# Patient Record
Sex: MALE | Race: WHITE | Employment: OTHER | URBAN - METROPOLITAN AREA
[De-identification: names, ages, dates, MRNs, and addresses within clinical notes are randomized per-mention and may not be internally consistent; named-entity substitution may affect disease eponyms.]

---

## 2017-01-03 ENCOUNTER — TELEPHONE (OUTPATIENT)
Dept: CARDIOLOGY CLINIC | Age: 82
End: 2017-01-03

## 2017-01-03 NOTE — TELEPHONE ENCOUNTER
Please call Rufina with [facility] at [phone].  She states that the RX for patients warfarin is incorrect and continues to be filled incorrectly.     Thank you, Faith

## 2017-01-03 NOTE — TELEPHONE ENCOUNTER
Please call Michael Rodriguez with [facility] at [phone]. She states that the RX for patients warfarin is incorrect and continues to be filled incorrectly.      Thank you, Ansley Dawson

## 2017-01-09 RX ORDER — WARFARIN 1 MG/1
TABLET ORAL
Qty: 90 TAB | Refills: 0 | Status: SHIPPED | OUTPATIENT
Start: 2017-01-09 | End: 2017-06-25 | Stop reason: SDUPTHER

## 2017-01-13 ENCOUNTER — CLINICAL SUPPORT (OUTPATIENT)
Dept: CARDIOLOGY CLINIC | Age: 82
End: 2017-01-13

## 2017-01-13 DIAGNOSIS — Z79.01 LONG TERM (CURRENT) USE OF ANTICOAGULANTS: Primary | ICD-10-CM

## 2017-01-13 DIAGNOSIS — I48.0 PAROXYSMAL ATRIAL FIBRILLATION (HCC): ICD-10-CM

## 2017-01-13 DIAGNOSIS — I10 ESSENTIAL HYPERTENSION: ICD-10-CM

## 2017-01-13 LAB
INR BLD: 2
PT POC: 22.5 SEC
VALID INTERNAL CONTROL?: YES

## 2017-01-20 RX ORDER — AMLODIPINE BESYLATE 5 MG/1
TABLET ORAL
Qty: 90 TAB | Refills: 0 | Status: SHIPPED | OUTPATIENT
Start: 2017-01-20 | End: 2017-04-18 | Stop reason: SDUPTHER

## 2017-02-06 RX ORDER — FLECAINIDE ACETATE 150 MG/1
TABLET ORAL
Qty: 180 TAB | Refills: 1 | Status: SHIPPED | OUTPATIENT
Start: 2017-02-06 | End: 2017-08-05 | Stop reason: SDUPTHER

## 2017-02-17 ENCOUNTER — CLINICAL SUPPORT (OUTPATIENT)
Dept: CARDIOLOGY CLINIC | Age: 82
End: 2017-02-17

## 2017-02-17 DIAGNOSIS — I48.0 PAROXYSMAL ATRIAL FIBRILLATION (HCC): ICD-10-CM

## 2017-02-17 DIAGNOSIS — G47.00 INSOMNIA, UNSPECIFIED TYPE: ICD-10-CM

## 2017-02-17 DIAGNOSIS — Z79.01 LONG TERM (CURRENT) USE OF ANTICOAGULANTS: Primary | ICD-10-CM

## 2017-02-17 DIAGNOSIS — I10 ESSENTIAL HYPERTENSION: ICD-10-CM

## 2017-02-17 LAB
INR BLD: 2
PT POC: 21.8 SEC
VALID INTERNAL CONTROL?: YES

## 2017-02-17 RX ORDER — ZOLPIDEM TARTRATE 5 MG/1
5 TABLET ORAL
Qty: 90 TAB | Refills: 0 | OUTPATIENT
Start: 2017-02-17 | End: 2017-05-12 | Stop reason: SDUPTHER

## 2017-02-17 NOTE — TELEPHONE ENCOUNTER
----- Message from Chaitanya Gramajo sent at 2/17/2017  3:25 PM EST -----  Regarding: Dr. Peggy Lynn  Pt requested refill on Rx(Zolpidem Tartrate 5 mg 90 day supply) called to  [facility]. Best contact number [phone].

## 2017-03-17 ENCOUNTER — CLINICAL SUPPORT (OUTPATIENT)
Dept: CARDIOLOGY CLINIC | Age: 82
End: 2017-03-17

## 2017-03-17 DIAGNOSIS — I48.0 PAROXYSMAL ATRIAL FIBRILLATION (HCC): ICD-10-CM

## 2017-03-17 DIAGNOSIS — Z79.01 LONG TERM (CURRENT) USE OF ANTICOAGULANTS: Primary | ICD-10-CM

## 2017-03-17 DIAGNOSIS — I10 ESSENTIAL HYPERTENSION: ICD-10-CM

## 2017-03-17 LAB
INR BLD: 1.1
PT POC: 13.5 SEC
VALID INTERNAL CONTROL?: YES

## 2017-03-28 ENCOUNTER — CLINICAL SUPPORT (OUTPATIENT)
Dept: CARDIOLOGY CLINIC | Age: 82
End: 2017-03-28

## 2017-03-28 DIAGNOSIS — I48.0 PAROXYSMAL ATRIAL FIBRILLATION (HCC): ICD-10-CM

## 2017-03-28 DIAGNOSIS — Z79.01 LONG TERM (CURRENT) USE OF ANTICOAGULANTS: Primary | ICD-10-CM

## 2017-03-28 DIAGNOSIS — I10 ESSENTIAL HYPERTENSION: ICD-10-CM

## 2017-03-28 LAB
INR BLD: 2
PT POC: 21.9 SEC
VALID INTERNAL CONTROL?: YES

## 2017-04-03 ENCOUNTER — TELEPHONE (OUTPATIENT)
Dept: INTERNAL MEDICINE CLINIC | Age: 82
End: 2017-04-03

## 2017-04-03 NOTE — TELEPHONE ENCOUNTER
Pt's wife Demetrio Franco states the patient's bowel incontinence has worsened.  He is still taking two tablets of the colestipol daily but they want to know what else can be done [phone]

## 2017-04-04 NOTE — TELEPHONE ENCOUNTER
Increase colestipol to 2 pills twice daily and also start a probiotic like Align.   appt if not improving

## 2017-05-02 ENCOUNTER — CLINICAL SUPPORT (OUTPATIENT)
Dept: CARDIOLOGY CLINIC | Age: 82
End: 2017-05-02

## 2017-05-02 DIAGNOSIS — I48.0 PAROXYSMAL ATRIAL FIBRILLATION (HCC): ICD-10-CM

## 2017-05-02 DIAGNOSIS — Z79.01 LONG TERM (CURRENT) USE OF ANTICOAGULANTS: Primary | ICD-10-CM

## 2017-05-02 DIAGNOSIS — I10 ESSENTIAL HYPERTENSION: ICD-10-CM

## 2017-05-02 LAB
INR BLD: 2
INR, EXTERNAL: 2 (ref 2–3)
PT POC: 21.9 SEC
VALID INTERNAL CONTROL?: YES

## 2017-05-12 ENCOUNTER — TELEPHONE (OUTPATIENT)
Dept: INTERNAL MEDICINE CLINIC | Age: 82
End: 2017-05-12

## 2017-05-12 DIAGNOSIS — G47.00 INSOMNIA, UNSPECIFIED TYPE: ICD-10-CM

## 2017-05-12 RX ORDER — ZOLPIDEM TARTRATE 5 MG/1
5 TABLET ORAL
Qty: 90 TAB | Refills: 0 | OUTPATIENT
Start: 2017-05-12 | End: 2017-07-03 | Stop reason: DRUGHIGH

## 2017-05-12 NOTE — TELEPHONE ENCOUNTER
----- Message from [facility] sent at 5/12/2017 12:09 PM EDT -----  Regarding: Dr. Carmenza Brown  Pt states that he needs a refill --90 day supply on \"zolpidem tartrate\" 5 mg tablets. He takes one per day. Pt request that we send the refill request to the [facility] on [address]. He says that we should have the number on file. Pt can be reached at [phone].

## 2017-05-17 ENCOUNTER — HOSPITAL ENCOUNTER (OUTPATIENT)
Dept: GENERAL RADIOLOGY | Age: 82
Discharge: HOME | End: 2017-05-17
Attending: PHYSICAL MEDICINE & REHABILITATION
Payer: MEDICARE

## 2017-05-17 ENCOUNTER — HOSPITAL ENCOUNTER (OUTPATIENT)
Dept: MRI IMAGING | Age: 82
Discharge: HOME | End: 2017-05-17
Attending: PHYSICAL MEDICINE & REHABILITATION
Payer: MEDICARE

## 2017-05-17 DIAGNOSIS — Z13.89 ENCOUNTER FOR IMAGING TO SCREEN FOR METAL PRIOR TO MRI: ICD-10-CM

## 2017-05-17 DIAGNOSIS — M51.26 DISPLACEMENT OF LUMBAR INTERVERTEBRAL DISC WITHOUT MYELOPATHY: ICD-10-CM

## 2017-05-17 DIAGNOSIS — M48.061 SPINAL STENOSIS, LUMBAR REGION, WITHOUT NEUROGENIC CLAUDICATION: ICD-10-CM

## 2017-05-17 PROCEDURE — 70030 X-RAY EYE FOR FOREIGN BODY: CPT

## 2017-05-22 ENCOUNTER — HOSPITAL ENCOUNTER (OUTPATIENT)
Dept: CT IMAGING | Age: 82
Discharge: HOME | End: 2017-05-22
Attending: PHYSICAL MEDICINE & REHABILITATION
Payer: MEDICARE

## 2017-05-22 DIAGNOSIS — M41.26 IDIOPATHIC SCOLIOSIS OF LUMBAR SPINE: ICD-10-CM

## 2017-05-22 DIAGNOSIS — M48.061 LUMBAR SPINAL STENOSIS: ICD-10-CM

## 2017-05-22 PROCEDURE — 72131 CT LUMBAR SPINE W/O DYE: CPT

## 2017-05-25 RX ORDER — TAMSULOSIN HYDROCHLORIDE 0.4 MG/1
CAPSULE ORAL
Qty: 90 CAP | Refills: 1 | Status: SHIPPED | OUTPATIENT
Start: 2017-05-25 | End: 2017-10-11 | Stop reason: SDUPTHER

## 2017-06-08 ENCOUNTER — CLINICAL SUPPORT (OUTPATIENT)
Dept: CARDIOLOGY CLINIC | Age: 82
End: 2017-06-08

## 2017-06-08 DIAGNOSIS — I48.0 PAROXYSMAL ATRIAL FIBRILLATION (HCC): ICD-10-CM

## 2017-06-08 DIAGNOSIS — Z79.01 LONG TERM (CURRENT) USE OF ANTICOAGULANTS: Primary | ICD-10-CM

## 2017-06-08 DIAGNOSIS — I10 ESSENTIAL HYPERTENSION: ICD-10-CM

## 2017-06-08 LAB
INR BLD: 2.3
INR, EXTERNAL: 2.3 (ref 2–3)
PT POC: 28.2 SECONDS
VALID INTERNAL CONTROL?: YES

## 2017-06-13 ENCOUNTER — TELEPHONE (OUTPATIENT)
Dept: INTERNAL MEDICINE CLINIC | Age: 82
End: 2017-06-13

## 2017-06-13 NOTE — TELEPHONE ENCOUNTER
----- Message from Chemo Montelongo sent at 6/13/2017 11:56 AM EDT -----  Regarding: Dr. Jeanine Chadwick  Patient is calling about a pre authorization for the medication Zolpidem Caltrate 5 milligrams. His number is [phone].

## 2017-06-15 NOTE — TELEPHONE ENCOUNTER
Spoke with patient. Told him we are working on his authorization. Form placed on Joyce's desk for signature.

## 2017-06-16 NOTE — TELEPHONE ENCOUNTER
Patient made aware that his ins company has asked if he has tried other alternatives on the formulary. He answered \"no\". I told he we will see how they respond and get back to him.

## 2017-06-28 ENCOUNTER — OFFICE VISIT (OUTPATIENT)
Dept: CARDIOLOGY CLINIC | Age: 82
End: 2017-06-28

## 2017-06-28 VITALS
OXYGEN SATURATION: 96 % | WEIGHT: 223.2 LBS | HEART RATE: 68 BPM | SYSTOLIC BLOOD PRESSURE: 130 MMHG | BODY MASS INDEX: 32.96 KG/M2 | DIASTOLIC BLOOD PRESSURE: 70 MMHG

## 2017-06-28 DIAGNOSIS — I45.4 IVCD (INTRAVENTRICULAR CONDUCTION DEFECT): ICD-10-CM

## 2017-06-28 DIAGNOSIS — Z51.81 ENCOUNTER FOR MONITORING FLECAINIDE THERAPY: ICD-10-CM

## 2017-06-28 DIAGNOSIS — Z79.899 ENCOUNTER FOR MONITORING FLECAINIDE THERAPY: ICD-10-CM

## 2017-06-28 DIAGNOSIS — I48.0 PAROXYSMAL ATRIAL FIBRILLATION (HCC): Primary | ICD-10-CM

## 2017-06-28 DIAGNOSIS — I10 ESSENTIAL HYPERTENSION: ICD-10-CM

## 2017-06-28 DIAGNOSIS — Z79.01 LONG TERM CURRENT USE OF ANTICOAGULANTS WITH INR GOAL OF 2.0-3.0: ICD-10-CM

## 2017-06-28 NOTE — PROGRESS NOTES
Pt has no complaints/no cardiac concerns    Chief Complaint   Patient presents with    Hypertension    Irregular Heart Beat     PAF     Visit Vitals    /70 (BP 1 Location: Right arm, BP Patient Position: Sitting)    Pulse 68    Wt 223 lb 3.2 oz (101.2 kg)    SpO2 96%    BMI 32.96 kg/m2

## 2017-06-28 NOTE — PROGRESS NOTES
Kyler Pandarambo Salas     7/15/1931       Chandler Butt MD, [facility]  Date of Visit-6/28/2017   PCP is Thayne Eisenmenger, MD   [facility]  [facility]  HPI:  Soy Mckeon. is a 80 y.o. male   Follow up of PAF( on class 1C drug), HTN  He is doing well currently from a cardiac standpoint. Denies chest pain, edema, syncope or shortness of breath at rest, has no tachycardia, palpitations or sense of arrhythmia. Assessment/Plan:     PAF  -asymptomatic on flecainide  150 mg bid     Class 1C drug monitoring,IVCD   ----EKG: sinus bradycardia IVCD with QTC of 457    HTN-CCB  -at goal   No bleeding on warfarin, goal 2-3     Lab Results   Component Value Date/Time    LDL, calculated 132 09/29/2015 10:23 AM    --no recent lipids or Creatinine-will call patient to order  --advise to lose weight  Follow up in 6 months     Future Appointments  Date Time Provider Ezekiel Munoz   7/12/2017 10:20 AM COUMADIN, STFRANCIS [facility] FR[facility] SCHED   1/17/2018 10:40 AM Katerin Parada MD [facility] SCHED       Impression:   1. Paroxysmal atrial fibrillation (HCC)    2. IVCD (intraventricular conduction defect)    3. Essential hypertension       Cardiac History:   ECHO 9-10-14=  Left ventricle: Systolic function was normal by EF (biplane method of  disks). Ejection fraction was estimated to be 66 %. There were no regional  wall motion abnormalities. Doppler parameters were consistent with  abnormal left ventricular relaxation (grade 1 diastolic dysfunction). Left atrium: The atrium was mildly to moderately dilated     ROS-except as noted above. . A complete cardiac and respiratory are reviewed and negative except as above ; Resp-denies wheezing  or productive cough,.  Const- No unusual weight loss or fever; Neuro-no recent seizure or CVA ; GI- No BRBPR, abdom pain, bloating ; - no  hematuria   see supplement sheet, initialed and to be scanned by staff  Past Medical History: Diagnosis Date    Advanced care planning/counseling discussion 10/8/2015    Basal cell carcinoma, face     prior hx, now sees Dr. Ricci Sow [facility]) 2012    Dr. George Bird. cyto 9/2014, 2/2016. s/p surgery, BCG irrigation [place]. saw Dr. Jordan Filter BPH with obstruction/lower urinary tract symptoms     Chronic lower back pain     injection in [place]    Chronic neck pain     limited motion to side    Depression, major     Dupuytren's contracture of left hand     5th finger    Epistaxis 2013    saw ENT at [address] [place] [address] incontinence     with loose stools    Hearing loss     has hearing aids    HTN (hypertension)     Insomnia     IVCD (intraventricular conduction defect) 11/30/2015    Long term current use of anticoagulants with INR goal of 2.0-3.0     Afib    Loose stools 7/18/2014    Paroxysmal atrial fibrillation (HCC)     EF 66%, 1-2+ LAE on flecainide;Dr. Lauralee Aschoff. saw [facility] Osiris Hoffman [facility] Peripheral neuropathy ([facility] [address]     SCC (squamous cell carcinoma), face     prior hx, now sees Dr. Ramesh Gonzales Urothelial carcinoma of right distal ureter ([facility] [address] 5/1/15    excision Dr. Finesse Avendano. low grade, papillary. repeat 3 month      Social Hx= reports that he has quit smoking. He has never used smokeless tobacco. He reports that he drinks alcohol. Exam and Labs:    Visit Vitals    /70 (BP 1 Location: Right arm, BP Patient Position: Sitting)    Pulse 68    Wt 223 lb 3.2 oz (101.2 kg)    SpO2 96%    BMI 32.96 kg/m2      Constitutional:  NAD, comfortable  Head: [place],AT. Eyes: No scleral icterus. Neck:  Neck supple. No JVD present. Throat: moist mucous membranes. Chest: Effort normal & normal respiratory excursion . Neurological: alert, conversant and oriented . Skin: Skin is not cold. No obvious systemic rash noted. Not diaphoretic. No erythema. Psychiatric:  Grossly normal mood and affect. Behavior appears normal. Extremities:  no clubbing or cyanosis.  Abdomen: non distended    Lungs:breath sounds normal. No stridor. distress, wheezes or  Rales. Heart:    normal rate, regular rhythm, normal S1, S2, no murmurs, rubs, clicks or gallops , PMI non displaced. Edema: Edema is none. No results found for this or any previous visit. Lab Results   Component Value Date/Time    Sodium 142 01/07/2016 10:39 AM    Potassium 4.3 01/07/2016 10:39 AM    Chloride 103 01/07/2016 10:39 AM    CO2 23 01/07/2016 10:39 AM    Glucose 91 01/07/2016 10:39 AM    BUN 24 01/07/2016 10:39 AM    Creatinine 1.29 01/07/2016 10:39 AM    BUN/Creatinine ratio 19 01/07/2016 10:39 AM    GFR est AA 58 01/07/2016 10:39 AM    GFR est non-AA 51 01/07/2016 10:39 AM    Calcium 8.7 01/07/2016 10:39 AM      Wt Readings from Last 3 Encounters:   06/28/17 223 lb 3.2 oz (101.2 kg)   12/07/16 220 lb (99.8 kg)   08/24/16 218 lb (98.9 kg)      BP Readings from Last 3 Encounters:   06/28/17 130/70   12/07/16 120/70   08/24/16 118/71        Current Outpatient Prescriptions   Medication Sig    warfarin (COUMADIN) 1 mg tablet TAKE 1 TABLET DAILY OR AS DIRECTED BY THE COUMADIN CLINIC    tamsulosin (FLOMAX) 0.4 mg capsule TAKE 1 CAPSULE DAILY    finasteride (PROSCAR) 5 mg tablet TAKE 1 TABLET DAILY    zolpidem (AMBIEN) 5 mg tablet Take 1 Tab by mouth nightly as needed for Sleep.  amLODIPine (NORVASC) 5 mg tablet TAKE 1 TABLET DAILY    warfarin (COUMADIN) 5 mg tablet TAKE 1 TABLET DAILY OR AS DIRECTED (Patient taking differently: TAKE 1.5 TABLET DAILY OR AS DIRECTED)    gabapentin (NEURONTIN) 300 mg capsule TAKE 1 CAPSULE DAILY (Patient taking differently: TAKE 2 CAPSULE DAILY)    colestipol (COLESTID) 1 gram tablet TAKE 2 TABLETS TWICE A DAY    flecainide (TAMBOCOR) 150 mg tablet TAKE 1 TABLET TWICE A DAY    valsartan (DIOVAN) 320 mg tablet Take 1 Tab by mouth daily. Indications: HYPERTENSION    sertraline (ZOLOFT) 100 mg tablet TAKE 1 TABLET DAILY     No current facility-administered medications for this visit.        Impression see above.    Written by Archie Irwin, as dictated by Adonis Ocampo MD.

## 2017-07-03 ENCOUNTER — OFFICE VISIT (OUTPATIENT)
Dept: INTERNAL MEDICINE CLINIC | Age: 82
End: 2017-07-03

## 2017-07-03 VITALS
OXYGEN SATURATION: 94 % | SYSTOLIC BLOOD PRESSURE: 109 MMHG | HEART RATE: 66 BPM | HEIGHT: 69 IN | TEMPERATURE: 98.1 F | WEIGHT: 220 LBS | BODY MASS INDEX: 32.58 KG/M2 | DIASTOLIC BLOOD PRESSURE: 69 MMHG | RESPIRATION RATE: 12 BRPM

## 2017-07-03 DIAGNOSIS — R19.5 LOOSE STOOLS: ICD-10-CM

## 2017-07-03 DIAGNOSIS — Z13.31 SCREENING FOR DEPRESSION: ICD-10-CM

## 2017-07-03 DIAGNOSIS — G25.81 RLS (RESTLESS LEGS SYNDROME): ICD-10-CM

## 2017-07-03 DIAGNOSIS — Z00.00 ROUTINE GENERAL MEDICAL EXAMINATION AT A HEALTH CARE FACILITY: ICD-10-CM

## 2017-07-03 DIAGNOSIS — Z00.00 MEDICARE ANNUAL WELLNESS VISIT, SUBSEQUENT: Primary | ICD-10-CM

## 2017-07-03 DIAGNOSIS — Z71.89 ADVANCED CARE PLANNING/COUNSELING DISCUSSION: ICD-10-CM

## 2017-07-03 DIAGNOSIS — M54.50 CHRONIC BILATERAL LOW BACK PAIN WITHOUT SCIATICA: ICD-10-CM

## 2017-07-03 DIAGNOSIS — K64.4 EXTERNAL HEMORRHOID: ICD-10-CM

## 2017-07-03 DIAGNOSIS — G47.00 INSOMNIA, UNSPECIFIED TYPE: ICD-10-CM

## 2017-07-03 DIAGNOSIS — Z13.39 SCREENING FOR ALCOHOLISM: ICD-10-CM

## 2017-07-03 DIAGNOSIS — G89.29 CHRONIC BILATERAL LOW BACK PAIN WITHOUT SCIATICA: ICD-10-CM

## 2017-07-03 DIAGNOSIS — I10 ESSENTIAL HYPERTENSION: ICD-10-CM

## 2017-07-03 RX ORDER — GABAPENTIN 300 MG/1
300 CAPSULE ORAL 2 TIMES DAILY
Qty: 180 CAP | Refills: 1
Start: 2017-07-03 | End: 2017-09-26 | Stop reason: SDUPTHER

## 2017-07-03 RX ORDER — HYDROCORTISONE ACETATE 25 MG/1
25 SUPPOSITORY RECTAL 2 TIMES DAILY
Qty: 24 SUPPOSITORY | Refills: 2 | Status: SHIPPED | OUTPATIENT
Start: 2017-07-03 | End: 2017-10-11 | Stop reason: SINTOL

## 2017-07-03 RX ORDER — TRAZODONE HYDROCHLORIDE 50 MG/1
TABLET ORAL
Qty: 60 TAB | Refills: 1 | Status: SHIPPED | OUTPATIENT
Start: 2017-07-03 | End: 2017-08-28 | Stop reason: SDUPTHER

## 2017-07-03 NOTE — PATIENT INSTRUCTIONS
Medicare Part B Preventive Services Guidelines/Limitations Date last completed and Frequency Due Date   Cardiovascular Screening Blood Tests (every 5 years)  Total cholesterol, HDL, Triglycerides Order as a panel if possible Completed 9/2015    As recommended by your PCP As recommended by your PCP or Specialist   Colorectal Cancer Screening  -Fecal occult blood test (annual)  -Flexible sigmoidoscopy (5y)  -Screening colonoscopy (10y)  -Barium Enema Age 49-80; After age [de-identified] if history of abnormal results As recommended by your PCP or Specialist  As recommended by your PCP or Specialist     Counseling to Prevent Tobacco Use (up to 8 sessions per year)  - Counseling greater than 3 and up to 10 minutes  - Counseling greater than 10 minutes Patients must be asymptomatic of tobacco-related conditions to receive as preventive service N/A N/A   Diabetes Screening Tests (at least every 3 years, Medicare covers annually or at 6-month intervals for prediabetic patients)    Fasting blood sugar (FBS) or glucose tolerance test (GTT) Patient must be diagnosed with one of the following:  -Hypertension, Dyslipidemia, obesity, previous impaired FBS or GTT  Or any two of the following: overweight, FH of diabetes, age ? 72, history of gestational diabetes, birth of baby weighing more than 9 pounds Completed 2/2016    Recommended every 3 years for non-diabetics    Recommended every 3-6 months for Pre-Diabetics and Diabetics As recommended by your PCP or Specialist     Glaucoma Screening (no USPSTF recommendation) Diabetes mellitus, family history, , age 48 or over,  American, age 72 or over Completed within the last year    Recommended annually As recommended by your PCP or Specialist   Prostate Cancer Screening (annually up to age 76)  - Digital rectal exam (AMIRA)  - Prostate specific antigen (PSA) Annually (age 48 or over), AMIRA not paid separately when covered E/M service is provided on same date As recommended by your PCP or Specialist    Recommended annually to age 76 As recommended by your PCP or Specialist     Seasonal Influenza Vaccination (annually)  Completed 11/2016    Recommended Annually Due Fall 2017   TDAP Vaccination  As recommended by your PCP or Specialist    Recommended every 10 years As recommended by your PCP or Specialist   Zoster (Shingles) Vaccination Covered by Medicare Part D through the pharmacy- PCP provides prescription Never received    Recommended once over age 48    As recommended by your PCP or Specialist   Pneumococcal Vaccination (once after 72)  Pneumo 23-   Recommended once over the age of 72    Prevnar 15- 10/2015 Recommended once over the age of 72 As recommended by your PCP or Specialist    Complete           Ultrasound Screening for Abdominal Aortic Aneurysm (AAA) (once) Patient must be referred through IPPE and not have had a screening for abdominal aortic aneurysm before under Medicare. Limited to patients who meet one of the following criteria:  - Men who are 73-68 years old and have smoked more than 100 cigarettes in their lifetime.  -Anyone with a FH of AAA  -Anyone recommended for screening by USPSTF Not indicated unless recommended by PCP   Not indicated unless recommended by PCP     Family Practice Management 2011    Please bring a copy of your completed advance medical directive to the office so it may be added to your medical record. Thank you. If you have any questions or concerns please feel free to contact me at [phone]. It was a pleasure meeting you today and participating in your healthcare.   Nadia Merritt RN

## 2017-07-03 NOTE — PROGRESS NOTES
Present for a follow up. Wants to discuss bowel movements on Colestipol. Still problems with LBP. [facility] won't [facility] Zol[facility]em. States discussed with the office. Wonders why he is on Gabapentin. Does have tingling in feet. Legs move.

## 2017-07-03 NOTE — PROGRESS NOTES
Nurse Navigator Medicare Wellness Visit performed by DARLEEN Rajput RN    This is a Subsequent Andrew Visit providing Personalized Prevention Plan Services (PPPS) (Performed 12 months after initial AWV and PPPS )    I have reviewed the patient's medical history in detail and updated the computerized patient record. History     Past Medical History:   Diagnosis Date    Advanced care planning/counseling discussion 10/8/2015    Basal cell carcinoma, face     prior hx, now sees Dr. Julieth Null [facility]) 2012    Dr. Michael Pappas. cyto 9/2014, 2/2016. s/p surgery, BCG irrigation [place]. saw Dr. Jakob Wilcox BPH with obstruction/lower urinary tract symptoms     Chronic lower back pain     injection in [place]    Chronic neck pain     limited motion to side    Depression, major     Dupuytren's contracture of left hand     5th finger    Epistaxis 2013    saw ENT at [address] [place] [address]     with loose stools    Hearing loss     has hearing aids    HTN (hypertension)     Insomnia     IVCD (intraventricular conduction defect) 11/30/2015    Long term current use of anticoagulants with INR goal of 2.0-3.0     Afib    Loose stools 7/18/2014    Paroxysmal atrial fibrillation (HCC)     EF 66%, 1-2+ LAE on flecainide;Dr. Deni Ferraro. saw [facility] Ronni [facility] Peripheral neuropathy [facility])     RLS (restless legs syndrome) 7/3/2017    SCC (squamous cell carcinoma), face     prior hx, now sees Dr. Tobias Suazo Urothelial carcinoma of right distal ureter ([facility] [address] 5/1/15    excision Dr. Gabino Madrid. low grade, papillary. repeat 3 month      Past Surgical History:   Procedure Laterality Date    CYSTOURETHROSCOPY  9/2014    Dr. Cassidy Hooper CYSTOURETHROSCOPY  5/1/15    low grade right pap urothelial cancer    CYSTOURETHROSCOPY  2/29/16    HX BLADDER REPAIR  2012    cancer    HX COLONOSCOPY  2012    polyps. repeat 2017?     HX HERNIA REPAIR      periumibical    HX RETINAL DETACHMENT REPAIR Right Current Outpatient Prescriptions   Medication Sig Dispense Refill    gabapentin (NEURONTIN) 300 mg capsule Take 1 Cap by mouth two (2) times a day. Take 300-400 mg in AM, 300-600 mg at night for restless legs, neuropathy, back pain 180 Cap 1    traZODone (DESYREL) 50 mg tablet Take 1/2-2 pills () nightly as needed. Replaces ambien for sleep 60 Tab 1    wheat dextrin (BENEFIBER) 3 gram/4 gram packet Take 1 Packet by mouth three (3) times daily as needed. 30 Packet 3    hydrocortisone (ANUSOL-HC) 25 mg supp Insert 1 Suppository into rectum two (2) times a day. 24 Suppository 2    witch hazel-glycerin (TUCKS) 12.5-50 % pad 1 Pad by PeriANAL route as needed for Pain. 1 Box 0    warfarin (COUMADIN) 1 mg tablet TAKE 1 TABLET DAILY OR AS DIRECTED BY THE COUMADIN CLINIC 90 Tab 1    tamsulosin (FLOMAX) 0.4 mg capsule TAKE 1 CAPSULE DAILY 90 Cap 1    finasteride (PROSCAR) 5 mg tablet TAKE 1 TABLET DAILY 90 Tab 3    amLODIPine (NORVASC) 5 mg tablet TAKE 1 TABLET DAILY 90 Tab 3    warfarin (COUMADIN) 5 mg tablet TAKE 1 TABLET DAILY OR AS DIRECTED (Patient taking differently: TAKE 1.5 TABLET DAILY OR AS DIRECTED) 90 Tab 3    colestipol (COLESTID) 1 gram tablet TAKE 2 TABLETS TWICE A  Tab 1    flecainide (TAMBOCOR) 150 mg tablet TAKE 1 TABLET TWICE A  Tab 1    sertraline (ZOLOFT) 100 mg tablet TAKE 1 TABLET DAILY 90 Tab 2    valsartan (DIOVAN) 320 mg tablet Take 1 Tab by mouth daily. Indications: HYPERTENSION 90 Tab 2     Allergies   Allergen Reactions    Hay Fever And Allergy Relief Other (comments)     Watery eyes     History reviewed. No pertinent family history.   Social History   Substance Use Topics    Smoking status: Former Smoker    Smokeless tobacco: Never Used      Comment: socially    Alcohol use Yes      Comment: occ     Patient Active Problem List   Diagnosis Code    Paroxysmal atrial fibrillation (HCC) I48.0    BPH with obstruction/lower urinary tract symptoms N40.1, N13.8  Depression, major F32.9    Long term current use of anticoagulants with INR goal of 2.0-3.0 Z79.01    Hearing loss H91.90    Insomnia G47.00    Bladder cancer (HCC) C67.9    Encounter for monitoring flecainide therapy Z51.81, Z79.899    Loose stools R19.5    Fecal incontinence R15.9    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) C66.1    Chronic lower back pain M54.5, G89.29    Peripheral neuropathy (HCC) G62.9    Essential hypertension I10    Advanced care planning/counseling discussion Z71.89    IVCD (intraventricular conduction defect) I45.4    Chronic neck pain M54.2, G89.29    Dupuytren's contracture of left hand M72.0    RLS (restless legs syndrome) G25.81       Depression Risk Factor Screening:   Patient denies feelings of being down, depressed or hopeless at this time. Patient states that they have a strong support system within their family & friends. PHQ over the last two weeks 7/3/2017   PHQ Not Done -   Little interest or pleasure in doing things Not at all   Feeling down, depressed or hopeless Not at all   Total Score PHQ 2 0     Alcohol Risk Factor Screening: On any occasion during the past 3 months, have you had more than 4 drinks containing alcohol? No    Do you average more than 14 drinks per week? No        Functional Ability and Level of Safety:     Hearing Loss   normal-to-mild    Activities of Daily Living   Self-care. Patient states that he lives with his wife in a private residence. Patient states independence in all ADLs & denies the use of assistive devices for ambulation. NN encouraged patient to continue and/ or introduce routine physical exercise into their daily routine as applicable & as recommended by PCP. Patient verbalized understanding & agreement to take this into consideration.    Requires assistance with:   ADL Assessment 7/3/2017   Feeding yourself No Help Needed   Getting from bed to chair No Help Needed   Getting dressed No Help Needed   Bathing or showering No Help Needed   Walk across the room (includes cane/walker) No Help Needed   Using the telphone No Help Needed   Taking your medications No Help Needed   Preparing meals No Help Needed   Managing money (expenses/bills) No Help Needed   Moderately strenuous housework (laundry) No Help Needed   Shopping for personal items (toiletries/medicines) No Help Needed   Shopping for groceries No Help Needed   Driving No Help Needed   Climbing a flight of stairs No Help Needed   Getting to places beyond walking distances No Help Needed       Fall Risk   Fall Risk Assessment, last 12 mths 7/3/2017   Able to walk? Yes   Fall in past 12 months? No   Patient denies falls within the past year & verbalizes awareness of fall prevention strategies. Abuse Screen   Patient is not abused     Abuse Screening Questionnaire 7/3/2017   Do you ever feel afraid of your partner? N   Are you in a relationship with someone who physically or mentally threatens you? N   Is it safe for you to go home? Y       Review of Systems   Medicare Wellness Visit    Physical Examination     Evaluation of Cognitive Function:  Mood/affect:  happy  Appearance: age appropriate and casually dressed  Family member/caregiver input: None present; however, patient reports a strong support system. No exam performed today, Medicare Wellness Visit. Patient Care Team:  Radha Avelar MD as PCP - General (Internal Medicine)    Advice/Referrals/Counseling   Education and counseling provided:  End-of-Life planning (with patient's consent)  Pneumococcal Vaccine  Influenza Vaccine  Prostate cancer screening tests (PSA, covered annually)  Colorectal cancer screening tests  Screening for glaucoma  tdap & shingles vaccinations      Assessment/Plan   1. Patient states that a completed Advanced Medical Directive is at home. NN encouraged patient to bring a copy of the completed Advanced Medical Directive to the office for scanning into the medical record.  Patient verbalized understanding & agreement. 2. Patient is up to date on the following immunizations:  Immunization History   Administered Date(s) Administered    Influenza High Dose Vaccine PF 11/04/2016    Influenza Vaccine 10/20/2014    Influenza Vaccine (Quad) PF 10/08/2015    Pneumococcal Conjugate (PCV-13) 10/08/2015   Patient confirmed the aforementioned preventative immunization dates are correct. Patient is unable to recall the date of their last tdap & pneumovax 23 vaccines. NN encouraged patient to check home records & if information obtained, to please notify PCP's office with the details. Patient verbalized agreement. Patient denies receiving a shingles vaccine in the past & patient denies ever having a case of shingles in the past. Patient's health maintenance immunization record has been updated & is current. 3. Due to the patient's age, screening PSA's & screening colonoscopies are no longer indicated unless recommended by PCP or a specialist.    4. Patient wears corrective lenses. Patient reports having a routine eye exam & glaucoma screening within the last year performed by Dr. August Underwood at [facility]. [facility] faxed requesting a copy of patient's last eye exam with glaucoma screening with patient's verbal approval.     Patient verbalized understanding of all information discussed. Patient was given the opportunity to ask questions. Medication reconciliation completed by MA/ LPN and reviewed by PCP. Patient provided AVS, either a printed version or electronic version in [employer], which includes Medicare Wellness Preventative Screening Table.

## 2017-07-03 NOTE — MR AVS SNAPSHOT
Visit Information Date & Time Provider Department Dept. Phone Encounter #  
 7/3/2017 10:30 AM Josie Power MD [facility] [address] [id number] Your Appointments 7/12/2017 10:20 AM  
COUMADIN CLINIC with PREMA GIL  
[facility] ([facility] SCHEDULING) Appt Note: f/u sov$0 crf 06/08,17  
 [address] [address] [facility]  
[phone]  
  
   
 [address] [place] [address]  
  
    
 1/17/2018 10:40 AM  
ESTABLISHED PATIENT with Savannah Montes MD  
[facility] ([facility]) Appt Note: 6 mo fu  
 [address] [place] [address] [address] [facility]  
[address] [place] [address] [address] Upcoming Health Maintenance Date Due DTaP/Tdap/Td series (1 - Tdap) 7/15/1952 Pneumococcal 65+ High/Highest Risk (2 of 2 - PPSV23) 12/3/2015 GLAUCOMA SCREENING Q2Y 1/1/2017 INFLUENZA AGE 9 TO ADULT 8/1/2017 MEDICARE YEARLY EXAM 7/4/2018 Allergies as of 7/3/2017  Review Complete On: 7/3/2017 By: Nancy Briceno Severity Noted Reaction Type Reactions Hay Fever And Allergy Relief  06/28/2017    Other (comments) Watery eyes Current Immunizations  Reviewed on 10/8/2015 Name Date Influenza High Dose Vaccine PF 11/4/2016 Influenza Vaccine 10/20/2014 Influenza Vaccine (Quad) PF 10/8/2015 Pneumococcal Conjugate (PCV-13) 10/8/2015 Not reviewed this visit You Were Diagnosed With   
  
 Codes Comments RLS (restless legs syndrome)    -  Primary ICD-10-CM: G25.81 ICD-9-CM: 333.94 Essential hypertension     ICD-10-CM: I10 
ICD-9-CM: 401.9 Advanced care planning/counseling discussion     ICD-10-CM: Z71.89 ICD-9-CM: V65.49 Insomnia, unspecified type     ICD-10-CM: G47.00 ICD-9-CM: 780.52   
 Chronic bilateral low back pain without sciatica     ICD-10-CM: M54.5, G89.29 ICD-9-CM: 724.2, 338.29 Loose stools     ICD-10-CM: R19.5 ICD-9-CM: 787.7 External hemorrhoid     ICD-10-CM: K64.4 ICD-9-CM: 442. 3 Vitals BP Pulse Temp Resp Height(growth percentile) Weight(growth percentile) 109/69 (BP 1 Location: Left arm, BP Patient Position: Sitting) 66 98.1 °F (36.7 °C) (Oral) 12 5' 9\" (1.753 m) 220 lb (99.8 kg) SpO2 BMI Smoking Status 94% 32.49 kg/m2 Former Smoker Vitals History BMI and BSA Data Body Mass Index Body Surface Area  
 32.49 kg/m 2 2.2 m 2 Preferred Pharmacy Pharmacy Name Phone [facility][id number] [facility], [address] [phone] Your Updated Medication List  
  
   
This list is accurate as of: 7/3/17 11:26 AM.  Always use your most recent med list. amLODIPine 5 mg tablet Commonly known as:  Jacquetta Couch TAKE 1 TABLET DAILY  
  
 colestipol 1 gram tablet Commonly known as:  COLESTID  
TAKE 2 TABLETS TWICE A DAY  
  
 finasteride 5 mg tablet Commonly known as:  PROSCAR  
TAKE 1 TABLET DAILY flecainide 150 mg tablet Commonly known as:  TAMBOCOR  
TAKE 1 TABLET TWICE A DAY  
  
 gabapentin 300 mg capsule Commonly known as:  NEURONTIN Take 1 Cap by mouth two (2) times a day. Take 300-400 mg in AM, 300-600 mg at night for restless legs, neuropathy, back pain  
  
 hydrocortisone 25 mg Supp Commonly known as:  ANUSOL-HC Insert 1 Suppository into rectum two (2) times a day. sertraline 100 mg tablet Commonly known as:  ZOLOFT  
TAKE 1 TABLET DAILY  
  
 tamsulosin 0.4 mg capsule Commonly known as:  FLOMAX TAKE 1 CAPSULE DAILY  
  
 traZODone 50 mg tablet Commonly known as:  Uma Faes Take 1/2-2 pills () nightly as needed. Replaces ambien for sleep  
  
 valsartan 320 mg tablet Commonly known as:  DIOVAN Take 1 Tab by mouth daily.  Indications: HYPERTENSION  
 * warfarin 5 mg tablet Commonly known as:  COUMADIN  
TAKE 1 TABLET DAILY OR AS DIRECTED * warfarin 1 mg tablet Commonly known as:  COUMADIN  
TAKE 1 TABLET DAILY OR AS DIRECTED BY THE COUMADIN CLINIC  
  
 wheat dextrin 3 gram/4 gram packet Commonly known as:  Selvin Ortega Take 1 Packet by mouth three (3) times daily as needed. witch hazel-glycerin 12.5-50 % pad Commonly known as:  TUCKS  
1 Pad by PeriANAL route as needed for Pain. * Notice: This list has 2 medication(s) that are the same as other medications prescribed for you. Read the directions carefully, and ask your doctor or other care provider to review them with you. Prescriptions Sent to Pharmacy Refills  
 traZODone (DESYREL) 50 mg tablet 1 Sig: Take 1/2-2 pills () nightly as needed. Replaces ambien for sleep Class: Normal  
 Pharmacy: [facility] [address] Ph #: [phone]  
 hydrocortisone (ANUSOL-HC) 25 mg supp 2 Sig: Insert 1 Suppository into rectum two (2) times a day. Class: Normal  
 Pharmacy: [address] [place] [address] Ph #: [phone] Route: Rectal  
  
Patient Instructions Medicare Part B Preventive Services Guidelines/Limitations Date last completed and Frequency Due Date Cardiovascular Screening Blood Tests (every 5 years) Total cholesterol, HDL, Triglycerides Order as a panel if possible Completed 9/2015 As recommended by your PCP As recommended by your PCP or Specialist  
Colorectal Cancer Screening 
-Fecal occult blood test (annual) -Flexible sigmoidoscopy (5y) 
-Screening colonoscopy (10y) -Barium Enema Age 49-80; After age [de-identified] if history of abnormal results As recommended by your PCP or Specialist  As recommended by your PCP or Specialist 
  
Counseling to Prevent Tobacco Use (up to 8 sessions per year) - Counseling greater than 3 and up to 10 minutes - Counseling greater than 10 minutes Patients must be asymptomatic of tobacco-related conditions to receive as preventive service N/A N/A Diabetes Screening Tests (at least every 3 years, Medicare covers annually or at 6-month intervals for prediabetic patients) Fasting blood sugar (FBS) or glucose tolerance test (GTT) Patient must be diagnosed with one of the following: 
-Hypertension, Dyslipidemia, obesity, previous impaired FBS or GTT 
Or any two of the following: overweight, FH of diabetes, age ? 72, history of gestational diabetes, birth of baby weighing more than 9 pounds Completed 2/2016 Recommended every 3 years for non-diabetics Recommended every 3-6 months for Pre-Diabetics and Diabetics As recommended by your PCP or Specialist 
  
Glaucoma Screening (no USPSTF recommendation) Diabetes mellitus, family history, , age 48 or over,  American, age 72 or over Completed within the last year Recommended annually As recommended by your PCP or Specialist  
Prostate Cancer Screening (annually up to age 76) - Digital rectal exam (AMIRA) - Prostate specific antigen (PSA) Annually (age 48 or over), AMIRA not paid separately when covered E/M service is provided on same date As recommended by your PCP or Specialist 
 
Recommended annually to age 76 As recommended by your PCP or Specialist 
  
Seasonal Influenza Vaccination (annually)  Completed 11/2016 Recommended Annually Due Fall 2017 TDAP Vaccination  As recommended by your PCP or Specialist 
 
Recommended every 10 years As recommended by your PCP or Specialist  
Zoster (Shingles) Vaccination Covered by Medicare Part D through the pharmacy- PCP provides prescription Never received Recommended once over age 48 As recommended by your PCP or Specialist  
Pneumococcal Vaccination (once after 65)  Pneumo 23- Recommended once over the age of 72 Prevnar 13- 10/2015 Recommended once over the age of 72 As recommended by your PCP or Specialist 
 
Complete Ultrasound Screening for Abdominal Aortic Aneurysm (AAA) (once) Patient must be referred through IPPE and not have had a screening for abdominal aortic aneurysm before under Medicare. Limited to patients who meet one of the following criteria: 
- Men who are 73-68 years old and have smoked more than 100 cigarettes in their lifetime. 
-Anyone with a FH of AAA 
-Anyone recommended for screening by USPSTF Not indicated unless recommended by PCP Not indicated unless recommended by PCP Family Practice Management 2011 Please bring a copy of your completed advance medical directive to the office so it may be added to your medical record. Thank you. If you have any questions or concerns please feel free to contact me at [phone]. It was a pleasure meeting you today and participating in your healthcare. Lexi Grissom RN Please provide this summary of care documentation to your next provider. Your primary care clinician is listed as Louie Garcia. If you have any questions after today's visit, please call [phone].

## 2017-07-10 ENCOUNTER — TELEPHONE (OUTPATIENT)
Dept: CARDIOLOGY CLINIC | Age: 82
End: 2017-07-10

## 2017-07-10 DIAGNOSIS — I10 ESSENTIAL HYPERTENSION: Primary | ICD-10-CM

## 2017-07-10 DIAGNOSIS — I48.0 PAF (PAROXYSMAL ATRIAL FIBRILLATION) (HCC): ICD-10-CM

## 2017-07-10 NOTE — TELEPHONE ENCOUNTER
Call placed to pt (IDx2) about CMP and Lipid panel needing to be drawn per VO/WO of Dr. Suzanne Rosa. Informed pt that Dr. Suzanne Rosa would like this drawn within the next month or two. Pt expressed understanding and asked that this nurse fax the lab work to [address] [place] [address] at [facility]. Lab requisition faxed to [address] [place] [address] at [phone]. Opportunities for questions, clarifications, and concerns provided.

## 2017-07-12 ENCOUNTER — CLINICAL SUPPORT (OUTPATIENT)
Dept: CARDIOLOGY CLINIC | Age: 82
End: 2017-07-12

## 2017-07-12 DIAGNOSIS — I10 ESSENTIAL HYPERTENSION: ICD-10-CM

## 2017-07-12 DIAGNOSIS — I48.0 PAROXYSMAL ATRIAL FIBRILLATION (HCC): ICD-10-CM

## 2017-07-12 DIAGNOSIS — Z79.01 LONG TERM CURRENT USE OF ANTICOAGULANTS WITH INR GOAL OF 2.0-3.0: Primary | ICD-10-CM

## 2017-07-12 LAB
INR BLD: 2.2
INR, EXTERNAL: 2.2 (ref 2–3)
PT POC: 26.9 SECONDS
VALID INTERNAL CONTROL?: YES

## 2017-07-13 LAB
ALBUMIN SERPL-MCNC: 4 G/DL (ref 3.5–4.7)
ALBUMIN/GLOB SERPL: 1.4 {RATIO} (ref 1.2–2.2)
ALP SERPL-CCNC: 99 IU/L (ref 39–117)
ALT SERPL-CCNC: 11 IU/L (ref 0–44)
AST SERPL-CCNC: 19 IU/L (ref 0–40)
BILIRUB SERPL-MCNC: 0.4 MG/DL (ref 0–1.2)
BUN SERPL-MCNC: 22 MG/DL (ref 8–27)
BUN/CREAT SERPL: 15 (ref 10–24)
CALCIUM SERPL-MCNC: 8.5 MG/DL (ref 8.6–10.2)
CHLORIDE SERPL-SCNC: 104 MMOL/L (ref 96–106)
CHOLEST SERPL-MCNC: 192 MG/DL (ref 100–199)
CO2 SERPL-SCNC: 24 MMOL/L (ref 18–29)
CREAT SERPL-MCNC: 1.49 MG/DL (ref 0.76–1.27)
GLOBULIN SER CALC-MCNC: 2.9 G/DL (ref 1.5–4.5)
GLUCOSE SERPL-MCNC: 92 MG/DL (ref 65–99)
HDLC SERPL-MCNC: 58 MG/DL
INTERPRETATION, 910389: NORMAL
INTERPRETATION: NORMAL
LDLC SERPL CALC-MCNC: 116 MG/DL (ref 0–99)
PDF IMAGE, 910387: NORMAL
POTASSIUM SERPL-SCNC: 4.2 MMOL/L (ref 3.5–5.2)
PROT SERPL-MCNC: 6.9 G/DL (ref 6–8.5)
SODIUM SERPL-SCNC: 141 MMOL/L (ref 134–144)
TRIGL SERPL-MCNC: 92 MG/DL (ref 0–149)
VLDLC SERPL CALC-MCNC: 18 MG/DL (ref 5–40)

## 2017-07-19 RX ORDER — VALSARTAN 320 MG/1
TABLET ORAL
Qty: 90 TAB | Refills: 2 | Status: SHIPPED | OUTPATIENT
Start: 2017-07-19 | End: 2017-10-11 | Stop reason: SDUPTHER

## 2017-07-31 NOTE — PROGRESS NOTES
Mild kidney dysfunction   LDL is fair  Stable other labs  Including K is ok  I see a note to [facility] about stopping warfarin before procedure, not sure what procedure but if needed that is ok for 3 days prior  Let him know labs stable  8/16/2017  10:20 AM   COUMADIN, PREMA BOLES          FRANCIS SCHED  1/17/2018  10:40 AM   Josef Briceño MD        [address] [place] [address],[address]

## 2017-08-11 RX ORDER — FLECAINIDE ACETATE 150 MG/1
TABLET ORAL
Qty: 180 TAB | Refills: 2 | Status: SHIPPED | OUTPATIENT
Start: 2017-08-11 | End: 2018-05-08 | Stop reason: SDUPTHER

## 2017-08-11 NOTE — TELEPHONE ENCOUNTER
Requested Prescriptions     Signed Prescriptions Disp Refills    flecainide (TAMBOCOR) 150 mg tablet 180 Tab 2     Sig: TAKE 1 TABLET TWICE A DAY     Authorizing Provider: Claudene Ros     Ordering User: Eliane Malave     Verbal order per Dr. Lidia Kilpatrick. Labs of CMP done in 7/2107.      6 month follow up scheduled on 1-17-18.

## 2017-08-16 ENCOUNTER — CLINICAL SUPPORT (OUTPATIENT)
Dept: CARDIOLOGY CLINIC | Age: 82
End: 2017-08-16

## 2017-08-16 DIAGNOSIS — Z79.01 LONG TERM CURRENT USE OF ANTICOAGULANTS WITH INR GOAL OF 2.0-3.0: Primary | ICD-10-CM

## 2017-08-16 DIAGNOSIS — I10 ESSENTIAL HYPERTENSION: ICD-10-CM

## 2017-08-16 DIAGNOSIS — I48.0 PAROXYSMAL ATRIAL FIBRILLATION (HCC): ICD-10-CM

## 2017-08-16 LAB
INR BLD: 2
INR, EXTERNAL: 2 (ref 2–3)
PT POC: 24.4 SECONDS
VALID INTERNAL CONTROL?: YES

## 2017-08-16 RX ORDER — WARFARIN 1 MG/1
TABLET ORAL
Qty: 90 TAB | Refills: 1 | Status: SHIPPED | OUTPATIENT
Start: 2017-08-16 | End: 2018-01-17 | Stop reason: SDUPTHER

## 2017-08-16 RX ORDER — WARFARIN SODIUM 5 MG/1
TABLET ORAL
Qty: 135 TAB | Refills: 1 | Status: SHIPPED | OUTPATIENT
Start: 2017-08-16 | End: 2018-03-07 | Stop reason: SDUPTHER

## 2017-09-19 RX ORDER — MONTELUKAST SODIUM 4 MG/1
TABLET, CHEWABLE ORAL
Qty: 360 TAB | Refills: 1 | Status: SHIPPED | OUTPATIENT
Start: 2017-09-19 | End: 2018-03-18 | Stop reason: SDUPTHER

## 2017-09-22 ENCOUNTER — CLINICAL SUPPORT (OUTPATIENT)
Dept: CARDIOLOGY CLINIC | Age: 82
End: 2017-09-22

## 2017-09-22 DIAGNOSIS — Z79.01 LONG TERM CURRENT USE OF ANTICOAGULANTS WITH INR GOAL OF 2.0-3.0: Primary | ICD-10-CM

## 2017-09-22 DIAGNOSIS — I10 ESSENTIAL HYPERTENSION: ICD-10-CM

## 2017-09-22 DIAGNOSIS — I48.0 PAROXYSMAL ATRIAL FIBRILLATION (HCC): ICD-10-CM

## 2017-09-22 LAB
INR BLD: 2
INR, EXTERNAL: 2 (ref 2–3)
PT POC: 23.9 SECONDS
VALID INTERNAL CONTROL?: YES

## 2017-10-11 ENCOUNTER — OFFICE VISIT (OUTPATIENT)
Dept: INTERNAL MEDICINE CLINIC | Age: 82
End: 2017-10-11

## 2017-10-11 VITALS
BODY MASS INDEX: 32.58 KG/M2 | DIASTOLIC BLOOD PRESSURE: 67 MMHG | SYSTOLIC BLOOD PRESSURE: 108 MMHG | TEMPERATURE: 97.9 F | HEIGHT: 69 IN | WEIGHT: 220 LBS | HEART RATE: 69 BPM | RESPIRATION RATE: 12 BRPM

## 2017-10-11 DIAGNOSIS — Z23 ENCOUNTER FOR IMMUNIZATION: ICD-10-CM

## 2017-10-11 DIAGNOSIS — F32.1 MAJOR DEPRESSIVE DISORDER, SINGLE EPISODE, MODERATE (HCC): ICD-10-CM

## 2017-10-11 DIAGNOSIS — H61.23 BILATERAL IMPACTED CERUMEN: ICD-10-CM

## 2017-10-11 DIAGNOSIS — I10 ESSENTIAL HYPERTENSION: Primary | ICD-10-CM

## 2017-10-11 DIAGNOSIS — R53.83 FATIGUE, UNSPECIFIED TYPE: ICD-10-CM

## 2017-10-11 DIAGNOSIS — G47.00 INSOMNIA, UNSPECIFIED TYPE: ICD-10-CM

## 2017-10-11 DIAGNOSIS — G60.9 IDIOPATHIC PERIPHERAL NEUROPATHY: ICD-10-CM

## 2017-10-11 RX ORDER — SERTRALINE HYDROCHLORIDE 100 MG/1
50 TABLET, FILM COATED ORAL DAILY
Qty: 90 TAB | Refills: 0
Start: 2017-10-11 | End: 2017-10-27 | Stop reason: SDUPTHER

## 2017-10-11 RX ORDER — VALSARTAN 160 MG/1
TABLET ORAL
Qty: 90 TAB | Refills: 1 | Status: SHIPPED | COMMUNITY
Start: 2017-10-11 | End: 2018-03-23 | Stop reason: SDUPTHER

## 2017-10-11 RX ORDER — ZOLPIDEM TARTRATE 5 MG/1
TABLET ORAL
Refills: 0 | COMMUNITY
Start: 2017-08-01 | End: 2017-10-11 | Stop reason: SDUPTHER

## 2017-10-11 RX ORDER — ZOLPIDEM TARTRATE 5 MG/1
TABLET ORAL
Qty: 30 TAB | Refills: 1 | Status: SHIPPED | OUTPATIENT
Start: 2017-10-11 | End: 2017-11-30 | Stop reason: SDUPTHER

## 2017-10-11 RX ORDER — TAMSULOSIN HYDROCHLORIDE 0.4 MG/1
CAPSULE ORAL
Qty: 90 CAP | Refills: 3 | Status: SHIPPED | COMMUNITY
Start: 2017-10-11 | End: 2018-10-25 | Stop reason: SDUPTHER

## 2017-10-11 NOTE — PROGRESS NOTES
HISTORY OF PRESENT ILLNESS    Chief Complaint   Patient presents with    Fatigue       Presents for follow-up    Would like a flu shot. Has tingling in his feet/toes. He feels tired a lot. Wonders if medication related. Taking gabapentin,     Reports mild hearing loss. Check for wax in ears. Would to discuss taking benefiber, align and peanut butter. Hypertension  Hypertension ROS: taking medications as instructed, no medication side effects noted, no TIA's, no chest pain on exertion, no dyspnea on exertion, no swelling of ankles     reports that he has quit smoking. He has never used smokeless tobacco.    reports that he drinks alcohol. BP Readings from Last 2 Encounters:   10/11/17 108/67   07/03/17 109/69           Review of Systems   All other systems reviewed and are negative, except as noted in HPI    Past Medical and Surgical History   has a past medical history of Advanced care planning/counseling discussion (10/8/2015); Basal cell carcinoma, face; Bladder cancer ([facility] [address] (2012); BPH with obstruction/lower urinary tract symptoms; Chronic lower back pain; Chronic neck pain; Depression, major; Dupuytren's contracture of left hand; Epistaxis (2013); Fecal incontinence; Hearing loss; HTN (hypertension); Insomnia; IVCD (intraventricular conduction defect) (11/30/2015); Long term current use of anticoagulants with INR goal of 2.0-3.0; Loose stools (7/18/2014); Paroxysmal atrial fibrillation ([address] Peripheral neuropathy; RLS (restless legs syndrome) (7/3/2017); SCC (squamous cell carcinoma), face; and Urothelial carcinoma of right distal ureter ([facility] [address] (5/1/15). has a past surgical history that includes bladder repair (2012); retinal detachment repair (Right); hernia repair; colonoscopy (2012); cystourethroscopy (9/2014); cystourethroscopy (5/1/15); and cystourethroscopy (2/29/16). reports that he has quit smoking.  He has never used smokeless tobacco. He reports that he drinks alcohol.  family history is not on file. Physical Exam   Nursing note and vitals reviewed. Blood pressure 108/67, pulse 69, temperature 97.9 °F (36.6 °C), resp. rate 12, height 5' 9\" (1.753 m), weight 220 lb (99.8 kg). Constitutional:  No distress. Eyes: Conjunctivae are normal.   Ears:  Hearing grossly intact  Cardiovascular: Normal rate. regular rhythm, no murmurs or gallops  No edema  Pulmonary/Chest: Effort normal.   CTAB  Musculoskeletal: moves all 4 extremities   Neurological: Alert and oriented to person, place, and time. Skin: No rash noted. Psychiatric: Normal mood and affect. Behavior is normal.     ASSESSMENT and PLAN  Diagnoses and all orders for this visit:    1. Essential hypertension- over-controlled  -     valsartan (DIOVAN) 160 mg tablet; TAKE 1 TABLET BY MOUTH EVERY DAY - decreased dose 10/11/17    2. Encounter for immunization  -     Influenza virus vaccine ([address]) 72 years and older ([address])  -     Administration fee () for Medicare insured patients    3. Fatigue, unspecified type - wean BP med, zoloft, gabapentin    4. Insomnia, unspecified type  -     zolpidem (AMBIEN) 5 mg tablet; TAKE 1 TABLET BY MOUTH AT BEDTIME AS NEEDED FOR SLEEP    5. Major depressive disorder, single episode, moderate (HCC)  -     sertraline (ZOLOFT) 100 mg tablet; Take 0.5 Tabs by mouth daily. Decreased dose 10/11/17    6. Idiopathic peripheral neuropathy    7. Ceruminosis - left > R.  Hard cerumen in canal with 100% impaction  Ceruminosis is noted. Wax is removed by syringing and manual debridement. Instructions for home care to prevent wax buildup are given.         There are no Patient Instructions on file for this visit.    lab results and schedule of future lab studies reviewed with patient  reviewed medications and side effects in detail    Return to clinic for further evaluation if new symptoms develop    Follow-up Disposition: Not on File    Current Outpatient Prescriptions   Medication Sig    zolpidem (AMBIEN) 5 mg tablet TAKE 1 TABLET BY MOUTH AT BEDTIME AS NEEDED FOR SLEEP    Bifidobacterium Infantis (ALIGN) 4 mg cap Take  by mouth.  gabapentin (NEURONTIN) 300 mg capsule TAKE 1 CAPSULE DAILY    colestipol (COLESTID) 1 gram tablet TAKE 2 TABLETS TWICE A DAY    warfarin (COUMADIN) 5 mg tablet TAKE 1.5 TABLET DAILY OR AS DIRECTED    warfarin (COUMADIN) 1 mg tablet TAKE 1 TABLET DAILY OR AS DIRECTED BY THE COUMADIN CLINIC    flecainide (TAMBOCOR) 150 mg tablet TAKE 1 TABLET TWICE A DAY    valsartan (DIOVAN) 320 mg tablet TAKE 1 TABLET BY MOUTH EVERY DAY    wheat dextrin (BENEFIBER) 3 gram/4 gram packet Take 1 Packet by mouth three (3) times daily as needed.  tamsulosin (FLOMAX) 0.4 mg capsule TAKE 1 CAPSULE DAILY    finasteride (PROSCAR) 5 mg tablet TAKE 1 TABLET DAILY    amLODIPine (NORVASC) 5 mg tablet TAKE 1 TABLET DAILY    sertraline (ZOLOFT) 100 mg tablet TAKE 1 TABLET DAILY     No current facility-administered medications for this visit.

## 2017-10-11 NOTE — PROGRESS NOTES
Would like a flu shot. Has tingling in his feet/toes. He feels tired a lot. Wonders if medication related. Check for wax in ears. Would to discuss taking benefiber, align and peanut butter.

## 2017-10-11 NOTE — MR AVS SNAPSHOT
Visit Information Date & Time Provider Department Dept. Phone Encounter #  
 10/11/2017  1:30 PM Moris Johnson MD [facility] [address]  [id number] Your Appointments 10/26/2017 11:20 AM  
COUMADIN CLINIC with PREMA GIL  
[facility] ([facility] SCHEDULING) Appt Note: f/u 09/22//17 crf 09/22/17  
 [address] [place] [phone]  
  
   
 [address] [facility] [address] [place] [address]  
  
    
 1/17/2018 10:40 AM  
ESTABLISHED PATIENT with Genevieve Ames MD  
[facility] ([address]) Appt Note: 6 mo fu  
 [address] [facility]  
[address] [place] [address] [address] [place], [place] [address] Upcoming Health Maintenance Date Due DTaP/Tdap/Td series (1 - Tdap) 7/15/1952 Pneumococcal 65+ High/Highest Risk (2 of 2 - PPSV23) 12/3/2015 GLAUCOMA SCREENING Q2Y 1/1/2017 INFLUENZA AGE 9 TO ADULT 8/1/2017 MEDICARE YEARLY EXAM 7/4/2018 Allergies as of 10/11/2017  Review Complete On: 10/11/2017 By: Nell Weaver Severity Noted Reaction Type Reactions Hay Fever And Allergy Relief  06/28/2017    Other (comments) Watery eyes Current Immunizations  Reviewed on 10/8/2015 Name Date Influenza High Dose Vaccine PF  Incomplete, 11/4/2016 Influenza Vaccine 10/20/2014 Influenza Vaccine (Quad) PF 10/8/2015 Pneumococcal Conjugate (PCV-13) 10/8/2015 Not reviewed this visit You Were Diagnosed With   
  
 Codes Comments Other inflammatory polyneuropathies ([facility] [address]    -  Primary ICD-10-CM: G61.89 ICD-9-CM: 357.89 Essential hypertension     ICD-10-CM: I10 
ICD-9-CM: 401.9 Encounter for immunization     ICD-10-CM: S30 ICD-9-CM: V03.89 Fatigue, unspecified type     ICD-10-CM: R53.83 ICD-9-CM: 780.79   
 Insomnia, unspecified type     ICD-10-CM: G47.00 ICD-9-CM: 780.52 Depression, unspecified depression type     ICD-10-CM: F32.9 ICD-9-CM: 346 Major depressive disorder, single episode, moderate (HCC)     ICD-10-CM: F32.1 ICD-9-CM: 296.22 Vitals BP Pulse Temp Resp Height(growth percentile) Weight(growth percentile) 108/67 (BP 1 Location: Left arm, BP Patient Position: Sitting) 69 97.9 °F (36.6 °C) 12 5' 9\" (1.753 m) 220 lb (99.8 kg) BMI Smoking Status 32.49 kg/m2 Former Smoker BMI and BSA Data Body Mass Index Body Surface Area  
 32.49 kg/m 2 2.2 m 2 Preferred Pharmacy Pharmacy Name Phone [address] [facility] [phone] Your Updated Medication List  
  
   
This list is accurate as of: 10/11/17  2:06 PM.  Always use your most recent med list.  
  
  
  
  
 ALIGN 4 mg Cap Generic drug:  Bifidobacterium Infantis Take  by mouth. amLODIPine 5 mg tablet Commonly known as:  Nathaniel Chafe TAKE 1 TABLET DAILY  
  
 colestipol 1 gram tablet Commonly known as:  COLESTID  
TAKE 2 TABLETS TWICE A DAY  
  
 finasteride 5 mg tablet Commonly known as:  PROSCAR  
TAKE 1 TABLET DAILY flecainide 150 mg tablet Commonly known as:  TAMBOCOR  
TAKE 1 TABLET TWICE A DAY  
  
 gabapentin 300 mg capsule Commonly known as:  NEURONTIN  
TAKE 1 CAPSULE DAILY  
  
 sertraline 100 mg tablet Commonly known as:  ZOLOFT Take 0.5 Tabs by mouth daily. Decreased dose 10/11/17  
  
 tamsulosin 0.4 mg capsule Commonly known as:  FLOMAX TAKE 1 CAPSULE DAILY  
  
 valsartan 160 mg tablet Commonly known as:  DIOVAN  
TAKE 1 TABLET BY MOUTH EVERY DAY - decreased dose 10/11/17 * warfarin 5 mg tablet Commonly known as:  COUMADIN  
TAKE 1.5 TABLET DAILY OR AS DIRECTED * warfarin 1 mg tablet Commonly known as:  COUMADIN  
TAKE 1 TABLET DAILY OR AS DIRECTED BY THE COUMADIN CLINIC  
  
 wheat dextrin 3 gram/4 gram packet Commonly known as:  Bradley Joselo Take 1 Packet by mouth three (3) times daily as needed. zolpidem 5 mg tablet Commonly known as:  AMBIEN  
TAKE 1 TABLET BY MOUTH AT BEDTIME AS NEEDED FOR SLEEP * Notice: This list has 2 medication(s) that are the same as other medications prescribed for you. Read the directions carefully, and ask your doctor or other care provider to review them with you. Prescriptions Printed Refills  
 zolpidem (AMBIEN) 5 mg tablet 1 Sig: TAKE 1 TABLET BY MOUTH AT BEDTIME AS NEEDED FOR SLEEP Class: Print Prescriptions Sent to Mail Order Refills  
 valsartan (DIOVAN) 160 mg tablet 1 Sig: TAKE 1 TABLET BY MOUTH EVERY DAY - decreased dose 10/11/17 Class: Mail Order Pharmacy: [address], [address] Ph #: [phone]  
 tamsulosin (FLOMAX) 0.4 mg capsule 3 Sig: TAKE 1 CAPSULE DAILY Class: Mail Order Pharmacy: [address], [address] Ph #: [phone] We Performed the Following ADMIN INFLUENZA VIRUS VAC [ [facility]] INFLUENZA VIRUS VACCINE, HIGH DOSE SEASONAL, PRESERVATIVE FREE [32641 CPT(R)] Please provide this summary of care documentation to your next provider. Your primary care clinician is listed as Milton Calvillo. If you have any questions after today's visit, please call [phone].

## 2017-10-13 RX ORDER — WARFARIN 1 MG/1
TABLET ORAL
Qty: 90 TAB | Refills: 0 | Status: SHIPPED | OUTPATIENT
Start: 2017-10-13 | End: 2018-03-07 | Stop reason: SDUPTHER

## 2017-10-26 ENCOUNTER — CLINICAL SUPPORT (OUTPATIENT)
Dept: CARDIOLOGY CLINIC | Age: 82
End: 2017-10-26

## 2017-10-26 DIAGNOSIS — I10 ESSENTIAL HYPERTENSION: ICD-10-CM

## 2017-10-26 DIAGNOSIS — I48.0 PAROXYSMAL ATRIAL FIBRILLATION (HCC): ICD-10-CM

## 2017-10-26 DIAGNOSIS — Z79.01 LONG TERM CURRENT USE OF ANTICOAGULANTS WITH INR GOAL OF 2.0-3.0: Primary | ICD-10-CM

## 2017-10-26 LAB
INR BLD: 2.2
INR, EXTERNAL: 2.2 (ref 2–3)
PT POC: 25.6 SECONDS
VALID INTERNAL CONTROL?: YES

## 2017-10-27 DIAGNOSIS — F32.1 MAJOR DEPRESSIVE DISORDER, SINGLE EPISODE, MODERATE (HCC): ICD-10-CM

## 2017-10-27 RX ORDER — SERTRALINE HYDROCHLORIDE 100 MG/1
TABLET, FILM COATED ORAL
Qty: 90 TAB | Refills: 2 | Status: SHIPPED | OUTPATIENT
Start: 2017-10-27 | End: 2018-07-24 | Stop reason: SDUPTHER

## 2017-11-16 ENCOUNTER — TELEPHONE (OUTPATIENT)
Dept: INTERNAL MEDICINE CLINIC | Age: 82
End: 2017-11-16

## 2017-11-16 NOTE — TELEPHONE ENCOUNTER
----- Message from [facility] sent at 11/16/2017 12:31 PM EST -----  Regarding: Dr. Mary Mast  Pt wife Roman Sanchez says that pt needs referral to a gastroenterologist. Pt wife can be reached at [phone].        He needs a name and no of Gastroenterologist

## 2017-11-16 NOTE — TELEPHONE ENCOUNTER
----- Message from [facility] sent at 11/16/2017 12:31 PM EST -----  Regarding: Dr. Stanley Seymour  Pt wife Richard Powers says that pt needs referral to a gastroenterologist. Pt wife can be reached at [phone].

## 2017-11-17 NOTE — TELEPHONE ENCOUNTER
Pt did not need a referral was looking for a recommendation of a gastro doc that dr clarke uses. Ask if the nurse can call with that information.      [phone]

## 2017-11-30 ENCOUNTER — CLINICAL SUPPORT (OUTPATIENT)
Dept: CARDIOLOGY CLINIC | Age: 82
End: 2017-11-30

## 2017-11-30 DIAGNOSIS — Z79.01 LONG TERM CURRENT USE OF ANTICOAGULANTS WITH INR GOAL OF 2.0-3.0: Primary | ICD-10-CM

## 2017-11-30 DIAGNOSIS — I10 ESSENTIAL HYPERTENSION: ICD-10-CM

## 2017-11-30 DIAGNOSIS — G47.00 INSOMNIA, UNSPECIFIED TYPE: ICD-10-CM

## 2017-11-30 DIAGNOSIS — I48.0 PAROXYSMAL ATRIAL FIBRILLATION (HCC): ICD-10-CM

## 2017-11-30 LAB
INR BLD: 2.1
INR, EXTERNAL: 2.1 (ref 2–3)
PT POC: 24.8 SECONDS
VALID INTERNAL CONTROL?: YES

## 2017-11-30 RX ORDER — ZOLPIDEM TARTRATE 5 MG/1
TABLET ORAL
Qty: 90 TAB | Refills: 1 | OUTPATIENT
Start: 2017-11-30 | End: 2018-01-24 | Stop reason: SDUPTHER

## 2017-12-01 NOTE — TELEPHONE ENCOUNTER
Please call in medication as written in [facility] care. Thanks.     90 days w 1 refill

## 2017-12-29 ENCOUNTER — CLINICAL SUPPORT (OUTPATIENT)
Dept: CARDIOLOGY CLINIC | Age: 82
End: 2017-12-29

## 2017-12-29 DIAGNOSIS — I48.0 PAROXYSMAL ATRIAL FIBRILLATION (HCC): ICD-10-CM

## 2017-12-29 DIAGNOSIS — I10 ESSENTIAL HYPERTENSION: ICD-10-CM

## 2017-12-29 DIAGNOSIS — Z79.01 LONG TERM CURRENT USE OF ANTICOAGULANTS WITH INR GOAL OF 2.0-3.0: Primary | ICD-10-CM

## 2017-12-29 LAB
INR BLD: 2.1
INR, EXTERNAL: 2.1 (ref 2–3)
PT POC: 25.8 SECONDS
VALID INTERNAL CONTROL?: YES

## 2018-01-17 ENCOUNTER — OFFICE VISIT (OUTPATIENT)
Dept: CARDIOLOGY CLINIC | Age: 83
End: 2018-01-17

## 2018-01-17 VITALS
HEART RATE: 72 BPM | DIASTOLIC BLOOD PRESSURE: 80 MMHG | OXYGEN SATURATION: 98 % | BODY MASS INDEX: 32.7 KG/M2 | RESPIRATION RATE: 18 BRPM | WEIGHT: 220.8 LBS | HEIGHT: 69 IN | SYSTOLIC BLOOD PRESSURE: 140 MMHG

## 2018-01-17 DIAGNOSIS — Z79.899 ENCOUNTER FOR MONITORING FLECAINIDE THERAPY: ICD-10-CM

## 2018-01-17 DIAGNOSIS — Z51.81 ENCOUNTER FOR MONITORING FLECAINIDE THERAPY: ICD-10-CM

## 2018-01-17 DIAGNOSIS — E78.5 DYSLIPIDEMIA: ICD-10-CM

## 2018-01-17 DIAGNOSIS — I45.4 IVCD (INTRAVENTRICULAR CONDUCTION DEFECT): ICD-10-CM

## 2018-01-17 DIAGNOSIS — I10 ESSENTIAL HYPERTENSION: ICD-10-CM

## 2018-01-17 DIAGNOSIS — I48.0 PAROXYSMAL ATRIAL FIBRILLATION (HCC): Primary | ICD-10-CM

## 2018-01-17 NOTE — PROGRESS NOTES
Pat[place] Glenroytricia Salas     7/15/1931       Chandler Wang MD, [facility]  Date of Visit-1/17/2018   PCP is Jose J Ly MD   [facility] [place]  [facility]  HPI:  Marquis Booth. is a 80 y.o. male   Follow up of PAF on class 1C drug and hypertension. Overall the pt states he is doing well. He denies any lightheadedness or dizziness. The pt does note that he has been feeling fatigued lately. He states that he has noticed this more in the last year. The pt states that he has not had any medication changes. He denies any episodes of bleeding on coumadin. The pt states that he is taking ambient frequently but does not feel rested when he wakes up. The pt states that he is waking up a lot in the middle of the night. He reports using 2 thin pillows at night. The pt states that his wife tells him he snores. He notes that he has been diagnosed with sleep apnea but he is not using his CPAP machine. The pt states that he has not worn a CPAP machine in over 4 years. He notes that he was bent over working in the yard about 2 months ago pulling weeds and he fell backwards. The pt states that he does feel a little unsteady upon standing but is not dizzy. He reports that he is not taking his blood pressure at home. The pt reports that he does go to the gym and does some walking. He does not have any difficulty doing this. Denies chest pain, edema, syncope or shortness of breath at rest, has no tachycardia, palpitations or sense of arrhythmia. EKG: NSR at 62 IVCD normal axis  QTc 443 msec    Assessment/Plan:     1. PAF, asymptomatic on flecainide 150 mg. Still class 1C drug, monitoring QTC  2. HTN on CCB, at goal, no bleeding on warfarin  3. IVCD-conduction dz, no syncope  4. Dyslipidemia, not on statin  Lab Results   Component Value Date/Time    LDL, calculated 116 07/12/2017 11:08 AM   Follow up in 6 months.     Future Appointments  Date Time Provider Ezekiel Munoz 1/30/2018 11:40 AM COUMADIN, [facility] SCHED   6/27/2018 11:00 AM Jose Aguilar MD [facility] SCHED     Key CAD CHF Meds             warfarin (COUMADIN) 1 mg tablet  (Taking) Take 1 tablet by mouth daily or as directed by the coumadin clinic    valsartan (DIOVAN) 160 mg tablet  (Taking) TAKE 1 TABLET BY MOUTH EVERY DAY - decreased dose 10/11/17    warfarin (COUMADIN) 5 mg tablet  (Taking) TAKE 1.5 TABLET DAILY OR AS DIRECTED    flecainide (TAMBOCOR) 150 mg tablet  (Taking) TAKE 1 TABLET TWICE A DAY    amLODIPine (NORVASC) 5 mg tablet  (Taking) TAKE 1 TABLET DAILY               Impression:   1. Paroxysmal atrial fibrillation (HCC)    2. Essential hypertension    3. IVCD (intraventricular conduction defect)    4. Encounter for monitoring flecainide therapy    5. Dyslipidemia       Cardiac History:   ECHO 9-10-14=  Left ventricle: Systolic function was normal by EF (biplane method of  disks). Ejection fraction was estimated to be 66 %. There were no regional  wall motion abnormalities. Doppler parameters were consistent with  abnormal left ventricular relaxation (grade 1 diastolic dysfunction). Left atrium: The atrium was mildly to moderately dilated     ROS-except as noted above. . A complete cardiac and respiratory are reviewed and negative except as above ; Resp-denies wheezing  or productive cough,. Const- No unusual weight loss or fever; Neuro-no recent seizure or CVA ; GI- No BRBPR, abdom pain, bloating ; - no  hematuria   see supplement sheet, initialed and to be scanned by staff  Past Medical History:   Diagnosis Date    Advanced care planning/counseling discussion 10/8/2015    Basal cell carcinoma, face     prior hx, now sees Dr. Mo Barney [facility]) 2012    Dr. Yadira Ro. cyto 9/2014, 2/2016. s/p surgery, BCG irrigation [place].   saw Dr. Baldomero Liao BPH with obstruction/lower urinary tract symptoms     Chronic lower back pain     injection in [place]    Chronic neck pain limited motion to side    Depression, major     Dupuytren's contracture of left hand     5th finger    Epistaxis 2013    saw ENT at [address] incontinence     with loose stools    Hearing loss     has hearing aids    HTN (hypertension)     Insomnia     IVCD (intraventricular conduction defect) 11/30/2015    Dr Bobo [place] term current use of anticoagulants with INR goal of 2.0-3.0     Afib    Loose stools 7/18/2014    Paroxysmal atrial fibrillation (HCC)     EF 66%, 1-2+ LAE on flecainide;Dr. Lolita Donohue. saw Dr. Emilia Ferraro at [facility] Peripheral neuropathy     RLS (restless legs syndrome) 7/3/2017    SCC (squamous cell carcinoma), face     prior hx, now sees Dr. Tonya Newell Urothelial carcinoma of right distal ureter ([address] 5/1/15    excision Dr. Jerry Hunter. low grade, papillary. repeat 3 month      Social Hx= reports that he has quit smoking. He has never used smokeless tobacco. He reports that he drinks alcohol. Exam and Labs:  /80 (BP 1 Location: Left arm)  Pulse 72  Resp 18  Ht 5' 9\" (1.753 m)  Wt 220 lb 12.8 oz (100.2 kg)  SpO2 98%  BMI 32.61 kg/[id number]:  NAD, comfortable  Head: [place],AT. Eyes: No scleral icterus. Neck:  Neck supple. No JVD present. Throat: moist mucous membranes. Chest: Effort normal & normal respiratory excursion . Neurological: alert, conversant and oriented . Skin: Skin is not cold. No obvious systemic rash noted. Not diaphoretic. No erythema. Psychiatric:  Grossly normal mood and affect. Behavior appears normal. Extremities:  no clubbing or cyanosis. Abdomen: non distended    Lungs:breath sounds normal. No stridor. distress, wheezes or  Rales. Heart: normal rate, regular rhythm, normal S1, S2, no murmurs, rubs, clicks or gallops , PMI non displaced. Edema: Edema is none.   Lab Results   Component Value Date/Time    Cholesterol, total 192 07/12/2017 11:08 AM    HDL Cholesterol 58 07/12/2017 11:08 AM    LDL, calculated 116 07/12/2017 11:08 AM Triglyceride 92 07/12/2017 11:08 AM     Lab Results   Component Value Date/Time    Sodium 141 07/12/2017 11:08 AM    Potassium 4.2 07/12/2017 11:08 AM    Chloride 104 07/12/2017 11:08 AM    CO2 24 07/12/2017 11:08 AM    Glucose 92 07/12/2017 11:08 AM    BUN 22 07/12/2017 11:08 AM    Creatinine 1.49 07/12/2017 11:08 AM    BUN/Creatinine ratio 15 07/12/2017 11:08 AM    GFR est AA 49 07/12/2017 11:08 AM    GFR est non-AA 42 07/12/2017 11:08 AM    Calcium 8.5 07/12/2017 11:08 AM      Wt Readings from Last 3 Encounters:   01/17/18 220 lb 12.8 oz (100.2 kg)   10/11/17 220 lb (99.8 kg)   07/03/17 220 lb (99.8 kg)      BP Readings from Last 3 Encounters:   01/17/18 140/80   10/11/17 108/67   07/03/17 109/69      Current Outpatient Prescriptions   Medication Sig    zolpidem (AMBIEN) 5 mg tablet TAKE 1 TABLET BY MOUTH AT BEDTIME AS NEEDED FOR SLEEP    sertraline (ZOLOFT) 100 mg tablet TAKE 1 TABLET DAILY    warfarin (COUMADIN) 1 mg tablet Take 1 tablet by mouth daily or as directed by the coumadin clinic    Bifidobacterium Infantis (ALIGN) 4 mg cap Take  by mouth.  valsartan (DIOVAN) 160 mg tablet TAKE 1 TABLET BY MOUTH EVERY DAY - decreased dose 10/11/17    tamsulosin (FLOMAX) 0.4 mg capsule TAKE 1 CAPSULE DAILY    gabapentin (NEURONTIN) 300 mg capsule TAKE 1 CAPSULE DAILY    colestipol (COLESTID) 1 gram tablet TAKE 2 TABLETS TWICE A DAY    warfarin (COUMADIN) 5 mg tablet TAKE 1.5 TABLET DAILY OR AS DIRECTED    flecainide (TAMBOCOR) 150 mg tablet TAKE 1 TABLET TWICE A DAY    wheat dextrin (BENEFIBER) 3 gram/4 gram packet Take 1 Packet by mouth three (3) times daily as needed.  finasteride (PROSCAR) 5 mg tablet TAKE 1 TABLET DAILY    amLODIPine (NORVASC) 5 mg tablet TAKE 1 TABLET DAILY     No current facility-administered medications for this visit. Impression see above.       Written by Loy Kincaid, as dictated by Raman Candelario MD.

## 2018-01-17 NOTE — MR AVS SNAPSHOT
[address] [facility] [address] [facility] 
[phone] Patient: Adrian Perez. MRN: [id number] BKG:[id number] Visit Information Date & Time Provider Department Dept. Phone Encounter #  
 1/17/2018 10:40 AM Ck Drew MD [facility] [phone] [id number] Your Appointments 1/17/2018 10:40 AM  
ESTABLISHED PATIENT with Ck Drew MD  
[facility] ([facility] SCHEDULING) Appt Note: 6 mo fu  
 [address] [address]  
[phone]  
  
   
 [address] [place] [address]  
  
    
 1/30/2018 11:40 AM  
COUMADIN CLINIC with PREMA GIL  
[facility] ([facility] SCHEDULING) [address] [facility] [address] [address] [facility]  
[address] [facility] [address] [address] Upcoming Health Maintenance Date Due DTaP/Tdap/Td series (1 - Tdap) 7/15/1952 Pneumococcal 65+ High/Highest Risk (2 of 2 - PPSV23) 12/3/2015 GLAUCOMA SCREENING Q2Y 1/1/2017 MEDICARE YEARLY EXAM 7/4/2018 Allergies as of 1/17/2018  Review Complete On: 10/11/2017 By: Sandee Huntley MD  
  
 Severity Noted Reaction Type Reactions Hay Fever And Allergy Relief  06/28/2017    Other (comments) Watery eyes Current Immunizations  Reviewed on 10/8/2015 Name Date Influenza High Dose Vaccine PF 10/11/2017, 11/4/2016 Influenza Vaccine 10/20/2014 Influenza Vaccine (Quad) PF 10/8/2015 Pneumococcal Conjugate (PCV-13) 10/8/2015 Not reviewed this visit Vitals Smoking Status Former Smoker Your Updated Medication List  
  
   
This list is accurate as of: 1/16/18  9:41 AM.  Always use your most recent med list.  
  
  
  
  
 ALIGN 4 mg Cap Generic drug:  Bifidobacterium Infantis Take  by mouth. amLODIPine 5 mg tablet Commonly known as:  Rulon Stephanie TAKE 1 TABLET DAILY  
  
 colestipol 1 gram tablet Commonly known as:  COLESTID  
TAKE 2 TABLETS TWICE A DAY  
  
 finasteride 5 mg tablet Commonly known as:  PROSCAR  
TAKE 1 TABLET DAILY flecainide 150 mg tablet Commonly known as:  TAMBOCOR  
TAKE 1 TABLET TWICE A DAY  
  
 gabapentin 300 mg capsule Commonly known as:  NEURONTIN  
TAKE 1 CAPSULE DAILY  
  
 sertraline 100 mg tablet Commonly known as:  ZOLOFT  
TAKE 1 TABLET DAILY  
  
 tamsulosin 0.4 mg capsule Commonly known as:  FLOMAX TAKE 1 CAPSULE DAILY  
  
 valsartan 160 mg tablet Commonly known as:  DIOVAN  
TAKE 1 TABLET BY MOUTH EVERY DAY - decreased dose 10/11/17 * warfarin 5 mg tablet Commonly known as:  COUMADIN  
TAKE 1.5 TABLET DAILY OR AS DIRECTED * warfarin 1 mg tablet Commonly known as:  COUMADIN  
TAKE 1 TABLET DAILY OR AS DIRECTED BY THE COUMADIN CLINIC * warfarin 1 mg tablet Commonly known as:  COUMADIN Take 1 tablet by mouth daily or as directed by the coumadin clinic  
  
 wheat dextrin 3 gram/4 gram packet Commonly known as:  Rachael Bullocks Take 1 Packet by mouth three (3) times daily as needed. zolpidem 5 mg tablet Commonly known as:  AMBIEN  
TAKE 1 TABLET BY MOUTH AT BEDTIME AS NEEDED FOR SLEEP * Notice: This list has 3 medication(s) that are the same as other medications prescribed for you. Read the directions carefully, and ask your doctor or other care provider to review them with you. Please provide this summary of care documentation to your next provider. Your primary care clinician is listed as Alisa Tang. If you have any questions after today's visit, please call [phone].

## 2018-01-24 DIAGNOSIS — G47.00 INSOMNIA, UNSPECIFIED TYPE: ICD-10-CM

## 2018-01-24 RX ORDER — ZOLPIDEM TARTRATE 5 MG/1
TABLET ORAL
Qty: 90 TAB | Refills: 1 | OUTPATIENT
Start: 2018-01-24 | End: 2018-05-30 | Stop reason: SDUPTHER

## 2018-01-30 ENCOUNTER — CLINICAL SUPPORT (OUTPATIENT)
Dept: CARDIOLOGY CLINIC | Age: 83
End: 2018-01-30

## 2018-01-30 DIAGNOSIS — I48.0 PAROXYSMAL ATRIAL FIBRILLATION (HCC): ICD-10-CM

## 2018-01-30 DIAGNOSIS — I10 ESSENTIAL HYPERTENSION: ICD-10-CM

## 2018-01-30 DIAGNOSIS — Z79.01 LONG TERM CURRENT USE OF ANTICOAGULANTS WITH INR GOAL OF 2.0-3.0: Primary | ICD-10-CM

## 2018-01-30 LAB
INR BLD: 2.1
INR, EXTERNAL: 2.1 (ref 2–3)
PT POC: 25.5 SECONDS
VALID INTERNAL CONTROL?: YES

## 2018-02-27 ENCOUNTER — TELEPHONE (OUTPATIENT)
Dept: INTERNAL MEDICINE CLINIC | Age: 83
End: 2018-02-27

## 2018-02-27 NOTE — TELEPHONE ENCOUNTER
----- Message from Lacie Perez Page sent at 2/27/2018  2:30 PM EST -----  Regarding: Dr. Samuel Shane  Pt is requesting a refill for rx \"Gabapentin 300 mg and 100 mg 90 day supply [facility] ([address] pharmacy on file. Best contact number is [phone].

## 2018-03-01 DIAGNOSIS — M54.50 CHRONIC BILATERAL LOW BACK PAIN WITHOUT SCIATICA: ICD-10-CM

## 2018-03-01 DIAGNOSIS — G89.29 CHRONIC BILATERAL LOW BACK PAIN WITHOUT SCIATICA: ICD-10-CM

## 2018-03-01 RX ORDER — GABAPENTIN 300 MG/1
CAPSULE ORAL
Qty: 90 CAP | Refills: 1 | Status: SHIPPED | OUTPATIENT
Start: 2018-03-01 | End: 2018-09-04 | Stop reason: SDUPTHER

## 2018-03-07 ENCOUNTER — CLINICAL SUPPORT (OUTPATIENT)
Dept: CARDIOLOGY CLINIC | Age: 83
End: 2018-03-07

## 2018-03-07 DIAGNOSIS — I48.0 PAROXYSMAL ATRIAL FIBRILLATION (HCC): ICD-10-CM

## 2018-03-07 DIAGNOSIS — I10 ESSENTIAL HYPERTENSION: ICD-10-CM

## 2018-03-07 DIAGNOSIS — Z79.01 LONG TERM CURRENT USE OF ANTICOAGULANTS WITH INR GOAL OF 2.0-3.0: Primary | ICD-10-CM

## 2018-03-07 LAB
INR BLD: 2.5
INR, EXTERNAL: 2.5 (ref 2–2.5)
PT POC: 29.5 SECONDS
VALID INTERNAL CONTROL?: YES

## 2018-03-07 RX ORDER — WARFARIN SODIUM 5 MG/1
TABLET ORAL
Qty: 135 TAB | Refills: 1 | Status: SHIPPED | OUTPATIENT
Start: 2018-03-07 | End: 2018-06-27 | Stop reason: SDUPTHER

## 2018-03-07 RX ORDER — WARFARIN 1 MG/1
TABLET ORAL
Qty: 90 TAB | Refills: 0 | Status: SHIPPED | OUTPATIENT
Start: 2018-03-07 | End: 2018-06-14 | Stop reason: SDUPTHER

## 2018-03-07 RX ORDER — WARFARIN 1 MG/1
TABLET ORAL
Qty: 30 TAB | Refills: 0 | Status: SHIPPED | OUTPATIENT
Start: 2018-03-07 | End: 2018-03-07 | Stop reason: SDUPTHER

## 2018-03-13 ENCOUNTER — TELEPHONE (OUTPATIENT)
Dept: INTERNAL MEDICINE CLINIC | Age: 83
End: 2018-03-13

## 2018-03-13 DIAGNOSIS — G47.00 INSOMNIA, UNSPECIFIED TYPE: ICD-10-CM

## 2018-03-13 RX ORDER — ZOLPIDEM TARTRATE 5 MG/1
TABLET ORAL
Qty: 90 TAB | Refills: 1 | Status: CANCELLED | OUTPATIENT
Start: 2018-03-13

## 2018-03-13 NOTE — TELEPHONE ENCOUNTER
----- Message from Kuldip Fraire sent at 3/13/2018 11:53 AM EDT -----  Regarding: Dr. Nereida Alves Rx  Patient would like Rx zolpidem tartrate 5mg tablet 90 day supply sent to [facility] on [address]. Please call patient back at [phone] if there are questions.

## 2018-03-19 RX ORDER — MONTELUKAST SODIUM 4 MG/1
TABLET, CHEWABLE ORAL
Qty: 360 TAB | Refills: 1 | Status: SHIPPED | OUTPATIENT
Start: 2018-03-19 | End: 2018-09-15 | Stop reason: SDUPTHER

## 2018-03-23 DIAGNOSIS — I10 ESSENTIAL HYPERTENSION: ICD-10-CM

## 2018-03-23 RX ORDER — VALSARTAN 160 MG/1
TABLET ORAL
Qty: 90 TAB | Refills: 1 | Status: SHIPPED | OUTPATIENT
Start: 2018-03-23 | End: 2019-02-13 | Stop reason: ALTCHOICE

## 2018-04-14 RX ORDER — AMLODIPINE BESYLATE 5 MG/1
TABLET ORAL
Qty: 90 TAB | Refills: 3 | Status: SHIPPED | OUTPATIENT
Start: 2018-04-14 | End: 2019-04-08 | Stop reason: SDUPTHER

## 2018-04-18 ENCOUNTER — CLINICAL SUPPORT (OUTPATIENT)
Dept: CARDIOLOGY CLINIC | Age: 83
End: 2018-04-18

## 2018-04-18 DIAGNOSIS — Z79.01 LONG TERM CURRENT USE OF ANTICOAGULANTS WITH INR GOAL OF 2.0-3.0: Primary | ICD-10-CM

## 2018-04-18 DIAGNOSIS — I48.0 PAROXYSMAL ATRIAL FIBRILLATION (HCC): ICD-10-CM

## 2018-04-18 DIAGNOSIS — I10 ESSENTIAL HYPERTENSION: ICD-10-CM

## 2018-04-18 LAB
INR BLD: 2.1
INR, EXTERNAL: 2.1 (ref 2–3)
PT POC: 25.1 SECONDS
VALID INTERNAL CONTROL?: YES

## 2018-05-08 RX ORDER — FLECAINIDE ACETATE 150 MG/1
TABLET ORAL
Qty: 180 TAB | Refills: 2 | Status: SHIPPED | OUTPATIENT
Start: 2018-05-08 | End: 2019-02-04 | Stop reason: SDUPTHER

## 2018-05-24 ENCOUNTER — CLINICAL SUPPORT (OUTPATIENT)
Dept: CARDIOLOGY CLINIC | Age: 83
End: 2018-05-24

## 2018-05-24 DIAGNOSIS — I10 ESSENTIAL HYPERTENSION: ICD-10-CM

## 2018-05-24 DIAGNOSIS — I48.0 PAROXYSMAL ATRIAL FIBRILLATION (HCC): ICD-10-CM

## 2018-05-24 DIAGNOSIS — Z79.01 LONG TERM CURRENT USE OF ANTICOAGULANTS WITH INR GOAL OF 2.0-3.0: Primary | ICD-10-CM

## 2018-05-24 LAB
INR BLD: 1.9
INR, EXTERNAL: 1.9 (ref 2–3)
PT POC: 21.5 SECONDS
VALID INTERNAL CONTROL?: YES

## 2018-05-30 DIAGNOSIS — G47.00 INSOMNIA, UNSPECIFIED TYPE: ICD-10-CM

## 2018-05-30 RX ORDER — ZOLPIDEM TARTRATE 5 MG/1
TABLET ORAL
Qty: 90 TAB | Refills: 1 | OUTPATIENT
Start: 2018-05-30 | End: 2018-06-14 | Stop reason: SDUPTHER

## 2018-05-30 NOTE — TELEPHONE ENCOUNTER
----- Message from Chauncey Holguin sent at 5/30/2018 11:04 AM EDT -----  Regarding: Dr. Kyler Burton  Patient needs a prescription for Zolpidem 5 milligrams sent to the [facility] pharmacy on [address]. Their number is [phone]. His  Number is [phone].

## 2018-06-04 ENCOUNTER — TELEPHONE (OUTPATIENT)
Dept: CARDIOLOGY CLINIC | Age: 83
End: 2018-06-04

## 2018-06-04 NOTE — TELEPHONE ENCOUNTER
Stefany Beach from Dr. 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 Stefany Beach can be reached @ [phone].      Thanks

## 2018-06-05 NOTE — TELEPHONE ENCOUNTER
Attempted to return call. Elaine Mireles unavailable. Left  for return call. PLEASE READ:  Upon return call please find out if office needs cardiac clearance as well and their fax number to send it to.  Thanks

## 2018-06-05 NOTE — TELEPHONE ENCOUNTER
Abdon Reynolds returning your call. She said that you can call [phone] and hit [phone] to find her.      Thanks

## 2018-06-05 NOTE — TELEPHONE ENCOUNTER
Verified patient with two types of identifiers. 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 Will be done under local anesthesia. Needs to know if warfarin can be held?     Will ask MD and fax over recommendation to [phone]

## 2018-06-14 DIAGNOSIS — G47.00 INSOMNIA, UNSPECIFIED TYPE: ICD-10-CM

## 2018-06-18 ENCOUNTER — TELEPHONE (OUTPATIENT)
Dept: INTERNAL MEDICINE CLINIC | Age: 83
End: 2018-06-18

## 2018-06-18 RX ORDER — WARFARIN 1 MG/1
TABLET ORAL
Qty: 90 TAB | Refills: 0 | Status: SHIPPED | OUTPATIENT
Start: 2018-06-18 | End: 2018-09-29 | Stop reason: SDUPTHER

## 2018-06-18 RX ORDER — ZOLPIDEM TARTRATE 5 MG/1
TABLET ORAL
Qty: 90 TAB | Refills: 0 | Status: SHIPPED | OUTPATIENT
Start: 2018-06-18 | End: 2018-06-27 | Stop reason: SDUPTHER

## 2018-06-27 ENCOUNTER — OFFICE VISIT (OUTPATIENT)
Dept: CARDIOLOGY CLINIC | Age: 83
End: 2018-06-27

## 2018-06-27 ENCOUNTER — CLINICAL SUPPORT (OUTPATIENT)
Dept: CARDIOLOGY CLINIC | Age: 83
End: 2018-06-27

## 2018-06-27 ENCOUNTER — OFFICE VISIT (OUTPATIENT)
Dept: INTERNAL MEDICINE CLINIC | Age: 83
End: 2018-06-27

## 2018-06-27 VITALS
HEART RATE: 69 BPM | DIASTOLIC BLOOD PRESSURE: 76 MMHG | WEIGHT: 213 LBS | OXYGEN SATURATION: 95 % | TEMPERATURE: 98.3 F | BODY MASS INDEX: 31.55 KG/M2 | HEIGHT: 69 IN | SYSTOLIC BLOOD PRESSURE: 128 MMHG | RESPIRATION RATE: 18 BRPM

## 2018-06-27 VITALS
RESPIRATION RATE: 16 BRPM | OXYGEN SATURATION: 94 % | WEIGHT: 212.3 LBS | BODY MASS INDEX: 31.44 KG/M2 | HEIGHT: 69 IN | HEART RATE: 68 BPM | DIASTOLIC BLOOD PRESSURE: 70 MMHG | SYSTOLIC BLOOD PRESSURE: 128 MMHG

## 2018-06-27 DIAGNOSIS — G47.00 INSOMNIA, UNSPECIFIED TYPE: ICD-10-CM

## 2018-06-27 DIAGNOSIS — I45.4 IVCD (INTRAVENTRICULAR CONDUCTION DEFECT): ICD-10-CM

## 2018-06-27 DIAGNOSIS — I48.0 PAROXYSMAL ATRIAL FIBRILLATION (HCC): ICD-10-CM

## 2018-06-27 DIAGNOSIS — I10 ESSENTIAL HYPERTENSION: ICD-10-CM

## 2018-06-27 DIAGNOSIS — G60.9 IDIOPATHIC PERIPHERAL NEUROPATHY: ICD-10-CM

## 2018-06-27 DIAGNOSIS — R53.83 FATIGUE, UNSPECIFIED TYPE: Primary | ICD-10-CM

## 2018-06-27 DIAGNOSIS — R49.0 HOARSENESS: ICD-10-CM

## 2018-06-27 DIAGNOSIS — Z79.01 LONG TERM CURRENT USE OF ANTICOAGULANTS WITH INR GOAL OF 2.0-3.0: Primary | ICD-10-CM

## 2018-06-27 DIAGNOSIS — M54.16 LUMBAR RADICULOPATHY, RIGHT: ICD-10-CM

## 2018-06-27 DIAGNOSIS — Z79.01 LONG TERM CURRENT USE OF ANTICOAGULANTS WITH INR GOAL OF 2.0-3.0: ICD-10-CM

## 2018-06-27 DIAGNOSIS — R42 DIZZINESS: ICD-10-CM

## 2018-06-27 DIAGNOSIS — I48.0 PAROXYSMAL ATRIAL FIBRILLATION (HCC): Primary | ICD-10-CM

## 2018-06-27 DIAGNOSIS — G89.29 CHRONIC LOW BACK PAIN, UNSPECIFIED BACK PAIN LATERALITY, WITH SCIATICA PRESENCE UNSPECIFIED: ICD-10-CM

## 2018-06-27 DIAGNOSIS — G89.29 CHRONIC PAIN OF LEFT KNEE: ICD-10-CM

## 2018-06-27 DIAGNOSIS — M54.5 CHRONIC LOW BACK PAIN, UNSPECIFIED BACK PAIN LATERALITY, WITH SCIATICA PRESENCE UNSPECIFIED: ICD-10-CM

## 2018-06-27 DIAGNOSIS — M25.562 CHRONIC PAIN OF LEFT KNEE: ICD-10-CM

## 2018-06-27 LAB
INR BLD: 2
INR, EXTERNAL: 2 (ref 2–3)
PT POC: 20.1 SECONDS
VALID INTERNAL CONTROL?: YES

## 2018-06-27 RX ORDER — ZOLPIDEM TARTRATE 5 MG/1
TABLET ORAL
Qty: 90 TAB | Refills: 1 | Status: SHIPPED | OUTPATIENT
Start: 2018-06-27 | End: 2019-10-07

## 2018-06-27 RX ORDER — CEPHALEXIN 500 MG/1
CAPSULE ORAL
Refills: 1 | COMMUNITY
Start: 2018-06-18 | End: 2019-10-07

## 2018-06-27 NOTE — PROGRESS NOTES
Fe Fieldskaitlyn Salas     7/15/1931       Chandler Jeffries MD, [facility]  Date of Visit-6/27/2018   PCP is Powell Libman, MD   [address] [facility] [place]  [facility]  HPI:  Arpit Saenz is a 80 y.o. male   Follow up of PAF and hypertension. Overall the pt states he is doing well. He denies having any episodes of atrial fibrillation. The pt reports that he just had a follow up with Powell Libman, MD this morning. He notes that his wife just had a spinal fusion done and she is doing well. Denies chest pain, edema, syncope or shortness of breath at rest, has no tachycardia, palpitations or sense of arrhythmia. Assessment/Plan:     1. PAF on flecainide doing well.   no bleeding on warfarin INR is 2  2. HTN on CCB   3. IVCD no syncope  4. Follow up in 6 months. Future Appointments  Date Time Provider Ezekiel Munoz   7/25/2018 11:00 AM COUMADIN [facility] SCHED   1/2/2019 11:00 AM Estefani Flowers MD [facility] SCHED         Impression:   1. Paroxysmal atrial fibrillation (HCC)    2. Essential hypertension    3. IVCD (intraventricular conduction defect)       Cardiac History:   ECHO 9-10-14=  Left ventricle: Systolic function was normal by EF (biplane method of  disks). Ejection fraction was estimated to be 66 %. There were no regional  wall motion abnormalities. Doppler parameters were consistent with  abnormal left ventricular relaxation (grade 1 diastolic dysfunction). Left atrium: The atrium was mildly to moderately dilated     ROS-except as noted above. . A complete cardiac and respiratory are reviewed and negative except as above ; Resp-denies wheezing  or productive cough,.  Const- No unusual weight loss or fever; Neuro-no recent seizure or CVA ; GI- No BRBPR, abdom pain, bloating ; - no  hematuria   see supplement sheet, initialed and to be scanned by staff  Past Medical History:   Diagnosis Date    Advanced care planning/counseling discussion 10/8/2015    Basal cell carcinoma, face     now sees Dr. Chele Shore.  Bladder cancer [facility]) 2012    Dr. Enrrique Velazquez. cyto 9/2014, 2/2016. s/p surgery, BCG irrigation [place]. saw Dr. Parks Check BPH with obstruction/lower urinary tract symptoms     Chronic lower back pain     injection in [place]. saw Dr. Marina Medrano Chronic neck pain     limited motion to side    Depression, major     Dupuytren's contracture of left hand     5th finger    Epistaxis 2013    saw ENT at [address] [place] [address] incontinence     with loose stools    Hearing loss     has hearing aids    HTN (hypertension)     Insomnia     IVCD (intraventricular conduction defect) 11/30/2015    Dr Clarissa Perez term current use of anticoagulants with INR goal of 2.0-3.0     Afib    Loose stools 7/18/2014    Paroxysmal atrial fibrillation (HCC)     EF 66%, 1-2+ LAE on flecainide;Dr. Olamide Jeffries. saw Dr. David Pierer at [facility] Peripheral neuropathy     RLS (restless legs syndrome) 7/3/2017    SCC (squamous cell carcinoma), face     prior hx, now sees Dr. Sarah Oconnell Urothelial carcinoma of right distal ureter ([address] 5/1/15    excision Dr. Willian Agrawal. low grade, papillary. repeat 3 month      Social Hx= reports that he has quit smoking. He has never used smokeless tobacco. He reports that he drinks alcohol. Exam and Labs:  /70 (BP 1 Location: Left arm)  Pulse 68  Resp 16  Ht 5' 9\" (1.753 m)  Wt 212 lb 4.8 oz (96.3 kg)  SpO2 94%  BMI 31.35 kg/[id number]:  NAD, comfortable  Head: [place],AT. Eyes: No scleral icterus. Neck:  Neck supple. No JVD present. Throat: moist mucous membranes. Chest: Effort normal & normal respiratory excursion . Neurological: alert, conversant and oriented . Skin: Skin is not cold. No obvious systemic rash noted. Not diaphoretic. No erythema. Psychiatric:  Grossly normal mood and affect. Behavior appears normal. Extremities:  no clubbing or cyanosis. Abdomen: non distended    Lungs:breath sounds normal. No stridor.  distress, wheezes or  Rales. Heart: normal rate, regular rhythm, normal S1, S2, no murmurs, rubs, clicks or gallops , PMI non displaced. Edema: Edema is none.   Lab Results   Component Value Date/Time    Cholesterol, total 192 07/12/2017 11:08 AM    HDL Cholesterol 58 07/12/2017 11:08 AM    LDL, calculated 116 (H) 07/12/2017 11:08 AM    Triglyceride 92 07/12/2017 11:08 AM     Lab Results   Component Value Date/Time    Sodium 141 07/12/2017 11:08 AM    Potassium 4.2 07/12/2017 11:08 AM    Chloride 104 07/12/2017 11:08 AM    CO2 24 07/12/2017 11:08 AM    Glucose 92 07/12/2017 11:08 AM    BUN 22 07/12/2017 11:08 AM    Creatinine 1.49 (H) 07/12/2017 11:08 AM    BUN/Creatinine ratio 15 07/12/2017 11:08 AM    GFR est AA 49 (L) 07/12/2017 11:08 AM    GFR est non-AA 42 (L) 07/12/2017 11:08 AM    Calcium 8.5 (L) 07/12/2017 11:08 AM      Wt Readings from Last 3 Encounters:   06/27/18 212 lb 4.8 oz (96.3 kg)   06/27/18 213 lb (96.6 kg)   01/17/18 220 lb 12.8 oz (100.2 kg)      BP Readings from Last 3 Encounters:   06/27/18 128/70   06/27/18 128/76   01/17/18 140/80      Current Outpatient Prescriptions   Medication Sig    cephALEXin (KEFLEX) 500 mg capsule TAKE ONE CAPSULE BY MOUTH EVERY 6 HOURS    zolpidem (AMBIEN) 5 mg tablet TAKE 1 TABLET BY MOUTH AT BEDTIME AS NEEDED FOR SLEEP    warfarin (COUMADIN) 1 mg tablet TAKE 1 TABLET BY MOUTH DAILY OR AS DIRECTED BY THE COUMADIN CLINIC    flecainide (TAMBOCOR) 150 mg tablet TAKE 1 TABLET TWICE A DAY    amLODIPine (NORVASC) 5 mg tablet TAKE 1 TABLET DAILY    valsartan (DIOVAN) 160 mg tablet TAKE 1 TABLET DAILY (DECREASED DOSE 10/11/17)    warfarin (COUMADIN) 5 mg tablet TAKING 8.5 MG DAILY OR AS DIRECTED BY CAV COUMADIN CLINIC    colestipol (COLESTID) 1 gram tablet TAKE 2 TABLETS TWICE A DAY    gabapentin (NEURONTIN) 300 mg capsule TAKE 1 CAPSULE DAILY  Indications: Restless Legs Syndrome    sertraline (ZOLOFT) 100 mg tablet TAKE 1 TABLET DAILY    Bifidobacterium Infantis (ALIGN) 4 mg cap Take 1 Cap by mouth daily.  tamsulosin (FLOMAX) 0.4 mg capsule TAKE 1 CAPSULE DAILY    finasteride (PROSCAR) 5 mg tablet TAKE 1 TABLET DAILY     No current facility-administered medications for this visit. Impression see above.       Written by Simona Martin, as dictated by Evita Carbone MD.

## 2018-06-27 NOTE — MR AVS SNAPSHOT
[address] [address]7 [facility] 
[phone] Patient: Kerri Burgess. MRN: [id number] FCL:8/93/2475 Visit Information Date & Time Provider Department Dept. Phone Encounter #  
 6/27/2018 11:00 AM Horace Gitelman, MD [facility] [phone] [id number] Your Appointments 7/25/2018 11:00 AM  
COUMADIN CLINIC with PREMA GIL  
[facility] (RFANCIS SCHEDULING) Appt Note: f/u sov$0 crf 06/27/18  
 [address] 1007 [facility]  
[phone]  
  
   
 [address] [address]  
  
    
 1/2/2019 11:00 AM  
ESTABLISHED PATIENT with Horace Gitelman, MD  
[facility] (Phillip Matthewst) Appt Note: 6mo fu  
 354 [facility] 600 1007 [facility]  
[address] [facility] [address] [address] Upcoming Health Maintenance Date Due DTaP/Tdap/Td series (1 - Tdap) 7/15/1952 Pneumococcal 65+ High/Highest Risk (2 of 2 - PPSV23) 12/3/2015 GLAUCOMA SCREENING Q2Y 1/1/2017 MEDICARE YEARLY EXAM 7/4/2018 Influenza Age 5 to Adult 8/1/2018 Allergies as of 6/27/2018  Review Complete On: 6/27/2018 By: Terra Sanders Severity Noted Reaction Type Reactions Hay Fever And Allergy Relief  06/28/2017    Other (comments) Watery eyes Trazodone  06/27/2018    Palpitations Current Immunizations  Reviewed on 10/8/2015 Name Date Influenza High Dose Vaccine PF 10/11/2017, 11/4/2016 Influenza Vaccine 10/20/2014 Influenza Vaccine (Quad) PF 10/8/2015 Pneumococcal Conjugate (PCV-13) 10/8/2015 Not reviewed this visit Vitals BP Pulse Resp Height(growth percentile) Weight(growth percentile) SpO2  
 128/70 (BP 1 Location: Left arm) 68 16 5' 9\" (1.753 m) 212 lb 4.8 oz (96.3 kg) 94% BMI Smoking Status 31.35 kg/m2 Former Smoker Vitals History BMI and BSA Data Body Mass Index Body Surface Area  
 31.35 kg/m 2 2.17 m 2 Preferred Pharmacy Pharmacy Name Phone [facility] #[id number]- [address] , [address] [phone] Your Updated Medication List  
  
   
This list is accurate as of 6/27/18 11:17 AM.  Always use your most recent med list.  
  
  
  
  
 ALIGN 4 mg Cap Generic drug:  Bifidobacterium Infantis Take 1 Cap by mouth daily. amLODIPine 5 mg tablet Commonly known as:  Nathaniel Chafe TAKE 1 TABLET DAILY  
  
 cephALEXin 500 mg capsule Commonly known as:  Dene Decree TAKE ONE CAPSULE BY MOUTH EVERY 6 HOURS  
  
 colestipol 1 gram tablet Commonly known as:  COLESTID  
TAKE 2 TABLETS TWICE A DAY  
  
 finasteride 5 mg tablet Commonly known as:  PROSCAR  
TAKE 1 TABLET DAILY flecainide 150 mg tablet Commonly known as:  TAMBOCOR  
TAKE 1 TABLET TWICE A DAY  
  
 gabapentin 300 mg capsule Commonly known as:  NEURONTIN  
TAKE 1 CAPSULE DAILY  Indications: Restless Legs Syndrome  
  
 sertraline 100 mg tablet Commonly known as:  ZOLOFT  
TAKE 1 TABLET DAILY  
  
 tamsulosin 0.4 mg capsule Commonly known as:  FLOMAX TAKE 1 CAPSULE DAILY  
  
 valsartan 160 mg tablet Commonly known as:  DIOVAN  
TAKE 1 TABLET DAILY (DECREASED DOSE 10/11/17) * warfarin 5 mg tablet Commonly known as:  COUMADIN  
TAKING 8.5 MG DAILY OR AS DIRECTED BY CAV COUMADIN CLINIC * warfarin 1 mg tablet Commonly known as:  COUMADIN  
TAKE 1 TABLET BY MOUTH DAILY OR AS DIRECTED BY THE COUMADIN CLINIC  
  
 zolpidem 5 mg tablet Commonly known as:  AMBIEN  
TAKE 1 TABLET BY MOUTH AT BEDTIME AS NEEDED FOR SLEEP * Notice: This list has 2 medication(s) that are the same as other medications prescribed for you. Read the directions carefully, and ask your doctor or other care provider to review them with you. Patient Instructions You will need to follow up in clinic with Dr. Graciela Hurst in 6 months. Introducing [facility] & HEALTH SERVICES! [facility] introduces [employer] patient portal. Now you can access parts of your medical record, email your doctor's office, and request medication refills online. 1. In your internet browser, go to [email or web address] 2. Click on the First Time User? Click Here link in the Sign In box. You will see the New Member Sign Up page. 3. Enter your [employer] Access Code exactly as it appears below. You will not need to use this code after youve completed the sign-up process. If you do not sign up before the expiration date, you must request a new code. · [employer] Access Code: [email or web address] Expires: 8/22/2018 12:16 PM 
 
4. Enter the last four digits of your Social Security Number (xxxx) and Date of Birth (mm/dd/yyyy) as indicated and click Submit. You will be taken to the next sign-up page. 5. Create a [employer] ID. This will be your [employer] login ID and cannot be changed, so think of one that is secure and easy to remember. 6. Create a [employer] password. You can change your password at any time. 7. Enter your Password Reset Question and Answer. This can be used at a later time if you forget your password. 8. Enter your e-mail address. You will receive e-mail notification when new information is available in [address] 9. Click Sign Up. You can now view and download portions of your medical record. 10. Click the Download Summary menu link to download a portable copy of your medical information. If you have questions, please visit the Frequently Asked Questions section of the [employer] website. Remember, [employer] is NOT to be used for urgent needs. For medical emergencies, dial 911. Now available from your iPhone and Android! Please provide this summary of care documentation to your next provider. Your primary care clinician is listed as Powell Libman. If you have any questions after today's visit, please call [phone].

## 2018-06-27 NOTE — PROGRESS NOTES
HISTORY OF PRESENT ILLNESS    His wife is in rehab nursing units for spinal fusion revision    Patient presents with    Hoarse     c/o raspy voice for a few weeks. Improves some during the day. No chest congestion.  Fatigue     c/o increased fatigue. He is not sleeping well. rx Lauren Xavier is not covered by his medicare but he is taking it every night. It helps some. 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 Wife is calling him from the hospital all night.  Dizziness     reports dizziness when walking, standing and during activity at times. He does not drink as much water as he shoulder, he says. Taking BP medications as directed. Blood pressure 128/76, pulse 69, temperature 98.3 °F (36.8 °C), temperature source Oral, resp. rate 18, height 5' 9\" (1.753 m), weight 213 lb (96.6 kg), SpO2 95 %.  Pain (Chronic)     c/o pain to L knee and lower R back. Hurt to turn to the left  CT lumbar 5/2017 Dr. Nancy Palomares  No acute findings and no destructive lesions. Mild central stenosis at L3-4 and  L4-5. Levels of foraminal narrowing as described above. 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 Did not improve pain    Injection in [place] helped years ago  Takes gabapentin for neuropathy and for RLS and it helps.  Follow-up     Currently on ABx therapy for basal cell removal from lower lip 6/14/18  Healing well. Review of Systems   All other systems reviewed and are negative, except as noted in HPI    Past Medical and Surgical History   has a past medical history of Advanced care planning/counseling discussion (10/8/2015); Basal cell carcinoma, face; Bladder cancer ([facility] [address] (2012); BPH with obstruction/lower urinary tract symptoms; Chronic lower back pain; Chronic neck pain; Depression, major; Dupuytren's contracture of left hand; Epistaxis (2013); Fecal incontinence;  Hearing loss; HTN (hypertension); Insomnia; IVCD (intraventricular conduction defect) (11/30/2015); Long term current use of anticoagulants with INR goal of 2.0-3.0; Loose stools (7/18/2014); Paroxysmal atrial fibrillation ([facility] [address] Peripheral neuropathy; RLS (restless legs syndrome) (7/3/2017); SCC (squamous cell carcinoma), face; and Urothelial carcinoma of right distal ureter ([facility] [address] (5/1/15). has a past surgical history that includes hx bladder repair (2012); hx retinal detachment repair (Right); hx hernia repair; hx colonoscopy (2012); pr cystourethroscopy (9/2014); pr cystourethroscopy (5/1/15); pr cystourethroscopy (2/29/16); and hx malignant skin lesion excision (06/14/2018). reports that he has quit smoking. He has never used smokeless tobacco. He reports that he drinks alcohol.  family history is not on file. Physical Exam   Nursing note and vitals reviewed. Blood pressure 128/76, pulse 69, temperature 98.3 °F (36.8 °C), temperature source Oral, resp. rate 18, height 5' 9\" (1.753 m), weight 213 lb (96.6 kg), SpO2 95 %. Constitutional:  No distress. Eyes: Conjunctivae are normal.   Ears:  Hearing grossly intact  Cardiovascular: Normal rate. regular rhythm, no murmurs or gallops  No edema  Pulmonary/Chest: Effort normal.   CTAB  Musculoskeletal: moves all 4 extremities   Neurological: Alert and oriented to person, place, and time. Skin: No rash noted. Psychiatric: Normal mood and affect. Behavior is normal.     ASSESSMENT and PLAN  Diagnoses and all orders for this visit:    1. Fatigue, unspecified type  -     METABOLIC PANEL, COMPREHENSIVE  -     CBC WITH AUTOMATED DIFF  -     TSH 3RD GENERATION    2. Insomnia, unspecified type  -     zolpidem (AMBIEN) 5 mg tablet; TAKE 1 TABLET BY MOUTH AT BEDTIME AS NEEDED FOR SLEEP    3. Hoarseness  -     REFERRAL TO ENT-OTOLARYNGOLOGY    4. Dizziness    5. Essential hypertension - Controlled on current regimen.   Continue current medications as written in chart  -     LIPID PANEL    6. Long term current use of anticoagulants with INR goal of 2.0-3.0    7. Chronic pain of left knee    8. Chronic low back pain, unspecified back pain laterality, with sciatica presence unspecified  9. Lumbar radiculopathy, right  -     REFERRAL TO PAIN MANAGEMENT for injection   CT 5/2017  L4/5:  Disc degeneration with loss of height of the disc posteriorly. Mild  posterior central disc osteophyte complex. Mild central spinal stenosis. Moderate right and mild left foraminal stenosis. Mild right facet hypertrophy. Baastrup's disease.   L5/S1: Fusion of the vertebral bodies which appears postsurgical although the  patient does not indicate a history of prior lumbar surgery. . Loss of height of  the disc. Mild left facet hypertrophy. No central spinal stenosis. Mild  bilateral foraminal stenosis.   No acute findings and no destructive lesions. Mild central stenosis at L3-4 and  L4-5. Levels of foraminal narrowing as described above. 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       10.  Idiopathic peripheral neuropathy    lab results and schedule of future lab studies reviewed with patient  reviewed medications and side effects in detail    Return to clinic for further evaluation if new symptoms develop    Follow-up Disposition: Not on File    Current Outpatient Prescriptions   Medication Sig    cephALEXin (KEFLEX) 500 mg capsule TAKE ONE CAPSULE BY MOUTH EVERY 6 HOURS    zolpidem (AMBIEN) 5 mg tablet TAKE 1 TABLET BY MOUTH AT BEDTIME AS NEEDED FOR SLEEP    warfarin (COUMADIN) 1 mg tablet TAKE 1 TABLET BY MOUTH DAILY OR AS DIRECTED BY THE COUMADIN CLINIC    flecainide (TAMBOCOR) 150 mg tablet TAKE 1 TABLET TWICE A DAY    amLODIPine (NORVASC) 5 mg tablet TAKE 1 TABLET DAILY    valsartan (DIOVAN) 160 mg tablet TAKE 1 TABLET DAILY (DECREASED DOSE 10/11/17)    warfarin (COUMADIN) 5 mg tablet TAKING 8.5 MG DAILY OR AS DIRECTED BY CAV COUMADIN CLINIC    colestipol (COLESTID) 1 gram tablet TAKE 2 TABLETS TWICE A DAY    gabapentin (NEURONTIN) 300 mg capsule TAKE 1 CAPSULE DAILY  Indications: Restless Legs Syndrome    sertraline (ZOLOFT) 100 mg tablet TAKE 1 TABLET DAILY    Bifidobacterium Infantis (ALIGN) 4 mg cap Take 1 Cap by mouth daily.  tamsulosin (FLOMAX) 0.4 mg capsule TAKE 1 CAPSULE DAILY    finasteride (PROSCAR) 5 mg tablet TAKE 1 TABLET DAILY     No current facility-administered medications for this visit.

## 2018-06-27 NOTE — MR AVS SNAPSHOT
[address] [facility] 
 
 
 [address] [place] 1007 [facility] 
[phone] Patient: Elan Lauren. MRN: [id number] ZDB:1/62/6588 Visit Information Date & Time Provider Department Dept. Phone Encounter #  
 6/27/2018  9:30 AM Og Pat MD [facility] [address] [id number] Your Appointments 6/27/2018 10:40 AM  
COUMADIN CLINIC with PREMA GIL  
[facility] (FRNACIS SCHEDULING) Appt Note: f/u sov$0 crf 05/24/18  
 [address] 1007 [facility]  
[phone]  
  
   
 [address] [place] [address]  
  
    
 6/27/2018 11:00 AM  
New Patient with Adonis Ocampo MD  
[facility] ([address]) Appt Note: 6 month follow up  
 [address] [facility] [address] 1007 [facility]  
[id number] Upcoming Health Maintenance Date Due DTaP/Tdap/Td series (1 - Tdap) 7/15/1952 Pneumococcal 65+ High/Highest Risk (2 of 2 - PPSV23) 12/3/2015 GLAUCOMA SCREENING Q2Y 1/1/2017 MEDICARE YEARLY EXAM 7/4/2018 Influenza Age 5 to Adult 8/1/2018 Allergies as of 6/27/2018  Review Complete On: 6/27/2018 By: Og Pat MD  
  
 Severity Noted Reaction Type Reactions Hay Fever And Allergy Relief  06/28/2017    Other (comments) Watery eyes Trazodone  06/27/2018    Palpitations Current Immunizations  Reviewed on 10/8/2015 Name Date Influenza High Dose Vaccine PF 10/11/2017, 11/4/2016 Influenza Vaccine 10/20/2014 Influenza Vaccine (Quad) PF 10/8/2015 Pneumococcal Conjugate (PCV-13) 10/8/2015 Not reviewed this visit You Were Diagnosed With   
  
 Codes Comments Fatigue, unspecified type    -  Primary ICD-10-CM: R53.83 ICD-9-CM: 780.79  Insomnia, unspecified type     ICD-10-CM: G47.00 
 ICD-9-CM: 780.52 Hoarseness     ICD-10-CM: R49.0 ICD-9-CM: 784.42 Dizziness     ICD-10-CM: B17 ICD-9-CM: 780.4 Essential hypertension     ICD-10-CM: I10 
ICD-9-CM: 401.9 Long term current use of anticoagulants with INR goal of 2.0-3.0     ICD-10-CM: Z79.01 
ICD-9-CM: V58.61 Chronic pain of left knee     ICD-10-CM: M25.562, G89.29 ICD-9-CM: 719.46, 338.29 Chronic low back pain, unspecified back pain laterality, with sciatica presence unspecified     ICD-10-CM: M54.5, G89.29 ICD-9-CM: 724.2, 338.29 Lumbar radiculopathy, right     ICD-10-CM: M54.16 
ICD-9-CM: 724.4 Idiopathic peripheral neuropathy     ICD-10-CM: G60.9 ICD-9-CM: 356.9 Vitals BP Pulse Temp Resp Height(growth percentile) Weight(growth percentile) 128/76 (BP 1 Location: Left arm, BP Patient Position: Sitting) 69 98.3 °F (36.8 °C) (Oral) 18 5' 9\" (1.753 m) 213 lb (96.6 kg) SpO2 BMI Smoking Status 95% 31.45 kg/m2 Former Smoker Vitals History BMI and BSA Data Body Mass Index Body Surface Area  
 31.45 kg/m 2 2.17 m 2 Preferred Pharmacy Pharmacy Name Phone [facility][id number]- [address] [facility], [address] [phone] Your Updated Medication List  
  
   
This list is accurate as of 6/27/18 10:29 AM.  Always use your most recent med list.  
  
  
  
  
 ALIGN 4 mg Cap Generic drug:  Bifidobacterium Infantis Take 1 Cap by mouth daily. amLODIPine 5 mg tablet Commonly known as:  [place]ville Cordial TAKE 1 TABLET DAILY  
  
 cephALEXin 500 mg capsule Commonly known as:  Lexus Clonts TAKE ONE CAPSULE BY MOUTH EVERY 6 HOURS  
  
 colestipol 1 gram tablet Commonly known as:  COLESTID  
TAKE 2 TABLETS TWICE A DAY  
  
 finasteride 5 mg tablet Commonly known as:  PROSCAR  
TAKE 1 TABLET DAILY flecainide 150 mg tablet Commonly known as:  TAMBOCOR  
TAKE 1 TABLET TWICE A DAY  
  
 gabapentin 300 mg capsule Commonly known as:  NEURONTIN  
 TAKE 1 CAPSULE DAILY  Indications: Restless Legs Syndrome  
  
 sertraline 100 mg tablet Commonly known as:  ZOLOFT  
TAKE 1 TABLET DAILY  
  
 tamsulosin 0.4 mg capsule Commonly known as:  FLOMAX TAKE 1 CAPSULE DAILY  
  
 valsartan 160 mg tablet Commonly known as:  DIOVAN  
TAKE 1 TABLET DAILY (DECREASED DOSE 10/11/17) * warfarin 5 mg tablet Commonly known as:  COUMADIN  
TAKE ONE AND ONE-HALF TABLETS DAILY OR AS DIRECTED * warfarin 5 mg tablet Commonly known as:  COUMADIN  
TAKING 8.5 MG DAILY OR AS DIRECTED BY CAV COUMADIN CLINIC * warfarin 1 mg tablet Commonly known as:  COUMADIN  
TAKE 1 TABLET BY MOUTH DAILY OR AS DIRECTED BY THE COUMADIN CLINIC  
  
 wheat dextrin 3 gram/4 gram packet Commonly known as:  Emogene Herrlich Take 1 Packet by mouth three (3) times daily as needed. zolpidem 5 mg tablet Commonly known as:  AMBIEN  
TAKE 1 TABLET BY MOUTH AT BEDTIME AS NEEDED FOR SLEEP * Notice: This list has 3 medication(s) that are the same as other medications prescribed for you. Read the directions carefully, and ask your doctor or other care provider to review them with you. Prescriptions Printed Refills  
 zolpidem (AMBIEN) 5 mg tablet 1 Sig: TAKE 1 TABLET BY MOUTH AT BEDTIME AS NEEDED FOR SLEEP Class: Print We Performed the Following CBC WITH AUTOMATED DIFF [01239 CPT(R)] LIPID PANEL [92869 CPT(R)] METABOLIC PANEL, COMPREHENSIVE [26845 CPT(R)] REFERRAL TO ENT-OTOLARYNGOLOGY [IJD10 Custom] REFERRAL TO PAIN MANAGEMENT [NEU029 Custom] TSH 3RD GENERATION [23678 CPT(R)] Referral Information Referral ID Referred By Referred To  
  
 [id number] Flavio [facility]INCH[facility] [address] [place] [address]  [place], [address] [facility] Visits Status Start Date End Date 1 New Request 6/27/18 6/27/19  If your referral has a status of pending review or denied, additional information will be sent to support the outcome of this decision. Referral ID Referred By Referred To  
 [id number] Lucille CURIEL [facility] [address] Visits Status Start Date End Date 1 New Request 6/27/18 6/27/19 If your referral has a status of pending review or denied, additional information will be sent to support the outcome of this decision. Introducing [facility] & HEALTH SERVICES! [facility] introduces [employer] patient portal. Now you can access parts of your medical record, email your doctor's office, and request medication refills online. 1. In your internet browser, go to [email or web address] 2. Click on the First Time User? Click Here link in the Sign In box. You will see the New Member Sign Up page. 3. Enter your [employer] Access Code exactly as it appears below. You will not need to use this code after youve completed the sign-up process. If you do not sign up before the expiration date, you must request a new code. · [employer] Access Code: [email or web address] Expires: 8/22/2018 12:16 PM 
 
4. Enter the last four digits of your Social Security Number (xxxx) and Date of Birth (mm/dd/yyyy) as indicated and click Submit. You will be taken to the next sign-up page. 5. Create a [employer] ID. This will be your [employer] login ID and cannot be changed, so think of one that is secure and easy to remember. 6. Create a [employer] password. You can change your password at any time. 7. Enter your Password Reset Question and Answer. This can be used at a later time if you forget your password. 8. Enter your e-mail address. You will receive e-mail notification when new information is available in [address] 9. Click Sign Up. You can now view and download portions of your medical record. 10. Click the Download Summary menu link to download a portable copy of your medical information. If you have questions, please visit the Frequently Asked Questions section of the [employer] website. Remember, [employer] is NOT to be used for urgent needs. For medical emergencies, dial 911. Now available from your iPhone and Android! Please provide this summary of care documentation to your next provider. Your primary care clinician is listed as Federica Urbina. If you have any questions after today's visit, please call [phone].

## 2018-06-30 LAB
ALBUMIN SERPL-MCNC: 4.1 G/DL (ref 3.5–4.7)
ALBUMIN/GLOB SERPL: 1.4 {RATIO} (ref 1.2–2.2)
ALP SERPL-CCNC: 102 IU/L (ref 39–117)
ALT SERPL-CCNC: 10 IU/L (ref 0–44)
AST SERPL-CCNC: 18 IU/L (ref 0–40)
BASOPHILS # BLD AUTO: 0 X10E3/UL (ref 0–0.2)
BASOPHILS NFR BLD AUTO: 0 %
BILIRUB SERPL-MCNC: 0.3 MG/DL (ref 0–1.2)
BUN SERPL-MCNC: 21 MG/DL (ref 8–27)
BUN/CREAT SERPL: 18 (ref 10–24)
CALCIUM SERPL-MCNC: 8.7 MG/DL (ref 8.6–10.2)
CHLORIDE SERPL-SCNC: 103 MMOL/L (ref 96–106)
CHOLEST SERPL-MCNC: 201 MG/DL (ref 100–199)
CO2 SERPL-SCNC: 26 MMOL/L (ref 20–29)
CREAT SERPL-MCNC: 1.16 MG/DL (ref 0.76–1.27)
EOSINOPHIL # BLD AUTO: 0.4 X10E3/UL (ref 0–0.4)
EOSINOPHIL NFR BLD AUTO: 5 %
ERYTHROCYTE [DISTWIDTH] IN BLOOD BY AUTOMATED COUNT: 13.8 % (ref 12.3–15.4)
GLOBULIN SER CALC-MCNC: 2.9 G/DL (ref 1.5–4.5)
GLUCOSE SERPL-MCNC: 90 MG/DL (ref 65–99)
HCT VFR BLD AUTO: 42.6 % (ref 37.5–51)
HDLC SERPL-MCNC: 53 MG/DL
HGB BLD-MCNC: 14.1 G/DL (ref 13–17.7)
IMM GRANULOCYTES # BLD: 0 X10E3/UL (ref 0–0.1)
IMM GRANULOCYTES NFR BLD: 0 %
INTERPRETATION, 910389: NORMAL
INTERPRETATION: NORMAL
LDLC SERPL CALC-MCNC: 124 MG/DL (ref 0–99)
LYMPHOCYTES # BLD AUTO: 1.5 X10E3/UL (ref 0.7–3.1)
LYMPHOCYTES NFR BLD AUTO: 23 %
MCH RBC QN AUTO: 31.3 PG (ref 26.6–33)
MCHC RBC AUTO-ENTMCNC: 33.1 G/DL (ref 31.5–35.7)
MCV RBC AUTO: 95 FL (ref 79–97)
MONOCYTES # BLD AUTO: 0.6 X10E3/UL (ref 0.1–0.9)
MONOCYTES NFR BLD AUTO: 9 %
NEUTROPHILS # BLD AUTO: 4.1 X10E3/UL (ref 1.4–7)
NEUTROPHILS NFR BLD AUTO: 63 %
PDF IMAGE, 910387: NORMAL
PLATELET # BLD AUTO: 221 X10E3/UL (ref 150–379)
POTASSIUM SERPL-SCNC: 4.6 MMOL/L (ref 3.5–5.2)
PROT SERPL-MCNC: 7 G/DL (ref 6–8.5)
RBC # BLD AUTO: 4.51 X10E6/UL (ref 4.14–5.8)
SODIUM SERPL-SCNC: 142 MMOL/L (ref 134–144)
TRIGL SERPL-MCNC: 121 MG/DL (ref 0–149)
TSH SERPL DL<=0.005 MIU/L-ACNC: 3.67 UIU/ML (ref 0.45–4.5)
VLDLC SERPL CALC-MCNC: 24 MG/DL (ref 5–40)
WBC # BLD AUTO: 6.6 X10E3/UL (ref 3.4–10.8)

## 2018-07-05 ENCOUNTER — TELEPHONE (OUTPATIENT)
Dept: INTERNAL MEDICINE CLINIC | Age: 83
End: 2018-07-05

## 2018-07-24 DIAGNOSIS — F32.1 MAJOR DEPRESSIVE DISORDER, SINGLE EPISODE, MODERATE (HCC): ICD-10-CM

## 2018-07-24 RX ORDER — SERTRALINE HYDROCHLORIDE 100 MG/1
TABLET, FILM COATED ORAL
Qty: 90 TAB | Refills: 2 | Status: SHIPPED | OUTPATIENT
Start: 2018-07-24 | End: 2019-04-22 | Stop reason: SDUPTHER

## 2018-07-25 ENCOUNTER — CLINICAL SUPPORT (OUTPATIENT)
Dept: CARDIOLOGY CLINIC | Age: 83
End: 2018-07-25

## 2018-07-25 DIAGNOSIS — Z79.01 LONG TERM CURRENT USE OF ANTICOAGULANTS WITH INR GOAL OF 2.0-3.0: ICD-10-CM

## 2018-07-25 DIAGNOSIS — I48.0 PAROXYSMAL ATRIAL FIBRILLATION (HCC): ICD-10-CM

## 2018-07-25 DIAGNOSIS — I10 ESSENTIAL HYPERTENSION: Primary | ICD-10-CM

## 2018-07-25 LAB
INR BLD: 2
INR, EXTERNAL: 2 (ref 2–3)
PT POC: 24.2 SECONDS
VALID INTERNAL CONTROL?: YES

## 2018-08-09 ENCOUNTER — TELEPHONE (OUTPATIENT)
Dept: INTERNAL MEDICINE CLINIC | Age: 83
End: 2018-08-09

## 2018-09-03 DIAGNOSIS — I48.0 PAROXYSMAL ATRIAL FIBRILLATION (HCC): Primary | ICD-10-CM

## 2018-09-04 DIAGNOSIS — G89.29 CHRONIC BILATERAL LOW BACK PAIN WITHOUT SCIATICA: ICD-10-CM

## 2018-09-04 DIAGNOSIS — M54.50 CHRONIC BILATERAL LOW BACK PAIN WITHOUT SCIATICA: ICD-10-CM

## 2018-09-04 RX ORDER — GABAPENTIN 100 MG/1
100 CAPSULE ORAL
Qty: 90 CAP | Refills: 1 | Status: SHIPPED | OUTPATIENT
Start: 2018-09-04 | End: 2019-04-23 | Stop reason: SDUPTHER

## 2018-09-04 RX ORDER — IRBESARTAN 150 MG/1
150 TABLET ORAL
Qty: 90 TAB | Refills: 1 | Status: SHIPPED | OUTPATIENT
Start: 2018-09-04 | End: 2018-09-14 | Stop reason: SDUPTHER

## 2018-09-04 RX ORDER — GABAPENTIN 300 MG/1
CAPSULE ORAL
Qty: 90 CAP | Refills: 1 | Status: SHIPPED | OUTPATIENT
Start: 2018-09-04 | End: 2019-03-29 | Stop reason: SDUPTHER

## 2018-09-04 RX ORDER — WARFARIN SODIUM 5 MG/1
TABLET ORAL
Qty: 140 TAB | Refills: 1 | Status: SHIPPED | OUTPATIENT
Start: 2018-09-04 | End: 2019-03-03 | Stop reason: SDUPTHER

## 2018-09-04 NOTE — TELEPHONE ENCOUNTER
Patient is calling for a refill on his gabapentin (NEURONTIN) 300 mg capsule  #90 days and Gabapentin 10 mg #90 day supply called into the [facility] at [phone]   His no is [phone]  He is running low on his medication needs it soon

## 2018-09-04 NOTE — TELEPHONE ENCOUNTER
Requested Prescriptions     Signed Prescriptions Disp Refills    warfarin (COUMADIN) 5 mg tablet 140 Tab 1     Sig: TAKE ONE AND ONE-HALF TABLETS DAILY OR AS DIRECTED     Authorizing Provider: Elaine Sheppard     Ordering User: Megan Gamino       Per Dr. Rhonda Ceja  Date Time Provider Ezekiel Munoz   1/2/2019 11:00 AM Kevyn Waterman MD [address] [place] [address]

## 2018-09-05 ENCOUNTER — CLINICAL SUPPORT (OUTPATIENT)
Dept: CARDIOLOGY CLINIC | Age: 83
End: 2018-09-05

## 2018-09-05 DIAGNOSIS — I48.0 PAROXYSMAL ATRIAL FIBRILLATION (HCC): ICD-10-CM

## 2018-09-05 DIAGNOSIS — I10 ESSENTIAL HYPERTENSION: ICD-10-CM

## 2018-09-05 DIAGNOSIS — Z79.01 LONG TERM CURRENT USE OF ANTICOAGULANTS WITH INR GOAL OF 2.0-3.0: Primary | ICD-10-CM

## 2018-09-05 LAB
INR BLD: 2.2
INR, EXTERNAL: 2.2 (ref 2–3)
PT POC: 25.8 SECONDS
VALID INTERNAL CONTROL?: YES

## 2018-09-14 ENCOUNTER — TELEPHONE (OUTPATIENT)
Dept: INTERNAL MEDICINE CLINIC | Age: 83
End: 2018-09-14

## 2018-09-14 RX ORDER — IRBESARTAN 150 MG/1
150 TABLET ORAL
Qty: 90 TAB | Refills: 1 | Status: SHIPPED | OUTPATIENT
Start: 2018-09-14 | End: 2019-02-13

## 2018-09-14 NOTE — TELEPHONE ENCOUNTER
Prior auth for Zolpidem has been denied by [employer]. Per Cover my meds: Denied today  Request Reference Number: [id number]. ZOLPIDEM TAB 5MG is denied due to Plan Exclusion.  For further questions, call [phone]

## 2018-09-14 NOTE — TELEPHONE ENCOUNTER
I spoke with patient to advise prior auth denied. Pt was given Trazodone which kept him up at night. He isn't taking anything right now for sleep. Pt was thankful for the call.

## 2018-09-17 RX ORDER — MONTELUKAST SODIUM 4 MG/1
TABLET, CHEWABLE ORAL
Qty: 360 TAB | Refills: 1 | Status: SHIPPED | OUTPATIENT
Start: 2018-09-17 | End: 2019-03-16 | Stop reason: SDUPTHER

## 2018-09-25 DIAGNOSIS — I48.0 PAROXYSMAL ATRIAL FIBRILLATION (HCC): ICD-10-CM

## 2018-09-25 RX ORDER — WARFARIN SODIUM 5 MG/1
TABLET ORAL
Qty: 140 TAB | Refills: 1 | Status: CANCELLED | OUTPATIENT
Start: 2018-09-25

## 2018-09-25 RX ORDER — WARFARIN 1 MG/1
TABLET ORAL
Qty: 90 TAB | Refills: 0 | Status: CANCELLED | OUTPATIENT
Start: 2018-09-25

## 2018-09-25 NOTE — TELEPHONE ENCOUNTER
----- Message from Tamra Gomez sent at 9/25/2018 10:05 AM EDT -----  Regarding: Dr. Kendra Henderson  Patient needs a 90 day supply of Warfarin sent to the [facility] pharmacy at [phone].

## 2018-09-25 NOTE — TELEPHONE ENCOUNTER
Dr Darinel Hatfield sent this script in 9/4/2018 # 140, pt is going to coumadin clinic  At Cardiology, message left for pt  Needs to get medicine filled there.

## 2018-09-30 RX ORDER — WARFARIN 1 MG/1
TABLET ORAL
Qty: 90 TAB | Refills: 0 | Status: SHIPPED | OUTPATIENT
Start: 2018-09-30 | End: 2018-12-30 | Stop reason: SDUPTHER

## 2018-10-10 ENCOUNTER — CLINICAL SUPPORT (OUTPATIENT)
Dept: CARDIOLOGY CLINIC | Age: 83
End: 2018-10-10

## 2018-10-10 DIAGNOSIS — Z79.01 LONG TERM CURRENT USE OF ANTICOAGULANTS WITH INR GOAL OF 2.0-3.0: Primary | ICD-10-CM

## 2018-10-10 DIAGNOSIS — I48.0 PAROXYSMAL ATRIAL FIBRILLATION (HCC): ICD-10-CM

## 2018-10-10 DIAGNOSIS — I10 ESSENTIAL HYPERTENSION: ICD-10-CM

## 2018-10-10 LAB
INR BLD: 2
INR, EXTERNAL: 2 (ref 2–3)
PT POC: 24.1 SECONDS
VALID INTERNAL CONTROL?: YES

## 2018-10-17 RX ORDER — FINASTERIDE 5 MG/1
TABLET, FILM COATED ORAL
Qty: 90 TAB | Refills: 3 | Status: SHIPPED | OUTPATIENT
Start: 2018-10-17 | End: 2019-10-07 | Stop reason: SDUPTHER

## 2018-10-25 RX ORDER — TAMSULOSIN HYDROCHLORIDE 0.4 MG/1
CAPSULE ORAL
Qty: 90 CAP | Refills: 3 | Status: SHIPPED | OUTPATIENT
Start: 2018-10-25 | End: 2019-10-20 | Stop reason: SDUPTHER

## 2018-11-14 ENCOUNTER — CLINICAL SUPPORT (OUTPATIENT)
Dept: CARDIOLOGY CLINIC | Age: 83
End: 2018-11-14

## 2018-11-14 DIAGNOSIS — Z79.01 LONG TERM CURRENT USE OF ANTICOAGULANTS WITH INR GOAL OF 2.0-3.0: ICD-10-CM

## 2018-11-14 DIAGNOSIS — I48.0 PAROXYSMAL ATRIAL FIBRILLATION (HCC): ICD-10-CM

## 2018-11-14 DIAGNOSIS — I10 ESSENTIAL HYPERTENSION: Primary | ICD-10-CM

## 2018-11-14 LAB
INR BLD: 2.5
INR, EXTERNAL: 2.5 (ref 2–3)
PT POC: 30.4 SECONDS
VALID INTERNAL CONTROL?: YES

## 2018-12-19 ENCOUNTER — CLINICAL SUPPORT (OUTPATIENT)
Dept: CARDIOLOGY CLINIC | Age: 83
End: 2018-12-19

## 2018-12-19 DIAGNOSIS — I48.0 PAROXYSMAL ATRIAL FIBRILLATION (HCC): ICD-10-CM

## 2018-12-19 DIAGNOSIS — I10 ESSENTIAL HYPERTENSION: Primary | ICD-10-CM

## 2018-12-19 DIAGNOSIS — Z79.01 LONG TERM CURRENT USE OF ANTICOAGULANTS WITH INR GOAL OF 2.0-3.0: ICD-10-CM

## 2018-12-19 LAB
INR BLD: 2.2
INR, EXTERNAL: 2.2 (ref 2–3)
PT POC: 26.7 SECONDS
VALID INTERNAL CONTROL?: YES

## 2018-12-30 RX ORDER — WARFARIN 1 MG/1
TABLET ORAL
Qty: 90 TAB | Refills: 0 | Status: SHIPPED | OUTPATIENT
Start: 2018-12-30 | End: 2018-12-31 | Stop reason: SDUPTHER

## 2019-01-02 RX ORDER — WARFARIN 1 MG/1
TABLET ORAL
Qty: 90 TAB | Refills: 0 | Status: SHIPPED | OUTPATIENT
Start: 2019-01-02 | End: 2019-06-23 | Stop reason: SDUPTHER

## 2019-01-15 ENCOUNTER — TELEPHONE (OUTPATIENT)
Dept: CARDIOLOGY CLINIC | Age: 84
End: 2019-01-15

## 2019-01-15 NOTE — TELEPHONE ENCOUNTER
Patient stated he will be having minor facial surgery on 1/21 with dr Thania Kaiser they need to know when he should stop his warfrin   Phone: [phone] please leave a VM if he can not answer

## 2019-01-30 ENCOUNTER — OFFICE VISIT (OUTPATIENT)
Dept: CARDIOLOGY CLINIC | Age: 84
End: 2019-01-30

## 2019-01-30 ENCOUNTER — CLINICAL SUPPORT (OUTPATIENT)
Dept: CARDIOLOGY CLINIC | Age: 84
End: 2019-01-30

## 2019-01-30 VITALS
SYSTOLIC BLOOD PRESSURE: 100 MMHG | HEART RATE: 63 BPM | WEIGHT: 223.8 LBS | OXYGEN SATURATION: 96 % | BODY MASS INDEX: 33.15 KG/M2 | DIASTOLIC BLOOD PRESSURE: 60 MMHG | RESPIRATION RATE: 16 BRPM | HEIGHT: 69 IN

## 2019-01-30 DIAGNOSIS — I45.4 IVCD (INTRAVENTRICULAR CONDUCTION DEFECT): ICD-10-CM

## 2019-01-30 DIAGNOSIS — Z79.01 LONG TERM CURRENT USE OF ANTICOAGULANTS WITH INR GOAL OF 2.0-3.0: ICD-10-CM

## 2019-01-30 DIAGNOSIS — Z79.899 ENCOUNTER FOR MONITORING FLECAINIDE THERAPY: ICD-10-CM

## 2019-01-30 DIAGNOSIS — E78.5 DYSLIPIDEMIA: ICD-10-CM

## 2019-01-30 DIAGNOSIS — I48.0 PAROXYSMAL ATRIAL FIBRILLATION (HCC): ICD-10-CM

## 2019-01-30 DIAGNOSIS — I48.0 PAROXYSMAL ATRIAL FIBRILLATION (HCC): Primary | ICD-10-CM

## 2019-01-30 DIAGNOSIS — Z51.81 ENCOUNTER FOR MONITORING FLECAINIDE THERAPY: ICD-10-CM

## 2019-01-30 DIAGNOSIS — I10 ESSENTIAL HYPERTENSION: Primary | ICD-10-CM

## 2019-01-30 DIAGNOSIS — I10 ESSENTIAL HYPERTENSION: ICD-10-CM

## 2019-01-30 LAB
INR BLD: 1.9
INR, EXTERNAL: 1.9 (ref 2–3)
PT POC: 20.1 SECONDS
VALID INTERNAL CONTROL?: YES

## 2019-01-30 NOTE — PROGRESS NOTES
Genoveva Orlando .     7/15/1931       Chandler Storey MD, [facility] [place]  Date of Visit-1/30/2019   PCP is Susan Lozano MD   [facility] [place]  [facility]  HPI:  Francisco Javier Davenport is a 80 y.o. male   Overall the pt states he is doing well. Pt feels fine. Pt denies any episodes of afib. Pt has occasional tingling sensation on his feet but otherwise fells fine. F/u of PAF and HTN. On flecainide. Denies chest pain, edema, syncope or shortness of breath at rest, has no tachycardia, palpitations or sense of arrhythmia. EKG: Sinus rhythm with ST depression in the high lateral leads. non-specific ST-T segment/T wave findings     Assessment/Plan:     1. PAF. Asymptomatic on flecainide. QTC measurement is 447 which is acceptable. 2. HTN. Continues CCB. At goal.      3. No bleeding on Coumadin. 4. IVCD. QRS is slightly prolonged at 122 ms this has been persistent. Unchanged. Has had no syncope. 5. Dyslipidemia. Not on statin. Last lipid panel showed an LDL of 124 which is acceptable. Continue diet and exercise. F/u in 6 months. Impression:   1. Paroxysmal atrial fibrillation (HCC)    2. Essential hypertension    3. Long term current use of anticoagulants with INR goal of 2.0-3.0    4. IVCD (intraventricular conduction defect)    5. Encounter for monitoring flecainide therapy    6. Dyslipidemia       Cardiac History:   ECHO 9-10-14=  Left ventricle: Systolic function was normal by EF (biplane method of  disks). Ejection fraction was estimated to be 66 %. There were no regional  wall motion abnormalities. Doppler parameters were consistent with  abnormal left ventricular relaxation (grade 1 diastolic dysfunction). Left atrium: The atrium was mildly to moderately dilated     ROS-except as noted above. . A complete cardiac and respiratory are reviewed and negative except as above ; Resp-denies wheezing  or productive cough,.  Const- No unusual weight loss or fever; Neuro-no recent seizure or CVA ; GI- No BRBPR, abdom pain, bloating ; - no  hematuria   see supplement sheet, initialed and to be scanned by staff  Past Medical History:   Diagnosis Date    Advanced care planning/counseling discussion 10/8/2015    Basal cell carcinoma, face     now sees Dr. Savage Kincaid.  Bladder cancer [facility]) 2012    Dr. Raudel Xavier. cyto 9/2014, 2/2016. s/p surgery, BCG irrigation [place]. saw Dr. Christelle Yeh BPH with obstruction/lower urinary tract symptoms     Chronic lower back pain     injection in [place]. saw Dr. Gisselle Larose Chronic neck pain     limited motion to side    Depression, major     Dupuytren's contracture of left hand     5th finger    Epistaxis 2013    saw ENT at [address] [place] [address]     with loose stools    Hearing loss     has hearing aids    HTN (hypertension)     Insomnia     IVCD (intraventricular conduction defect) 11/30/2015    Dr Raimundo Armstrong term current use of anticoagulants with INR goal of 2.0-3.0     Afib    Loose stools 7/18/2014    Paroxysmal atrial fibrillation (HCC)     EF 66%, 1-2+ LAE on flecainide;Dr. Lori Grier. [facility] Ibis Carlson [facility] Peripheral neuropathy     RLS (restless legs syndrome) 7/3/2017    SCC (squamous cell carcinoma), face     prior hx, now sees Dr. Garcia President Urothelial carcinoma of right distal ureter ([facility] [address] 5/1/15    excision Dr. Verner Berry. low grade, papillary. repeat 3 month      Social Hx= reports that he has quit smoking. he has never used smokeless tobacco. He reports that he drinks alcohol. Exam and Labs:  /60 (BP 1 Location: Left arm, BP Patient Position: Sitting)   Pulse 63   Resp 16   Ht 5' 9\" (1.753 m)   Wt 223 lb 12.8 oz (101.5 kg)   SpO2 96%   BMI 33.05 kg/m² Constitutional:  NAD, comfortable  Head: [place],AT. Eyes: No scleral icterus. Neck:  Neck supple. No JVD present. Throat: moist mucous membranes. Chest: Effort normal & normal respiratory excursion . Neurological: alert, conversant and oriented . Skin: Skin is not cold. No obvious systemic rash noted. Not diaphoretic. No erythema. Psychiatric:  Grossly normal mood and affect. Behavior appears normal. Extremities:  no clubbing or cyanosis. Abdomen: non distended    Lungs:breath sounds normal. No stridor. distress, wheezes or  Rales. Heart: normal rate, regular rhythm, normal S1, S2, no murmurs, rubs, clicks or gallops , PMI non displaced. Edema: Edema is none. Lab Results   Component Value Date/Time    Cholesterol, total 201 (H) 06/29/2018 10:24 AM    HDL Cholesterol 53 06/29/2018 10:24 AM    LDL, calculated 124 (H) 06/29/2018 10:24 AM    Triglyceride 121 06/29/2018 10:24 AM     Lab Results   Component Value Date/Time    Sodium 142 06/29/2018 10:24 AM    Potassium 4.6 06/29/2018 10:24 AM    Chloride 103 06/29/2018 10:24 AM    CO2 26 06/29/2018 10:24 AM    Glucose 90 06/29/2018 10:24 AM    BUN 21 06/29/2018 10:24 AM    Creatinine 1.16 06/29/2018 10:24 AM    BUN/Creatinine ratio 18 06/29/2018 10:24 AM    GFR est AA 66 06/29/2018 10:24 AM    GFR est non-AA 57 (L) 06/29/2018 10:24 AM    Calcium 8.7 06/29/2018 10:24 AM      Wt Readings from Last 3 Encounters:   01/30/19 223 lb 12.8 oz (101.5 kg)   06/27/18 212 lb 4.8 oz (96.3 kg)   06/27/18 213 lb (96.6 kg)      BP Readings from Last 3 Encounters:   01/30/19 100/60   06/27/18 128/70   06/27/18 128/76      Current Outpatient Medications   Medication Sig    warfarin (COUMADIN) 1 mg tablet TAKE 1 TABLET BY MOUTH EVERY DAY    tamsulosin (FLOMAX) 0.4 mg capsule TAKE 1 CAPSULE DAILY    finasteride (PROSCAR) 5 mg tablet TAKE 1 TABLET DAILY    colestipol (COLESTID) 1 gram tablet TAKE 2 TABLETS TWICE A DAY    irbesartan (AVAPRO) 150 mg tablet Take 1 Tab by mouth nightly.     warfarin (COUMADIN) 5 mg tablet TAKE ONE AND ONE-HALF TABLETS DAILY OR AS DIRECTED    gabapentin (NEURONTIN) 300 mg capsule TAKE 1 CAPSULE DAILY  Indications: Restless Legs Syndrome    gabapentin (NEURONTIN) 100 mg capsule Take 1 Cap by mouth every morning.  sertraline (ZOLOFT) 100 mg tablet TAKE 1 TABLET DAILY    cephALEXin (KEFLEX) 500 mg capsule TAKE ONE CAPSULE BY MOUTH EVERY 6 HOURS    zolpidem (AMBIEN) 5 mg tablet TAKE 1 TABLET BY MOUTH AT BEDTIME AS NEEDED FOR SLEEP    flecainide (TAMBOCOR) 150 mg tablet TAKE 1 TABLET TWICE A DAY    amLODIPine (NORVASC) 5 mg tablet TAKE 1 TABLET DAILY    valsartan (DIOVAN) 160 mg tablet TAKE 1 TABLET DAILY (DECREASED DOSE 10/11/17)    Bifidobacterium Infantis (ALIGN) 4 mg cap Take 1 Cap by mouth daily. No current facility-administered medications for this visit. Impression see above.       Written by Kylah Frank, as dictated by Savannah Montes MD.

## 2019-01-30 NOTE — PROGRESS NOTES
Visit Vitals  /60 (BP 1 Location: Left arm, BP Patient Position: Sitting)   Pulse 63   Resp 16   Ht 5' 9\" (1.753 m)   Wt 223 lb 12.8 oz (101.5 kg)   SpO2 96%   BMI 33.05 kg/m²

## 2019-02-04 DIAGNOSIS — I48.0 PAROXYSMAL ATRIAL FIBRILLATION (HCC): Primary | ICD-10-CM

## 2019-02-04 RX ORDER — FLECAINIDE ACETATE 150 MG/1
TABLET ORAL
Qty: 180 TAB | Refills: 2 | Status: SHIPPED | OUTPATIENT
Start: 2019-02-04 | End: 2019-11-01 | Stop reason: SDUPTHER

## 2019-02-04 NOTE — TELEPHONE ENCOUNTER
Request for flecainide 150mg BID. Last office visit & EKG 1-30-19, next office visit 7-31-19.  Refills per verbal order from Dr. Lori Grier.

## 2019-02-13 RX ORDER — IRBESARTAN 75 MG/1
150 TABLET ORAL
Qty: 180 TAB | Refills: 1 | Status: SHIPPED | COMMUNITY
Start: 2019-02-13 | End: 2019-07-29 | Stop reason: SDUPTHER

## 2019-02-19 ENCOUNTER — TELEPHONE (OUTPATIENT)
Dept: INTERNAL MEDICINE CLINIC | Age: 84
End: 2019-02-19

## 2019-02-19 NOTE — TELEPHONE ENCOUNTER
I called pt and provided him with [facility] contact information. Pt verbalized understanding.

## 2019-02-22 ENCOUNTER — DOCUMENTATION ONLY (OUTPATIENT)
Dept: FAMILY MEDICINE CLINIC | Age: 84
End: 2019-02-22

## 2019-02-27 ENCOUNTER — ANTI-COAG VISIT (OUTPATIENT)
Dept: CARDIOLOGY CLINIC | Age: 84
End: 2019-02-27

## 2019-02-27 DIAGNOSIS — I10 ESSENTIAL HYPERTENSION: Primary | ICD-10-CM

## 2019-02-27 DIAGNOSIS — Z79.01 LONG TERM CURRENT USE OF ANTICOAGULANTS WITH INR GOAL OF 2.0-3.0: ICD-10-CM

## 2019-02-27 DIAGNOSIS — I48.0 PAROXYSMAL ATRIAL FIBRILLATION (HCC): ICD-10-CM

## 2019-02-27 LAB
INR BLD: 2.4
INR, EXTERNAL: 2.4 (ref 2–3)
PT POC: 28.3 SECONDS
VALID INTERNAL CONTROL?: YES

## 2019-03-03 DIAGNOSIS — I48.0 PAROXYSMAL ATRIAL FIBRILLATION (HCC): ICD-10-CM

## 2019-03-12 RX ORDER — WARFARIN SODIUM 5 MG/1
TABLET ORAL
Qty: 140 TAB | Refills: 1 | Status: SHIPPED | OUTPATIENT
Start: 2019-03-12 | End: 2019-09-03 | Stop reason: SDUPTHER

## 2019-03-17 RX ORDER — MONTELUKAST SODIUM 4 MG/1
TABLET, CHEWABLE ORAL
Qty: 360 TAB | Refills: 1 | Status: SHIPPED | OUTPATIENT
Start: 2019-03-17 | End: 2020-05-29

## 2019-03-29 DIAGNOSIS — M54.50 CHRONIC BILATERAL LOW BACK PAIN WITHOUT SCIATICA: ICD-10-CM

## 2019-03-29 DIAGNOSIS — G89.29 CHRONIC BILATERAL LOW BACK PAIN WITHOUT SCIATICA: ICD-10-CM

## 2019-03-29 RX ORDER — GABAPENTIN 300 MG/1
CAPSULE ORAL
Qty: 90 CAP | Refills: 1 | Status: SHIPPED | OUTPATIENT
Start: 2019-03-29 | End: 2019-06-23 | Stop reason: SDUPTHER

## 2019-04-03 ENCOUNTER — ANTI-COAG VISIT (OUTPATIENT)
Dept: CARDIOLOGY CLINIC | Age: 84
End: 2019-04-03

## 2019-04-03 DIAGNOSIS — Z79.01 LONG TERM CURRENT USE OF ANTICOAGULANTS WITH INR GOAL OF 2.0-3.0: ICD-10-CM

## 2019-04-03 DIAGNOSIS — I10 ESSENTIAL HYPERTENSION: Primary | ICD-10-CM

## 2019-04-03 DIAGNOSIS — I48.0 PAROXYSMAL ATRIAL FIBRILLATION (HCC): ICD-10-CM

## 2019-04-03 LAB
INR BLD: 2
INR, EXTERNAL: 2 (ref 2–3)
PT POC: 23.3 SECONDS
VALID INTERNAL CONTROL?: YES

## 2019-04-08 RX ORDER — AMLODIPINE BESYLATE 5 MG/1
TABLET ORAL
Qty: 90 TAB | Refills: 3 | Status: SHIPPED | OUTPATIENT
Start: 2019-04-08 | End: 2020-05-15

## 2019-04-22 ENCOUNTER — TELEPHONE (OUTPATIENT)
Dept: CARDIOLOGY CLINIC | Age: 84
End: 2019-04-22

## 2019-04-22 DIAGNOSIS — F32.1 MAJOR DEPRESSIVE DISORDER, SINGLE EPISODE, MODERATE (HCC): ICD-10-CM

## 2019-04-22 RX ORDER — SERTRALINE HYDROCHLORIDE 100 MG/1
TABLET, FILM COATED ORAL
Qty: 90 TAB | Refills: 2 | Status: SHIPPED | OUTPATIENT
Start: 2019-04-22 | End: 2020-06-05

## 2019-04-22 NOTE — TELEPHONE ENCOUNTER
Patient would like to speak to you about a concern he has with his warfarin and an antibiotic he was prescribed.      He can be reached at [phone]

## 2019-04-23 ENCOUNTER — OFFICE VISIT (OUTPATIENT)
Dept: INTERNAL MEDICINE CLINIC | Age: 84
End: 2019-04-23

## 2019-04-23 ENCOUNTER — TELEPHONE (OUTPATIENT)
Dept: INTERNAL MEDICINE CLINIC | Age: 84
End: 2019-04-23

## 2019-04-23 VITALS
RESPIRATION RATE: 16 BRPM | TEMPERATURE: 98.1 F | HEIGHT: 69 IN | SYSTOLIC BLOOD PRESSURE: 136 MMHG | BODY MASS INDEX: 33.92 KG/M2 | HEART RATE: 72 BPM | OXYGEN SATURATION: 98 % | WEIGHT: 229 LBS | DIASTOLIC BLOOD PRESSURE: 72 MMHG

## 2019-04-23 DIAGNOSIS — H60.92 OTITIS EXTERNA OF LEFT EAR, UNSPECIFIED CHRONICITY, UNSPECIFIED TYPE: Primary | ICD-10-CM

## 2019-04-23 DIAGNOSIS — I10 ESSENTIAL HYPERTENSION: ICD-10-CM

## 2019-04-23 DIAGNOSIS — H66.90 ACUTE OTITIS MEDIA, UNSPECIFIED OTITIS MEDIA TYPE: ICD-10-CM

## 2019-04-23 RX ORDER — CIPROFLOXACIN AND DEXAMETHASONE 3; 1 MG/ML; MG/ML
4 SUSPENSION/ DROPS AURICULAR (OTIC) 2 TIMES DAILY
Qty: 7.5 ML | Refills: 0 | Status: SHIPPED | OUTPATIENT
Start: 2019-04-23 | End: 2019-04-28

## 2019-04-23 RX ORDER — AMOXICILLIN 500 MG/1
500 CAPSULE ORAL
COMMUNITY
End: 2019-10-07

## 2019-04-23 NOTE — TELEPHONE ENCOUNTER
I spoke with patient, he would like to be seen today to have his left ear checked. He feels like there is still fluid in his left ear with pain. He was seen at [facility] and [facility]. Appt schedule with Dr. Hannon Began today at 2:45pm. Pt was thankful.

## 2019-04-23 NOTE — TELEPHONE ENCOUNTER
Patient was seen at [facility] on Saturday for issues with his ear. They cleaned it out and gave him antibiotics. Provider at [facility] told patient the antibiotics may not mix well with Warfarin and for him to get his blood check by his PCP. He states it feel like its water in his ear and wants to see if  [facility] or [facility] today. There are no openings with either one. I offered tomorrow morning with [facility] and the patient declined. He then asked to speak with a nurse. Please call patient at [phone]. Thanks.

## 2019-04-23 NOTE — PROGRESS NOTES
Brianna Kilpatrick. is a 80 y.o. male who presents today for Ear Pain Sudjoselo Mar He has a history of  
Patient Active Problem List  
Diagnosis Code  Paroxysmal atrial fibrillation (HCC) I48.0  BPH with obstruction/lower urinary tract symptoms N40.1, N13.8  Depression, major F32.9  Long term current use of anticoagulants with INR goal of 2.0-3.0 Z79.01  
 Hearing loss H91.90  
 Insomnia G47.00  Bladder cancer ([facility] [address] C67.9  
 Encounter for monitoring flecainide therapy Z51.81, D14.781  Loose stools R19.5  Fecal incontinence R15.9  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) C66.1  Chronic lower back pain M54.5, G89.29  
 Peripheral neuropathy G62.9  
 Essential hypertension I10  
 Advanced care planning/counseling discussion Z71.89  
 IVCD (intraventricular conduction defect) I45.4  Chronic neck pain M54.2, G89.29  Dupuytren's contracture of left hand M72.0  
 RLS (restless legs syndrome) G25.81 Shira Mota Today patient is here for an acute visit. Upper respiratory illness: 
Brianna Kilpatrick. presents with complaints of L ear pain and ache that started about 5 days ago. Seen at [facility] 4 days ago. Told that he had cerumen impaction. They cleaned his ears out. Told that he had a L ear infection and placed on amoxicillin. Denies much help with this. Pain has gotten worse. Pain constant with a sharp stabbing pain that is sharp. No difference with moving jaw. No fevers/chills. No other allergy symptoms. No changes since abx. ROS Review of Systems Constitutional: Negative for chills, fever and weight loss. HENT: Positive for ear discharge and ear pain. Negative for congestion, hearing loss, nosebleeds, sinus pain and tinnitus. Ear fullness Respiratory: Negative for cough and shortness of breath. Cardiovascular: Negative for chest pain, palpitations and leg swelling. Gastrointestinal: Negative for abdominal pain, nausea and vomiting. Neurological: Negative. Endo/Heme/Allergies: Does not bruise/bleed easily. Psychiatric/Behavioral: Negative for depression. The patient is not nervous/anxious. Visit Vitals /72 (BP 1 Location: Left arm, BP Patient Position: Sitting) Pulse 72 Temp 98.1 °F (36.7 °C) (Oral) Resp 16 Ht 5' 9\" (1.753 m) Wt 229 lb (103.9 kg) SpO2 98% BMI 33.82 kg/m² Physical Exam  
Constitutional: He is oriented to person, place, and time. He appears well-developed and well-nourished. HENT:  
Head: Normocephalic and atraumatic. Inflamed left ear canal with what appears to be pus. Unable to see tympanic membrane. Very tender to ear canal. 
Right ear normal 
No lymphadenopathy noted Eyes: Pupils are equal, round, and reactive to light. EOM are normal.  
Cardiovascular: Normal rate and regular rhythm. No murmur heard. Pulmonary/Chest: Effort normal and breath sounds normal. No respiratory distress. Musculoskeletal: Normal range of motion. He exhibits no edema. Neurological: He is alert and oriented to person, place, and time. Skin: Skin is warm and dry. He is not diaphoretic. Psychiatric: He has a normal mood and affect. His behavior is normal.  
 
 
 
Current Outpatient Medications Medication Sig  
 amoxicillin (AMOXIL) 500 mg capsule Take 500 mg by mouth.  sertraline (ZOLOFT) 100 mg tablet TAKE 1 TABLET DAILY  amLODIPine (NORVASC) 5 mg tablet TAKE 1 TABLET DAILY  gabapentin (NEURONTIN) 300 mg capsule TAKE 1 CAPSULE BY MOUTH DAILY INDICATIONS: RESTLESS LEGS SYNDROME  colestipol (COLESTID) 1 gram tablet TAKE 2 TABLETS TWICE A DAY  warfarin (COUMADIN) 5 mg tablet TAKE ONE AND ONE-HALF TABLETS DAILY OR AS DIRECTED  irbesartan (AVAPRO) 75 mg tablet Take 2 Tabs by mouth nightly. Change in pill strength due to stock 2/13/19  flecainide (TAMBOCOR) 150 mg tablet TAKE 1 TABLET TWICE A DAY  warfarin (COUMADIN) 1 mg tablet TAKE 1 TABLET BY MOUTH EVERY DAY  
  tamsulosin (FLOMAX) 0.4 mg capsule TAKE 1 CAPSULE DAILY  finasteride (PROSCAR) 5 mg tablet TAKE 1 TABLET DAILY  cephALEXin (KEFLEX) 500 mg capsule TAKE ONE CAPSULE BY MOUTH EVERY 6 HOURS  zolpidem (AMBIEN) 5 mg tablet TAKE 1 TABLET BY MOUTH AT BEDTIME AS NEEDED FOR SLEEP  Bifidobacterium Infantis (ALIGN) 4 mg cap Take 1 Cap by mouth daily. No current facility-administered medications for this visit. Past Medical History:  
Diagnosis Date  Advanced care planning/counseling discussion 10/8/2015  Basal cell carcinoma, face   
 now sees Dr. Carloz Montanez.  Bladder cancer ([facility] [address] 2012 Dr. Julius Tinsley. cyto 9/2014, 2/2016. s/p surgery, BCG irrigation [place]. saw Dr. Liv Dasilva  BPH with obstruction/lower urinary tract symptoms  Chronic lower back pain   
 injection in [place]. saw Dr. Mario Grimaldo  Chronic neck pain   
 limited motion to side  Depression, major  Dupuytren's contracture of left hand 5th finger  Epistaxis 2013  
 saw ENT at [facility]  Fecal incontinence   
 with loose stools  Hearing loss   
 has hearing aids  HTN (hypertension)  Insomnia  IVCD (intraventricular conduction defect) 11/30/2015 Dr Allie Franco term current use of anticoagulants with INR goal of 2.0-3.0 Afib  Loose stools 7/18/2014  Paroxysmal atrial fibrillation (HCC) EF 66%, 1-2+ LAE on flecainide;Dr. Duron. saw Dr. Bry Meza at [facility]  Peripheral neuropathy  RLS (restless legs syndrome) 7/3/2017  SCC (squamous cell carcinoma), face   
 prior hx, now sees Dr. Carloz Montanez  Urothelial carcinoma of right distal ureter ([facility] [address] 5/1/15  
 excision Dr. Nicole Kayser. low grade, papillary. repeat 3 month Past Surgical History:  
Procedure Laterality Date  HX BLADDER REPAIR  2012  
 cancer  HX COLONOSCOPY  2012  
 polyps. repeat 2017?  HX HERNIA REPAIR    
 periumibical  
 HX MALIGNANT SKIN LESION EXCISION  06/14/2018  
 basal cell left lip  HX RETINAL DETACHMENT REPAIR Right  [place] CYSTOURETHROSCOPY  9/2014 Dr. Chloé Cabral  [place] CYSTOURETHROSCOPY  5/1/15  
 low grade right pap urothelial cancer  [place] CYSTOURETHROSCOPY  2/29/16 Social History Tobacco Use  Smoking status: Former Smoker  Smokeless tobacco: Never Used  Tobacco comment: socially Substance Use Topics  Alcohol use: Yes Comment: occ History reviewed. No pertinent family history. Allergies Allergen Reactions  Hay Fever And Allergy Relief Other (comments) Watery eyes  Trazodone Palpitations Assessment/Plan Diagnoses and all orders for this visit: 
 
1. Otitis externa of left ear, unspecified chronicity, unspecified type -unable to see tympanic membrane but will definitely treat for an Milo externa. Part of this may be inflammatory secondary to having his ears flush the other day and therefore will add dexamethasone with the Cipro. I would like to see him back sooner as he is in good bit of discomfort. Suggest acetaminophen for pain. Continue amoxicillin as I am not been able to visualize his tympanic membrane. 
-     ciprofloxacin-dexamethasone (CIPRODEX) 0.3-0.1 % otic suspension; Administer 4 Drops in left ear two (2) times a day for 5 days. 2. Acute otitis media, unspecified otitis media type 3. Essential hypertension -no changes needed Naima Hart MD 
4/23/2019 This note was created with the help of speech recognition software (Dragon) and may contain some 'sound alike' errors.

## 2019-04-23 NOTE — PATIENT INSTRUCTIONS

## 2019-04-25 ENCOUNTER — ANTI-COAG VISIT (OUTPATIENT)
Dept: CARDIOLOGY CLINIC | Age: 84
End: 2019-04-25

## 2019-04-25 DIAGNOSIS — Z79.01 LONG TERM CURRENT USE OF ANTICOAGULANTS WITH INR GOAL OF 2.0-3.0: ICD-10-CM

## 2019-04-25 DIAGNOSIS — I10 ESSENTIAL HYPERTENSION: Primary | ICD-10-CM

## 2019-04-25 DIAGNOSIS — I48.0 PAROXYSMAL ATRIAL FIBRILLATION (HCC): ICD-10-CM

## 2019-04-25 LAB
INR BLD: 2
INR, EXTERNAL: 2 (ref 2–3)
PT POC: 24.2 SECONDS
VALID INTERNAL CONTROL?: YES

## 2019-05-23 ENCOUNTER — TELEPHONE (OUTPATIENT)
Dept: INTERNAL MEDICINE CLINIC | Age: 84
End: 2019-05-23

## 2019-05-23 NOTE — TELEPHONE ENCOUNTER
Patient is requesting  A doctor recommendation for worsening  right knee pain and worsening  lumbar spine pain.        He can be reached at [phone]

## 2019-05-30 ENCOUNTER — ANTI-COAG VISIT (OUTPATIENT)
Dept: CARDIOLOGY CLINIC | Age: 84
End: 2019-05-30

## 2019-05-30 DIAGNOSIS — I10 ESSENTIAL HYPERTENSION: Primary | ICD-10-CM

## 2019-05-30 DIAGNOSIS — I48.0 PAROXYSMAL ATRIAL FIBRILLATION (HCC): ICD-10-CM

## 2019-05-30 DIAGNOSIS — Z79.01 LONG TERM CURRENT USE OF ANTICOAGULANTS WITH INR GOAL OF 2.0-3.0: ICD-10-CM

## 2019-05-30 LAB
INR BLD: 2.1
INR, EXTERNAL: 2.1 (ref 2–3)
PT POC: 25 SECONDS
VALID INTERNAL CONTROL?: YES

## 2019-06-23 DIAGNOSIS — G89.29 CHRONIC BILATERAL LOW BACK PAIN WITHOUT SCIATICA: ICD-10-CM

## 2019-06-23 DIAGNOSIS — M54.50 CHRONIC BILATERAL LOW BACK PAIN WITHOUT SCIATICA: ICD-10-CM

## 2019-06-23 RX ORDER — GABAPENTIN 300 MG/1
CAPSULE ORAL
Qty: 90 CAP | Refills: 1 | Status: SHIPPED | OUTPATIENT
Start: 2019-06-23 | End: 2020-01-01

## 2019-06-27 ENCOUNTER — ANTI-COAG VISIT (OUTPATIENT)
Dept: CARDIOLOGY CLINIC | Age: 84
End: 2019-06-27

## 2019-06-27 DIAGNOSIS — Z79.01 LONG TERM CURRENT USE OF ANTICOAGULANTS WITH INR GOAL OF 2.0-3.0: ICD-10-CM

## 2019-06-27 DIAGNOSIS — I48.0 PAROXYSMAL ATRIAL FIBRILLATION (HCC): ICD-10-CM

## 2019-06-27 DIAGNOSIS — I10 ESSENTIAL HYPERTENSION: Primary | ICD-10-CM

## 2019-06-27 LAB
INR BLD: 2
INR, EXTERNAL: 2 (ref 2–3)
PT POC: 22.4 SECONDS
VALID INTERNAL CONTROL?: YES

## 2019-07-02 RX ORDER — WARFARIN 1 MG/1
TABLET ORAL
Qty: 90 TAB | Refills: 0 | Status: SHIPPED | OUTPATIENT
Start: 2019-07-02 | End: 2019-10-22 | Stop reason: SDUPTHER

## 2019-07-25 ENCOUNTER — ANTI-COAG VISIT (OUTPATIENT)
Dept: CARDIOLOGY CLINIC | Age: 84
End: 2019-07-25

## 2019-07-25 DIAGNOSIS — I10 ESSENTIAL HYPERTENSION: Primary | ICD-10-CM

## 2019-07-25 DIAGNOSIS — I48.0 PAROXYSMAL ATRIAL FIBRILLATION (HCC): ICD-10-CM

## 2019-07-25 DIAGNOSIS — Z79.01 LONG TERM CURRENT USE OF ANTICOAGULANTS WITH INR GOAL OF 2.0-3.0: ICD-10-CM

## 2019-07-25 LAB
INR BLD: 2
INR, EXTERNAL: 2 (ref 2–3)
PT POC: 24 SECONDS
VALID INTERNAL CONTROL?: YES

## 2019-07-29 RX ORDER — IRBESARTAN 75 MG/1
TABLET ORAL
Qty: 180 TAB | Refills: 0 | Status: SHIPPED | OUTPATIENT
Start: 2019-07-29 | End: 2019-10-28 | Stop reason: SDUPTHER

## 2019-08-21 ENCOUNTER — OFFICE VISIT (OUTPATIENT)
Dept: CARDIOLOGY CLINIC | Age: 84
End: 2019-08-21

## 2019-08-21 VITALS
BODY MASS INDEX: 34.51 KG/M2 | SYSTOLIC BLOOD PRESSURE: 110 MMHG | OXYGEN SATURATION: 94 % | HEIGHT: 69 IN | HEART RATE: 70 BPM | RESPIRATION RATE: 18 BRPM | WEIGHT: 233 LBS | DIASTOLIC BLOOD PRESSURE: 70 MMHG

## 2019-08-21 DIAGNOSIS — E78.5 DYSLIPIDEMIA: ICD-10-CM

## 2019-08-21 DIAGNOSIS — Z79.01 LONG TERM CURRENT USE OF ANTICOAGULANTS WITH INR GOAL OF 2.0-3.0: ICD-10-CM

## 2019-08-21 DIAGNOSIS — I10 ESSENTIAL HYPERTENSION: ICD-10-CM

## 2019-08-21 DIAGNOSIS — I45.4 IVCD (INTRAVENTRICULAR CONDUCTION DEFECT): ICD-10-CM

## 2019-08-21 DIAGNOSIS — I48.0 PAROXYSMAL ATRIAL FIBRILLATION (HCC): Primary | ICD-10-CM

## 2019-08-21 NOTE — PROGRESS NOTES
Kleber Leung .     7/15/1931       Chandler Owen MD, [facility] [place]  Date of Visit-8/21/2019   PCP is Shashank Alberts MD   [facility] [place]  [facility]  HPI:  Catalina Kincaid. is a 80 y.o. male   F/u of PAF and HTN. Overall the pt states he is doing well. Pt denies any sensations of palpitations of tachycardia. Denies chest pain, edema, syncope or shortness of breath at rest, has no tachycardia, palpitations or sense of arrhythmia. Pt is taking the coumadin and will have excess bleeding if he cuts himself, but no other times. Pt has been taking care of his wife who is ill with back pain and it is demanding on him. Assessment/Plan:     1. Paroxysmal atrial fibrillation (HCC)  EKG: SR, QRS widening 123 ms with an IVCD and nonspecific T wave flattening involving the ST segment in the lateral leads. Compared to previous the QRS duration is similar. The ST sagging in the alteral leads is slightly more prominent. Stable. On flecainide. Asymptomatic.   - AMB POC EKG ROUTINE W/ 12 LEADS, INTER & REP    2. IVCD (intraventricular conduction defect)  QRS is similar to previous. No syncope. No change needed. 3. Essential hypertension  Stable. At goal.   BP Readings from Last 6 Encounters:   08/21/19 110/70   04/23/19 136/72   01/30/19 100/60   06/27/18 128/70   06/27/18 128/76   01/17/18 140/80       4. Dyslipidemia  Stable. Lab Results   Component Value Date/Time    LDL, calculated 124 (H) 06/29/2018 10:24 AM      F/u in 6 months      Impression:   1. Paroxysmal atrial fibrillation (HCC)    2. IVCD (intraventricular conduction defect)    3. Essential hypertension    4. Dyslipidemia    5. Long term current use of anticoagulants with INR goal of 2.0-3.0       Cardiac History:   ECHO 9-10-14=  Left ventricle: Systolic function was normal by EF (biplane method of  disks). Ejection fraction was estimated to be 66 %.  There were no regional  wall motion abnormalities. Doppler parameters were consistent with  abnormal left ventricular relaxation (grade 1 diastolic dysfunction). Left atrium: The atrium was mildly to moderately dilated     ROS-except as noted above. . A complete cardiac and respiratory are reviewed and negative except as above ; Resp-denies wheezing  or productive cough,. Const- No unusual weight loss or fever; Neuro-no recent seizure or CVA ; GI- No BRBPR, abdom pain, bloating ; - no  hematuria   see supplement sheet, initialed and to be scanned by staff  Past Medical History:   Diagnosis Date    Advanced care planning/counseling discussion 10/8/2015    Basal cell carcinoma, face     now sees Dr. Mihaela Raymond.  Bladder cancer [facility]) 2012    Dr. Yovanny Hernandez. cyto 9/2014, 2/2016. s/p surgery, BCG irrigation [place]. saw Dr. Rebecca Contreras BPH with obstruction/lower urinary tract symptoms     Chronic lower back pain     injection in [place]. saw Dr. Xiomara Hudson Chronic neck pain     limited motion to side    Depression, major     Dupuytren's contracture of left hand     5th finger    Epistaxis 2013    saw ENT at [address] [place] [address]     with loose stools    Hearing loss     has hearing aids    HTN (hypertension)     Insomnia     IVCD (intraventricular conduction defect) 11/30/2015    Dr Galina Uriostegui term current use of anticoagulants with INR goal of 2.0-3.0     Afib    Loose stools 7/18/2014    Paroxysmal atrial fibrillation (HCC)     EF 66%, 1-2+ LAE on flecainide;Dr. Darinel Hatfield. saw Dr. Frank Jalloh at [facility] Peripheral neuropathy     RLS (restless legs syndrome) 7/3/2017    SCC (squamous cell carcinoma), face     prior hx, now sees Dr. Jose Williamson Urothelial carcinoma of right distal ureter ([facility] [address] 5/1/15    excision Dr. Jodie Richard. low grade, papillary. repeat 3 month      Social Hx= reports that he has quit smoking. He has never used smokeless tobacco. He reports that he drinks alcohol. He reports that he does not use drugs.      Exam and Labs: /70 (BP 1 Location: Left arm, BP Patient Position: Sitting)   Pulse 70   Resp 18   Ht 5' 9\" (1.753 m)   Wt 233 lb (105.7 kg)   SpO2 94%   BMI 34.41 kg/m² Constitutional:  NAD, comfortable  Head: [place],AT. Eyes: No scleral icterus. Neck:  Neck supple. No JVD present. Throat: moist mucous membranes. Chest: Effort normal & normal respiratory excursion . Neurological: alert, conversant and oriented . Skin: Skin is not cold. No obvious systemic rash noted. Not diaphoretic. No erythema. Psychiatric:  Grossly normal mood and affect. Behavior appears normal. Extremities:  no clubbing or cyanosis. Abdomen: non distended    Lungs:breath sounds normal. No stridor. distress, wheezes or  Rales. Heart: normal rate, regular rhythm, normal S1, S2, no murmurs, rubs, clicks or gallops , PMI non displaced. Edema: Edema is none.   Lab Results   Component Value Date/Time    Cholesterol, total 201 (H) 06/29/2018 10:24 AM    HDL Cholesterol 53 06/29/2018 10:24 AM    LDL, calculated 124 (H) 06/29/2018 10:24 AM    Triglyceride 121 06/29/2018 10:24 AM     Lab Results   Component Value Date/Time    Sodium 142 06/29/2018 10:24 AM    Potassium 4.6 06/29/2018 10:24 AM    Chloride 103 06/29/2018 10:24 AM    CO2 26 06/29/2018 10:24 AM    Glucose 90 06/29/2018 10:24 AM    BUN 21 06/29/2018 10:24 AM    Creatinine 1.16 06/29/2018 10:24 AM    BUN/Creatinine ratio 18 06/29/2018 10:24 AM    GFR est AA 66 06/29/2018 10:24 AM    GFR est non-AA 57 (L) 06/29/2018 10:24 AM    Calcium 8.7 06/29/2018 10:24 AM      Wt Readings from Last 3 Encounters:   08/21/19 233 lb (105.7 kg)   04/23/19 229 lb (103.9 kg)   01/30/19 223 lb 12.8 oz (101.5 kg)      BP Readings from Last 3 Encounters:   08/21/19 110/70   04/23/19 136/72   01/30/19 100/60      Current Outpatient Medications   Medication Sig    irbesartan (AVAPRO) 75 mg tablet TAKE 2 TABLETS NIGHTLY (CHANGE IN PILL STRENGTH DUE TO STOCK 2/13/19)    gabapentin (NEURONTIN) 300 mg capsule TAKE 1 CAPSULE BY MOUTH DAILY INDICATIONS: RESTLESS LEGS SYNDROME    amoxicillin (AMOXIL) 500 mg capsule Take 500 mg by mouth.  sertraline (ZOLOFT) 100 mg tablet TAKE 1 TABLET DAILY    amLODIPine (NORVASC) 5 mg tablet TAKE 1 TABLET DAILY    colestipol (COLESTID) 1 gram tablet TAKE 2 TABLETS TWICE A DAY    warfarin (COUMADIN) 5 mg tablet TAKE ONE AND ONE-HALF TABLETS DAILY OR AS DIRECTED    flecainide (TAMBOCOR) 150 mg tablet TAKE 1 TABLET TWICE A DAY    tamsulosin (FLOMAX) 0.4 mg capsule TAKE 1 CAPSULE DAILY    finasteride (PROSCAR) 5 mg tablet TAKE 1 TABLET DAILY    warfarin (COUMADIN) 1 mg tablet TAKE 1 TABLET BY MOUTH EVERY DAY    cephALEXin (KEFLEX) 500 mg capsule TAKE ONE CAPSULE BY MOUTH EVERY 6 HOURS    zolpidem (AMBIEN) 5 mg tablet TAKE 1 TABLET BY MOUTH AT BEDTIME AS NEEDED FOR SLEEP    Bifidobacterium Infantis (ALIGN) 4 mg cap Take 1 Cap by mouth daily. No current facility-administered medications for this visit. Impression see above.       Written by Mireya Desouza, as dictated by Lay Renee MD.

## 2019-08-21 NOTE — Clinical Note
8/21/19 Patient: Jewels Marquez. YOB: 1931 Date of Visit: 8/21/2019 Richardson Nogueira MD 
[place] [address] [address] Internal Medicine [facility] [address] [id number] VIA In Basket Dear Richardson Nogueira MD, Thank you for referring Mr. Chance Albrecht to [facility] for evaluation. My notes for this consultation are attached. If you have questions, please do not hesitate to call me. I look forward to following your patient along with you.  
 
 
Sincerely, 
 
Marcus Chaves MD

## 2019-08-21 NOTE — PROGRESS NOTES
Visit Vitals  /70 (BP 1 Location: Left arm, BP Patient Position: Sitting)   Pulse 70   Resp 18   Ht 5' 9\" (1.753 m)   Wt 233 lb (105.7 kg)   SpO2 94%   BMI 34.41 kg/m²

## 2019-08-29 ENCOUNTER — ANTI-COAG VISIT (OUTPATIENT)
Dept: CARDIOLOGY CLINIC | Age: 84
End: 2019-08-29

## 2019-08-29 DIAGNOSIS — Z79.01 LONG TERM CURRENT USE OF ANTICOAGULANTS WITH INR GOAL OF 2.0-3.0: ICD-10-CM

## 2019-08-29 DIAGNOSIS — I48.0 PAROXYSMAL ATRIAL FIBRILLATION (HCC): ICD-10-CM

## 2019-08-29 DIAGNOSIS — I10 ESSENTIAL HYPERTENSION: Primary | ICD-10-CM

## 2019-08-29 LAB
INR BLD: 2.3
INR, EXTERNAL: 2.3 (ref 2–3)
PT POC: 27.3 SECONDS
VALID INTERNAL CONTROL?: YES

## 2019-09-03 DIAGNOSIS — I48.0 PAROXYSMAL ATRIAL FIBRILLATION (HCC): ICD-10-CM

## 2019-09-16 RX ORDER — WARFARIN SODIUM 5 MG/1
TABLET ORAL
Qty: 140 TAB | Refills: 1 | Status: SHIPPED | OUTPATIENT
Start: 2019-09-16 | End: 2019-10-22 | Stop reason: SDUPTHER

## 2019-09-26 ENCOUNTER — ANTI-COAG VISIT (OUTPATIENT)
Dept: CARDIOLOGY CLINIC | Age: 84
End: 2019-09-26

## 2019-09-26 DIAGNOSIS — Z79.01 LONG TERM CURRENT USE OF ANTICOAGULANTS WITH INR GOAL OF 2.0-3.0: ICD-10-CM

## 2019-09-26 DIAGNOSIS — I48.0 PAROXYSMAL ATRIAL FIBRILLATION (HCC): ICD-10-CM

## 2019-09-26 DIAGNOSIS — I10 ESSENTIAL HYPERTENSION: Primary | ICD-10-CM

## 2019-09-26 LAB
INR BLD: 2.1
INR, EXTERNAL: 2.1 (ref 2–3)
PT POC: 25.2 SECONDS
VALID INTERNAL CONTROL?: YES

## 2019-10-07 ENCOUNTER — OFFICE VISIT (OUTPATIENT)
Dept: INTERNAL MEDICINE CLINIC | Age: 84
End: 2019-10-07

## 2019-10-07 ENCOUNTER — HOSPITAL ENCOUNTER (OUTPATIENT)
Dept: GENERAL RADIOLOGY | Age: 84
Discharge: HOME | End: 2019-10-07
Attending: INTERNAL MEDICINE
Payer: MEDICARE

## 2019-10-07 VITALS
OXYGEN SATURATION: 95 % | BODY MASS INDEX: 34.36 KG/M2 | SYSTOLIC BLOOD PRESSURE: 130 MMHG | HEART RATE: 73 BPM | RESPIRATION RATE: 18 BRPM | TEMPERATURE: 98.5 F | HEIGHT: 69 IN | DIASTOLIC BLOOD PRESSURE: 70 MMHG | WEIGHT: 232 LBS

## 2019-10-07 DIAGNOSIS — M54.6 ACUTE MIDLINE THORACIC BACK PAIN: ICD-10-CM
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
## 2019-10-07 PROCEDURE — 72072 X-RAY EXAM THORAC SPINE 3VWS: CPT

## 2019-10-07 RX ORDER — FINASTERIDE 5 MG/1
TABLET, FILM COATED ORAL
Qty: 90 TAB | Refills: 3 | Status: SHIPPED | OUTPATIENT
Start: 2019-10-07 | End: 2020-10-04

## 2019-10-07 NOTE — PROGRESS NOTES
History of Present Illness   Chief Complaint   Back pain after fall    Stefanie Brown is a 80 y.o. male who presents today for the following:      Fall - was doing some vaccuuming with a hand vacuum. Was standing up from bent over position and fell backward. Hit middle of back, didn't hit head, arms, or hips. Feels like recently he's been getting more unsteady especially when turning too quickly. Still able to walk and drive and feels like he's fairly active, but has noticed a difference. Both parents had falls later in life as well. Siblings have also had falls. No orthostatic symptoms during that episode. Occasionally feels \"woozy\" after standing up. No vision issues. Had one fall while weeding in the garden - will sometimes tip over when bending over. Back pain - after the fall. Hasn't been able to sleep well because of the pain. Hurts a little more on the right today. 3-4/10 when sitting, but certain movements can make it 20/10. Sitting in a chair with a back helps, but movements make it worse. No saddle anesthesia, bladder/bowel incontinence. No fevers, unexplained wt loss. Took advil today for the pain and tylenol. Has been on gabapentin and tamsulosin for several years now. Review of Systems  Constitutional:  No fever   Neuro: no numbness or tingling      Physical Exam   Vitals:       Visit Vitals  /82 (BP 1 Location: Left arm, BP Patient Position: Sitting)   Pulse 73   Temp 98.5 °F (36.9 °C) (Oral)   Resp 18   Ht 5' 9\" (1.753 m)   Wt 232 lb (105.2 kg)   SpO2 95%   BMI 34.26 kg/m²        General:  well-appearing, no acute distress except during movement  Respiratory:  normal respiratory effort  Gastrointestinal: Ventral hernia noted  MSK: Tenderness to palpation of mid lower thoracic spine and paraspinal muscles, no overlying skin changes, full flexion, mild antalgic gait    Assessment and Plan   Diagnoses and all orders for this visit:    1.  Fall, initial encounter -attempted to obtain orthostatics but patient was unable to tolerate laying down and developed severe pain when sitting up. Discussed activity modification and minimizing bending over. Recommend physical therapy to evaluate balance (patient will go to the physical therapy location that he has been to before). We will get x-rays to rule out fracture. Recommend NSAIDs, Tylenol, and heat for pain control as patient is already on gabapentin. Discussed anticoagulation with Dr. Femi Martel. Given pt's relatively good functional status and CHASVASC 3, will continue warfarin at this time.  -     REFERRAL TO PHYSICAL THERAPY  -     XR SPINE THORAC 3 V; Future    2.  Acute midline thoracic back pain  -     REFERRAL TO PHYSICAL THERAPY  -     XR SPINE THORAC 3 V; Future                Hubert Arechiga MD  [facility]  10/7/2019    Future Appointments   Date Time Provider Ezekiel Tammy   10/24/2019 10:40 AM COUMADIN, [facility] SCHED   11/11/2019  8:30 AM Kait Ellis MD [facility] SCHED   3/4/2020 11:00 AM Catarino Mena MD [address] [place] [address]

## 2019-10-08 ENCOUNTER — TELEPHONE (OUTPATIENT)
Dept: INTERNAL MEDICINE CLINIC | Age: 84
End: 2019-10-08

## 2019-10-18 DIAGNOSIS — I48.0 PAROXYSMAL ATRIAL FIBRILLATION (HCC): ICD-10-CM

## 2019-10-20 RX ORDER — TAMSULOSIN HYDROCHLORIDE 0.4 MG/1
CAPSULE ORAL
Qty: 90 CAP | Refills: 4 | Status: SHIPPED | OUTPATIENT
Start: 2019-10-20 | End: 2021-01-12

## 2019-10-21 ENCOUNTER — TELEPHONE (OUTPATIENT)
Dept: INTERNAL MEDICINE CLINIC | Age: 84
End: 2019-10-21

## 2019-10-21 NOTE — TELEPHONE ENCOUNTER
I have not seen him since June 2018. Likely is just a bruise and will take time to heal.  X-ray was normal of his thoracic spine he did have some arthritis. I am happy to see him.   He can try over-the-counter lidocaine patches

## 2019-10-21 NOTE — TELEPHONE ENCOUNTER
Pt walked in ,C/O pain in low back, states he had a fall, was  seen by another provider 10/7/2019, still in a lot of pain, physical therapy not helping ,where to from here  .

## 2019-10-22 RX ORDER — WARFARIN SODIUM 5 MG/1
TABLET ORAL
Qty: 135 TAB | Refills: 0 | Status: SHIPPED | OUTPATIENT
Start: 2019-10-22 | End: 2020-03-06

## 2019-10-22 RX ORDER — WARFARIN 1 MG/1
TABLET ORAL
Qty: 90 TAB | Refills: 0 | Status: CANCELLED | OUTPATIENT
Start: 2019-10-22

## 2019-10-22 RX ORDER — WARFARIN 1 MG/1
TABLET ORAL
Qty: 90 TAB | Refills: 0 | Status: SHIPPED | OUTPATIENT
Start: 2019-10-22 | End: 2020-07-17 | Stop reason: SDUPTHER

## 2019-10-22 NOTE — TELEPHONE ENCOUNTER
Verified patient with two types of identifiers. Spoke to patient, he states he has been out of his coumadin and expressed frustration. Sent refill per previous dose based on last INR and VO by MD. Patient will  prescription tonight.

## 2019-10-24 ENCOUNTER — ANTI-COAG VISIT (OUTPATIENT)
Dept: CARDIOLOGY CLINIC | Age: 84
End: 2019-10-24

## 2019-10-24 DIAGNOSIS — Z79.01 LONG TERM CURRENT USE OF ANTICOAGULANTS WITH INR GOAL OF 2.0-3.0: ICD-10-CM

## 2019-10-24 DIAGNOSIS — I10 ESSENTIAL HYPERTENSION: Primary | ICD-10-CM

## 2019-10-24 DIAGNOSIS — I48.0 PAROXYSMAL ATRIAL FIBRILLATION (HCC): ICD-10-CM

## 2019-10-24 LAB
INR BLD: 2.7
INR, EXTERNAL: 2.7 (ref 2–3)
PT POC: 32.1 SECONDS
VALID INTERNAL CONTROL?: YES

## 2019-10-29 RX ORDER — WARFARIN 1 MG/1
TABLET ORAL
Qty: 90 TAB | Refills: 0 | Status: SHIPPED | OUTPATIENT
Start: 2019-10-29 | End: 2020-04-17

## 2019-11-01 DIAGNOSIS — I48.0 PAROXYSMAL ATRIAL FIBRILLATION (HCC): ICD-10-CM

## 2019-11-04 RX ORDER — FLECAINIDE ACETATE 150 MG/1
150 TABLET ORAL 2 TIMES DAILY
Qty: 180 TAB | Refills: 3 | Status: SHIPPED | OUTPATIENT
Start: 2019-11-04 | End: 2021-02-03

## 2019-11-04 NOTE — TELEPHONE ENCOUNTER
Request for Flecainide 150mg BID. Last office visit 8-21-19, next office visit 3-4-20.  Refills per verbal order from Dr. Amanda Groves.

## 2019-11-21 ENCOUNTER — ANTI-COAG VISIT (OUTPATIENT)
Dept: CARDIOLOGY CLINIC | Age: 84
End: 2019-11-21

## 2019-11-21 DIAGNOSIS — I48.0 PAROXYSMAL ATRIAL FIBRILLATION (HCC): ICD-10-CM

## 2019-11-21 DIAGNOSIS — Z79.01 LONG TERM CURRENT USE OF ANTICOAGULANTS WITH INR GOAL OF 2.0-3.0: ICD-10-CM

## 2019-11-21 DIAGNOSIS — I10 ESSENTIAL HYPERTENSION: Primary | ICD-10-CM

## 2019-11-21 LAB
INR BLD: 3.1
INR, EXTERNAL: 3.1 (ref 2–3)
PT POC: NORMAL
VALID INTERNAL CONTROL?: YES

## 2019-12-09 ENCOUNTER — OFFICE VISIT (OUTPATIENT)
Dept: DERMATOLOGY | Facility: AMBULATORY SURGERY CENTER | Age: 84
End: 2019-12-09

## 2019-12-09 VITALS
SYSTOLIC BLOOD PRESSURE: 148 MMHG | OXYGEN SATURATION: 94 % | RESPIRATION RATE: 14 BRPM | WEIGHT: 232 LBS | BODY MASS INDEX: 34.36 KG/M2 | TEMPERATURE: 98.6 F | DIASTOLIC BLOOD PRESSURE: 70 MMHG | HEIGHT: 69 IN | HEART RATE: 70 BPM

## 2019-12-09 DIAGNOSIS — C44.319 BASAL CELL CARCINOMA (BCC) OF LEFT CHEEK: Primary | ICD-10-CM

## 2019-12-09 NOTE — PROGRESS NOTES
Progress note for Mohs surgery patient:    Chief Complaint:  Basal cell carcinoma of the Left cheek    HPI:  Matias Blackburn is a 80y.o. year old male referred by  Leslie Barragan MD for Mohs surgery to treat the following lesion:  Lesion Info  Location: Left cheek  Size: 1.4 x 1.1 cm  Type: Basal  Duration: 1 year  Path Lab: [facility] #: [id number]  Prior Treatment: none     Symptoms of the lesion include increase in size. ROS:  Ivy Maravilla is feeling well and in their usual state of health today. He is not in pain. He does not have any other skin concerns. Exam:  Ivy Maravilla is an awake, alert, oriented, well-appearing male in no distress. The face was examined. Findings are:  On the left lateral cheek there is a pink pearly plaque. There are scattered pink scaly plaques on the bilateral cheeks. A/P:  Basal cell carcinoma of the Left cheek. The diagnosis was reviewed. The Mohs surgery procedure was reviewed. Indications, risks, and options were discussed with Mr. Jojo Tariq preoperatively. Risks including, but not limited to: pain, bleeding, infection, tumor recurrence, scarring and damage to motor and/or sensory nerves, were discussed. Mr. Jojo Tariq chose Mohs surgery. Mr. Jojo Tariq was an acceptable surgery candidate. I performed Mohs surgery using standard technique after verbal and written consent were obtained. The lesion was identified and confirmed with the patient and photograph, if available. The surgical site was marked with gentian violet, prepped, draped and anesthetized in standard fashion. The tumor was debulked by curettage and orientation hashes were placed. The tumor and any associated scar was excised using beveled incision. Hemostasis was achieved, the site was bandaged, and the tissue was transported to the Mohs lab. While maintaining anatomic orientation the tissue was divided, if needed, and marked with colored inks that were noted on the corresponding Mohs map.   The tissue was prepared by Mohs en-face technique for fresh frozen section analysis. The resulting slides were examined for residual tumor, scar and other concerns, all of which were marked on the corresponding Mohs map, if present. The Mohs map was used to guide subsequent stages of surgery, if necessary, and the above process was repeated until a tumor-free plane was achieved. Once the tumor was cleared the map was marked as such and signed. Dr. Keaton Powers acted as surgeon and pathologist for the entire case, performing all stages of the surgical excision as well as examination and interpretation of the histologic slides. See table below for details regarding the surgical case. 1 stage(s) were required to reach a tumor-free plane, resulting in a 1.6 x 1.4 cm defect extending to the subcutaneous fat. There were not complications. Kye Escalante will follow up as needed in the postoperative period. Regular skin examinations will be with  Gaye Fraire MD.    The wound management options of second intent healing, layered closure, local flap, and/or full thickness skin graft were discussed. Mr. Moose Doe understands the aims, risks, alternatives, and possible complications and elects to proceed with a complex layered closure. Wound margins were debeveled, edges widely undermined in the subcutaneous plane, and standing cones corrected at both poles followed by complex layered closure. The wound was closed with buried 5-0 vicryl suture in the muscle and deep subcutis to reduce width of the wound and a second layer in the dermis to reduce tension on the skin edges with careful attention to edge apposition and eversion for optimal cosmesis. Epidermal edges were carefully approximated with 5-0 fast absorbing gut suture, again with careful attention to apposition and eversion. The final closure length was Closure Length: 5.8 cm. The wound was bandaged with Petrolatum ointment, Telfa, gauze and Coverroll.  Wound care instructions (written and/or verbal) and a follow up appointment were given to Mr. Loulou Love before discharge. Mr. Loulou Love was discharged in good condition. Left cheek  Mohs Lesion Operative Report  Date: 12/09/19  Room: procedure room 2  Indications: Poor definition, Site, Size  Pre-op Meds: n/a  Pre-op BP: 142/76  Pre-op pulse: 71  1st Assistant: Jennifer Hubbard RN  Stage #: 1  Stage 1 Sections: 1  Stage 1 # Pos: 0  Perineural Involvement: No  Lymphadenopathy: No  Defect Size: 1.6 x 1.4 cm  Depth: subcutaneous fat  Wound Mgt: complex linear closure  Donor Site: n/a  Suture: Buried, Surface  Buried details: 5-0 polysorb  Surface Details: 5-0 fast  Undermining: SubQ  Closure Length: 5.8 cm  Estimated Blood Loss: 2 mL  Hemostasis: Electrosurgery, Pressure  Anesthesia: 1% Lidocaine w/1:100,000 epi  1% Lidocaine: 9 cc  Complications: n/a  Dressing: vaseline, telfa, gauze, medipore tape  Post-op BP: 148/70  Post-op Pulse: 70  Pos-op Meds: n/a  W/C Instructions: Verbal, Written  Follow-up: 3 weeks for wound check     [facility]   OFFICE PROCEDURE PROGRESS NOTE   Chart reviewed for the following:   Rafita Chacko MD have reviewed the History, Physical and updated the Allergic reactions for Abdon Hopper. Janny Null TIME OUT performed immediately prior to start of procedure:   Rafita Chacko MD, have performed the following reviews on Abdon Hopper.   prior to the start of the procedure:     * Patient was identified by name and date of birth   * Agreement on procedure being performed was verified   * Risks and Benefits explained to the patient   * Procedure site verified and marked as necessary   * Patient was positioned for comfort   * Consent was signed and verified     Time: 2 PM  Date of procedure: 12/9/2019  Procedure performed by:  Cassidy Patel MD  Provider assisted by: Jennifer Hubbard RN  Patient assisted by: self   How tolerated by patient: tolerated the procedure well with no complications   Comments: none

## 2019-12-09 NOTE — LETTER
12/9/2019 5:12 PM 
 
Patient:  Abdon Hopper. YOB: 1931 Date of Visit: 12/9/2019 Dear Chase Murphy MD 
[address] [facility] [id number] VIA Facsimile: [phone] Thank you for referring Abdon Hopper. to me for evaluation/treatment. Below are the relevant portions of my assessment and plan of care. Mr. Loulou Love presented today for Mohs surgery to treat a biopsy-proven basal cell carcinoma of the left cheek. 1 stage(s) of Mohs surgery were required to achieve tumor free margins. I repaired the defect with a(n) primary closure. He tolerated the procedure well. Please see the attached procedure note(s) for additional details. Mr. Loulou Love will return to me for suture removal and/or wound checks at an appropriate interval and I will follow-up with him regarding any issues arising from or relating to this surgery. I will otherwise defer any additional dermatologic care back to you. If you have questions, please do not hesitate to call me. I look forward to following Mr. Loulou Love along with you. Sincerely, Sylvia Cedillo MD 
[phone] (cell)

## 2019-12-09 NOTE — PATIENT INSTRUCTIONS
Chief Complaint Patient presents with [facility] Mohs - Left cheek, BCC  
 
WOUND CARE INSTRUCTIONS 1. Keep the dressing clean and dry and do not remove for 48 hours. 2. Then change the dressing once a day as follows for 2 weeks: 
a. Wash hands before and after each dressing change. b. Remove dressing and wash site gently with mild soap and water, rinse, and pat dry. 
c. Apply liberal amounts of an ointment (Petroleum jelly or Aquaphor). d. Apply a non-stick (Telfa) dressing or Band-Aid to cover the wound. e. After 2 weeks, you may leave wound uncovered, but make sure it is always moist with vaseline or aquaphor. Cover at night. 3. Watch for: BLEEDING: A small amount of drainage may occur. If bleeding occurs, elevate and rest the surgery site. Apply gauze and steady pressure for 20 minutes. If bleeding continues repeat pressure once more. If bleeding still does not stop, call this office. If after hours, call Dr. Shawn Grubbs at [phone]. INFECTION: Signs of infection include increased redness, pain, warmth, drainage of pus, and fever. If this occurs, please call this office. 4. Special Instructions (follow any that are checked): 
· [x] You have stitches that DO NOT need to be removed. · [x] Avoid bending at the waist and heavy lifting for two days. · [x] Sleep with your head elevated for the next two nights. · [x] Keep your head above your heart at all times for 2 full days. · [] You may apply an ice-pack for 10-15 minutes every waking hour for the rest of the day. · [] Eat a soft diet and avoid hot food and hot drinks for the rest of the day. · [] Other instructions: Follow up as directed. Take Tylenol for pain as needed. Once the site is healed with no remaining bandages or open areas, protect your surgical site and scar from the sun, as this area will be more sensitive.   Use a broad spectrum sunscreen SPF 30 or higher daily, and a chemical free product (one containing zinc oxide or titanium dioxide) is a good choice if the area is sensitive. You may begin to gently massage the surgical site in 3 weeks, rubbing in a circular motion along the scar. This can help reduce swelling and thickness of a scar. If you have any questions or concerns, please call our office Monday through Friday at [phone]. If after hours, please call Dr. Arie Casillas at [phone].

## 2019-12-19 ENCOUNTER — ANTI-COAG VISIT (OUTPATIENT)
Dept: CARDIOLOGY CLINIC | Age: 84
End: 2019-12-19

## 2019-12-19 DIAGNOSIS — Z79.01 LONG TERM CURRENT USE OF ANTICOAGULANTS WITH INR GOAL OF 2.0-3.0: ICD-10-CM

## 2019-12-19 DIAGNOSIS — I48.0 PAROXYSMAL ATRIAL FIBRILLATION (HCC): ICD-10-CM

## 2019-12-19 DIAGNOSIS — I10 ESSENTIAL HYPERTENSION: Primary | ICD-10-CM

## 2019-12-19 LAB
INR BLD: 2.6
INR, EXTERNAL: 2.6 (ref 2–3)
PT POC: 31.7 SECONDS
VALID INTERNAL CONTROL?: YES

## 2019-12-29 DIAGNOSIS — M54.50 CHRONIC BILATERAL LOW BACK PAIN WITHOUT SCIATICA: ICD-10-CM

## 2019-12-29 DIAGNOSIS — G89.29 CHRONIC BILATERAL LOW BACK PAIN WITHOUT SCIATICA: ICD-10-CM

## 2020-01-01 RX ORDER — GABAPENTIN 300 MG/1
CAPSULE ORAL
Qty: 90 CAP | Refills: 0 | Status: SHIPPED | OUTPATIENT
Start: 2020-01-01 | End: 2020-03-30

## 2020-01-23 ENCOUNTER — ANTI-COAG VISIT (OUTPATIENT)
Dept: CARDIOLOGY CLINIC | Age: 85
End: 2020-01-23

## 2020-01-23 DIAGNOSIS — Z79.01 LONG TERM CURRENT USE OF ANTICOAGULANTS WITH INR GOAL OF 2.0-3.0: ICD-10-CM

## 2020-01-23 DIAGNOSIS — I10 ESSENTIAL HYPERTENSION: Primary | ICD-10-CM

## 2020-01-23 DIAGNOSIS — I48.0 PAROXYSMAL ATRIAL FIBRILLATION (HCC): ICD-10-CM

## 2020-01-23 LAB
INR BLD: 2.4
INR, EXTERNAL: 2.4 (ref 2–3)
PT POC: 28.9 SECONDS
VALID INTERNAL CONTROL?: YES

## 2020-01-26 RX ORDER — IRBESARTAN 75 MG/1
TABLET ORAL
Qty: 180 TAB | Refills: 4 | Status: SHIPPED | OUTPATIENT
Start: 2020-01-26 | End: 2021-05-31

## 2020-02-20 ENCOUNTER — ANTI-COAG VISIT (OUTPATIENT)
Dept: CARDIOLOGY CLINIC | Age: 85
End: 2020-02-20

## 2020-02-20 DIAGNOSIS — I48.0 PAROXYSMAL ATRIAL FIBRILLATION (HCC): ICD-10-CM

## 2020-02-20 DIAGNOSIS — I10 ESSENTIAL HYPERTENSION: Primary | ICD-10-CM

## 2020-02-20 DIAGNOSIS — Z79.01 LONG TERM CURRENT USE OF ANTICOAGULANTS WITH INR GOAL OF 2.0-3.0: ICD-10-CM

## 2020-02-20 LAB
INR BLD: 2.1
INR, EXTERNAL: 2.1 (ref 2–3)
PT POC: 22.4 SECONDS
VALID INTERNAL CONTROL?: YES

## 2020-03-01 DIAGNOSIS — I48.0 PAROXYSMAL ATRIAL FIBRILLATION (HCC): ICD-10-CM

## 2020-03-06 ENCOUNTER — TELEPHONE (OUTPATIENT)
Dept: CARDIOLOGY CLINIC | Age: 85
End: 2020-03-06

## 2020-03-06 RX ORDER — WARFARIN SODIUM 5 MG/1
TABLET ORAL
Qty: 140 TAB | Refills: 4 | Status: SHIPPED | OUTPATIENT
Start: 2020-03-06 | End: 2021-05-28

## 2020-03-19 ENCOUNTER — ANTI-COAG VISIT (OUTPATIENT)
Dept: CARDIOLOGY CLINIC | Age: 85
End: 2020-03-19

## 2020-03-19 DIAGNOSIS — I48.0 PAROXYSMAL ATRIAL FIBRILLATION (HCC): ICD-10-CM

## 2020-03-19 DIAGNOSIS — I10 ESSENTIAL HYPERTENSION: Primary | ICD-10-CM

## 2020-03-19 DIAGNOSIS — Z79.01 LONG TERM CURRENT USE OF ANTICOAGULANTS WITH INR GOAL OF 2.0-3.0: ICD-10-CM

## 2020-03-19 LAB
INR BLD: 2
INR, EXTERNAL: 2 (ref 2–3)
PT POC: 22.8 SECONDS
VALID INTERNAL CONTROL?: YES

## 2020-04-17 RX ORDER — WARFARIN 1 MG/1
TABLET ORAL
Qty: 90 TAB | Refills: 0 | Status: SHIPPED | OUTPATIENT
Start: 2020-04-17 | End: 2021-05-28

## 2020-04-23 ENCOUNTER — ANTI-COAG VISIT (OUTPATIENT)
Dept: CARDIOLOGY CLINIC | Age: 85
End: 2020-04-23

## 2020-04-23 DIAGNOSIS — I10 ESSENTIAL HYPERTENSION: Primary | ICD-10-CM

## 2020-04-23 DIAGNOSIS — I48.0 PAROXYSMAL ATRIAL FIBRILLATION (HCC): ICD-10-CM

## 2020-04-23 DIAGNOSIS — Z79.01 LONG TERM CURRENT USE OF ANTICOAGULANTS WITH INR GOAL OF 2.0-3.0: ICD-10-CM

## 2020-04-23 LAB
INR BLD: 2
INR, EXTERNAL: 2 (ref 2–3)
PT POC: 20 SECONDS
VALID INTERNAL CONTROL?: YES

## 2020-05-15 RX ORDER — AMLODIPINE BESYLATE 5 MG/1
TABLET ORAL
Qty: 90 TAB | Refills: 3 | Status: SHIPPED | OUTPATIENT
Start: 2020-05-15 | End: 2020-07-17

## 2020-05-21 ENCOUNTER — ANTI-COAG VISIT (OUTPATIENT)
Dept: CARDIOLOGY CLINIC | Age: 85
End: 2020-05-21

## 2020-05-21 DIAGNOSIS — Z79.01 LONG TERM CURRENT USE OF ANTICOAGULANTS WITH INR GOAL OF 2.0-3.0: ICD-10-CM

## 2020-05-21 DIAGNOSIS — I48.0 PAROXYSMAL ATRIAL FIBRILLATION (HCC): ICD-10-CM

## 2020-05-21 DIAGNOSIS — I10 ESSENTIAL HYPERTENSION: Primary | ICD-10-CM

## 2020-05-21 LAB
INR BLD: 2
INR, EXTERNAL: 2 (ref 2–3)
PT POC: 20.7 SECONDS
VALID INTERNAL CONTROL?: YES

## 2020-05-29 RX ORDER — MONTELUKAST SODIUM 4 MG/1
TABLET, CHEWABLE ORAL
Qty: 360 TAB | Refills: 3 | Status: SHIPPED | OUTPATIENT
Start: 2020-05-29 | End: 2020-12-07 | Stop reason: ALTCHOICE

## 2020-06-05 DIAGNOSIS — F32.1 MAJOR DEPRESSIVE DISORDER, SINGLE EPISODE, MODERATE (HCC): ICD-10-CM

## 2020-06-05 RX ORDER — SERTRALINE HYDROCHLORIDE 100 MG/1
TABLET, FILM COATED ORAL
Qty: 90 TAB | Refills: 3 | Status: SHIPPED | OUTPATIENT
Start: 2020-06-05 | End: 2020-06-09 | Stop reason: SDUPTHER

## 2020-06-09 DIAGNOSIS — F32.1 MAJOR DEPRESSIVE DISORDER, SINGLE EPISODE, MODERATE (HCC): ICD-10-CM

## 2020-06-09 RX ORDER — SERTRALINE HYDROCHLORIDE 100 MG/1
TABLET, FILM COATED ORAL
Qty: 90 TAB | Refills: 3 | Status: SHIPPED | OUTPATIENT
Start: 2020-06-09 | End: 2021-05-31

## 2020-06-09 NOTE — TELEPHONE ENCOUNTER
----- Message from Birgit Herzog sent at 6/8/2020  5:04 PM EDT -----  Regarding: Dr. Blount Branch (if not patient):      Relationship of caller (if not patient):      Best contact number(s):[phone]      Name of medication and dosage if known:Sertraline 90 day supply      Is patient out of this medication (yes/no):yes      [facility] [address] [place] [address] listed in chart? (yes/no):yes  Pharmacy phone number:      Details to clarify the request:90 day supply of Sertraline to [facility].        Birgit Herzog

## 2020-06-22 ENCOUNTER — TELEPHONE (OUTPATIENT)
Dept: INTERNAL MEDICINE CLINIC | Age: 85
End: 2020-06-22

## 2020-06-22 ENCOUNTER — OFFICE VISIT (OUTPATIENT)
Dept: INTERNAL MEDICINE CLINIC | Age: 85
End: 2020-06-22

## 2020-06-22 ENCOUNTER — HOSPITAL ENCOUNTER (OUTPATIENT)
Dept: GENERAL RADIOLOGY | Age: 85
Discharge: HOME | End: 2020-06-22
Payer: MEDICARE

## 2020-06-22 VITALS
HEART RATE: 75 BPM | OXYGEN SATURATION: 96 % | TEMPERATURE: 98 F | BODY MASS INDEX: 33.33 KG/M2 | HEIGHT: 69 IN | RESPIRATION RATE: 18 BRPM | SYSTOLIC BLOOD PRESSURE: 118 MMHG | DIASTOLIC BLOOD PRESSURE: 66 MMHG | WEIGHT: 225 LBS

## 2020-06-22 DIAGNOSIS — M54.6 ACUTE MIDLINE THORACIC BACK PAIN: ICD-10-CM

## 2020-06-22 DIAGNOSIS — M79.642 LEFT HAND PAIN: ICD-10-CM

## 2020-06-22 DIAGNOSIS — M54.50 CHRONIC BILATERAL LOW BACK PAIN WITHOUT SCIATICA: ICD-10-CM

## 2020-06-22 DIAGNOSIS — G89.29 CHRONIC BILATERAL LOW BACK PAIN WITHOUT SCIATICA: ICD-10-CM
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
## 2020-06-22 PROCEDURE — 73130 X-RAY EXAM OF HAND: CPT

## 2020-06-22 PROCEDURE — 72072 X-RAY EXAM THORAC SPINE 3VWS: CPT

## 2020-06-22 RX ORDER — GABAPENTIN 300 MG/1
CAPSULE ORAL
Qty: 90 CAP | Refills: 2 | Status: SHIPPED | OUTPATIENT
Start: 2020-06-22 | End: 2021-01-25 | Stop reason: SDUPTHER

## 2020-06-22 NOTE — TELEPHONE ENCOUNTER
----- Message from Tam Melendez sent at 6/22/2020  1:52 PM EDT -----  Regarding: Dr. Janene Schneider  Pt fell cutting arm in multiple locations. Pts son requesting appt as soon as possible. Pts son stated he is afraid if he takes the pts bandaids off pt will bleed too much. Pt has not been seen by medical professionals yet. Son requesting appt today if possible in office to remove bandages. Transferred to backline.     Best contact:  [phone]

## 2020-06-22 NOTE — TELEPHONE ENCOUNTER
Pt's son called and reported that Pt fell on Saturday morning @4:00 AM. Pt reports multiple cuts in both arms and possible pinkie finger dislocation. Pt cleaned and bandaged his arms. Pt was evaluated by neighbor, who is an EMT and was advised to f/u with PCP for concern of bleeding as he is on coumadin. Pt denies any pain or any other injuries. Pt and his son refuse to go to ED, would like to be evaluated in office. Pt's son denies any COVID s/s in Pt. Same day appt has been scheduled. pt will arrive alone.

## 2020-06-22 NOTE — PROGRESS NOTES
Chief Complaint   Patient presents with    Fall     PT states that he fell 3 days ago and presents with pain in left shoulder and back

## 2020-06-22 NOTE — PROGRESS NOTES
Assessment and Plan   Diagnoses and all orders for this visit:    1. Fall, initial encounter  2. Acute midline thoracic back pain  -     XR SPINE THORAC 3 V; Future  3. Left hand pain  -     XR HAND LT MIN 3 V; Future  4. Chronic bilateral low back pain without sciatica  -     gabapentin (NEURONTIN) 300 mg capsule; TAKE 1 CAPSULE BY MOUTH DAILY  Indications: neuropathic pain  We will get x-rays to rule out any abnormalities. Was given a physical prescription for physical therapy for fall evaluation. Recommend covering the wound to keep it clean but it should heal nicely. Recommend stopping tamsulosin to see if that helps with his lightheadedness. Discussed with the pt that we may need to consider adjusting his blood pressure medicine given some weight loss blood pressures that are lower than where he was previously. Also discussed with him that we may need to consider stopping his warfarin given his fall risk. Benefits, risks, possible drug interactions, and side effects of all new medications were reviewed with the patient. Pt verbalized understanding. Return to clinic:   1 to 3 months with his PCP  Please call the pt and let him know that his hand xray didn't show any dislocations or fractures. His back xray did not show any fractures either. However, if he continues to have severe back pain, we could consider getting a CT scan to look at it more closely. Adilson Sterling MD  [facility]  6/22/2020    Future Appointments   Date Time Provider Ezekiel Munoz   6/25/2020 10:00 AM COUMADIN, [facility] SCHED        Subjective   Chief Complaint   Noah Odell. is a 80 y.o. male     Reports on Friday, he was trying to swallow back in his kitchen when he had a misstep and fell onto the edge of his stove and scraped his right arm then fell on his left side and onto his left hand.   Reports some pain in his left shoulder and left hand associated with some wrist swelling. Reports having a couple other falls in the past 6 months due to feeling unsteady on his feet and after bending over. Denies hitting his head at all. Reports some lightheadedness    Review of Systems   Constitutional: Negative for chills and fever. Respiratory: Negative for shortness of breath. Cardiovascular: Negative for chest pain. Objective   Vitals:       Visit Vitals  /66   Pulse 75   Temp 98 °F (36.7 °C)   Resp 18   Ht 5' 9\" (1.753 m)   Wt 225 lb (102.1 kg)   SpO2 96%   BMI 33.23 kg/m²        Physical Exam  Constitutional:       Appearance: Normal appearance. He is not ill-appearing. Cardiovascular:      Rate and Rhythm: Normal rate. Pulmonary:      Effort: No respiratory distress. Musculoskeletal:      Comments: Mild left wrist swelling. Swelling present between his PIP and MCP of his fifth digit of the left hand. No tenderness to palpation in that area. Able to move his finger at his MCP and minimal movement at his PIP. Nnown contracture present. Normal range of motion of the wrists and other fingers. Pain with abduction above the horizontal.  Mildly limited range of motion above the horizontal on the left shoulder. Normal range of motion of his right shoulder. Normal range of motion of bilateral elbows and no pain to palpation. Mild tenderness to mid thoracic spine   Skin:     Comments: Right arm around the elbow 2 areas where his epidermis is removed with good wound healing   Neurological:      Mental Status: He is alert. Psychiatric:         Mood and Affect: Mood normal.         Thought Content:  Thought content normal.         Judgment: Judgment normal.          Current Outpatient Medications   Medication Sig    gabapentin (NEURONTIN) 300 mg capsule TAKE 1 CAPSULE BY MOUTH DAILY  Indications: neuropathic pain    sertraline (ZOLOFT) 100 mg tablet TAKE 1 TABLET DAILY    colestipoL (COLESTID) 1 gram tablet TAKE 2 TABLETS TWICE A DAY    amLODIPine (NORVASC) 5 mg tablet TAKE 1 TABLET DAILY    warfarin (COUMADIN) 1 mg tablet TAKE 1 TABLET BY MOUTH EVERY DAY    warfarin (COUMADIN) 5 mg tablet TAKE ONE AND ONE-HALF TABLETS DAILY OR AS DIRECTED    irbesartan (AVAPRO) 75 mg tablet TAKE 2 TABLETS NIGHTLY    flecainide (TAMBOCOR) 150 mg tablet Take 1 Tab by mouth two (2) times a day.  warfarin (COUMADIN) 1 mg tablet TAKE 1 TABLET BY MOUTH EVERY DAY    tamsulosin (FLOMAX) 0.4 mg capsule TAKE 1 CAPSULE DAILY    finasteride (PROSCAR) 5 mg tablet TAKE 1 TABLET BY MOUTH EVERY DAY     No current facility-administered medications for this visit.         Voice recognition software is utilized and this note may contain transcription errors

## 2020-06-22 NOTE — PATIENT INSTRUCTIONS
- Stop taking tamsulosin - Cover your wounds with triple antibiotic every day for a week - Keep wounds clean and cover if needed - See physical therapy for a fall risk evaluation - they'll be able to tell you if you should use anything to walk with  
- make an appointment with Dr. MORAES [facility] to discuss your medications to see if any changes need to be made

## 2020-06-24 ENCOUNTER — TELEPHONE (OUTPATIENT)
Dept: INTERNAL MEDICINE CLINIC | Age: 85
End: 2020-06-24

## 2020-06-24 NOTE — PROGRESS NOTES
Please call the pt and let him know that his hand xray didn't show any dislocations or fractures. His back xray did not show any fractures either. However, if he continues to have severe back pain, we could consider getting a CT scan to look at it more closely.

## 2020-06-25 ENCOUNTER — ANTI-COAG VISIT (OUTPATIENT)
Dept: CARDIOLOGY CLINIC | Age: 85
End: 2020-06-25

## 2020-06-25 DIAGNOSIS — Z79.01 LONG TERM CURRENT USE OF ANTICOAGULANTS WITH INR GOAL OF 2.0-3.0: ICD-10-CM

## 2020-06-25 DIAGNOSIS — I10 ESSENTIAL HYPERTENSION: Primary | ICD-10-CM

## 2020-06-25 DIAGNOSIS — I48.0 PAROXYSMAL ATRIAL FIBRILLATION (HCC): ICD-10-CM

## 2020-06-25 LAB
INR BLD: 2
INR, EXTERNAL: 2 (ref 2–3)
PT POC: 20.4 SECONDS
VALID INTERNAL CONTROL?: YES

## 2020-07-17 ENCOUNTER — HOSPITAL ENCOUNTER (OUTPATIENT)
Dept: LAB | Age: 85
Discharge: HOME | End: 2020-07-17

## 2020-07-17 ENCOUNTER — OFFICE VISIT (OUTPATIENT)
Dept: INTERNAL MEDICINE CLINIC | Age: 85
End: 2020-07-17

## 2020-07-17 VITALS
HEART RATE: 69 BPM | BODY MASS INDEX: 33.49 KG/M2 | DIASTOLIC BLOOD PRESSURE: 76 MMHG | RESPIRATION RATE: 18 BRPM | HEIGHT: 69 IN | OXYGEN SATURATION: 94 % | WEIGHT: 226.13 LBS | TEMPERATURE: 98.4 F | SYSTOLIC BLOOD PRESSURE: 122 MMHG

## 2020-07-17 DIAGNOSIS — G89.29 CHRONIC BILATERAL LOW BACK PAIN WITHOUT SCIATICA: ICD-10-CM

## 2020-07-17 DIAGNOSIS — G60.9 IDIOPATHIC PERIPHERAL NEUROPATHY: ICD-10-CM

## 2020-07-17 DIAGNOSIS — M54.50 CHRONIC BILATERAL LOW BACK PAIN WITHOUT SCIATICA: ICD-10-CM

## 2020-07-17 DIAGNOSIS — Z74.09 IMPAIRED FUNCTIONAL MOBILITY, BALANCE, GAIT, AND ENDURANCE: ICD-10-CM

## 2020-07-17 DIAGNOSIS — C67.9 MALIGNANT NEOPLASM OF URINARY BLADDER, UNSPECIFIED SITE (HCC): ICD-10-CM

## 2020-07-17 DIAGNOSIS — Z00.00 MEDICARE ANNUAL WELLNESS VISIT, SUBSEQUENT: Primary | ICD-10-CM

## 2020-07-17 DIAGNOSIS — I10 ESSENTIAL HYPERTENSION: ICD-10-CM

## 2020-07-17 DIAGNOSIS — R53.82 CHRONIC FATIGUE: ICD-10-CM

## 2020-07-17 DIAGNOSIS — R19.5 LOOSE STOOLS: ICD-10-CM

## 2020-07-17 DIAGNOSIS — Z00.00 MEDICARE ANNUAL WELLNESS VISIT, SUBSEQUENT: ICD-10-CM

## 2020-07-17 DIAGNOSIS — R29.6 FREQUENT FALLS: ICD-10-CM

## 2020-07-17 LAB
ALBUMIN SERPL-MCNC: 3.6 G/DL (ref 3.5–5)
ALBUMIN/GLOB SERPL: 0.9 {RATIO} (ref 1.1–2.2)
ALP SERPL-CCNC: 135 U/L (ref 45–117)
ALT SERPL-CCNC: 20 U/L (ref 12–78)
ANION GAP SERPL CALC-SCNC: 9 MMOL/L (ref 5–15)
AST SERPL-CCNC: 14 U/L (ref 15–37)
BILIRUB SERPL-MCNC: 0.5 MG/DL (ref 0.2–1)
BUN SERPL-MCNC: 22 MG/DL (ref 6–20)
BUN/CREAT SERPL: 16 (ref 12–20)
CALCIUM SERPL-MCNC: 8.6 MG/DL (ref 8.5–10.1)
CHLORIDE SERPL-SCNC: 109 MMOL/L (ref 97–108)
CHOLEST SERPL-MCNC: 210 MG/DL
CO2 SERPL-SCNC: 24 MMOL/L (ref 21–32)
CREAT SERPL-MCNC: 1.41 MG/DL (ref 0.7–1.3)
ERYTHROCYTE [DISTWIDTH] IN BLOOD BY AUTOMATED COUNT: 14 % (ref 11.5–14.5)
GLOBULIN SER CALC-MCNC: 4 G/DL (ref 2–4)
GLUCOSE SERPL-MCNC: 95 MG/DL (ref 65–100)
HCT VFR BLD AUTO: 42.1 % (ref 36.6–50.3)
HDLC SERPL-MCNC: 65 MG/DL
HDLC SERPL: 3.2 {RATIO} (ref 0–5)
HGB BLD-MCNC: 13.3 G/DL (ref 12.1–17)
LDLC SERPL CALC-MCNC: 119.8 MG/DL (ref 0–100)
LIPID PROFILE,FLP: ABNORMAL
MCH RBC QN AUTO: 30.1 PG (ref 26–34)
MCHC RBC AUTO-ENTMCNC: 31.6 G/DL (ref 30–36.5)
MCV RBC AUTO: 95.2 FL (ref 80–99)
NRBC # BLD: 0 K/UL (ref 0–0.01)
NRBC BLD-RTO: 0 PER 100 WBC
PLATELET # BLD AUTO: 238 K/UL (ref 150–400)
PMV BLD AUTO: 11 FL (ref 8.9–12.9)
POTASSIUM SERPL-SCNC: 3.8 MMOL/L (ref 3.5–5.1)
PROT SERPL-MCNC: 7.6 G/DL (ref 6.4–8.2)
RBC # BLD AUTO: 4.42 M/UL (ref 4.1–5.7)
SODIUM SERPL-SCNC: 142 MMOL/L (ref 136–145)
TRIGL SERPL-MCNC: 126 MG/DL (ref ?–150)
TSH SERPL DL<=0.05 MIU/L-ACNC: 2.58 UIU/ML (ref 0.36–3.74)
VLDLC SERPL CALC-MCNC: 25.2 MG/DL
WBC # BLD AUTO: 8.6 K/UL (ref 4.1–11.1)

## 2020-07-17 RX ORDER — AMLODIPINE BESYLATE 2.5 MG/1
2.5 TABLET ORAL DAILY
Qty: 90 TAB | Refills: 1 | Status: SHIPPED | COMMUNITY
Start: 2020-07-17 | End: 2021-01-14

## 2020-07-17 NOTE — PATIENT INSTRUCTIONS
PT  Lorelei Jeffries, [address] [id number] [address]   [place] [facility]   [phone] (Fax)

## 2020-07-17 NOTE — PROGRESS NOTES
This is a Subsequent Medicare Annual Wellness Visit providing Personalized Prevention Plan Services (PPPS) (Performed 12 months after initial AWV and PPPS )    I have reviewed the patient's medical history in detail and updated the computerized patient record. Wife is home now, but trying to get her into assisted living again. Has aid 8-1 pm daily    Reports frequent falls. Loss of balance, perhaps tripping. No major dizziness. No major injuries. xrays last mo neg for fx. Ongoing chronic back pain. Neuropathy of feet. On HTN meds  Taking finasteride and tamsulosin for BPG. Flow is fair  Bladder cancer f/u - will see Dr. Jonny Currie soon   No recent PT  Blood pressure 122/76, pulse 69, temperature 98.4 °F (36.9 °C), temperature source Oral, resp. rate 18, height 5' 9\" (1.753 m), weight 226 lb 2 oz (102.6 kg), SpO2 94 %. Hypertension  Hypertension ROS: taking medications as instructed, no medication side effects noted, no TIA's, no chest pain on exertion, no dyspnea on exertion, no swelling of ankles     reports that he has quit smoking. He has never used smokeless tobacco.    reports current alcohol use. BP Readings from Last 2 Encounters:   07/17/20 122/76   06/22/20 118/66     Reports fatigue in AM, improves later    Occasional loose stools. Mild stool incontinence and leakage for years. Coffee increases it. Uses pads to help leakage. Has some urgency at times. Takes colestipol BID. Colonoscopy 2012. History     Past Medical History:   Diagnosis Date    Advanced care planning/counseling discussion 10/8/2015    Basal cell carcinoma, face     now sees Dr. Kenji Manuel.  Bladder cancer [facility]) 2012    Dr. Nova. cyto 9/2014, 2/2016. s/p surgery, BCG irrigation [place]. saw Dr. Klaudia Clark BPH with obstruction/lower urinary tract symptoms     Chronic lower back pain     injection in [place].   saw Dr. Marlen Terry Chronic neck pain     limited motion to side    Depression, major     Dupuytren's contracture of left hand     5th finger    Epistaxis 2013    saw ENT at [facility] history of skin cancer     Fecal incontinence     with loose stools    Hearing loss     has hearing aids    HTN (hypertension)     Insomnia     IVCD (intraventricular conduction defect) 11/30/2015    Dr Tamra Concepcion term current use of anticoagulants with INR goal of 2.0-3.0     Afib    Loose stools 7/18/2014    Paroxysmal atrial fibrillation (HCC)     EF 66%, 1-2+ LAE on flecainide;Dr. Yunier Reis. saw Dr. Whitmore at [facility] Peripheral neuropathy     RLS (restless legs syndrome) 7/3/2017    SCC (squamous cell carcinoma), face     prior hx, now sees Dr. Coleman Claude Skin cancer     Urothelial carcinoma of right distal ureter ([address] 5/1/15    excision Dr. Kunal Carmona. low grade, papillary. repeat 3 month       Past Surgical History:   Procedure Laterality Date    HX BLADDER REPAIR  2012    cancer    HX COLONOSCOPY  2012    polyps. repeat 2017?  HX HERNIA REPAIR      periumibical    HX MALIGNANT SKIN LESION EXCISION  06/14/2018    basal cell left lip    HX RETINAL DETACHMENT REPAIR Right     [place] CYSTOURETHROSCOPY  9/2014    Dr. Jouse Franklin [place] CYSTOURETHROSCOPY  5/1/15    low grade right pap urothelial cancer    [place] CYSTOURETHROSCOPY  2/29/16       Current Outpatient Medications   Medication Sig    amLODIPine (NORVASC) 2.5 mg tablet Take 1 Tab by mouth daily. Decreased dose 7/17/20    gabapentin (NEURONTIN) 300 mg capsule TAKE 1 CAPSULE BY MOUTH DAILY  Indications: neuropathic pain    sertraline (ZOLOFT) 100 mg tablet TAKE 1 TABLET DAILY    colestipoL (COLESTID) 1 gram tablet TAKE 2 TABLETS TWICE A DAY    warfarin (COUMADIN) 1 mg tablet TAKE 1 TABLET BY MOUTH EVERY DAY    warfarin (COUMADIN) 5 mg tablet TAKE ONE AND ONE-HALF TABLETS DAILY OR AS DIRECTED    irbesartan (AVAPRO) 75 mg tablet TAKE 2 TABLETS NIGHTLY    flecainide (TAMBOCOR) 150 mg tablet Take 1 Tab by mouth two (2) times a day.     tamsulosin (FLOMAX) 0.4 mg capsule TAKE 1 CAPSULE DAILY    finasteride (PROSCAR) 5 mg tablet TAKE 1 TABLET BY MOUTH EVERY DAY     No current facility-administered medications for this visit. Allergies   Allergen Reactions    Hay Fever And Allergy Relief Other (comments)     Watery eyes    Trazodone Palpitations       No family history on file. reports that he has quit smoking. He has never used smokeless tobacco.   reports current alcohol use. Depression Risk Factor Screening:       Alcohol Risk Factor Screening: On any occasion during the past 3 months, have you had more than 3 drinks containing alcohol? No    Do you average more than 14 drinks per week? No      Functional Ability and Level of Safety:     Hearing Loss   mild    Activities of Daily Living   Self-care. Requires assistance with: no ADLs    Fall Risk     Fall Risk Assessment, last 12 mths 7/17/2020   Able to walk? Yes   Fall in past 12 months? Yes   Fall with injury? No   Number of falls in past 12 months 3   Fall Risk Score 3         Abuse Screen   Patient is not abused    Review of Systems   A comprehensive review of systems was negative except for that written in the HPI. Physical Examination     Evaluation of Cognitive Function:  Mood/affect:  neutral, happy  Appearance: age appropriate  Family member/caregiver input: none    Blood pressure 122/76, pulse 69, temperature 98.4 °F (36.9 °C), temperature source Oral, resp. rate 18, height 5' 9\" (1.753 m), weight 226 lb 2 oz (102.6 kg), SpO2 94 %.   General appearance: alert, cooperative, no distress, appears stated age  Neck: supple, symmetrical, trachea midline, no adenopathy, thyroid: not enlarged, symmetric, no tenderness/mass/nodules, no carotid bruit and no JVD  Lungs: clear to auscultation bilaterally  Heart: regular rate and rhythm, S1, S2 normal, no murmur, click, rub or gallop  Extremities: extremities normal, atraumatic, no cyanosis or edema    Patient Care Team:  Brendan Palencia MD as PCP - General (Internal Medicine)  Brendan Palencia MD as PCP - REHABILITATION [facility] Empaneled Provider      Advice/Referrals/Counseling   Education and counseling provided. See below for specific orders    Assessment/Plan   Diagnoses and all orders for this visit:    1. Medicare annual wellness visit, subsequent  Will get pneumovax and flu shot in Sept  Eye exam pending Dr. Domingo Doing; Future  -     CBC W/O DIFF; Future  -     METABOLIC PANEL, COMPREHENSIVE; Future    2. Essential hypertension  Over-controlled?  decrease  -     amLODIPine (NORVASC) 2.5 mg tablet; Take 1 Tab by mouth daily. Decreased dose 7/17/20    3. Frequent falls- multi-factorial.  BPH meds? Norvasc? Neuropathy, pain  -     REFERRAL TO PHYSICAL THERAPY at [facility] where he went fall 2019    4. Impaired functional mobility, balance, gait, and endurance  5. Idiopathic peripheral neuropathy  6. Chronic bilateral low back pain without sciatica  -     REFERRAL TO PHYSICAL THERAPY    7. Chronic fatigue  -     CBC W/O DIFF; Future  -     TSH 3RD GENERATION; Future    8. Malignant neoplasm of urinary bladder, unspecified site [facility])   f/u Dr. Devi Berger soon    9. Loose stools  Try imodium prn going out.   -     REFERRAL TO GASTROENTEROLOGY    . Potential medication side effects were discussed with the patient; let me know if any occur.   Return for yearly Annual Wellness Visits

## 2020-07-28 ENCOUNTER — ANTI-COAG VISIT (OUTPATIENT)
Dept: CARDIOLOGY CLINIC | Age: 85
End: 2020-07-28

## 2020-07-28 DIAGNOSIS — I10 ESSENTIAL HYPERTENSION: Primary | ICD-10-CM

## 2020-07-28 DIAGNOSIS — I48.0 PAROXYSMAL ATRIAL FIBRILLATION (HCC): ICD-10-CM

## 2020-07-28 DIAGNOSIS — Z79.01 LONG TERM CURRENT USE OF ANTICOAGULANTS WITH INR GOAL OF 2.0-3.0: ICD-10-CM

## 2020-07-28 LAB
INR BLD: 2
INR, EXTERNAL: 2 (ref 2–3)
PT POC: 21.2 SECONDS
VALID INTERNAL CONTROL?: YES

## 2020-09-17 NOTE — TELEPHONE ENCOUNTER
Called patient, went over his x ray results. Patient stated his doing ok. Advised if his back pain gets worse and persist to call for more imaging. Patient verbalized understanding. yes(specify)/RIght and left toe screws

## 2020-10-04 RX ORDER — FINASTERIDE 5 MG/1
TABLET, FILM COATED ORAL
Qty: 90 TAB | Refills: 3 | Status: SHIPPED | OUTPATIENT
Start: 2020-10-04 | End: 2021-10-05

## 2020-10-06 ENCOUNTER — ANTI-COAG VISIT (OUTPATIENT)
Dept: CARDIOLOGY CLINIC | Age: 85
End: 2020-10-06
Payer: MEDICARE

## 2020-10-06 DIAGNOSIS — I48.0 PAROXYSMAL ATRIAL FIBRILLATION (HCC): ICD-10-CM

## 2020-10-06 DIAGNOSIS — Z79.01 LONG TERM CURRENT USE OF ANTICOAGULANTS WITH INR GOAL OF 2.0-3.0: ICD-10-CM

## 2020-10-06 DIAGNOSIS — I10 ESSENTIAL HYPERTENSION: Primary | ICD-10-CM

## 2020-10-06 LAB
INR BLD: 2
INR, EXTERNAL: 2 (ref 2–3)
PT POC: 20.4 SECONDS
VALID INTERNAL CONTROL?: YES

## 2020-10-06 PROCEDURE — 85610 PROTHROMBIN TIME: CPT | Performed by: SPECIALIST

## 2020-11-10 ENCOUNTER — ANTI-COAG VISIT (OUTPATIENT)
Dept: CARDIOLOGY CLINIC | Age: 85
End: 2020-11-10
Payer: MEDICARE

## 2020-11-10 DIAGNOSIS — I48.0 PAROXYSMAL ATRIAL FIBRILLATION (HCC): ICD-10-CM

## 2020-11-10 DIAGNOSIS — Z79.01 LONG TERM CURRENT USE OF ANTICOAGULANTS WITH INR GOAL OF 2.0-3.0: ICD-10-CM

## 2020-11-10 DIAGNOSIS — I10 ESSENTIAL HYPERTENSION: Primary | ICD-10-CM

## 2020-11-10 LAB
INR BLD: 2
INR, EXTERNAL: 2 (ref 2–3)
PT POC: 20.3 SECONDS
VALID INTERNAL CONTROL?: YES

## 2020-11-10 PROCEDURE — 85610 PROTHROMBIN TIME: CPT | Performed by: SPECIALIST

## 2020-12-07 ENCOUNTER — TELEPHONE (OUTPATIENT)
Dept: INTERNAL MEDICINE CLINIC | Age: 85
End: 2020-12-07

## 2020-12-07 RX ORDER — CHOLESTYRAMINE LIGHT 4 G/5.7G
4 POWDER, FOR SUSPENSION ORAL 2 TIMES DAILY
Qty: 180 PACKET | Refills: 2 | Status: SHIPPED | COMMUNITY
Start: 2020-12-07 | End: 2021-09-15 | Stop reason: ALTCHOICE

## 2020-12-07 NOTE — TELEPHONE ENCOUNTER
[facility] [phone]      Patient called to advise Colestid medication has been discontinued. Patient is requesting new medication to take its place. Please call patient to advise.    [phone]

## 2020-12-10 ENCOUNTER — TELEPHONE (OUTPATIENT)
Dept: INTERNAL MEDICINE CLINIC | Age: 85
End: 2020-12-10

## 2020-12-10 NOTE — TELEPHONE ENCOUNTER
Attempted to reach pt to see what medication he is referring to.  No answer, left voicemail requesting return call

## 2020-12-10 NOTE — TELEPHONE ENCOUNTER
Mr Amanda Conley came in today to tell us that his medicine he uses for loose stool needs to be substituted per his pharmacy - pharmacy stated they aren't going to make it anymore

## 2020-12-15 ENCOUNTER — ANTI-COAG VISIT (OUTPATIENT)
Dept: CARDIOLOGY CLINIC | Age: 85
End: 2020-12-15
Payer: MEDICARE

## 2020-12-15 DIAGNOSIS — I48.0 PAROXYSMAL ATRIAL FIBRILLATION (HCC): ICD-10-CM

## 2020-12-15 DIAGNOSIS — Z79.01 LONG TERM CURRENT USE OF ANTICOAGULANTS WITH INR GOAL OF 2.0-3.0: ICD-10-CM

## 2020-12-15 DIAGNOSIS — I10 ESSENTIAL HYPERTENSION: Primary | ICD-10-CM

## 2020-12-15 LAB
INR BLD: 2
INR, EXTERNAL: 2 (ref 2–3)
PT POC: 20.1 SECONDS
VALID INTERNAL CONTROL?: YES

## 2020-12-15 PROCEDURE — 85610 PROTHROMBIN TIME: CPT | Performed by: SPECIALIST

## 2021-01-11 ENCOUNTER — TELEPHONE (OUTPATIENT)
Dept: INTERNAL MEDICINE CLINIC | Age: 86
End: 2021-01-11

## 2021-01-11 NOTE — TELEPHONE ENCOUNTER
----- Message from Danielle Foster sent at 1/11/2021  2:26 PM EST -----  Regarding: Dr. Cary Jimenez Message/Vendor Calls    Caller's first and last name:Cirilo Fitzpatrick      Reason for call:2 recent falls and balance and any xrays received      Callback required yes/no and why:yes      Best contact number(s):[phone]      Details to clarify the request:  Please let the patient know if you received any xrays or scans   Danielle Foster

## 2021-01-11 NOTE — TELEPHONE ENCOUNTER
Returned call to pt, he states he has fallen and is now having back pain and left elbow and wrist pain. Appt moved up for eval tomorrow.

## 2021-01-12 ENCOUNTER — OFFICE VISIT (OUTPATIENT)
Dept: INTERNAL MEDICINE CLINIC | Age: 86
End: 2021-01-12
Payer: MEDICARE

## 2021-01-12 VITALS
OXYGEN SATURATION: 97 % | WEIGHT: 223 LBS | HEART RATE: 61 BPM | TEMPERATURE: 98 F | RESPIRATION RATE: 12 BRPM | HEIGHT: 69 IN | DIASTOLIC BLOOD PRESSURE: 90 MMHG | BODY MASS INDEX: 33.03 KG/M2 | SYSTOLIC BLOOD PRESSURE: 156 MMHG

## 2021-01-12 DIAGNOSIS — R29.6 FREQUENT FALLS: ICD-10-CM

## 2021-01-12 DIAGNOSIS — M25.522 LEFT ELBOW PAIN: Primary | ICD-10-CM

## 2021-01-12 DIAGNOSIS — F32.1 MAJOR DEPRESSIVE DISORDER, SINGLE EPISODE, MODERATE (HCC): ICD-10-CM

## 2021-01-12 DIAGNOSIS — C67.9 MALIGNANT NEOPLASM OF URINARY BLADDER, UNSPECIFIED SITE (HCC): ICD-10-CM

## 2021-01-12 DIAGNOSIS — M77.8 LEFT ELBOW TENDONITIS: ICD-10-CM

## 2021-01-12 DIAGNOSIS — R19.5 LOOSE STOOLS: ICD-10-CM

## 2021-01-12 DIAGNOSIS — Z74.09 IMPAIRED FUNCTIONAL MOBILITY, BALANCE, GAIT, AND ENDURANCE: ICD-10-CM

## 2021-01-12 DIAGNOSIS — I48.0 PAROXYSMAL ATRIAL FIBRILLATION (HCC): ICD-10-CM

## 2021-01-12 DIAGNOSIS — G60.9 IDIOPATHIC PERIPHERAL NEUROPATHY: ICD-10-CM

## 2021-01-12 PROCEDURE — G8417 CALC BMI ABV UP PARAM F/U: HCPCS | Performed by: INTERNAL MEDICINE

## 2021-01-12 PROCEDURE — G8536 NO DOC ELDER MAL SCRN: HCPCS | Performed by: INTERNAL MEDICINE

## 2021-01-12 PROCEDURE — G9717 DOC PT DX DEP/BP F/U NT REQ: HCPCS | Performed by: INTERNAL MEDICINE

## 2021-01-12 PROCEDURE — G8427 DOCREV CUR MEDS BY ELIG CLIN: HCPCS | Performed by: INTERNAL MEDICINE

## 2021-01-12 PROCEDURE — 1100F PTFALLS ASSESS-DOCD GE2>/YR: CPT | Performed by: INTERNAL MEDICINE

## 2021-01-12 PROCEDURE — 3288F FALL RISK ASSESSMENT DOCD: CPT | Performed by: INTERNAL MEDICINE

## 2021-01-12 PROCEDURE — 99214 OFFICE O/P EST MOD 30 MIN: CPT | Performed by: INTERNAL MEDICINE

## 2021-01-12 RX ORDER — LOPERAMIDE HCL 2 MG
2 TABLET ORAL
Qty: 30 TAB | Refills: 2
Start: 2021-01-12 | End: 2021-01-22

## 2021-01-12 RX ORDER — TAMSULOSIN HYDROCHLORIDE 0.4 MG/1
CAPSULE ORAL
Qty: 90 CAP | Refills: 3 | Status: SHIPPED | OUTPATIENT
Start: 2021-01-12 | End: 2021-01-12 | Stop reason: ALTCHOICE

## 2021-01-12 NOTE — PROGRESS NOTES
HISTORY OF PRESENT ILLNESS    Chief Complaint   Patient presents with    Results     Discuss XR.  Other     Balance issues - about 6 months. Presents for follow-up    Wife is in SNF at [facility] and will need home care if she can come home. Falls. 3 more falls since summer 2020, last 2 weeks ago 12/28/20. Lalitha Berrios in bathroom after showering. Slipped and fell onto back. Could not stand us since room was small and he was in pain, was a little confused. Home aid came in and called EMS who came and picked him up. Went to [address] [place] [address] for evaluation and no sign of fracture noted. CT done of neck? Was d/c to home. 6 weeks, ago tripped in the dark while tending to his wife. landed on left arm and side. Does not use cane or walker. Reports left forearm pain since he fell. Hurts to lift and flex. Lifts his wife. Strain. Referred to PT this summer. He says he did it for a while at [address] [place] [address] and stopped due to his wife. Bladder cancer. Saw Dr. Kaushik Branham. CT 12/26/20 showed no sign of cancer. +bladder diverticulosis, enlarged prostate. Pancreas cysts. Takes tamsulosin. Flow is ok. Loose stools. Colestipol not covered. Does not like cholestyramine due to alisson taste.     Review of Systems   All other systems reviewed and are negative, except as noted in HPI    Past Medical and Surgical History   has a past medical history of Advanced care planning/counseling discussion (10/8/2015), Basal cell carcinoma, face, Bladder cancer ([facility] (2012), BPH with obstruction/lower urinary tract symptoms, Chronic lower back pain, Chronic neck pain, Depression, major, Dupuytren's contracture of left hand, Epistaxis (2013), Family history of skin cancer, Fecal incontinence, Hearing loss, HTN (hypertension), Insomnia, IVCD (intraventricular conduction defect) (11/30/2015), Long term current use of anticoagulants with INR goal of 2.0-3.0, Loose stools (7/18/2014), Paroxysmal atrial fibrillation ([address] Peripheral neuropathy, RLS (restless legs syndrome) (7/3/2017), SCC (squamous cell carcinoma), face, Skin cancer, and Urothelial carcinoma of right distal ureter ([facility] (5/1/15). He also has no past medical history of Sun-damaged skin, Sunburn, blistering, or Tanning bed exposure. has a past surgical history that includes hx bladder repair (2012); hx retinal detachment repair (Right); hx hernia repair; hx colonoscopy (2012); pr cystourethroscopy (9/2014); pr cystourethroscopy (5/1/15); pr cystourethroscopy (2/29/16); and hx malignant skin lesion excision (06/14/2018). reports that he has quit smoking. He has never used smokeless tobacco. He reports current alcohol use. He reports that he does not use drugs. family history is not on file. Physical Exam   Nursing note and vitals reviewed. Blood pressure (!) 156/90, pulse 61, temperature 98 °F (36.7 °C), temperature source Oral, resp. rate 12, height 5' 9\" (1.753 m), weight 223 lb (101.2 kg), SpO2 97 %. Constitutional:  No distress. Eyes: Conjunctivae are normal.   Ears:  Hearing grossly intact  Cardiovascular: Normal rate. regular rhythm, no murmurs or gallops  No edema  Pulmonary/Chest: Effort normal.   CTAB  Musculoskeletal: moves all 4 extremities   FROM left elbow. Mild pain medically   Neurological: Alert and oriented to person, place, and time. Skin: No rash noted. Psychiatric: Normal mood and affect. Behavior is normal.     ASSESSMENT and PLAN  Diagnoses and all orders for this visit:    1. Left elbow pain  2. Left elbow tendonitis  Stretching exercises demonstrated for for this condition  Can try wrist brace.    -     REFERRAL TO PHYSICAL THERAPY    3. Frequent falls  4. Impaired functional mobility, balance, gait, and endurance  Multi-factorial. Foot neuropathy, orthostatic dizziness. Stop flomax for now. Refer to PT.    -     REFERRAL TO PHYSICAL THERAPY    5. Idiopathic peripheral neuropathy  stable    6.  Major depressive disorder, single episode, moderate ([facility]  This condition is controlled on current medication regimen as written in medication list.  Medications refilled. 7. Malignant neoplasm of urinary bladder, unspecified site [facility])  Per urology. 8. Paroxysmal atrial fibrillation (HCC)  Currently asymptomatic    9. Loose stools  -     REFERRAL TO GASTROENTEROLOGY  -     loperamide (Imodium A-D) 2 mg tablet; Take 1 Tab by mouth daily as needed for Diarrhea for up to 10 days. lab results and schedule of future lab studies reviewed with patient  reviewed medications and side effects in detail    Return to clinic for further evaluation if new symptoms develop        Current Outpatient Medications   Medication Sig    tamsulosin (FLOMAX) 0.4 mg capsule TAKE 1 CAPSULE DAILY    cholestyramine-aspartame (Cholestyramine Light) 4 gram packet Take 1 Packet by mouth two (2) times a day. 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 Decreased dose 7/17/20    gabapentin (NEURONTIN) 300 mg capsule TAKE 1 CAPSULE BY MOUTH DAILY  Indications: neuropathic pain    sertraline (ZOLOFT) 100 mg tablet TAKE 1 TABLET DAILY    warfarin (COUMADIN) 1 mg tablet TAKE 1 TABLET BY MOUTH EVERY DAY    warfarin (COUMADIN) 5 mg tablet TAKE ONE AND ONE-HALF TABLETS DAILY OR AS DIRECTED    irbesartan (AVAPRO) 75 mg tablet TAKE 2 TABLETS NIGHTLY (Patient taking differently: Take 1 tab in the morning.)    flecainide (TAMBOCOR) 150 mg tablet Take 1 Tab by mouth two (2) times a day. No current facility-administered medications for this visit.

## 2021-01-14 DIAGNOSIS — I10 ESSENTIAL HYPERTENSION: ICD-10-CM

## 2021-01-14 RX ORDER — AMLODIPINE BESYLATE 2.5 MG/1
TABLET ORAL
Qty: 90 TAB | Refills: 1 | Status: SHIPPED | OUTPATIENT
Start: 2021-01-14 | End: 2021-09-21 | Stop reason: ALTCHOICE

## 2021-01-14 NOTE — PROGRESS NOTES
Addendum January 13, 2021. We will able to obtain records from Eris Sampson after his ground-level fall. His fall actually occurred on January 2, not December 28. as stated in previous note. Reports states that patient was standing in his bathroom with one leg on the toilet to look at his toe and thinks he lost his balance. He stated he did not believe he lost consciousness but was not entirely sure. He did lose bowel control. Was not exactly sure how long prior to evaluation this occurred. Was reporting lower back pain, left shoulder pain, right elbow pain in ER. .  The right elbow pain was the only new kind of pain. EKG showed interventricular conduction today, normal axis, normal T waves, no ST elevation. No previous. Labs showed normal lactic acid, Covid negative, opiates and alcohol negative, urinalysis with small blood but no white blood cells. Sodium 140, potassium 3.5, BUN 24, creatinine 1.72, liver enzymes normal, calcium 7.5, glucose 116  WBC 9.1, hemoglobin 12.7, platelets 888 chest x-ray showed no acute process. Lumbar spine CT showed degenerative disc disease with facet arthritis but no fracture. No thoracic fracture. Unremarkable chest CT. C-spine CT normal.  X-ray showed no definitive elbow fracture. Head CT with no evidence of stroke or hemorrhage. Mild white matter disease. Diagnosed with possible syncope and contusions. Referred to orthopedic for chronic left shoulder pain.

## 2021-01-25 ENCOUNTER — TELEPHONE (OUTPATIENT)
Dept: INTERNAL MEDICINE CLINIC | Age: 86
End: 2021-01-25

## 2021-01-25 DIAGNOSIS — M54.50 CHRONIC BILATERAL LOW BACK PAIN WITHOUT SCIATICA: ICD-10-CM

## 2021-01-25 DIAGNOSIS — G89.29 CHRONIC BILATERAL LOW BACK PAIN WITHOUT SCIATICA: ICD-10-CM

## 2021-01-25 RX ORDER — GABAPENTIN 300 MG/1
CAPSULE ORAL
Qty: 90 CAP | Refills: 2 | Status: CANCELLED | OUTPATIENT
Start: 2021-01-25

## 2021-01-25 RX ORDER — GABAPENTIN 300 MG/1
CAPSULE ORAL
Qty: 90 CAP | Refills: 1 | Status: SHIPPED | OUTPATIENT
Start: 2021-01-25 | End: 2021-07-26

## 2021-01-25 NOTE — TELEPHONE ENCOUNTER
----- Message from Wilhemina Siemens sent at 1/25/2021 11:10 AM EST -----  Regarding: Dr. Al Moran (if not patient): pt      Relationship of caller (if not patient): pt      Best contact number(s): [phone]      Name of medication and dosage if known: gabapentin 300 mg 90 day supply      Is patient out of this medication (yes/no): yes      Ph[facility]: [facility] listed in chart? (yes/no): yes  Pharmacy phone number: [phone]    Date of last visit: 1/12/21    Details to clarify the request: n/a       Wilhemina Siemens

## 2021-01-25 NOTE — TELEPHONE ENCOUNTER
----- Message from Patricio Klein sent at 1/25/2021 11:10 AM EST -----  Regarding: Dr. Mehrdad Garcia (if not patient): pt      Relationship of caller (if not patient): pt      Best contact number(s): [phone]      Name of medication and dosage if known: gabapentin 300 mg 90 day supply      Is patient out of this medication (yes/no): yes      Pharm[facility] name: [facility] listed in chart? (yes/no): yes  Pharmacy phone number: [phone]    Date of last visit: 1/12/21    Details to clarify the request: n/a       Patricio Klein

## 2021-01-28 DIAGNOSIS — I48.0 PAROXYSMAL ATRIAL FIBRILLATION (HCC): ICD-10-CM

## 2021-02-01 ENCOUNTER — TELEPHONE (OUTPATIENT)
Dept: CARDIOLOGY CLINIC | Age: 86
End: 2021-02-01

## 2021-02-01 NOTE — TELEPHONE ENCOUNTER
Attempted to reach patient by telephone. A message was left for return call. Needs appointment for medication refill.

## 2021-02-05 RX ORDER — FLECAINIDE ACETATE 150 MG/1
150 TABLET ORAL 2 TIMES DAILY
Qty: 180 TAB | Refills: 0 | Status: SHIPPED | OUTPATIENT
Start: 2021-02-05 | End: 2021-04-30

## 2021-02-11 ENCOUNTER — ANTI-COAG VISIT (OUTPATIENT)
Dept: CARDIOLOGY CLINIC | Age: 86
End: 2021-02-11
Payer: MEDICARE

## 2021-02-11 DIAGNOSIS — Z79.01 LONG TERM CURRENT USE OF ANTICOAGULANTS WITH INR GOAL OF 2.0-3.0: ICD-10-CM

## 2021-02-11 DIAGNOSIS — I48.0 PAROXYSMAL ATRIAL FIBRILLATION (HCC): ICD-10-CM

## 2021-02-11 DIAGNOSIS — I10 ESSENTIAL HYPERTENSION: Primary | ICD-10-CM

## 2021-02-11 LAB
INR BLD: 2
INR, EXTERNAL: 2 (ref 2–3)
PT POC: 20.9 SECONDS
VALID INTERNAL CONTROL?: YES

## 2021-02-11 PROCEDURE — 85610 PROTHROMBIN TIME: CPT | Performed by: SPECIALIST

## 2021-03-10 ENCOUNTER — ANTI-COAG VISIT (OUTPATIENT)
Dept: CARDIOLOGY CLINIC | Age: 86
End: 2021-03-10
Payer: MEDICARE

## 2021-03-10 DIAGNOSIS — Z79.01 LONG TERM CURRENT USE OF ANTICOAGULANTS WITH INR GOAL OF 2.0-3.0: ICD-10-CM

## 2021-03-10 DIAGNOSIS — I48.0 PAROXYSMAL ATRIAL FIBRILLATION (HCC): ICD-10-CM

## 2021-03-10 DIAGNOSIS — I10 ESSENTIAL HYPERTENSION: Primary | ICD-10-CM

## 2021-03-10 LAB
INR BLD: 2
INR, EXTERNAL: 2 (ref 2–3)
PT POC: 21.2 SECONDS
VALID INTERNAL CONTROL?: YES

## 2021-03-10 PROCEDURE — 85610 PROTHROMBIN TIME: CPT | Performed by: SPECIALIST

## 2021-04-07 ENCOUNTER — ANTI-COAG VISIT (OUTPATIENT)
Dept: CARDIOLOGY CLINIC | Age: 86
End: 2021-04-07
Payer: MEDICARE

## 2021-04-07 DIAGNOSIS — Z79.01 LONG TERM CURRENT USE OF ANTICOAGULANTS WITH INR GOAL OF 2.0-3.0: ICD-10-CM

## 2021-04-07 DIAGNOSIS — I10 ESSENTIAL HYPERTENSION: Primary | ICD-10-CM

## 2021-04-07 DIAGNOSIS — I48.0 PAROXYSMAL ATRIAL FIBRILLATION (HCC): ICD-10-CM

## 2021-04-07 LAB
INR BLD: 2
INR, EXTERNAL: 2 (ref 2–3)
PT POC: 23.6 SECONDS
VALID INTERNAL CONTROL?: YES

## 2021-04-07 PROCEDURE — 85610 PROTHROMBIN TIME: CPT | Performed by: SPECIALIST

## 2021-04-21 ENCOUNTER — OFFICE VISIT (OUTPATIENT)
Dept: CARDIOLOGY CLINIC | Age: 86
End: 2021-04-21
Payer: MEDICARE

## 2021-04-21 VITALS
HEART RATE: 76 BPM | HEIGHT: 69 IN | DIASTOLIC BLOOD PRESSURE: 70 MMHG | BODY MASS INDEX: 34.36 KG/M2 | WEIGHT: 232 LBS | SYSTOLIC BLOOD PRESSURE: 110 MMHG | OXYGEN SATURATION: 96 %

## 2021-04-21 DIAGNOSIS — E78.5 DYSLIPIDEMIA: ICD-10-CM

## 2021-04-21 DIAGNOSIS — I10 ESSENTIAL HYPERTENSION: ICD-10-CM

## 2021-04-21 DIAGNOSIS — I48.0 PAROXYSMAL ATRIAL FIBRILLATION (HCC): Primary | ICD-10-CM

## 2021-04-21 DIAGNOSIS — I45.4 IVCD (INTRAVENTRICULAR CONDUCTION DEFECT): ICD-10-CM

## 2021-04-21 PROCEDURE — G9717 DOC PT DX DEP/BP F/U NT REQ: HCPCS | Performed by: SPECIALIST

## 2021-04-21 PROCEDURE — 99214 OFFICE O/P EST MOD 30 MIN: CPT | Performed by: SPECIALIST

## 2021-04-21 PROCEDURE — G8427 DOCREV CUR MEDS BY ELIG CLIN: HCPCS | Performed by: SPECIALIST

## 2021-04-21 PROCEDURE — 3288F FALL RISK ASSESSMENT DOCD: CPT | Performed by: SPECIALIST

## 2021-04-21 PROCEDURE — 93000 ELECTROCARDIOGRAM COMPLETE: CPT | Performed by: SPECIALIST

## 2021-04-21 PROCEDURE — G8536 NO DOC ELDER MAL SCRN: HCPCS | Performed by: SPECIALIST

## 2021-04-21 PROCEDURE — 1100F PTFALLS ASSESS-DOCD GE2>/YR: CPT | Performed by: SPECIALIST

## 2021-04-21 PROCEDURE — G8417 CALC BMI ABV UP PARAM F/U: HCPCS | Performed by: SPECIALIST

## 2021-04-21 RX ORDER — COLESTIPOL HYDROCHLORIDE 5 G/5G
5 GRANULE, FOR SUSPENSION ORAL 2 TIMES DAILY
COMMUNITY
End: 2021-12-07 | Stop reason: SDUPTHER

## 2021-04-21 NOTE — PROGRESS NOTES
Reid Nash. is a 80 y.o. male    Visit Vitals  /70 (BP 1 Location: Left upper arm, BP Patient Position: Sitting, BP Cuff Size: Adult)   Pulse 76   Ht 5' 9\" (1.753 m)   Wt 232 lb (105.2 kg)   SpO2 96%   BMI 34.26 kg/m²       Chief Complaint   Patient presents with    Follow-up     6MO       Chest pain NO  SOB NO  Dizziness WHEN WAKING UP IN MORNING  Swelling NO  Recent hospital visit NO  Refills NO

## 2021-04-21 NOTE — Clinical Note
4/21/2021 Patient: Leola Mendoza. YOB: 1931 Date of Visit: 4/21/2021 Andrés Foley MD 
[place] [address] [address] [facility] [address] [address] [address] [employer] Dear Andrés Foley MD, Thank you for referring Mr. Rolla Halsted to [facility] for evaluation. My notes for this consultation are attached. If you have questions, please do not hesitate to call me. I look forward to following your patient along with you.  
 
 
Sincerely, 
 
Francisco Javier Nava MD

## 2021-04-21 NOTE — PROGRESS NOTES
Sal Valdovinos .     7/15/1931       Chandler Schwarz MD, [facility]  Date of Visit-4/21/2021   PCP is Laura Kunz MD   [facility] [place]  [facility]  HPI:  Ole Min is a 80 y.o. male   F/u of PAF and HTN. Since our visit with him 6 months ago he has had a ground level fall and has seen his PCP. Records reviewed. His imaging in the ER was normal for CT scan and hip pain and felt it may be more related to balance. He has also seen urology in December because of his hx of bladder cancer. From a heart standpoint, pt is doing well. He recently had a procedure to remove basal cell carcinoma from his face and he states that the plastic surgeon told him he was in AF at the time. He notes that he does not feel the AF. He states he has had a couple falls with the past few years secondary to balance issues. He reports that he has never hit his head when he has fallen. He has received both COVID shots. See Diandra Rosales for urology for regular visit and follow-up of bladder cancer date of ER visit was 1-21 for the fall   denies chest pain, edema, syncope or shortness of breath at rest, has no tachycardia, palpitations or sense of arrhythmia. EKG: Sinus  Rhythm   -Intraventricular conduction defect -consider left ventricular hypertrophy.    -Nonspecific ST depression  -Nondiagnostic. Unchanged from 8/21/19  Rate 79,  ms,  ms,  ms  Assessment/Plan:     1. Paroxysmal atrial fibrillation (HCC)  No change in EKG. Rhythm is stable. Continue Flecainide. We are watching the QRS duration. Will get echo at next visit. - AMB POC EKG ROUTINE W/ 12 LEADS, INTER & REP    2. IVCD (intraventricular conduction defect)  -following for flecainide use . No syncope. 3. Essential hypertension  Stable.    At goal , meds and possible side effects reviewed and patient denies  Key CAD CHF Meds             colestipoL (COLESTID) 5 gram packet (Taking) Take 5 g by mouth two (2) times a day. flecainide (TAMBOCOR) 150 mg tablet (Taking/Discontinued) Take 1 Tab by mouth two (2) times a day. amLODIPine (NORVASC) 2.5 mg tablet (Taking) TAKE 1 TABLET BY MOUTH ONCE DAILY. DECREASED DOSE.    warfarin (COUMADIN) 5 mg tablet (Taking) TAKE ONE AND ONE-HALF TABLETS DAILY OR AS DIRECTED    irbesartan (AVAPRO) 75 mg tablet (Taking) TAKE 2 TABLETS NIGHTLY    cholestyramine-aspartame (Cholestyramine Light) 4 gram packet Take 1 Packet by mouth two (2) times a day. 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 Dyslipidemia  Stable. At goal , denies excess muscle aches or new liver issues  Key Antihyperlipidemia Meds             colestipoL (COLESTID) 5 gram packet (Taking) Take 5 g by mouth two (2) times a day. cholestyramine-aspartame (Cholestyramine Light) 4 gram packet Take 1 Packet by mouth two (2) times a day. 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      Future Appointments   Date Time Provider Ezekiel Munoz   6/2/2021 10:40 AM COUMADINPREMA [facility] BS AMB   10/20/2021 10:00 AM ECHO, PREMA [facility] BS AMB   10/20/2021 11:00 AM Chandler Duron MD CAV[facility] BS AMB          Impression:   1. Paroxysmal atrial fibrillation (HCC)    2. IVCD (intraventricular conduction defect)    3. Essential hypertension    4. Dyslipidemia       Cardiac History:   ECHO 9-10-14=  Left ventricle: Systolic function was normal by EF (biplane method of  disks). Ejection fraction was estimated to be 66 %.  There were no regional  wall motion abnormalities. Doppler parameters were consistent with  abnormal left ventricular relaxation (grade 1 diastolic dysfunction). Left atrium: The atrium was mildly to moderately dilated     ROS-except as noted above. . A complete cardiac and respiratory are reviewed and negative except as above ; Resp-denies wheezing  or productive cough,. Const- No unusual weight loss or fever; Neuro-no recent seizure or CVA ; GI- No BRBPR, abdom pain, bloating ; - no  hematuria   see supplement sheet, initialed and to be scanned by staff  Past Medical History:   Diagnosis Date    Advanced care planning/counseling discussion 10/8/2015    Basal cell carcinoma, face     now sees Dr. Sapphire Hsu.  Bladder cancer [facility]) 2012    Dr. Manan Marcus. cyto 9/2014, 2/2016. s/p surgery, BCG irrigation [place]. saw Dr. Lan Nunez BPH with obstruction/lower urinary tract symptoms     Chronic lower back pain     injection in [place]. saw Dr. Dank Grayson Chronic neck pain     limited motion to side    Depression, major     Dupuytren's contracture of left hand     5th finger    Epistaxis 2013    saw ENT at [address] [place] [address] history of skin cancer     Fecal incontinence     with loose stools    Hearing loss     has hearing aids    HTN (hypertension)     Insomnia     IVCD (intraventricular conduction defect) 11/30/2015    Dr Chandra Maloney term current use of anticoagulants with INR goal of 2.0-3.0     Afib    Loose stools 7/18/2014    Paroxysmal atrial fibrillation (HCC)     EF 66%, 1-2+ LAE on flecainide;Dr. Mercedes Sanz. saw Dr. Aylin Cote at [facility] Peripheral neuropathy     RLS (restless legs syndrome) 7/3/2017    SCC (squamous cell carcinoma), face     prior hx, now sees Dr. Gerry Mccarthy Skin cancer     Urothelial carcinoma of right distal ureter ([facility] [address] 5/1/15    excision Dr. Jerome Mclain. low grade, papillary. repeat 3 month      Social Hx= reports that he has quit smoking.  He has never used smokeless tobacco. He reports current alcohol use. He reports that he does not use drugs. Exam and Labs:  /70 (BP 1 Location: Left upper arm, BP Patient Position: Sitting, BP Cuff Size: Adult)   Pulse 76   Ht 5' 9\" (1.753 m)   Wt 232 lb (105.2 kg)   SpO2 96%   BMI 34.26 kg/m² Constitutional:  NAD, comfortable  Head: NC,AT. Eyes: No scleral icterus. Neck:  Neck supple. No JVD present. Throat: moist mucous membranes. Chest: Effort normal & normal respiratory excursion . Neurological: alert, conversant and oriented . Skin: Skin is not cold. No obvious systemic rash noted. Not diaphoretic. No erythema. Psychiatric:  Grossly normal mood and affect. Behavior appears normal. Extremities:  no clubbing or cyanosis. Abdomen: non distended    Lungs:breath sounds normal. No stridor. distress, wheezes or  Rales. Heart: normal rate, regular rhythm, normal S1, S2, no murmurs, rubs, clicks or gallops , PMI non displaced. Edema: Edema is none.   Lab Results   Component Value Date/Time    Cholesterol, total 210 (H) 07/17/2020 10:31 AM    HDL Cholesterol 65 07/17/2020 10:31 AM    LDL, calculated 119.8 (H) 07/17/2020 10:31 AM    Triglyceride 126 07/17/2020 10:31 AM    CHOL/HDL Ratio 3.2 07/17/2020 10:31 AM     Lab Results   Component Value Date/Time    Sodium 142 07/17/2020 10:31 AM    Potassium 3.8 07/17/2020 10:31 AM    Chloride 109 (H) 07/17/2020 10:31 AM    CO2 24 07/17/2020 10:31 AM    Anion gap 9 07/17/2020 10:31 AM    Glucose 95 07/17/2020 10:31 AM    BUN 22 (H) 07/17/2020 10:31 AM    Creatinine 1.41 (H) 07/17/2020 10:31 AM    BUN/Creatinine ratio 16 07/17/2020 10:31 AM    GFR est AA 57 (L) 07/17/2020 10:31 AM    GFR est non-AA 47 (L) 07/17/2020 10:31 AM    Calcium 8.6 07/17/2020 10:31 AM      Wt Readings from Last 3 Encounters:   04/21/21 232 lb (105.2 kg)   01/12/21 223 lb (101.2 kg)   07/17/20 226 lb 2 oz (102.6 kg)      BP Readings from Last 3 Encounters:   04/21/21 110/70   01/12/21 (!) 156/90   07/17/20 122/76      Current Outpatient Medications   Medication Sig    colestipoL (COLESTID) 5 gram packet Take 5 g by mouth two (2) times a day.  tamsulosin HCl (TAMSULOSIN PO) Take  by mouth. ONE TABLET ONCE DAILY. PT DOES NOT KNOW DOSAGE    flecainide (TAMBOCOR) 150 mg tablet Take 1 Tab by mouth two (2) times a day.  gabapentin (NEURONTIN) 300 mg capsule TAKE 1 CAPSULE BY MOUTH DAILY  Indications: neuropathic pain    amLODIPine (NORVASC) 2.5 mg tablet TAKE 1 TABLET BY MOUTH ONCE DAILY. DECREASED DOSE.  finasteride (PROSCAR) 5 mg tablet TAKE 1 TABLET BY MOUTH EVERY DAY    sertraline (ZOLOFT) 100 mg tablet TAKE 1 TABLET DAILY    warfarin (COUMADIN) 5 mg tablet TAKE ONE AND ONE-HALF TABLETS DAILY OR AS DIRECTED    irbesartan (AVAPRO) 75 mg tablet TAKE 2 TABLETS NIGHTLY (Patient taking differently: Take 1 tab in the morning.)    cholestyramine-aspartame (Cholestyramine Light) 4 gram packet Take 1 Packet by mouth two (2) times a day. 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 Impression see above.       Written by Joaquim Souza, as dictated by Twin White MD.

## 2021-04-28 DIAGNOSIS — I48.0 PAROXYSMAL ATRIAL FIBRILLATION (HCC): ICD-10-CM

## 2021-04-30 RX ORDER — FLECAINIDE ACETATE 150 MG/1
150 TABLET ORAL 2 TIMES DAILY
Qty: 180 TAB | Refills: 3 | Status: SHIPPED | OUTPATIENT
Start: 2021-04-30 | End: 2021-11-11

## 2021-05-05 ENCOUNTER — ANTI-COAG VISIT (OUTPATIENT)
Dept: CARDIOLOGY CLINIC | Age: 86
End: 2021-05-05
Payer: MEDICARE

## 2021-05-05 DIAGNOSIS — I48.0 PAROXYSMAL ATRIAL FIBRILLATION (HCC): ICD-10-CM

## 2021-05-05 DIAGNOSIS — Z79.01 LONG TERM CURRENT USE OF ANTICOAGULANTS WITH INR GOAL OF 2.0-3.0: ICD-10-CM

## 2021-05-05 DIAGNOSIS — I10 ESSENTIAL HYPERTENSION: Primary | ICD-10-CM

## 2021-05-05 LAB
INR BLD: 2
INR, EXTERNAL: 2 (ref 2–3)
PT POC: 20.6 SECONDS
VALID INTERNAL CONTROL?: YES

## 2021-05-05 PROCEDURE — 85610 PROTHROMBIN TIME: CPT | Performed by: SPECIALIST

## 2021-05-27 DIAGNOSIS — I48.0 PAROXYSMAL ATRIAL FIBRILLATION (HCC): ICD-10-CM

## 2021-05-28 RX ORDER — WARFARIN SODIUM 5 MG/1
TABLET ORAL
Qty: 144 TABLET | Refills: 3 | Status: SHIPPED | OUTPATIENT
Start: 2021-05-28 | End: 2021-09-15 | Stop reason: ALTCHOICE

## 2021-05-31 DIAGNOSIS — F32.1 MAJOR DEPRESSIVE DISORDER, SINGLE EPISODE, MODERATE (HCC): ICD-10-CM

## 2021-05-31 RX ORDER — SERTRALINE HYDROCHLORIDE 100 MG/1
TABLET, FILM COATED ORAL
Qty: 90 TABLET | Refills: 3 | Status: SHIPPED | OUTPATIENT
Start: 2021-05-31 | End: 2022-01-21 | Stop reason: SDUPTHER

## 2021-05-31 RX ORDER — IRBESARTAN 75 MG/1
TABLET ORAL
Qty: 180 TABLET | Refills: 3 | Status: SHIPPED | OUTPATIENT
Start: 2021-05-31 | End: 2022-01-04

## 2021-07-20 ENCOUNTER — TELEPHONE (OUTPATIENT)
Dept: CARDIOLOGY CLINIC | Age: 86
End: 2021-07-20

## 2021-07-20 NOTE — TELEPHONE ENCOUNTER
Have been trying to reach pt for INR appt,only able to leave message for cell number. Home number not in service,says karen.

## 2021-08-09 ENCOUNTER — ANTI-COAG VISIT (OUTPATIENT)
Dept: CARDIOLOGY CLINIC | Age: 86
End: 2021-08-09
Payer: MEDICARE

## 2021-08-09 DIAGNOSIS — I10 ESSENTIAL HYPERTENSION: Primary | ICD-10-CM

## 2021-08-09 DIAGNOSIS — Z79.01 LONG TERM CURRENT USE OF ANTICOAGULANTS WITH INR GOAL OF 2.0-3.0: ICD-10-CM

## 2021-08-09 DIAGNOSIS — I48.0 PAROXYSMAL ATRIAL FIBRILLATION (HCC): ICD-10-CM

## 2021-08-09 LAB
INR BLD: 2
INR, EXTERNAL: 2 (ref 2–3)
PT POC: 20.4 SECONDS
VALID INTERNAL CONTROL?: YES

## 2021-08-09 PROCEDURE — 85610 PROTHROMBIN TIME: CPT | Performed by: SPECIALIST

## 2021-08-25 ENCOUNTER — TELEPHONE (OUTPATIENT)
Dept: CARDIOLOGY CLINIC | Age: 86
End: 2021-08-25

## 2021-08-25 NOTE — TELEPHONE ENCOUNTER
Dr. Maureen Molina called in regards local anesthesia cardiac clearance for warfrin.     9/8 surgery date    PHONE: [phone]  FAX: [phone]

## 2021-08-27 NOTE — TELEPHONE ENCOUNTER
I have no objection to the planned  surgery. Patient seems to be at a low risk for beatrice-operative severe adverse cardiac events.   Can Hold warfarin for 3 days prior to surgery

## 2021-08-28 ENCOUNTER — HOSPITAL ENCOUNTER (OUTPATIENT)
Age: 86
Setting detail: OBSERVATION
Discharge: NURSING, REHAB OR LONG-TERM CARE | End: 2021-09-02
Attending: EMERGENCY MEDICINE | Admitting: HOSPITALIST
Payer: MEDICARE

## 2021-08-28 ENCOUNTER — APPOINTMENT (OUTPATIENT)
Dept: GENERAL RADIOLOGY | Age: 86
End: 2021-08-28
Attending: EMERGENCY MEDICINE
Payer: MEDICARE

## 2021-08-28 ENCOUNTER — APPOINTMENT (OUTPATIENT)
Dept: CT IMAGING | Age: 86
End: 2021-08-28
Attending: EMERGENCY MEDICINE
Payer: MEDICARE

## 2021-08-28 DIAGNOSIS — M62.82 NON-TRAUMATIC RHABDOMYOLYSIS: ICD-10-CM

## 2021-08-28 DIAGNOSIS — Z79.01 ANTICOAGULATED: ICD-10-CM

## 2021-08-28 DIAGNOSIS — E86.0 DEHYDRATION: ICD-10-CM
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
## 2021-08-28 DIAGNOSIS — S09.90XA INJURY OF HEAD, INITIAL ENCOUNTER: ICD-10-CM

## 2021-08-28 DIAGNOSIS — I48.91 ATRIAL FIBRILLATION, UNSPECIFIED TYPE (HCC): ICD-10-CM

## 2021-08-28 LAB
ALBUMIN SERPL-MCNC: 3.1 G/DL (ref 3.5–5)
ALBUMIN/GLOB SERPL: 0.6 {RATIO} (ref 1.1–2.2)
ALP SERPL-CCNC: 118 U/L (ref 45–117)
ALT SERPL-CCNC: 37 U/L (ref 12–78)
ANION GAP SERPL CALC-SCNC: 8 MMOL/L (ref 5–15)
AST SERPL-CCNC: 62 U/L (ref 15–37)
BASOPHILS # BLD: 0 K/UL (ref 0–0.1)
BASOPHILS NFR BLD: 0 % (ref 0–1)
BILIRUB SERPL-MCNC: 0.7 MG/DL (ref 0.2–1)
BUN SERPL-MCNC: 38 MG/DL (ref 6–20)
BUN/CREAT SERPL: 24 (ref 12–20)
CALCIUM SERPL-MCNC: 8.9 MG/DL (ref 8.5–10.1)
CHLORIDE SERPL-SCNC: 111 MMOL/L (ref 97–108)
CK SERPL-CCNC: 1434 U/L (ref 39–308)
CO2 SERPL-SCNC: 22 MMOL/L (ref 21–32)
COMMENT, HOLDF: NORMAL
CREAT SERPL-MCNC: 1.58 MG/DL (ref 0.7–1.3)
DIFFERENTIAL METHOD BLD: ABNORMAL
EOSINOPHIL # BLD: 0 K/UL (ref 0–0.4)
EOSINOPHIL NFR BLD: 0 % (ref 0–7)
ERYTHROCYTE [DISTWIDTH] IN BLOOD BY AUTOMATED COUNT: 14.1 % (ref 11.5–14.5)
GLOBULIN SER CALC-MCNC: 5 G/DL (ref 2–4)
GLUCOSE BLD STRIP.AUTO-MCNC: 117 MG/DL (ref 65–117)
GLUCOSE SERPL-MCNC: 130 MG/DL (ref 65–100)
HCT VFR BLD AUTO: 46.9 % (ref 36.6–50.3)
HGB BLD-MCNC: 15.8 G/DL (ref 12.1–17)
IMM GRANULOCYTES # BLD AUTO: 0.1 K/UL (ref 0–0.04)
IMM GRANULOCYTES NFR BLD AUTO: 1 % (ref 0–0.5)
INR PPP: 1.7 (ref 0.9–1.1)
LYMPHOCYTES # BLD: 0.7 K/UL (ref 0.8–3.5)
LYMPHOCYTES NFR BLD: 5 % (ref 12–49)
MAGNESIUM SERPL-MCNC: 2.3 MG/DL (ref 1.6–2.4)
MCH RBC QN AUTO: 31.2 PG (ref 26–34)
MCHC RBC AUTO-ENTMCNC: 33.7 G/DL (ref 30–36.5)
MCV RBC AUTO: 92.5 FL (ref 80–99)
MONOCYTES # BLD: 1.3 K/UL (ref 0–1)
MONOCYTES NFR BLD: 9 % (ref 5–13)
NEUTS SEG # BLD: 12.3 K/UL (ref 1.8–8)
NEUTS SEG NFR BLD: 85 % (ref 32–75)
NRBC # BLD: 0 K/UL (ref 0–0.01)
NRBC BLD-RTO: 0 PER 100 WBC
PLATELET # BLD AUTO: 260 K/UL (ref 150–400)
PMV BLD AUTO: 10.3 FL (ref 8.9–12.9)
POTASSIUM SERPL-SCNC: 3.8 MMOL/L (ref 3.5–5.1)
PROCALCITONIN SERPL-MCNC: 0.28 NG/ML
PROT SERPL-MCNC: 8.1 G/DL (ref 6.4–8.2)
PROTHROMBIN TIME: 17 SEC (ref 9–11.1)
RBC # BLD AUTO: 5.07 M/UL (ref 4.1–5.7)
RBC MORPH BLD: ABNORMAL
SAMPLES BEING HELD,HOLD: NORMAL
SERVICE CMNT-IMP: NORMAL
SODIUM SERPL-SCNC: 141 MMOL/L (ref 136–145)
TROPONIN I SERPL-MCNC: <0.05 NG/ML
WBC # BLD AUTO: 14.4 K/UL (ref 4.1–11.1)

## 2021-08-28 PROCEDURE — 84484 ASSAY OF TROPONIN QUANT: CPT

## 2021-08-28 PROCEDURE — 99284 EMERGENCY DEPT VISIT MOD MDM: CPT

## 2021-08-28 PROCEDURE — G0378 HOSPITAL OBSERVATION PER HR: HCPCS

## 2021-08-28 PROCEDURE — 74011000258 HC RX REV CODE- 258: Performed by: HOSPITALIST

## 2021-08-28 PROCEDURE — 96361 HYDRATE IV INFUSION ADD-ON: CPT

## 2021-08-28 PROCEDURE — 71045 X-RAY EXAM CHEST 1 VIEW: CPT

## 2021-08-28 PROCEDURE — 70450 CT HEAD/BRAIN W/O DYE: CPT

## 2021-08-28 PROCEDURE — 85025 COMPLETE CBC W/AUTO DIFF WBC: CPT

## 2021-08-28 PROCEDURE — 82962 GLUCOSE BLOOD TEST: CPT

## 2021-08-28 PROCEDURE — 85610 PROTHROMBIN TIME: CPT

## 2021-08-28 PROCEDURE — 82550 ASSAY OF CK (CPK): CPT

## 2021-08-28 PROCEDURE — 96374 THER/PROPH/DIAG INJ IV PUSH: CPT

## 2021-08-28 PROCEDURE — 36415 COLL VENOUS BLD VENIPUNCTURE: CPT

## 2021-08-28 PROCEDURE — 84145 PROCALCITONIN (PCT): CPT

## 2021-08-28 PROCEDURE — 65270000029 HC RM PRIVATE

## 2021-08-28 PROCEDURE — 83735 ASSAY OF MAGNESIUM: CPT

## 2021-08-28 PROCEDURE — 81001 URINALYSIS AUTO W/SCOPE: CPT

## 2021-08-28 PROCEDURE — 93005 ELECTROCARDIOGRAM TRACING: CPT

## 2021-08-28 PROCEDURE — 96360 HYDRATION IV INFUSION INIT: CPT

## 2021-08-28 PROCEDURE — 87086 URINE CULTURE/COLONY COUNT: CPT

## 2021-08-28 PROCEDURE — 87040 BLOOD CULTURE FOR BACTERIA: CPT

## 2021-08-28 PROCEDURE — 74011250636 HC RX REV CODE- 250/636: Performed by: HOSPITALIST

## 2021-08-28 PROCEDURE — 74011250636 HC RX REV CODE- 250/636: Performed by: EMERGENCY MEDICINE

## 2021-08-28 PROCEDURE — 87635 SARS-COV-2 COVID-19 AMP PRB: CPT

## 2021-08-28 PROCEDURE — 72125 CT NECK SPINE W/O DYE: CPT

## 2021-08-28 PROCEDURE — 80053 COMPREHEN METABOLIC PANEL: CPT

## 2021-08-28 RX ORDER — SERTRALINE HYDROCHLORIDE 50 MG/1
100 TABLET, FILM COATED ORAL DAILY
Status: DISCONTINUED | OUTPATIENT
Start: 2021-08-29 | End: 2021-09-02 | Stop reason: HOSPADM

## 2021-08-28 RX ORDER — AMLODIPINE BESYLATE 5 MG/1
2.5 TABLET ORAL DAILY
Status: DISCONTINUED | OUTPATIENT
Start: 2021-08-29 | End: 2021-08-29

## 2021-08-28 RX ORDER — CHOLESTYRAMINE 4 G/4.8G
4 POWDER, FOR SUSPENSION ORAL 2 TIMES DAILY
Status: DISCONTINUED | OUTPATIENT
Start: 2021-08-29 | End: 2021-09-02 | Stop reason: HOSPADM

## 2021-08-28 RX ORDER — ACETAMINOPHEN 325 MG/1
650 TABLET ORAL
Status: DISCONTINUED | OUTPATIENT
Start: 2021-08-28 | End: 2021-09-02 | Stop reason: HOSPADM

## 2021-08-28 RX ORDER — SODIUM CHLORIDE 0.9 % (FLUSH) 0.9 %
5-40 SYRINGE (ML) INJECTION AS NEEDED
Status: DISCONTINUED | OUTPATIENT
Start: 2021-08-28 | End: 2021-09-02 | Stop reason: HOSPADM

## 2021-08-28 RX ORDER — ONDANSETRON 2 MG/ML
4 INJECTION INTRAMUSCULAR; INTRAVENOUS
Status: DISCONTINUED | OUTPATIENT
Start: 2021-08-28 | End: 2021-09-02 | Stop reason: HOSPADM

## 2021-08-28 RX ORDER — SODIUM CHLORIDE 0.9 % (FLUSH) 0.9 %
5-40 SYRINGE (ML) INJECTION EVERY 8 HOURS
Status: DISCONTINUED | OUTPATIENT
Start: 2021-08-28 | End: 2021-09-02 | Stop reason: HOSPADM

## 2021-08-28 RX ORDER — TAMSULOSIN HYDROCHLORIDE 0.4 MG/1
0.4 CAPSULE ORAL DAILY
Status: DISCONTINUED | OUTPATIENT
Start: 2021-08-29 | End: 2021-09-02 | Stop reason: HOSPADM

## 2021-08-28 RX ORDER — SODIUM CHLORIDE 9 MG/ML
100 INJECTION, SOLUTION INTRAVENOUS CONTINUOUS
Status: DISCONTINUED | OUTPATIENT
Start: 2021-08-28 | End: 2021-09-01

## 2021-08-28 RX ORDER — POLYETHYLENE GLYCOL 3350 17 G/17G
17 POWDER, FOR SOLUTION ORAL DAILY PRN
Status: DISCONTINUED | OUTPATIENT
Start: 2021-08-28 | End: 2021-09-02 | Stop reason: HOSPADM

## 2021-08-28 RX ORDER — ONDANSETRON 4 MG/1
4 TABLET, ORALLY DISINTEGRATING ORAL
Status: DISCONTINUED | OUTPATIENT
Start: 2021-08-28 | End: 2021-09-02 | Stop reason: HOSPADM

## 2021-08-28 RX ORDER — FINASTERIDE 5 MG/1
5 TABLET, FILM COATED ORAL DAILY
Status: DISCONTINUED | OUTPATIENT
Start: 2021-08-29 | End: 2021-09-02 | Stop reason: HOSPADM

## 2021-08-28 RX ORDER — ACETAMINOPHEN 650 MG/1
650 SUPPOSITORY RECTAL
Status: DISCONTINUED | OUTPATIENT
Start: 2021-08-28 | End: 2021-09-02 | Stop reason: HOSPADM

## 2021-08-28 RX ADMIN — SODIUM CHLORIDE 1000 ML: 9 INJECTION, SOLUTION INTRAVENOUS at 20:02

## 2021-08-28 RX ADMIN — SODIUM CHLORIDE 100 ML/HR: 9 INJECTION, SOLUTION INTRAVENOUS at 23:44

## 2021-08-28 RX ADMIN — CEFTRIAXONE SODIUM 1 G: 1 INJECTION, POWDER, FOR SOLUTION INTRAMUSCULAR; INTRAVENOUS at 23:45

## 2021-08-28 RX ADMIN — Medication 10 ML: at 23:45

## 2021-08-28 NOTE — ED PROVIDER NOTES
77-year-old male presents from his senior living apartment via EMS after suspected ground-level fall. Patient had not been heard from for 2 or 3 days before the son became concerned and entered the apartment this evening. I found the patient on the floor. The suspicion is that he may have fallen 2 or 3 days ago and even on the floor ever since. Patient slightly confused although was found awake and alert. He has got evidence of head injury and some skin tears mildly tremulous. No evidence of any vomiting. Patient was incontinent of urine. Past Medical History:   Diagnosis Date    Advanced care planning/counseling discussion 10/8/2015    Basal cell carcinoma, face     now sees Dr. Marcio Delacruz.  Bladder cancer [facility]) 2012    Dr. Magda Medellin. cyto 9/2014, 2/2016. s/p surgery, BCG irrigation [place]. saw Dr. Jonelle Gregory BPH with obstruction/lower urinary tract symptoms     Chronic lower back pain     injection in [place]. saw Dr. Juan Carlos Cardona Chronic neck pain     limited motion to side    Depression, major     Dupuytren's contracture of left hand     5th finger    Epistaxis 2013    saw ENT at [address] [place] [address] history of skin cancer     Fecal incontinence     with loose stools    Hearing loss     has hearing aids    HTN (hypertension)     Insomnia     IVCD (intraventricular conduction defect) 11/30/2015    Dr Kent Pay term current use of anticoagulants with INR goal of 2.0-3.0     Afib    Loose stools 7/18/2014    Paroxysmal atrial fibrillation (HCC)     EF 66%, 1-2+ LAE on flecainide;Dr. Abbey Velásquez. saw Dr. Mcdonald [facility] Peripheral neuropathy     RLS (restless legs syndrome) 7/3/2017    SCC (squamous cell carcinoma), face     prior hx, now sees Dr. Sully Perez Skin cancer     Urothelial carcinoma of right distal ureter ([facility] [address] 5/1/15    excision Dr. Shubham Blake. low grade, papillary.  repeat 3 month       Past Surgical History:   Procedure Laterality Date    HX BLADDER REPAIR  2012    cancer  HX COLONOSCOPY  2012    polyps. repeat 2017?  HX HERNIA REPAIR      periumibical    HX MALIGNANT SKIN LESION EXCISION  06/14/2018    basal cell left lip    HX RETINAL DETACHMENT REPAIR Right     PA CYSTOURETHROSCOPY  9/2014    Dr. Juany Mcfarland [place] CYSTOURETHROSCOPY  5/1/15    low grade right pap urothelial cancer    PA CYSTOURETHROSCOPY  2/29/16         No family history on file. Social History     Socioeconomic History    Marital status:      Spouse name: Not on file    Number of children: Not on file    Years of education: Not on file    Highest education level: Not on file   Occupational History    Not on file   Tobacco Use    Smoking status: Former Smoker    Smokeless tobacco: Never Used    Tobacco comment: socially   Substance and Sexual Activity    Alcohol use: Yes     Comment: occ    Drug use: Never    Sexual activity: Not Currently   Other Topics Concern    Not on file   Social History Narrative    Not on file     Social Determinants of Health     Financial Resource Strain:     Difficulty of Paying Living Expenses:    Food Insecurity:     Worried About Running Out of Food in the Last Year:     [address] in the Last Year:    Transportation Needs:     Lack of Transportation (Medical):  Lack of Transportation (Non-Medical):    Physical Activity:     Days of Exercise per Week:     Minutes of Exercise per Session:    Stress:     Feeling of Stress :    Social Connections:     Frequency of Communication with Friends and Family:     Frequency of Social Gatherings with Friends and Family:     Attends [employer] Services:     Active Member of Clubs or Organizations:     Attends Club or Organization Meetings:     Marital Status:    Intimate Partner Violence:     Fear of Current or Ex-Partner:     Emotionally Abused:     Physically Abused:     Sexually Abused:           ALLERGIES: Hay fever and allergy relief and Trazodone    Review of Systems   Unable to perform ROS: Mental status change       There were no vitals filed for this visit. Physical Exam  Vitals and nursing note reviewed. Constitutional:       General: He is not in acute distress. Appearance: He is well-developed. HENT:      Head: Normocephalic. Comments: Contusion and swelling to the right side of his head and right periorbital area. Eyes:      Pupils: Pupils are equal, round, and reactive to light. Cardiovascular:      Rate and Rhythm: Normal rate. Rhythm irregular. Pulmonary:      Effort: Pulmonary effort is normal. No respiratory distress. Abdominal:      General: There is no distension. Musculoskeletal:         General: Normal range of motion. Cervical back: Normal range of motion and neck supple. Skin:     General: Skin is warm and dry. Neurological:      Mental Status: He is alert and oriented to person, place, and time. MDM  Number of Diagnoses or Management Options     Amount and/or Complexity of Data Reviewed  Clinical lab tests: reviewed  Tests in the radiology section of CPT®: reviewed  Tests in the medicine section of CPT®: reviewed      ED Course as of Aug 28 2113   Sat Aug 28, 2021   2019 ED EKG interpretation:  Rhythm: a. fib. Rate (approx.): 72. Axis: normal.  ST segment:  No concerning ST elevations or depressions. This EKG was interpreted by Andreina Casiano MD,ED Provider. EKG, 12 LEAD, INITIAL [JM]      ED Course User Index  [JM] Yasir Casanova MD     Perfect Serve Consult for Admission  9:14 PM    ED Room Number: ER15/15  Patient Name and age:  Williams Terry. 80 y.o.  male  Working Diagnosis:   1. Fall, initial encounter    2. Injury of head, initial encounter    3. Non-traumatic rhabdomyolysis    4.  Dehydration        COVID-19 Suspicion:  no  Sepsis present:  no  Reassessment needed: no  Code Status:  Full Code  Readmission: no  Isolation Requirements:  no  Recommended Level of Care:  telemetry  Department:  [facility] Adult ED - [phone]    Other:  Had fall 2-3 days ago. On floor of apartment since then. CPK =1400, BUN/Cr=38/1. 58. CT head and c spine unremarkable. Total critical care time spent exclusive of procedures:  35 minutes.     Procedures

## 2021-08-28 NOTE — ED NOTES
Pt arrived via EMS from home. Pt was found on the floor of apartment, last in contact with someone 48 hours ago. Per EMS there was feces all over the apartment. Pt has multiple abrasions.

## 2021-08-29 ENCOUNTER — APPOINTMENT (OUTPATIENT)
Dept: VASCULAR SURGERY | Age: 86
End: 2021-08-29
Attending: INTERNAL MEDICINE
Payer: MEDICARE

## 2021-08-29 ENCOUNTER — HOSPITAL ENCOUNTER (OUTPATIENT)
Dept: GENERAL RADIOLOGY | Age: 86
Setting detail: OBSERVATION
Discharge: HOME | End: 2021-08-29
Attending: INTERNAL MEDICINE
Payer: MEDICARE

## 2021-08-29 LAB
ANION GAP SERPL CALC-SCNC: 7 MMOL/L (ref 5–15)
APPEARANCE UR: ABNORMAL
BACTERIA URNS QL MICRO: ABNORMAL /HPF
BASOPHILS # BLD: 0 K/UL (ref 0–0.1)
BASOPHILS NFR BLD: 0 % (ref 0–1)
BILIRUB UR QL: NEGATIVE
BUN SERPL-MCNC: 39 MG/DL (ref 6–20)
BUN/CREAT SERPL: 25 (ref 12–20)
CALCIUM SERPL-MCNC: 8.2 MG/DL (ref 8.5–10.1)
CALCULATED R AXIS, ECG10: 1 DEGREES
CALCULATED T AXIS, ECG11: 60 DEGREES
CHLORIDE SERPL-SCNC: 111 MMOL/L (ref 97–108)
CK SERPL-CCNC: 1231 U/L (ref 39–308)
CO2 SERPL-SCNC: 24 MMOL/L (ref 21–32)
COLOR UR: ABNORMAL
COMMENT, HOLDF: NORMAL
COVID-19 RAPID TEST, COVR: NOT DETECTED
CREAT SERPL-MCNC: 1.55 MG/DL (ref 0.7–1.3)
DIAGNOSIS, 93000: NORMAL
DIFFERENTIAL METHOD BLD: ABNORMAL
EOSINOPHIL # BLD: 0 K/UL (ref 0–0.4)
EOSINOPHIL NFR BLD: 0 % (ref 0–7)
EPITH CASTS URNS QL MICRO: ABNORMAL /LPF
ERYTHROCYTE [DISTWIDTH] IN BLOOD BY AUTOMATED COUNT: 14 % (ref 11.5–14.5)
GLUCOSE SERPL-MCNC: 108 MG/DL (ref 65–100)
GLUCOSE UR STRIP.AUTO-MCNC: 500 MG/DL
HCT VFR BLD AUTO: 45.4 % (ref 36.6–50.3)
HGB BLD-MCNC: 14.5 G/DL (ref 12.1–17)
HGB UR QL STRIP: ABNORMAL
HYALINE CASTS URNS QL MICRO: >20 /LPF (ref 0–5)
IMM GRANULOCYTES # BLD AUTO: 0.1 K/UL (ref 0–0.04)
IMM GRANULOCYTES NFR BLD AUTO: 1 % (ref 0–0.5)
KETONES UR QL STRIP.AUTO: 15 MG/DL
LEFT CCA DIST DIAS: 10.1 CM/S
LEFT CCA DIST SYS: 58.9 CM/S
LEFT CCA PROX DIAS: 10.1 CM/S
LEFT CCA PROX SYS: 59.8 CM/S
LEFT ECA DIAS: 6.6 CM/S
LEFT ECA SYS: 77.2 CM/S
LEFT ICA DIST DIAS: 15.3 CM/S
LEFT ICA DIST SYS: 71.1 CM/S
LEFT ICA MID DIAS: 14.5 CM/S
LEFT ICA MID SYS: 56.3 CM/S
LEFT ICA PROX DIAS: 8.4 CM/S
LEFT ICA PROX SYS: 44.1 CM/S
LEFT ICA/CCA SYS: 1.2
LEFT VERTEBRAL DIAS: 6.3 CM/S
LEFT VERTEBRAL SYS: 36.1 CM/S
LEUKOCYTE ESTERASE UR QL STRIP.AUTO: NEGATIVE
LYMPHOCYTES # BLD: 1 K/UL (ref 0.8–3.5)
LYMPHOCYTES NFR BLD: 8 % (ref 12–49)
MAGNESIUM SERPL-MCNC: 2.3 MG/DL (ref 1.6–2.4)
MCH RBC QN AUTO: 30.6 PG (ref 26–34)
MCHC RBC AUTO-ENTMCNC: 31.9 G/DL (ref 30–36.5)
MCV RBC AUTO: 95.8 FL (ref 80–99)
MONOCYTES # BLD: 1.5 K/UL (ref 0–1)
MONOCYTES NFR BLD: 12 % (ref 5–13)
NEUTS SEG # BLD: 10.3 K/UL (ref 1.8–8)
NEUTS SEG NFR BLD: 79 % (ref 32–75)
NITRITE UR QL STRIP.AUTO: NEGATIVE
NRBC # BLD: 0 K/UL (ref 0–0.01)
NRBC BLD-RTO: 0 PER 100 WBC
PH UR STRIP: 5 [PH] (ref 5–8)
PLATELET # BLD AUTO: 250 K/UL (ref 150–400)
PMV BLD AUTO: 11.3 FL (ref 8.9–12.9)
POTASSIUM SERPL-SCNC: 3.6 MMOL/L (ref 3.5–5.1)
PROT UR STRIP-MCNC: 300 MG/DL
Q-T INTERVAL, ECG07: 428 MS
QRS DURATION, ECG06: 106 MS
QTC CALCULATION (BEZET), ECG08: 475 MS
RBC # BLD AUTO: 4.74 M/UL (ref 4.1–5.7)
RBC #/AREA URNS HPF: ABNORMAL /HPF (ref 0–5)
RIGHT CCA DIST DIAS: 13.8 CM/S
RIGHT CCA DIST SYS: 60 CM/S
RIGHT CCA PROX DIAS: 10.6 CM/S
RIGHT CCA PROX SYS: 54.5 CM/S
RIGHT ECA DIAS: 10.5 CM/S
RIGHT ECA SYS: 72.1 CM/S
RIGHT ICA DIST DIAS: 16.2 CM/S
RIGHT ICA DIST SYS: 71.2 CM/S
RIGHT ICA MID DIAS: 11 CM/S
RIGHT ICA MID SYS: 54.6 CM/S
RIGHT ICA PROX DIAS: 7.5 CM/S
RIGHT ICA PROX SYS: 37.2 CM/S
RIGHT ICA/CCA SYS: 1.2
RIGHT VERTEBRAL DIAS: 9.2 CM/S
RIGHT VERTEBRAL SYS: 44.1 CM/S
SAMPLES BEING HELD,HOLD: NORMAL
SODIUM SERPL-SCNC: 142 MMOL/L (ref 136–145)
SOURCE, COVRS: NORMAL
SP GR UR REFRACTOMETRY: 1.03 (ref 1–1.03)
UA: UC IF INDICATED,UAUC: ABNORMAL
UROBILINOGEN UR QL STRIP.AUTO: 1 EU/DL (ref 0.2–1)
VENTRICULAR RATE, ECG03: 74 BPM
WBC # BLD AUTO: 12.9 K/UL (ref 4.1–11.1)
WBC URNS QL MICRO: ABNORMAL /HPF (ref 0–4)

## 2021-08-29 PROCEDURE — 65270000029 HC RM PRIVATE

## 2021-08-29 PROCEDURE — 80048 BASIC METABOLIC PNL TOTAL CA: CPT

## 2021-08-29 PROCEDURE — 82550 ASSAY OF CK (CPK): CPT

## 2021-08-29 PROCEDURE — 73562 X-RAY EXAM OF KNEE 3: CPT

## 2021-08-29 PROCEDURE — 74011000258 HC RX REV CODE- 258: Performed by: HOSPITALIST

## 2021-08-29 PROCEDURE — 97530 THERAPEUTIC ACTIVITIES: CPT

## 2021-08-29 PROCEDURE — 51798 US URINE CAPACITY MEASURE: CPT

## 2021-08-29 PROCEDURE — 93880 EXTRACRANIAL BILAT STUDY: CPT

## 2021-08-29 PROCEDURE — G0378 HOSPITAL OBSERVATION PER HR: HCPCS

## 2021-08-29 PROCEDURE — 96376 TX/PRO/DX INJ SAME DRUG ADON: CPT

## 2021-08-29 PROCEDURE — 36415 COLL VENOUS BLD VENIPUNCTURE: CPT

## 2021-08-29 PROCEDURE — 74011250637 HC RX REV CODE- 250/637: Performed by: HOSPITALIST

## 2021-08-29 PROCEDURE — 74011250636 HC RX REV CODE- 250/636: Performed by: HOSPITALIST

## 2021-08-29 PROCEDURE — 97165 OT EVAL LOW COMPLEX 30 MIN: CPT

## 2021-08-29 PROCEDURE — 85025 COMPLETE CBC W/AUTO DIFF WBC: CPT

## 2021-08-29 PROCEDURE — 97161 PT EVAL LOW COMPLEX 20 MIN: CPT

## 2021-08-29 PROCEDURE — 74011250637 HC RX REV CODE- 250/637: Performed by: INTERNAL MEDICINE

## 2021-08-29 PROCEDURE — 83735 ASSAY OF MAGNESIUM: CPT

## 2021-08-29 RX ORDER — HYDRALAZINE HYDROCHLORIDE 20 MG/ML
10 INJECTION INTRAMUSCULAR; INTRAVENOUS
Status: DISCONTINUED | OUTPATIENT
Start: 2021-08-29 | End: 2021-09-02 | Stop reason: HOSPADM

## 2021-08-29 RX ORDER — AMLODIPINE BESYLATE 5 MG/1
5 TABLET ORAL DAILY
Status: DISCONTINUED | OUTPATIENT
Start: 2021-08-29 | End: 2021-08-30

## 2021-08-29 RX ORDER — WARFARIN 7.5 MG/1
7.5 TABLET ORAL ONCE
Status: COMPLETED | OUTPATIENT
Start: 2021-08-29 | End: 2021-08-29

## 2021-08-29 RX ADMIN — CEFTRIAXONE SODIUM 1 G: 1 INJECTION, POWDER, FOR SOLUTION INTRAMUSCULAR; INTRAVENOUS at 22:16

## 2021-08-29 RX ADMIN — FLECAINIDE ACETATE 150 MG: 100 TABLET ORAL at 08:44

## 2021-08-29 RX ADMIN — CHOLESTYRAMINE 4 G: 4 POWDER, FOR SUSPENSION ORAL at 13:04

## 2021-08-29 RX ADMIN — SERTRALINE 100 MG: 50 TABLET, FILM COATED ORAL at 08:44

## 2021-08-29 RX ADMIN — TAMSULOSIN HYDROCHLORIDE 0.4 MG: 0.4 CAPSULE ORAL at 08:44

## 2021-08-29 RX ADMIN — CHOLESTYRAMINE 4 G: 4 POWDER, FOR SUSPENSION ORAL at 22:25

## 2021-08-29 RX ADMIN — AMLODIPINE BESYLATE 5 MG: 5 TABLET ORAL at 08:44

## 2021-08-29 RX ADMIN — SODIUM CHLORIDE 100 ML/HR: 9 INJECTION, SOLUTION INTRAVENOUS at 13:04

## 2021-08-29 RX ADMIN — FLECAINIDE ACETATE 150 MG: 100 TABLET ORAL at 18:23

## 2021-08-29 RX ADMIN — WARFARIN SODIUM 7.5 MG: 7.5 TABLET ORAL at 18:23

## 2021-08-29 RX ADMIN — FINASTERIDE 5 MG: 5 TABLET, FILM COATED ORAL at 08:44

## 2021-08-29 RX ADMIN — Medication 10 ML: at 22:24

## 2021-08-29 NOTE — PROGRESS NOTES
Problem: Self Care Deficits Care Plan (Adult)  Goal: *Acute Goals and Plan of Care (Insert Text)  Description:   FUNCTIONAL STATUS PRIOR TO ADMISSION: Patient was independent and active without use of DME. Pt lives at independent living facility    HOME SUPPORT: The patient lived alone and reports wife lives at nearby SNF. Occupational Therapy Goals  Initiated 8/29/2021  1. Patient will perform bathing with minimal assistance/contact guard assist within 7 day(s). 2.  Patient will perform upper body dressing with supervision/set-up within 7 day(s). 3.  Patient will perform lower body dressing with moderate assistance and AE PRN within 7 day(s). 4.  Patient will perform toilet transfers with minimal assistance/contact guard assist within 7 day(s). 5.  Patient will perform all aspects of toileting with moderate assistance  within 7 day(s). Outcome: Not Met     OCCUPATIONAL THERAPY EVALUATION  Patient: Rogerio Cornejo (80 y.o. male)  Date: 8/29/2021  Primary Diagnosis: Rhabdomyolysis [M62.82]        Precautions:   Fall    ASSESSMENT  Based on the objective data described below, the patient presents with decreased endurance, balance, strength, and ability to perform to ADLs. Pt agreeable to therapy session but endorsing fatigue following am PT session. Pt required overall mod A supine>sit EOB and tolerate sitting EOB with SBA for approx 15 minutes to complete grooming task. Pt tangential at times and required redirection to task. Pt politely declining stand at this time 2/2 pain in [place] knees. Pt able to scoot up in bed with several scoots with almost full sit>stand to complete with overall min A. Pt required min A for LE management EOB>supine. Anticipate IPR vs. SNF at this time pending progress and to maximize functional independence.     Current Level of Function Impacting Discharge (ADLs/self-care): max A LB ADLs and toileting- inferred from bed mobility and inability to perform anterior reach.    Functional Outcome Measure: The patient scored 15/100 on the Barthel Index outcome measure which is indicative of 85% impairment. Other factors to consider for discharge: independent plof     Patient will benefit from skilled therapy intervention to address the above noted impairments. PLAN :  Recommendations and Planned Interventions: self care training, functional mobility training, therapeutic exercise, balance training, therapeutic activities, endurance activities, patient education, home safety training and family training/education    Frequency/Duration: Patient will be followed by occupational therapy 5 times a week to address goals. Recommendation for discharge: (in order for the patient to meet his/her long term goals)  Therapy 3 hours per day 5-7 days per week    This discharge recommendation:  Has not yet been discussed the attending provider and/or case management    IF patient discharges home will need the following DME: TBD       SUBJECTIVE:   Patient stated How long do you think I'll be here? Katie Alvarez    OBJECTIVE DATA SUMMARY:   HISTORY:   Past Medical History:   Diagnosis Date    Advanced care planning/counseling discussion 10/8/2015    Basal cell carcinoma, face     now sees Dr. Pal Germain.  Bladder cancer [facility]) 2012    Dr. Yung Vergara. cyto 9/2014, 2/2016. s/p surgery, BCG irrigation [place]. saw Dr. Wang Lira BPH with obstruction/lower urinary tract symptoms     Chronic lower back pain     injection in [place].   saw Dr. Klaudia Bourgeois Chronic neck pain     limited motion to side    Depression, major     Dupuytren's contracture of left hand     5th finger    Epistaxis 2013    saw ENT at [address] [place] [address] history of skin cancer     Fecal incontinence     with loose stools    Hearing loss     has hearing aids    HTN (hypertension)     Insomnia     IVCD (intraventricular conduction defect) 11/30/2015    Dr Monica Weldon term current use of anticoagulants with INR goal of 2.0-3.0 Afib    Loose stools 7/18/2014    Paroxysmal atrial fibrillation (HCC)     EF 66%, 1-2+ LAE on flecainide;Dr. Duron. saw Dr. Jill Nichols at [facility] Peripheral neuropathy     RLS (restless legs syndrome) 7/3/2017    SCC (squamous cell carcinoma), face     prior hx, now sees Dr. Rj Scott Skin cancer     Urothelial carcinoma of right distal ureter ([address] 5/1/15    excision Dr. Litzy Chen. low grade, papillary. repeat 3 month     Past Surgical History:   Procedure Laterality Date    HX BLADDER REPAIR  2012    cancer    HX COLONOSCOPY  2012    polyps. repeat 2017?  HX HERNIA REPAIR      periumibical    HX MALIGNANT SKIN LESION EXCISION  06/14/2018    basal cell left lip    HX RETINAL DETACHMENT REPAIR Right     [place] CYSTOURETHROSCOPY  9/2014    Dr. Dmitri Turcios [place] CYSTOURETHROSCOPY  5/1/15    low grade right pap urothelial cancer    [place] CYSTOURETHROSCOPY  2/29/16       Expanded or extensive additional review of patient history:     Home Situation  Home Environment: Apartment  # Steps to Enter: 0  One/Two Story Residence: One story  Living Alone: Yes  Support Systems: Family member(s)  Current DME Used/Available at Home: Walker, rolling    Hand dominance: Right    EXAMINATION OF PERFORMANCE DEFICITS:  Cognitive/Behavioral Status:  Neurologic State: Confused  Orientation Level: Oriented to person;Oriented to place; Disoriented to time;Oriented to situation  Cognition: No command following; Impaired decision making             Skin: visible skin intact    Edema: none noted    Hearing:       Vision/Perceptual:                                     Range of Motion:    AROM: Generally decreased, functional  PROM: Generally decreased, functional                      Strength:    Strength: Grossly decreased, non-functional                Coordination:  Coordination: Generally decreased, functional  Fine Motor Skills-Upper: Left Intact; Right Intact    Gross Motor Skills-Upper: Left Intact; Right Intact    Tone & Sensation:                              Balance:  Sitting: Impaired; Without support  Sitting - Static: Fair (occasional)  Sitting - Dynamic: Fair (occasional)    Functional Mobility and Transfers for ADLs:  Bed Mobility:  Supine to Sit: Moderate assistance  Sit to Supine: Moderate assistance  Scooting: Minimum assistance    Transfers:       ADL Assessment:  Feeding: Setup    Oral Facial Hygiene/Grooming: Setup    Bathing: Maximum assistance    Upper Body Dressing: Moderate assistance    Lower Body Dressing: Total assistance    Toileting: Maximal assistance                ADL Intervention and task modifications:                                          Therapeutic Exercise:     Functional Measure:  Barthel Index:    Bathin  Bladder: 5  Bowels: 5  Groomin  Dressin  Feedin  Mobility: 0  Stairs: 0  Toilet Use: 0  Transfer (Bed to Chair and Back): 5  Total: 15/100        The Barthel ADL Index: Guidelines  1. The index should be used as a record of what a patient does, not as a record of what a patient could do. 2. The main aim is to establish degree of independence from any help, physical or verbal, however minor and for whatever reason. 3. The need for supervision renders the patient not independent. 4. A patient's performance should be established using the best available evidence. Asking the patient, friends/relatives and nurses are the usual sources, but direct observation and common sense are also important. However direct testing is not needed. 5. Usually the patient's performance over the preceding 24-48 hours is important, but occasionally longer periods will be relevant. 6. Middle categories imply that the patient supplies over 50 per cent of the effort. 7. Use of aids to be independent is allowed. Guicho Fowler., Barthel, D.W. ([id number]). Functional evaluation: the Barthel Index. [facility] (14)2. CONCHIS Law, Nicholas Diez., Dajordan Crete., Niurka Roberto, [address] [facility] ().  Measuring the change indisability after inpatient rehabilitation; comparison of the responsiveness of the Barthel Index and Functional [place] Measure. Journal of Neurology, Neurosurgery, and Psychiatry, 66(4), 529-500. ROSALINO Leonard, ROSEMARIE Mercer, & Maribell Amador M.A. (2004.) Assessment of post-stroke quality of life in cost-effectiveness studies: The usefulness of the Barthel Index and the EuroQoL-5D. Quality of Life Research, 15, 125-53       Occupational Therapy Evaluation Charge Determination   History Examination Decision-Making   LOW Complexity : Brief history review  LOW Complexity : 1-3 performance deficits relating to physical, cognitive , or psychosocial skils that result in activity limitations and / or participation restrictions  MEDIUM Complexity : Patient may present with comorbidities that affect occupational performnce. Miniml to moderate modification of tasks or assistance (eg, physical or verbal ) with assesment(s) is necessary to enable patient to complete evaluation       Based on the above components, the patient evaluation is determined to be of the following complexity level: LOW   Pain Ratin/10 in knees    Activity Tolerance:   Good and requires rest breaks    After treatment patient left in no apparent distress:    Supine in bed, Heels elevated for pressure relief, Call bell within reach, Bed / chair alarm activated and Side rails x 3    COMMUNICATION/EDUCATION:   The patients plan of care was discussed with: Registered nurse. Home safety education was provided and the patient/caregiver indicated understanding., Patient/family have participated as able in goal setting and plan of care. and Patient/family agree to work toward stated goals and plan of care. This patients plan of care is appropriate for delegation to [facility].     Thank you for this referral.  Caridad Found  Time Calculation: 40 mins

## 2021-08-29 NOTE — PROGRESS NOTES
Hospitalist Progress Note      Hospital summary: 80 y.o man with BPH, afib, bladder cancer, HTN, who presents after being found down. His son didn't hear from him for 2 days, so he checked on him and he was on the floor. EMS noted feces around the apartment. The patient is a poor historian and doesn't know why he's here. He thinks he fell but is unsure. He denies pain 8/28/2021      Assessment/Plan:  Syncope  Hx falls   AMS/encephaopathy: improved  - unclear etiology. CVA vs cardiac etiology vs Orthostatics  - CT head: Right periorbital soft tissue swelling. No acute intracranial abnormality  - CT neck: Multilevel spondylosis without acute abnormality  - MRI brain and MRA head ordered  - orthostatics and Echo ordered  - PT/OT      Rhabdomyolysis: improving   - due to fall/being down  -monitor with IV fluids    CR on CKD stage 3  -cr 1.58, baseline 1.4  - on IVF  - will monitor renal function      Leukocytosis: improving   possible infection vs dehydration  - CXR: airspace disease   - UA : large blood, 1+ bacteria   - blood cultures pending. Urine cx ordered   - on IV ceftriaxone      Paroxysmal afib:  S/p AICD   -continue flecainide  -consult pharmacy for coumadin dosing, however, he should have a risk/benefit assessment of anticoagulation prior to discharge    HTN - BP elevated. Increased amlodipine. Hold ARB due to CR      History of bladder cancer - c/w flomax, proscar  Depression - zoloft     Code status: Full  DVT prophylaxis: warfarin   Disposition: TBD  ----------------------------------------------    CC: Fall     S: patient is seen and examined at bedside this AM. He is alert and oriented x 3, slow to answer. He does not remember the fall but knows he was on floor for 3 days.  C/w knee pain- XRs ordered  Discussed with nursing    Spoke with son Yissel Fields [phone] and daughter in law on phone - updated patient's status - Mentation improved, unclear etiology for syncope - MRI brain today, echo ordered. CK improving     Review of Systems:  A comprehensive review of systems was negative. O:  Visit Vitals  BP (!) 168/90   Pulse 68   Temp 98.7 °F (37.1 °C)   Resp 18   Ht 6' (1.829 m)   SpO2 98%   BMI 31.46 kg/m²       PHYSICAL EXAM:  Gen: NAD, elderly   HEENT: anicteric sclerae, normal conjunctiva, oropharynx clear, MM moist  Neck: supple, trachea midline, no adenopathy  Heart: RRR, no MRG, no JVD, no peripheral edema  Lungs: CTA b/l, non-labored respirations  Abd: soft, NT, ND, BS+, no organomegaly  Extr: warm  Skin: dry, bruising on knees R>L  Neuro: CN II-XII grossly intact, slow speech, moves all extremities      Intake/Output Summary (Last 24 hours) at 8/29/2021 1416  Last data filed at 8/29/2021 0009  Gross per 24 hour   Intake    Output 200 ml   Net -200 ml        Recent labs & imaging reviewed:  Recent Results (from the past 24 hour(s))   GLUCOSE, POC    Collection Time: 08/28/21  8:01 PM   Result Value Ref Range    Glucose (POC) 117 65 - 117 mg/dL    Performed by Travon Reza    CBC WITH AUTOMATED DIFF    Collection Time: 08/28/21  8:07 PM   Result Value Ref Range    WBC 14.4 (H) 4.1 - 11.1 K/uL    RBC 5.07 4. 10 - 5.70 M/uL    HGB 15.8 12.1 - 17.0 g/dL    HCT 46.9 36.6 - 50.3 %    MCV 92.5 80.0 - 99.0 FL    MCH 31.2 26.0 - 34.0 PG    MCHC 33.7 30.0 - 36.5 g/dL    RDW 14.1 11.5 - 14.5 %    PLATELET 287 979 - 111 K/uL    MPV 10.3 8.9 - 12.9 FL    NRBC 0.0 0  WBC    ABSOLUTE NRBC 0.00 0.00 - 0.01 K/uL    NEUTROPHILS 85 (H) 32 - 75 %    LYMPHOCYTES 5 (L) 12 - 49 %    MONOCYTES 9 5 - 13 %    EOSINOPHILS 0 0 - 7 %    BASOPHILS 0 0 - 1 %    IMMATURE GRANULOCYTES 1 (H) 0.0 - 0.5 %    ABS. NEUTROPHILS 12.3 (H) 1.8 - 8.0 K/UL    ABS. LYMPHOCYTES 0.7 (L) 0.8 - 3.5 K/UL    ABS. MONOCYTES 1.3 (H) 0.0 - 1.0 K/UL    ABS. EOSINOPHILS 0.0 0.0 - 0.4 K/UL    ABS. BASOPHILS 0.0 0.0 - 0.1 K/UL    ABS. IMM.  GRANS. 0.1 (H) 0.00 - 0.04 K/UL    DF SMEAR SCANNED      RBC COMMENTS NORMOCYTIC, NORMOCHROMIC PROTHROMBIN TIME + INR    Collection Time: 08/28/21  8:07 PM   Result Value Ref Range    INR 1.7 (H) 0.9 - 1.1      Prothrombin time 17.0 (H) 9.0 - 74.5 sec   METABOLIC PANEL, COMPREHENSIVE    Collection Time: 08/28/21  8:07 PM   Result Value Ref Range    Sodium 141 136 - 145 mmol/L    Potassium 3.8 3.5 - 5.1 mmol/L    Chloride 111 (H) 97 - 108 mmol/L    CO2 22 21 - 32 mmol/L    Anion gap 8 5 - 15 mmol/L    Glucose 130 (H) 65 - 100 mg/dL    BUN 38 (H) 6 - 20 MG/DL    Creatinine 1.58 (H) 0.70 - 1.30 MG/DL    BUN/Creatinine ratio 24 (H) 12 - 20      GFR est AA 50 (L) >60 ml/min/1.73m2    GFR est non-AA 41 (L) >60 ml/min/1.73m2    Calcium 8.9 8.5 - 10.1 MG/DL    Bilirubin, total 0.7 0.2 - 1.0 MG/DL    ALT (SGPT) 37 12 - 78 U/L    AST (SGOT) 62 (H) 15 - 37 U/L    Alk. phosphatase 118 (H) 45 - 117 U/L    Protein, total 8.1 6.4 - 8.2 g/dL    Albumin 3.1 (L) 3.5 - 5.0 g/dL    Globulin 5.0 (H) 2.0 - 4.0 g/dL    A-G Ratio 0.6 (L) 1.1 - 2.2     TROPONIN I    Collection Time: 08/28/21  8:07 PM   Result Value Ref Range    Troponin-I, Qt. <0.05 <0.05 ng/mL   MAGNESIUM    Collection Time: 08/28/21  8:07 PM   Result Value Ref Range    Magnesium 2.3 1.6 - 2.4 mg/dL   CK    Collection Time: 08/28/21  8:07 PM   Result Value Ref Range    CK 1,434 (H) 39 - 308 U/L   SAMPLES BEING HELD    Collection Time: 08/28/21  8:07 PM   Result Value Ref Range    SAMPLES BEING HELD 1RED     COMMENT        Add-on orders for these samples will be processed based on acceptable specimen integrity and analyte stability, which may vary by analyte.    PROCALCITONIN    Collection Time: 08/28/21  8:07 PM   Result Value Ref Range    Procalcitonin 0.28 ng/mL   EKG, 12 LEAD, INITIAL    Collection Time: 08/28/21  8:10 PM   Result Value Ref Range    Ventricular Rate 74 BPM    QRS Duration 106 ms    Q-T Interval 428 ms    QTC Calculation (Bezet) 475 ms    Calculated R Axis 1 degrees    Calculated T Axis 60 degrees    Diagnosis       Atrial fibrillation  Minimal voltage criteria for LVH, may be normal variant ( R in aVL )  T wave abnormality, consider inferior ischemia  Abnormal ECG  No previous ECGs available     COVID-19 RAPID TEST    Collection Time: 08/28/21 11:32 PM   Result Value Ref Range    Specimen source Nasopharyngeal      COVID-19 rapid test Not detected NOTD     URINALYSIS W/ REFLEX CULTURE    Collection Time: 08/28/21 11:32 PM    Specimen: Urine   Result Value Ref Range    Color YELLOW/STRAW      Appearance CLOUDY (A) CLEAR      Specific gravity 1.027 1.003 - 1.030      pH (UA) 5.0 5.0 - 8.0      Protein 300 (A) NEG mg/dL    Glucose 500 (A) NEG mg/dL    Ketone 15 (A) NEG mg/dL    Bilirubin Negative NEG      Blood LARGE (A) NEG      Urobilinogen 1.0 0.2 - 1.0 EU/dL    Nitrites Negative NEG      Leukocyte Esterase Negative NEG      WBC 5-10 0 - 4 /hpf    RBC  0 - 5 /hpf    Epithelial cells MODERATE (A) FEW /lpf    Bacteria 1+ (A) NEG /hpf    UA:UC IF INDICATED CULTURE NOT INDICATED BY UA RESULT CNI      Hyaline cast >20 (H) 0 - 5 /lpf   CULTURE, BLOOD, PAIRED    Collection Time: 08/28/21 11:32 PM    Specimen: Blood   Result Value Ref Range    Special Requests: NO SPECIAL REQUESTS      Culture result: NO GROWTH AFTER 6 HOURS     CK    Collection Time: 08/29/21  3:20 AM   Result Value Ref Range    CK 1,231 (H) 39 - 067 U/L   METABOLIC PANEL, BASIC    Collection Time: 08/29/21  3:20 AM   Result Value Ref Range    Sodium 142 136 - 145 mmol/L    Potassium 3.6 3.5 - 5.1 mmol/L    Chloride 111 (H) 97 - 108 mmol/L    CO2 24 21 - 32 mmol/L    Anion gap 7 5 - 15 mmol/L    Glucose 108 (H) 65 - 100 mg/dL    BUN 39 (H) 6 - 20 MG/DL    Creatinine 1.55 (H) 0.70 - 1.30 MG/DL    BUN/Creatinine ratio 25 (H) 12 - 20      GFR est AA 51 (L) >60 ml/min/1.73m2    GFR est non-AA 42 (L) >60 ml/min/1.73m2    Calcium 8.2 (L) 8.5 - 10.1 MG/DL   MAGNESIUM    Collection Time: 08/29/21  3:20 AM   Result Value Ref Range    Magnesium 2.3 1.6 - 2.4 mg/dL   SAMPLES BEING HELD    Collection Time: 08/29/21  3:20 AM   Result Value Ref Range    SAMPLES BEING HELD  1 LAV     COMMENT        Add-on orders for these samples will be processed based on acceptable specimen integrity and analyte stability, which may vary by analyte. CBC WITH AUTOMATED DIFF    Collection Time: 08/29/21  3:20 AM   Result Value Ref Range    WBC 12.9 (H) 4.1 - 11.1 K/uL    RBC 4.74 4.10 - 5.70 M/uL    HGB 14.5 12.1 - 17.0 g/dL    HCT 45.4 36.6 - 50.3 %    MCV 95.8 80.0 - 99.0 FL    MCH 30.6 26.0 - 34.0 PG    MCHC 31.9 30.0 - 36.5 g/dL    RDW 14.0 11.5 - 14.5 %    PLATELET 249 837 - 232 K/uL    MPV 11.3 8.9 - 12.9 FL    NRBC 0.0 0  WBC    ABSOLUTE NRBC 0.00 0.00 - 0.01 K/uL    NEUTROPHILS 79 (H) 32 - 75 %    LYMPHOCYTES 8 (L) 12 - 49 %    MONOCYTES 12 5 - 13 %    EOSINOPHILS 0 0 - 7 %    BASOPHILS 0 0 - 1 %    IMMATURE GRANULOCYTES 1 (H) 0.0 - 0.5 %    ABS. NEUTROPHILS 10.3 (H) 1.8 - 8.0 K/UL    ABS. LYMPHOCYTES 1.0 0.8 - 3.5 K/UL    ABS. MONOCYTES 1.5 (H) 0.0 - 1.0 K/UL    ABS. EOSINOPHILS 0.0 0.0 - 0.4 K/UL    ABS. BASOPHILS 0.0 0.0 - 0.1 K/UL    ABS. IMM. GRANS. 0.1 (H) 0.00 - 0.04 K/UL    DF AUTOMATED       Recent Labs     08/29/21  0320 08/28/21 2007   WBC 12.9* 14.4*   HGB 14.5 15.8   HCT 45.4 46.9    260     Recent Labs     08/29/21  0320 08/28/21 2007    141   K 3.6 3.8   * 111*   CO2 24 22   BUN 39* 38*   CREA 1.55* 1.58*   * 130*   CA 8.2* 8.9   MG 2.3 2.3     Recent Labs     08/28/21 2007   ALT 37   *   TBILI 0.7   TP 8.1   ALB 3.1*   GLOB 5.0*     Recent Labs     08/28/21 2007   INR 1.7*   PTP 17.0*      No results for input(s): FE, TIBC, PSAT, FERR in the last 72 hours. No results found for: FOL, RBCF   No results for input(s): PH, PCO2, PO2 in the last 72 hours.   Recent Labs     08/29/21  0320 08/28/21 2007   CPK 1,231* 1,434*   TROIQ  --  <0.05     Lab Results   Component Value Date/Time    Cholesterol, total 210 (H) 07/17/2020 10:31 AM    HDL Cholesterol 65 07/17/2020 10:31 AM    LDL, calculated 119.8 (H) 07/17/2020 10:31 AM    Triglyceride 126 07/17/2020 10:31 AM    CHOL/HDL Ratio 3.2 07/17/2020 10:31 AM     Lab Results   Component Value Date/Time    Glucose (POC) 117 08/28/2021 08:01 PM     Lab Results   Component Value Date/Time    Color YELLOW/STRAW 08/28/2021 11:32 PM    Appearance CLOUDY (A) 08/28/2021 11:32 PM    Specific gravity 1.027 08/28/2021 11:32 PM    pH (UA) 5.0 08/28/2021 11:32 PM    Protein 300 (A) 08/28/2021 11:32 PM    Glucose 500 (A) 08/28/2021 11:32 PM    Ketone 15 (A) 08/28/2021 11:32 PM    Bilirubin Negative 08/28/2021 11:32 PM    Urobilinogen 1.0 08/28/2021 11:32 PM    Nitrites Negative 08/28/2021 11:32 PM    Leukocyte Esterase Negative 08/28/2021 11:32 PM    Epithelial cells MODERATE (A) 08/28/2021 11:32 PM    Bacteria 1+ (A) 08/28/2021 11:32 PM    WBC 5-10 08/28/2021 11:32 PM    RBC  08/28/2021 11:32 PM       Med list reviewed  Current Facility-Administered Medications   Medication Dose Route Frequency    hydrALAZINE (APRESOLINE) 20 mg/mL injection 10 mg  10 mg IntraVENous Q6H PRN    amLODIPine (NORVASC) tablet 5 mg  5 mg Oral DAILY    warfarin - pharmacy to dose   Other Rx Dosing/Monitoring    warfarin (COUMADIN) tablet 7.5 mg  7.5 mg Oral ONCE    tamsulosin (FLOMAX) capsule 0.4 mg  0.4 mg Oral DAILY    sertraline (ZOLOFT) tablet 100 mg  100 mg Oral DAILY    flecainide (TAMBOCOR) tablet 150 mg  150 mg Oral BID    finasteride (PROSCAR) tablet 5 mg  5 mg Oral DAILY    cholestyramine-aspartame (QUESTRAN LIGHT) packet 4 g  4 g Oral BID    0.9% sodium chloride infusion  100 mL/hr IntraVENous CONTINUOUS    sodium chloride (NS) flush 5-40 mL  5-40 mL IntraVENous Q8H    sodium chloride (NS) flush 5-40 mL  5-40 mL IntraVENous PRN    acetaminophen (TYLENOL) tablet 650 mg  650 mg Oral Q6H PRN    Or    acetaminophen (TYLENOL) suppository 650 mg  650 mg Rectal Q6H PRN    polyethylene glycol (MIRALAX) packet 17 g  17 g Oral DAILY PRN    ondansetron (ZOFRAN ODT) tablet 4 mg  4 mg Oral Q8H PRN    Or    ondansetron (ZOFRAN) injection 4 mg  4 mg IntraVENous Q6H PRN    cefTRIAXone (ROCEPHIN) 1 g in 0.9% sodium chloride (MBP/ADV) 50 mL MBP  1 g IntraVENous Q24H       Care Plan discussed with:  Patient/Family and Nurse    Margot Frausto MD  Internal Medicine  Date of Service: 8/29/2021

## 2021-08-29 NOTE — PROGRESS NOTES
Occupational Therapy Note    Chart reviewed and pt cleared for OT evaluation. Pt received supine in bed with uneaten bfast at bedside. Lunch delivered at start of session and pt politely requesting to eat lunch prior to session as he had not eaten anything today. Will attempt to follow up as able.     Thank you,  Papi Marshall, OTR/L

## 2021-08-29 NOTE — H&P
HISTORY AND PHYSICAL      PCP: Ajit Yung MD  History source: ER report, the patient (poor historian)      CC: fall      HPI: 80 y.o man with BPH, afib, bladder cancer, HTN, who presents after being found down. His son didn't hear from him for 2 days, so he checked on him and he was on the floor. EMS noted feces around the apartment. The patient is a poor historian and doesn't know why he's here. He thinks he fell but is unsure. He denies pain. PMH/PSH:  Past Medical History:   Diagnosis Date    Advanced care planning/counseling discussion 10/8/2015    Basal cell carcinoma, face     now sees Dr. Christine Hickman.  Bladder cancer [facility]) 2012    Dr. Guilherme Mitchell. cyto 9/2014, 2/2016. s/p surgery, BCG irrigation [place]. saw Dr. Sherry Bustamante BPH with obstruction/lower urinary tract symptoms     Chronic lower back pain     injection in [place]. saw Dr. Enoch Lesch Chronic neck pain     limited motion to side    Depression, major     Dupuytren's contracture of left hand     5th finger    Epistaxis 2013    saw ENT at [address] [place] [address] history of skin cancer     Fecal incontinence     with loose stools    Hearing loss     has hearing aids    HTN (hypertension)     Insomnia     IVCD (intraventricular conduction defect) 11/30/2015    Dr Cameron Overall term current use of anticoagulants with INR goal of 2.0-3.0     Afib    Loose stools 7/18/2014    Paroxysmal atrial fibrillation (HCC)     EF 66%, 1-2+ LAE on flecainide;Dr. Eren Zamora. saw Dr. Precious Reyes [facility] Peripheral neuropathy     RLS (restless legs syndrome) 7/3/2017    SCC (squamous cell carcinoma), face     prior hx, now sees Dr. Praneeth Goss Skin cancer     Urothelial carcinoma of right distal ureter ([facility] [address] 5/1/15    excision Dr. Roxana Jean. low grade, papillary. repeat 3 month     Past Surgical History:   Procedure Laterality Date    HX BLADDER REPAIR  2012    cancer    HX COLONOSCOPY  2012    polyps. repeat 2017?     HX HERNIA REPAIR      periumibical    HX MALIGNANT SKIN LESION EXCISION  06/14/2018    basal cell left lip    HX RETINAL DETACHMENT REPAIR Right     CA CYSTOURETHROSCOPY  9/2014    Dr. Cary Francisco CA CYSTOURETHROSCOPY  5/1/15    low grade right pap urothelial cancer    CA CYSTOURETHROSCOPY  2/29/16       Home meds:   Prior to Admission medications    Medication Sig Start Date End Date Taking? Authorizing Provider   gabapentin (NEURONTIN) 300 mg capsule TAKE 1 CAPSULE BY MOUTH DAILY INDICATIONS: NEUROPATHIC PAIN 7/26/21   Myrna Bourgeois MD   irbesartan (AVAPRO) 75 mg tablet TAKE 2 TABLETS NIGHTLY 5/31/21   Pepe Falcon MD   sertraline (ZOLOFT) 100 mg tablet TAKE 1 TABLET DAILY 5/31/21   Pepe Falcon MD   warfarin (COUMADIN) 5 mg tablet 10 mg (5 mg x 2) every Thu, Sat; 7.5 mg (5 mg x 1.5) all other days or as directed. 5/28/21   Chandler Duron MD   flecainide (TAMBOCOR) 150 mg tablet Take 1 Tab by mouth two (2) times a day. 4/30/21   Chandler Duron MD   colestipoL (COLESTID) 5 gram packet Take 5 g by mouth two (2) times a day. Provider, Historical   tamsulosin HCl (TAMSULOSIN PO) Take  by mouth. ONE TABLET ONCE DAILY. PT DOES NOT KNOW DOSAGE    Provider, Historical   amLODIPine (NORVASC) 2.5 mg tablet TAKE 1 TABLET BY MOUTH ONCE DAILY. DECREASED DOSE. 1/14/21   Pepe Falcon MD   cholestyramine-aspartame (Cholestyramine Light) 4 gram packet Take 1 Packet by mouth two (2) times a day. 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 No pertinent family history. SH:  Social History     Tobacco Use    Smoking status: Former Smoker    Smokeless tobacco: Never Used    Tobacco comment: socially   Substance Use Topics    Alcohol use:  Yes Comment: occ       ROS: Review of systems not obtained due to patient factors. PHYSICAL EXAM:  Visit Vitals  BP (!) 184/101 (BP 1 Location: Right upper arm, BP Patient Position: At rest)   Pulse 77   Temp 99.2 °F (37.3 °C)   Resp 20   Ht 6' (1.829 m)   SpO2 95%   BMI 31.46 kg/m²       Gen: NAD, non-toxic  HEENT: anicteric sclerae, right periorbital erythema and edema  Neck: supple, trachea midline, no adenopathy  Heart: RRR, no MRG, no JVD, no peripheral edema  Lungs: CTA b/l, non-labored respirations  Abd: soft, NT, ND, BS+, no organomegaly  Extr: warm  Skin: dry, no rash  Neuro: CN II-XII grossly intact, he has word finding difficulty  Psych: normal mood, appropriate affect      Labs/Imaging:  Recent Results (from the past 24 hour(s))   GLUCOSE, POC    Collection Time: 08/28/21  8:01 PM   Result Value Ref Range    Glucose (POC) 117 65 - 117 mg/dL    Performed by Melissa Toney    CBC WITH AUTOMATED DIFF    Collection Time: 08/28/21  8:07 PM   Result Value Ref Range    WBC 14.4 (H) 4.1 - 11.1 K/uL    RBC 5.07 4. 10 - 5.70 M/uL    HGB 15.8 12.1 - 17.0 g/dL    HCT 46.9 36.6 - 50.3 %    MCV 92.5 80.0 - 99.0 FL    MCH 31.2 26.0 - 34.0 PG    MCHC 33.7 30.0 - 36.5 g/dL    RDW 14.1 11.5 - 14.5 %    PLATELET 523 025 - 136 K/uL    MPV 10.3 8.9 - 12.9 FL    NRBC 0.0 0  WBC    ABSOLUTE NRBC 0.00 0.00 - 0.01 K/uL    NEUTROPHILS 85 (H) 32 - 75 %    LYMPHOCYTES 5 (L) 12 - 49 %    MONOCYTES 9 5 - 13 %    EOSINOPHILS 0 0 - 7 %    BASOPHILS 0 0 - 1 %    IMMATURE GRANULOCYTES 1 (H) 0.0 - 0.5 %    ABS. NEUTROPHILS 12.3 (H) 1.8 - 8.0 K/UL    ABS. LYMPHOCYTES 0.7 (L) 0.8 - 3.5 K/UL    ABS. MONOCYTES 1.3 (H) 0.0 - 1.0 K/UL    ABS. EOSINOPHILS 0.0 0.0 - 0.4 K/UL    ABS. BASOPHILS 0.0 0.0 - 0.1 K/UL    ABS. IMM.  GRANS. 0.1 (H) 0.00 - 0.04 K/UL    DF SMEAR SCANNED      RBC COMMENTS NORMOCYTIC, NORMOCHROMIC     PROTHROMBIN TIME + INR    Collection Time: 08/28/21  8:07 PM   Result Value Ref Range    INR 1.7 (H) 0.9 - 1.1 Prothrombin time 17.0 (H) 9.0 - 05.2 sec   METABOLIC PANEL, COMPREHENSIVE    Collection Time: 08/28/21  8:07 PM   Result Value Ref Range    Sodium 141 136 - 145 mmol/L    Potassium 3.8 3.5 - 5.1 mmol/L    Chloride 111 (H) 97 - 108 mmol/L    CO2 22 21 - 32 mmol/L    Anion gap 8 5 - 15 mmol/L    Glucose 130 (H) 65 - 100 mg/dL    BUN 38 (H) 6 - 20 MG/DL    Creatinine 1.58 (H) 0.70 - 1.30 MG/DL    BUN/Creatinine ratio 24 (H) 12 - 20      GFR est AA 50 (L) >60 ml/min/1.73m2    GFR est non-AA 41 (L) >60 ml/min/1.73m2    Calcium 8.9 8.5 - 10.1 MG/DL    Bilirubin, total 0.7 0.2 - 1.0 MG/DL    ALT (SGPT) 37 12 - 78 U/L    AST (SGOT) 62 (H) 15 - 37 U/L    Alk. phosphatase 118 (H) 45 - 117 U/L    Protein, total 8.1 6.4 - 8.2 g/dL    Albumin 3.1 (L) 3.5 - 5.0 g/dL    Globulin 5.0 (H) 2.0 - 4.0 g/dL    A-G Ratio 0.6 (L) 1.1 - 2.2     TROPONIN I    Collection Time: 08/28/21  8:07 PM   Result Value Ref Range    Troponin-I, Qt. <0.05 <0.05 ng/mL   MAGNESIUM    Collection Time: 08/28/21  8:07 PM   Result Value Ref Range    Magnesium 2.3 1.6 - 2.4 mg/dL   CK    Collection Time: 08/28/21  8:07 PM   Result Value Ref Range    CK 1,434 (H) 39 - 308 U/L   SAMPLES BEING HELD    Collection Time: 08/28/21  8:07 PM   Result Value Ref Range    SAMPLES BEING HELD 1RED     COMMENT        Add-on orders for these samples will be processed based on acceptable specimen integrity and analyte stability, which may vary by analyte.    PROCALCITONIN    Collection Time: 08/28/21  8:07 PM   Result Value Ref Range    Procalcitonin 0.28 ng/mL   EKG, 12 LEAD, INITIAL    Collection Time: 08/28/21  8:10 PM   Result Value Ref Range    Ventricular Rate 74 BPM    QRS Duration 106 ms    Q-T Interval 428 ms    QTC Calculation (Bezet) 475 ms    Calculated R Axis 1 degrees    Calculated T Axis 60 degrees    Diagnosis       Atrial fibrillation  Minimal voltage criteria for LVH, may be normal variant ( R in aVL )  T wave abnormality, consider inferior ischemia  Abnormal ECG  No previous ECGs available         Recent Labs     08/28/21 2007   WBC 14.4*   HGB 15.8   HCT 46.9        Recent Labs     08/28/21 2007      K 3.8   *   CO2 22   BUN 38*   CREA 1.58*   *   CA 8.9   MG 2.3     Recent Labs     08/28/21 2007   ALT 37   *   TBILI 0.7   TP 8.1   ALB 3.1*   GLOB 5.0*       Recent Labs     08/28/21 2007   CPK 1,434*   TROIQ <0.05       Recent Labs     08/28/21 2007   INR 1.7*   PTP 17.0*        No results for input(s): PH, PCO2, PO2 in the last 72 hours. CT HEAD WO CONT    Result Date: 8/28/2021  Right periorbital soft tissue swelling. No acute intracranial abnormality. CT SPINE CERV WO CONT    Result Date: 8/28/2021  Multilevel spondylosis without acute abnormality. XR CHEST PORT    Result Date: 8/28/2021  Bilateral lower lobe interstitial infiltrates. Possible lingular mass lesion. Correlation with nonemergent chest CT is recommended. Assessment & Plan:     CR: he appears dry  -IVNS    Rhabdomyolysis: due to fall/being down  -monitor with IV fluids    AMS/encephaopathy: due to dehydration, infection, however he has word-finding difficulty and I'm concerned for a stroke  -MRI brain wo contrast    Leukocytosis: possible infection - airspace disease on CXR.  Possible early cellulitis (right facial) developing  -start ceftriaxone  -check UA  -send blood cultures    Paroxysmal afib:  -continue flecainide  -consult pharmacy for coumadin dosing, however, he should have a risk/benefit assessment of anticoagulation prior to discharge    BPH    History of bladder cancer    DVT ppx: SCDs  Code status: full - visit with patient when mentation improves  Disposition: TBD    Signed By: Osbaldo Chavez MD     August 28, 2021

## 2021-08-29 NOTE — PROGRESS NOTES
Problem: Mobility Impaired (Adult and Pediatric)  Goal: *Acute Goals and Plan of Care (Insert Text)  Description: FUNCTIONAL STATUS PRIOR TO ADMISSION: Patient was independent and active without use of DME.    HOME SUPPORT PRIOR TO ADMISSION: The patient lived with wife who is currently in SNF, pt's son lives nearby. Physical Therapy Goals  Initiated 8/29/2021  1. Patient will move from supine to sit and sit to supine , scoot up and down, and roll side to side in bed with minimal assistance/contact guard assist within 7 day(s). 2.  Patient will transfer from bed to chair and chair to bed with moderate assistance  using the least restrictive device within 7 day(s). 3.  Patient will perform sit to stand with moderate assistance  within 7 day(s). 4.  Patient will ambulate with moderate assistance  for 5 feet with the least restrictive device within 7 day(s). 5.  Patient will ascend/descend 2 stairs with both handrail(s) with moderate assistance  within 7 day(s). Outcome: Progressing Towards Goal   PHYSICAL THERAPY EVALUATION  Patient: Savanna Frye. (80 y.o. male)  Date: 8/29/2021  Primary Diagnosis: Rhabdomyolysis [M62.82]        Precautions:   Fall      ASSESSMENT  Based on the objective data described below, the patient presents with decreased activity tolerance, balance deficits, and weakness. Pt reports R>L knee pain noting bruising. Pt required increased time to transfer toward EOB. Pt tolerated sitting EOB for approx 8 minutes. Pt able to scoot toward [facility] reporting pain in R knee. Pt assisted back to supine and positioned for comfort. Anticipate IPR v SNF at this time pending progress as pt requiring assistance and to continue progress toward functional independence. Current Level of Function Impacting Discharge (mobility/balance): mod A bed mobility    Functional Outcome Measure:   The patient scored Total: 15/100 on the Barthel Index which is indicative of 85% impaired ability to care for basic self needs/dependency on others. Other factors to consider for discharge: fall risk, indep prior, lives in apt     Patient will benefit from skilled therapy intervention to address the above noted impairments. PLAN :  Recommendations and Planned Interventions: bed mobility training, transfer training, gait training, therapeutic exercises, neuromuscular re-education, patient and family training/education, and therapeutic activities      Frequency/Duration: Patient will be followed by physical therapy:  5 times a week to address goals. Recommendation for discharge: (in order for the patient to meet his/her long term goals)  Therapy 3 hours per day 5-7 days per week vs SNF pending progress  If pt were to d/c home, would benefit from HHPT and require assistance/supervision for 2 assist for all mobility as pt is a fall risk      This discharge recommendation:  Has not yet been discussed the attending provider and/or case management    IF patient discharges home will need the following DME: hospital bed, mechanical lift, rolling walker, and wheelchair         SUBJECTIVE:   Patient stated I do not know why no one came to get me.  pt referring to when found down    OBJECTIVE DATA SUMMARY:   HISTORY:    Past Medical History:   Diagnosis Date    Advanced care planning/counseling discussion 10/8/2015    Basal cell carcinoma, face     now sees Dr. Omid Allen. Bladder cancer [facility]) 2012    Dr. Lupe Martin. cyto 9/2014, 2/2016. s/p surgery, BCG irrigation [place]. saw Dr. Gunnar Owens    BPH with obstruction/lower urinary tract symptoms     Chronic lower back pain     injection in [place].   saw Dr. Qamar Melvin    Chronic neck pain     limited motion to side    Depression, major     Dupuytren's contracture of left hand     5th finger    Epistaxis 2013    saw ENT at [facility] history of skin cancer     Fecal incontinence     with loose stools    Hearing loss     has hearing aids    HTN (hypertension)     Insomnia     IVCD (intraventricular conduction defect) 11/30/2015    Dr Jason Rojas term current use of anticoagulants with INR goal of 2.0-3.0     Afib    Loose stools 7/18/2014    Paroxysmal atrial fibrillation (HCC)     EF 66%, 1-2+ LAE on flecainide;Dr. Clement Perez. saw Dr. Juaquin Storey at [facility]    Peripheral neuropathy     RLS (restless legs syndrome) 7/3/2017    SCC (squamous cell carcinoma), face     prior hx, now sees Dr. Bib Thapa    Skin cancer     Urothelial carcinoma of right distal ureter ([address] 5/1/15    excision Dr. Cyrus oJ. low grade, papillary. repeat 3 month     Past Surgical History:   Procedure Laterality Date    HX BLADDER REPAIR  2012    cancer    HX COLONOSCOPY  2012    polyps. repeat 2017?     HX HERNIA REPAIR      periumibical    HX MALIGNANT SKIN LESION EXCISION  06/14/2018    basal cell left lip    HX RETINAL DETACHMENT REPAIR Right     ME CYSTOURETHROSCOPY  9/2014    Dr. Adri Castorena CYSTOURETHROSCOPY  5/1/15    low grade right pap urothelial cancer    ME CYSTOURETHROSCOPY  2/29/16       Personal factors and/or comorbidities impacting plan of care: PMH    Home Situation  Home Environment: Apartment  # Steps to Enter: 0  One/Two Story Residence: One story  Living Alone: Yes  Support Systems: Family member(s)  Current DME Used/Available at Home: Walker, rolling    EXAMINATION/PRESENTATION/DECISION MAKING:   Critical Behavior:  Neurologic State: Confused  Orientation Level: Oriented to person, Oriented to place, Disoriented to time, Oriented to situation  Cognition: No command following, Impaired decision making     Hearing:     Skin:  intact  Edema: none  Range Of Motion:  AROM: Generally decreased, functional           PROM: Generally decreased, functional           Strength:    Strength: Grossly decreased, non-functional                    Tone & Sensation:                                  Coordination:  Coordination: Generally decreased, functional  Vision:      Functional Mobility:  Bed Mobility:     Supine to Sit: Moderate assistance  Sit to Supine: Moderate assistance     Transfers:                             Balance:   Sitting: Impaired; Without support  Sitting - Static: Fair (occasional)  Sitting - Dynamic: Fair (occasional)    Functional Measure:  Barthel Index:    Bathin  Bladder: 5  Bowels: 5  Groomin  Dressin  Feedin  Mobility: 0  Stairs: 0  Toilet Use: 0  Transfer (Bed to Chair and Back): 5  Total: 15/100       The Barthel ADL Index: Guidelines  1. The index should be used as a record of what a patient does, not as a record of what a patient could do. 2. The main aim is to establish degree of independence from any help, physical or verbal, however minor and for whatever reason. 3. The need for supervision renders the patient not independent. 4. A patient's performance should be established using the best available evidence. Asking the patient, friends/relatives and nurses are the usual sources, but direct observation and common sense are also important. However direct testing is not needed. 5. Usually the patient's performance over the preceding 24-48 hours is important, but occasionally longer periods will be relevant. 6. Middle categories imply that the patient supplies over 50 per cent of the effort. 7. Use of aids to be independent is allowed. Barry Villanueva., Barthel, D.W. (7256). Functional evaluation: the Barthel Index. [address] [facility] [employer] (14)2. Tray Lopez katherine CONCHIS Busby, Amberly Wolfe., Henrietta Garcia., Merlin Hippo, [address] [place] [address] (). Measuring the change indisability after inpatient rehabilitation; comparison of the responsiveness of the Barthel Index and Functional [place] Measure. Journal of Neurology, Neurosurgery, and Psychiatry, 66(4), 493-337. Pieter Marina, N.J.A, ROSEMARIE Mercer, & Kristin Cisneros, MCristianeA. (2004.) Assessment of post-stroke quality of life in cost-effectiveness studies: The usefulness of the Barthel Index and the EuroQoL-5D.  [facility], 13, [phone] Physical Therapy Evaluation Charge Determination   History Examination Presentation Decision-Making   HIGH Complexity :3+ comorbidities / personal factors will impact the outcome/ POC  LOW Complexity : 1-2 Standardized tests and measures addressing body structure, function, activity limitation and / or participation in recreation  MEDIUM Complexity : Evolving with changing characteristics  Other outcome measures barthel  HIGH       Based on the above components, the patient evaluation is determined to be of the following complexity level: LOW     Activity Tolerance:   Fair and requires rest breaks    After treatment patient left in no apparent distress:   Supine in bed, Call bell within reach, Bed / chair alarm activated, and Side rails x 3    COMMUNICATION/EDUCATION:   The patients plan of care was discussed with: Occupational therapist and Registered nurse. Fall prevention education was provided and the patient/caregiver indicated understanding., Patient/family have participated as able in goal setting and plan of care. , and Patient/family agree to work toward stated goals and plan of care.     Thank you for this referral.  Maria Isabel Poe, PT, DPT   Time Calculation: 16 mins

## 2021-08-29 NOTE — PROGRESS NOTES
Problem: Falls - Risk of  Goal: *Absence of Falls  Description: Document Carlpawel Blue Fall Risk and appropriate interventions in the flowsheet. Outcome: Progressing Towards Goal  Note: Fall Risk Interventions:       Mentation Interventions: Bed/chair exit alarm    Medication Interventions: Bed/chair exit alarm    Elimination Interventions: Bed/chair exit alarm    History of Falls Interventions: Door open when patient unattended         Problem: Pressure Injury - Risk of  Goal: *Prevention of pressure injury  Description: Document Gene Scale and appropriate interventions in the flowsheet.   Outcome: Progressing Towards Goal  Note: Pressure Injury Interventions:  Sensory Interventions: Assess changes in LOC    Moisture Interventions: Check for incontinence Q2 hours and as needed    Activity Interventions: PT/OT evaluation    Mobility Interventions: PT/OT evaluation    Nutrition Interventions: Offer support with meals,snacks and hydration

## 2021-08-29 NOTE — ED NOTES
Roula Winchester (son)- [phone], he would not like his dad to go back to the facility he was in, call with any updates or changes in location

## 2021-08-29 NOTE — PROGRESS NOTES
Pharmacist Note - Warfarin Dosing  Consult provided for this 89 y.o.male to manage warfarin for Atrial Fibrillation    INR Goal: 2 - 3    Home regimen/ tablet size: warfarin 10 mg every Thu, Sat; 7.5 mg all other days of the week    Drugs that may increase INR: None  Drugs that may decrease INR: None  Other current anticoagulants/ drugs that may increase bleeding risk: None  Risk factors: Age > 65  Daily INR ordered: YES    Recent Labs     08/29/21  0320 08/28/21 2007   HGB 14.5 15.8   INR  --  1.7*     Date               INR                  Dose  8/28             1.7                 --  8/29                 --                      7.5 mg                                                                               Assessment/ Plan: Will order warfarin 7.5 mg PO x 1 dose (continuation of home therapy)    Pharmacy will continue to monitor daily and adjust therapy as indicated. Please contact the pharmacist at  for outpatient recommendations if needed.

## 2021-08-29 NOTE — PROGRESS NOTES
Orders received, chart reviewed and patient evaluated by physical therapy. Pending progression with skilled acute physical therapy, recommend:  Therapy 3 hours per day 5-7 days per week    Recommend with nursing chair position in bed. Thank you for completing as able in order to maintain patient strength, endurance and independence. Full evaluation to follow.

## 2021-08-29 NOTE — ROUTINE PROCESS
TRANSFER - OUT REPORT:    Verbal report given to Kory(name) on Charla Quivers.  being transferred to (unit) for routine progression of care       Report consisted of patients Situation, Background, Assessment and   Recommendations(SBAR). Information from the following report(s) SBAR, ED Summary, [facility] and Recent Results was reviewed with the receiving nurse. Lines:   Peripheral IV 08/28/21 Left Antecubital (Active)   Site Assessment Clean, dry, & intact 08/28/21 1942   Phlebitis Assessment 0 08/28/21 1942   Infiltration Assessment 0 08/28/21 1942   Dressing Status Clean, dry, & intact 08/28/21 1942       Peripheral IV 08/28/21 Anterior; Left Hand (Active)        Opportunity for questions and clarification was provided.       Patient transported with:   [employer]

## 2021-08-30 ENCOUNTER — APPOINTMENT (OUTPATIENT)
Dept: NON INVASIVE DIAGNOSTICS | Age: 86
End: 2021-08-30
Attending: INTERNAL MEDICINE
Payer: MEDICARE

## 2021-08-30 ENCOUNTER — APPOINTMENT (OUTPATIENT)
Dept: MRI IMAGING | Age: 86
End: 2021-08-30
Attending: HOSPITALIST
Payer: MEDICARE

## 2021-08-30 LAB
ANION GAP SERPL CALC-SCNC: 6 MMOL/L (ref 5–15)
BACTERIA SPEC CULT: NORMAL
BASOPHILS # BLD: 0 K/UL (ref 0–0.1)
BASOPHILS NFR BLD: 0 % (ref 0–1)
BUN SERPL-MCNC: 36 MG/DL (ref 6–20)
BUN/CREAT SERPL: 23 (ref 12–20)
CALCIUM SERPL-MCNC: 8.2 MG/DL (ref 8.5–10.1)
CHLORIDE SERPL-SCNC: 111 MMOL/L (ref 97–108)
CK SERPL-CCNC: 803 U/L (ref 39–308)
CO2 SERPL-SCNC: 23 MMOL/L (ref 21–32)
CREAT SERPL-MCNC: 1.55 MG/DL (ref 0.7–1.3)
DIFFERENTIAL METHOD BLD: ABNORMAL
ECHO AO ROOT DIAM: 4.16 CM
ECHO AV AREA PEAK VELOCITY: 1.45 CM2
ECHO AV AREA/BSA PEAK VELOCITY: 0.6 CM2/M2
ECHO AV PEAK GRADIENT: 7.59 MMHG
ECHO AV PEAK VELOCITY: 137.78 CM/S
ECHO LA AREA 4C: 30.58 CM2
ECHO LA MAJOR AXIS: 5.99 CM
ECHO LA MINOR AXIS: 2.64 CM
ECHO LA VOL 2C: 148.42 ML (ref 18–58)
ECHO LA VOL 4C: 109.68 ML (ref 18–58)
ECHO LA VOL BP: 138.39 ML (ref 18–58)
ECHO LA VOL/BSA BIPLANE: 60.96 ML/M2 (ref 16–28)
ECHO LA VOLUME INDEX A2C: 65.38 ML/M2 (ref 16–28)
ECHO LA VOLUME INDEX A4C: 48.32 ML/M2 (ref 16–28)
ECHO LV E' LATERAL VELOCITY: 5.99 CM/S
ECHO LV E' SEPTAL VELOCITY: 7.38 CM/S
ECHO LV EDV A2C: 128.92 ML
ECHO LV EDV A4C: 161.8 ML
ECHO LV EDV BP: 145.43 ML (ref 67–155)
ECHO LV EDV INDEX A4C: 71.3 ML/M2
ECHO LV EDV INDEX BP: 64.1 ML/M2
ECHO LV EDV NDEX A2C: 56.8 ML/M2
ECHO LV EJECTION FRACTION A2C: 73 PERCENT
ECHO LV EJECTION FRACTION A4C: 69 PERCENT
ECHO LV EJECTION FRACTION BIPLANE: 70.8 PERCENT (ref 55–100)
ECHO LV ESV A2C: 34.65 ML
ECHO LV ESV A4C: 50.34 ML
ECHO LV ESV BP: 42.47 ML (ref 22–58)
ECHO LV ESV INDEX A2C: 15.3 ML/M2
ECHO LV ESV INDEX A4C: 22.2 ML/M2
ECHO LV ESV INDEX BP: 18.7 ML/M2
ECHO LV INTERNAL DIMENSION DIASTOLIC: 4.65 CM (ref 4.2–5.9)
ECHO LV INTERNAL DIMENSION SYSTOLIC: 3.3 CM
ECHO LV IVSD: 1.32 CM (ref 0.6–1)
ECHO LV MASS 2D: 256.8 G (ref 88–224)
ECHO LV MASS INDEX 2D: 113.1 G/M2 (ref 49–115)
ECHO LV POSTERIOR WALL DIASTOLIC: 1.45 CM (ref 0.6–1)
ECHO LVOT DIAM: 1.89 CM
ECHO LVOT PEAK GRADIENT: 2.03 MMHG
ECHO LVOT PEAK VELOCITY: 71.23 CM/S
ECHO MV A VELOCITY: 55.85 CM/S
ECHO MV AREA PHT: 2.97 CM2
ECHO MV E DECELERATION TIME (DT): 255.11 MS
ECHO MV E VELOCITY: 63.98 CM/S
ECHO MV E/A RATIO: 1.15
ECHO MV E/E' LATERAL: 10.68
ECHO MV E/E' RATIO (AVERAGED): 9.68
ECHO MV E/E' SEPTAL: 8.67
ECHO MV PRESSURE HALF TIME (PHT): 73.98 MS
ECHO PV PEAK INSTANTANEOUS GRADIENT SYSTOLIC: 2.01 MMHG
ECHO TV REGURGITANT MAX VELOCITY: 309.79 CM/S
ECHO TV REGURGITANT PEAK GRADIENT: 38.39 MMHG
EOSINOPHIL # BLD: 0.1 K/UL (ref 0–0.4)
EOSINOPHIL NFR BLD: 1 % (ref 0–7)
ERYTHROCYTE [DISTWIDTH] IN BLOOD BY AUTOMATED COUNT: 14 % (ref 11.5–14.5)
GLUCOSE SERPL-MCNC: 99 MG/DL (ref 65–100)
HCT VFR BLD AUTO: 42.3 % (ref 36.6–50.3)
HGB BLD-MCNC: 13.7 G/DL (ref 12.1–17)
IMM GRANULOCYTES # BLD AUTO: 0.1 K/UL (ref 0–0.04)
IMM GRANULOCYTES NFR BLD AUTO: 1 % (ref 0–0.5)
INR PPP: 1.7 (ref 0.9–1.1)
LYMPHOCYTES # BLD: 1.6 K/UL (ref 0.8–3.5)
LYMPHOCYTES NFR BLD: 16 % (ref 12–49)
MCH RBC QN AUTO: 30.6 PG (ref 26–34)
MCHC RBC AUTO-ENTMCNC: 32.4 G/DL (ref 30–36.5)
MCV RBC AUTO: 94.6 FL (ref 80–99)
MONOCYTES # BLD: 1.1 K/UL (ref 0–1)
MONOCYTES NFR BLD: 11 % (ref 5–13)
NEUTS SEG # BLD: 7.4 K/UL (ref 1.8–8)
NEUTS SEG NFR BLD: 71 % (ref 32–75)
NRBC # BLD: 0 K/UL (ref 0–0.01)
NRBC BLD-RTO: 0 PER 100 WBC
PLATELET # BLD AUTO: 238 K/UL (ref 150–400)
PMV BLD AUTO: 10.3 FL (ref 8.9–12.9)
POTASSIUM SERPL-SCNC: 3.5 MMOL/L (ref 3.5–5.1)
PROTHROMBIN TIME: 17.4 SEC (ref 9–11.1)
RBC # BLD AUTO: 4.47 M/UL (ref 4.1–5.7)
SERVICE CMNT-IMP: NORMAL
SODIUM SERPL-SCNC: 140 MMOL/L (ref 136–145)
WBC # BLD AUTO: 10.3 K/UL (ref 4.1–11.1)

## 2021-08-30 PROCEDURE — 74011250637 HC RX REV CODE- 250/637: Performed by: HOSPITALIST

## 2021-08-30 PROCEDURE — 65270000029 HC RM PRIVATE

## 2021-08-30 PROCEDURE — 82550 ASSAY OF CK (CPK): CPT

## 2021-08-30 PROCEDURE — 74011000258 HC RX REV CODE- 258: Performed by: HOSPITALIST

## 2021-08-30 PROCEDURE — 97116 GAIT TRAINING THERAPY: CPT

## 2021-08-30 PROCEDURE — 74011250636 HC RX REV CODE- 250/636: Performed by: HOSPITALIST

## 2021-08-30 PROCEDURE — 93306 TTE W/DOPPLER COMPLETE: CPT

## 2021-08-30 PROCEDURE — 97530 THERAPEUTIC ACTIVITIES: CPT

## 2021-08-30 PROCEDURE — G0378 HOSPITAL OBSERVATION PER HR: HCPCS

## 2021-08-30 PROCEDURE — 97535 SELF CARE MNGMENT TRAINING: CPT

## 2021-08-30 PROCEDURE — 93306 TTE W/DOPPLER COMPLETE: CPT | Performed by: INTERNAL MEDICINE

## 2021-08-30 PROCEDURE — 85025 COMPLETE CBC W/AUTO DIFF WBC: CPT

## 2021-08-30 PROCEDURE — 74011250637 HC RX REV CODE- 250/637: Performed by: INTERNAL MEDICINE

## 2021-08-30 PROCEDURE — 85610 PROTHROMBIN TIME: CPT

## 2021-08-30 PROCEDURE — 96376 TX/PRO/DX INJ SAME DRUG ADON: CPT

## 2021-08-30 PROCEDURE — 80048 BASIC METABOLIC PNL TOTAL CA: CPT

## 2021-08-30 PROCEDURE — 36415 COLL VENOUS BLD VENIPUNCTURE: CPT

## 2021-08-30 RX ORDER — AMLODIPINE BESYLATE 5 MG/1
5 TABLET ORAL ONCE
Status: COMPLETED | OUTPATIENT
Start: 2021-08-30 | End: 2021-08-30

## 2021-08-30 RX ORDER — BACITRACIN 500 [USP'U]/G
OINTMENT TOPICAL DAILY
Status: DISCONTINUED | OUTPATIENT
Start: 2021-08-31 | End: 2021-08-30 | Stop reason: SDUPTHER

## 2021-08-30 RX ORDER — WARFARIN SODIUM 5 MG/1
10 TABLET ORAL ONCE
Status: COMPLETED | OUTPATIENT
Start: 2021-08-30 | End: 2021-08-30

## 2021-08-30 RX ORDER — AMLODIPINE BESYLATE 5 MG/1
10 TABLET ORAL DAILY
Status: DISCONTINUED | OUTPATIENT
Start: 2021-08-31 | End: 2021-09-02 | Stop reason: HOSPADM

## 2021-08-30 RX ORDER — BACITRACIN 500 UNIT/G
2 PACKET (EA) TOPICAL DAILY
Status: DISCONTINUED | OUTPATIENT
Start: 2021-08-31 | End: 2021-09-02 | Stop reason: HOSPADM

## 2021-08-30 RX ADMIN — Medication 10 ML: at 22:10

## 2021-08-30 RX ADMIN — FLECAINIDE ACETATE 150 MG: 100 TABLET ORAL at 18:22

## 2021-08-30 RX ADMIN — CEFTRIAXONE SODIUM 1 G: 1 INJECTION, POWDER, FOR SOLUTION INTRAMUSCULAR; INTRAVENOUS at 22:09

## 2021-08-30 RX ADMIN — TAMSULOSIN HYDROCHLORIDE 0.4 MG: 0.4 CAPSULE ORAL at 09:46

## 2021-08-30 RX ADMIN — WARFARIN SODIUM 10 MG: 5 TABLET ORAL at 18:16

## 2021-08-30 RX ADMIN — Medication 10 ML: at 06:28

## 2021-08-30 RX ADMIN — FINASTERIDE 5 MG: 5 TABLET, FILM COATED ORAL at 09:45

## 2021-08-30 RX ADMIN — SERTRALINE 100 MG: 50 TABLET, FILM COATED ORAL at 09:45

## 2021-08-30 RX ADMIN — AMLODIPINE BESYLATE 5 MG: 5 TABLET ORAL at 09:45

## 2021-08-30 RX ADMIN — Medication 10 ML: at 14:37

## 2021-08-30 RX ADMIN — AMLODIPINE BESYLATE 5 MG: 5 TABLET ORAL at 14:41

## 2021-08-30 RX ADMIN — CHOLESTYRAMINE 4 G: 4 POWDER, FOR SUSPENSION ORAL at 22:05

## 2021-08-30 RX ADMIN — FLECAINIDE ACETATE 150 MG: 100 TABLET ORAL at 09:52

## 2021-08-30 RX ADMIN — CHOLESTYRAMINE 4 G: 4 POWDER, FOR SUSPENSION ORAL at 09:46

## 2021-08-30 NOTE — WOUND CARE
WOCN Note:     New consult for abdomen, arm, and forehead    Chart shows:  Admitted for fall with unknown down time, rhabdomyolysis  History of bladder cancer  Admitted from home and lives alone    Assessment:   Appropriately conversational and is reminiscing about his wife. Describes the \"fall\" as a slide to the carpet from sitting on side of his bed but then couldn't get off of the floor. Wearing briefs. Surface: Prius GLENDY mattress  Diet: regular - asking for diet pepsi and discussed with DEONNA Denis, who will bring if appropriate (?testing)    Bilateral heels and sacrum intact and without erythema. Heels offloaded with pillows. All areas are POA. drying burgundy abrasions to:  left great toe  medial metatarsal head  top of right second toe  bilateral knees along with redness and some swelling which may indicate an evolving area  left temple drying burgundy abrasion. right side of forehead and temple bruising    Right buttock with blanching light diffuse purple. Right shoulder drying yellow abrasion = 3 x 1.5 x 0 cm. No exudate or redness. 1. POA right upper quad abrasion  4 x 6 x 0.1 cm  100% drying burgundy crust with skip areas of open moist red  No exudate. Periwound without erythema    Tx: cleaned with saline, applied petrolatum and Mepitel One    Wound Recommendations:    Abdominal abrasion: clean with saline, apply bacitracin daily and cover with Mepitel One    PI Prevention:  Turn/reposition approximately every 2 hours  Offload heels with heels hanging off end of pillow at all times while in bed. Discussed with RN, Ann-Marie Hill.     Transition of Care: Plan to follow weekly and as needed while admitted to hospital.     Josué Bhardwaj, SPARKLEN, RN, [facility] [place]  Certified Wound, Ostomy, Continence Nurse  office [phone]  Available via [address] [place] [address]

## 2021-08-30 NOTE — PROGRESS NOTES
6908- CM received phone call from pt's son, Antonio Pandya, [phone] in regards to plan for d/c. CM discussed therapy recommendations of SNF vs IPR and pt history of frequent falls. Son advised he can take pt home with him with [facility], but only for about 10 days as he will be returning to work, essentially pt will not have 24/7 supervision following son's return to work. Son intends to discuss SNF choices with family and pt's spouse is currently LTC at [facility]. CM to follow up on family decision. CM to follow.   Dani Estrella RN BSN CCM

## 2021-08-30 NOTE — PROGRESS NOTES
Problem: Self Care Deficits Care Plan (Adult)  Goal: *Acute Goals and Plan of Care (Insert Text)  Description:   FUNCTIONAL STATUS PRIOR TO ADMISSION: Patient was independent and active without use of DME. Pt lives at independent living facility    HOME SUPPORT: The patient lived alone and reports wife lives at nearby SNF. Occupational Therapy Goals  Initiated 8/29/2021  1. Patient will perform bathing with minimal assistance/contact guard assist within 7 day(s). 2.  Patient will perform upper body dressing with supervision/set-up within 7 day(s). 3.  Patient will perform lower body dressing with moderate assistance and AE PRN within 7 day(s). 4.  Patient will perform toilet transfers with minimal assistance/contact guard assist within 7 day(s). 5.  Patient will perform all aspects of toileting with moderate assistance  within 7 day(s). Outcome: Progressing Towards Goal   OCCUPATIONAL THERAPY TREATMENT  Patient: London Blue (80 y.o. male)  Date: 8/30/2021  Diagnosis: Rhabdomyolysis [M62.82] <principal problem not specified>       Precautions: Fall  Chart, occupational therapy assessment, plan of care, and goals were reviewed. ASSESSMENT  Patient continues with skilled OT services and is progressing towards goals. Pt received supine in bed, willing to participate with therapy. Pt wanted to brush teeth and wash/comb hair. Pt ambulated to bathroom with min assist x 2, unsteady and weak on his feet using RW. Pt stood at sink for >12 min. Pt very particular with combing his hair. 1x pt's LEs buckled at sink, increase encouragement needed to complete task so he could sit down and rest.  Overall, pt needed increase verbal, tactile and physical cues for safety. Highly recommend further therapy at discharge. Will con't to follow and address listed goals.      Current Level of Function Impacting Discharge (ADLs): min assist x 2 for mobility d/t weakness and unsteady    Other factors to consider for discharge: lived alone, h/o of falls         PLAN :  Patient continues to benefit from skilled intervention to address the above impairments. Continue treatment per established plan of care to address goals. Recommend with staff:     Recommend next OT session: see goals    Recommendation for discharge: (in order for the patient to meet his/her long term goals)  To be determined: SNF vs IPR     This discharge recommendation:  A follow-up discussion with the attending provider and/or case management is planned    IF patient discharges home will need the following DME: TBD at rehab       SUBJECTIVE:   Patient stated I wish I could take a shower.     OBJECTIVE DATA SUMMARY:   Cognitive/Behavioral Status:  Neurologic State: Alert  Orientation Level: Appropriate for age           Safety/Judgement: Decreased awareness of need for safety;Decreased awareness of need for assistance    Functional Mobility and Transfers for ADLs:  Bed Mobility:  Supine to Sit: Minimum assistance;Assist x1    Transfers:  Sit to Stand: Minimum assistance;Assist x2  Functional Transfers  Bathroom Mobility: Minimum assistance  Adaptive Equipment: Walker (comment)  Bed to Chair: Minimum assistance;Assist x2    Balance:  Sitting: Intact  Standing: Impaired; Without support    ADL Intervention:       Grooming  Grooming Assistance: Minimum assistance  Position Performed: Standing; Other (comment) (at sink)  Brushing/Combing Hair: Minimum assistance         Lower Body Bathing  Perineal  : Moderate assistance;Maximum assistance  Position Performed: Standing              Toileting  Clothing Management: Maximum assistance    Cognitive Retraining  Safety/Judgement: Decreased awareness of need for safety;Decreased awareness of need for assistance    Therapeutic Exercises:       Pain:  No c/o pain    Activity Tolerance:   Fair    After treatment patient left in no apparent distress:   Sitting in chair, Call bell within reach, and Bed / chair alarm activated    COMMUNICATION/COLLABORATION:   The patients plan of care was discussed with: Physical therapist and Registered nurse.      Nitza Hernandez OTR/L  Time Calculation: 39 mins

## 2021-08-30 NOTE — PROGRESS NOTES
Problem: Mobility Impaired (Adult and Pediatric)  Goal: *Acute Goals and Plan of Care (Insert Text)  Description: FUNCTIONAL STATUS PRIOR TO ADMISSION: Patient was independent and active without use of DME.    HOME SUPPORT PRIOR TO ADMISSION: The patient lived with wife who is currently in SNF, pt's son lives nearby. Physical Therapy Goals  Initiated 8/29/2021  1. Patient will move from supine to sit and sit to supine , scoot up and down, and roll side to side in bed with minimal assistance/contact guard assist within 7 day(s). 2.  Patient will transfer from bed to chair and chair to bed with moderate assistance  using the least restrictive device within 7 day(s). 3.  Patient will perform sit to stand with moderate assistance  within 7 day(s). 4.  Patient will ambulate with moderate assistance  for 5 feet with the least restrictive device within 7 day(s). 5.  Patient will ascend/descend 2 stairs with both handrail(s) with moderate assistance  within 7 day(s). Outcome: Progressing Towards Goal   PHYSICAL THERAPY TREATMENT  Patient: Nicolás Galdamez. (80 y.o. male)  Date: 8/30/2021  Diagnosis: Rhabdomyolysis [M62.82] <principal problem not specified>       Precautions: Fall  Chart, physical therapy assessment, plan of care and goals were reviewed. ASSESSMENT  Patient continues with skilled PT services and is progressing towards goals. He was able to come to sit EOB w/ Min Ax2 while HOB elevated approx 40-50  degrees. Tolerated short gait to bathroom and performed ADL's in standing w/ RW along w/CGA-Min Ax1-2 to maintain stability. Pt knees buckling w/ fatigue and pt encouraged to return to sitting for rest break and to complete ADL's. Noted gait shuffled w/ decreased step clearance (noted RLE>Left). Pt remained OOB in chair w/ chair alarm in place. Pt instructed to call for NSG help by using call bell when ready to get up/back to bed or if he has any needs.      Current Level of Function Impacting Discharge (mobility/balance): CGA/Min Ax2 for functional transfers and short gait. Other factors to consider for discharge: lives alone (wife currently lives at [facility])         PLAN :  Patient continues to benefit from skilled intervention to address the above impairments. Continue treatment per established plan of care. to address goals. Recommendation for discharge: (in order for the patient to meet his/her long term goals)  To be determined: IPR vs SNF rehab pending mobility progress. This discharge recommendation:  Has been made in collaboration with the attending provider and/or case management    IF patient discharges home will need the following DME: hospital bed and rolling walker       SUBJECTIVE:   Patient stated So you have a wash cloth for my face?     OBJECTIVE DATA SUMMARY:   Critical Behavior:  Neurologic State: Alert  Orientation Level: Appropriate for age  Cognition: Appropriate decision making, Appropriate for age attention/concentration, Appropriate safety awareness, Follows commands  Safety/Judgement: Decreased awareness of need for safety, Decreased awareness of need for assistance  Functional Mobility Training:  Bed Mobility:     Supine to Sit: Minimum assistance;Assist x1              Transfers:  Sit to Stand: Minimum assistance;Assist x2  Stand to Sit: Minimum assistance;Assist x2        Bed to Chair: Minimum assistance;Assist x2                    Balance:  Sitting: Intact  Standing: Impaired; Without support  Ambulation/Gait Training:  Distance (ft): 20 Feet (ft)  Assistive Device: Gait belt;Walker, rolling  Ambulation - Level of Assistance: Contact guard assistance;Minimal assistance;Assist x2        Gait Abnormalities: Decreased step clearance; Step to gait        Base of Support: Widened     Speed/Gricelda: Pace decreased (<100 feet/min); Shuffled  Step Length: Left shortened;Right shortened        Interventions: Safety awareness training;Verbal cues      Therapeutic Exercises: Seated EOB: LAQ's, Hip Flexion  Pain Rating:  No verbal c/o pain    Activity Tolerance:   Fair    After treatment patient left in no apparent distress:   Sitting in chair and Call bell within reach    COMMUNICATION/COLLABORATION:   The patients plan of care was discussed with: Registered nurse.      Sailaja Izaguirre PTA   Time Calculation: 35 mins

## 2021-08-30 NOTE — PROGRESS NOTES
Pharmacist Note - Warfarin Dosing  Consult provided for this 89 y.o.male to manage warfarin for Afib    INR Goal: 2 - 3  Home regimen/ tablet size: 7.5 mg (5 mg x 1.5) daily, except 10 mg (5 mg x 2) every Thu, Sat    Drugs that may increase INR: None  Drugs that may decrease INR: None  Other current anticoagulants/ drugs that may increase bleeding risk: None  Risk factors: Age > 65  Daily INR ordered: YES    Recent Labs     08/30/21  0329 08/29/21  0320 08/28/21 2007   HGB 13.7 14.5 15.8   INR 1.7*  --  1.7*     Date               INR                  Dose  8/28             1.7              ---   8/29                --                     7.5 mg    8/30   1.7     10 mg                                                                               Assessment/ Plan: Will order warfarin 10 mg PO x 1 dose as INR still below goal range. Unclear when patient received last dose PTA. Pharmacy will continue to monitor daily and adjust therapy as indicated. Please contact the pharmacist at  for outpatient recommendations if needed.       Mary Jo Walton, PharmD  Clinical Pharmacist, Orthopedics and Med/Surg  [address] [facility] ()

## 2021-08-30 NOTE — PROGRESS NOTES
Hospitalist Progress Note      Hospital summary: 80 y.o man with BPH, afib, bladder cancer, HTN, who presents after being found down. His son didn't hear from him for 2 days, so he checked on him and he was on the floor. EMS noted feces around the apartment. The patient is a poor historian and doesn't know why he's here. He thinks he fell but is unsure. He denies pain 8/28/2021      Assessment/Plan:  Syncope vs Mechanical fall   Hx recurrent falls   AMS/encephaopathy: improved  - unclear etiology. CVA vs cardiac etiology vs Orthostatics  - CT head: Right periorbital soft tissue swelling. No acute intracranial abnormality  - CT neck: Multilevel spondylosis without acute abnormality  - MRI can not be done due to eye implant   -  Echo : EF 55 - 60%.   - PT/OT      Rhabdomyolysis: improving   - due to fall/being down  -monitor with IV fluids    CR on CKD stage 3 - improved   - cr near baseline   - will monitor renal function      Leukocytosis: resolved   possible infection vs dehydration  - CXR: airspace disease   - UA : large blood, 1+ bacteria   - blood cultures NGTD . Urine cx pending   - on IV ceftriaxone      Paroxysmal afib:  S/p AICD   -continue flecainide  -consult pharmacy for coumadin dosing, however, he should have a risk/benefit assessment of anticoagulation prior to discharge    HTN - BP elevated. Increased amlodipine. Hold ARB due to CR      History of bladder cancer - c/w flomax, proscar  Depression - zoloft     Code status: Full  DVT prophylaxis: warfarin   Disposition: TBD. [facility]  ----------------------------------------------    CC: Fall     S: patient is seen and examined at bedside this AM. He feels ok. He explained that he was sitting on edge of bed when he slipped on the floor and could not reach his alarm on night stand.    Discussed with nursing    Spoke with son Gume Whittington @ [phone] on phone - updated patient's status - pt improving, MRI can not be done, Echo normal, BP elevated, will be ready in 1-2 days. He wants to take his father to his home - explained that pt needs a lot of assistance       Review of Systems:  A comprehensive review of systems was negative.     O:  Visit Vitals  BP (!) 188/92   Pulse 80   Temp 97.7 °F (36.5 °C)   Resp 18   Ht 6' (1.829 m)   Wt 105.2 kg (232 lb)   SpO2 93%   BMI 31.46 kg/m²       PHYSICAL EXAM:  Gen: NAD, elderly   HEENT: anicteric sclerae, normal conjunctiva, oropharynx clear, MM moist  Neck: supple, trachea midline, no adenopathy  Heart: RRR, no MRG, no JVD, no peripheral edema  Lungs: CTA b/l, non-labored respirations  Abd: soft, NT, ND, BS+, no organomegaly  Extr: warm  Skin: dry, bruising on knees R>L  Neuro: CN II-XII grossly intact, slow speech, moves all extremities      Intake/Output Summary (Last 24 hours) at 8/30/2021 1425  Last data filed at 8/30/2021 1030  Gross per 24 hour   Intake    Output 425 ml   Net -425 ml        Recent labs & imaging reviewed:  Recent Results (from the past 24 hour(s))   DUPLEX CAROTID BILATERAL    Collection Time: 08/29/21  6:01 PM   Result Value Ref Range    Right CCA prox sys 54.5 cm/s    Right CCA prox [place] 10.6 cm/s    Right cca dist sys 60.0 cm/s    Right CCA dist [place] 13.8 cm/s    Right eca sys 72.1 cm/s    RIGHT EXTERNAL CAROTID ARTERY D 10.50 cm/s    Right ICA prox sys 37.2 cm/s    Right ICA prox [place] 7.5 cm/s    Right ICA mid sys 54.6 cm/s    Right ICA mid [place] 11.0 cm/s    Right ICA dist sys 71.2 cm/s    Right ICA dist [place] 16.2 cm/s    Right vertebral sys 44.1 cm/s    RIGHT VERTEBRAL ARTERY D 9.20 cm/s    Right ICA/CCA sys 1.2     Left CCA prox sys 59.8 cm/s    Left CCA prox [place] 10.1 cm/s    Left CCA dist sys 58.9 cm/s    Left CCA dist [place] 10.1 cm/s    Left ECA sys 77.2 cm/s    LEFT EXTERNAL CAROTID ARTERY D 6.60 cm/s    Left ICA prox sys 44.1 cm/s    Left ICA prox [place] 8.4 cm/s    Left ICA mid sys 56.3 cm/s    Left ICA mid [place] 14.5 cm/s    Left ICA dist sys 71.1 cm/s    Left ICA dist [place] 15.3 cm/s    Left vertebral sys 36.1 cm/s    LEFT VERTEBRAL ARTERY D 6.30 cm/s    Left ICA/CCA sys 1.20    PROTHROMBIN TIME + INR    Collection Time: 08/30/21  3:29 AM   Result Value Ref Range    INR 1.7 (H) 0.9 - 1.1      Prothrombin time 17.4 (H) 9.0 - 11.1 sec   CBC WITH AUTOMATED DIFF    Collection Time: 08/30/21  3:29 AM   Result Value Ref Range    WBC 10.3 4.1 - 11.1 K/uL    RBC 4.47 4.10 - 5.70 M/uL    HGB 13.7 12.1 - 17.0 g/dL    HCT 42.3 36.6 - 50.3 %    MCV 94.6 80.0 - 99.0 FL    MCH 30.6 26.0 - 34.0 PG    MCHC 32.4 30.0 - 36.5 g/dL    RDW 14.0 11.5 - 14.5 %    PLATELET 691 968 - 279 K/uL    MPV 10.3 8.9 - 12.9 FL    NRBC 0.0 0  WBC    ABSOLUTE NRBC 0.00 0.00 - 0.01 K/uL    NEUTROPHILS 71 32 - 75 %    LYMPHOCYTES 16 12 - 49 %    MONOCYTES 11 5 - 13 %    EOSINOPHILS 1 0 - 7 %    BASOPHILS 0 0 - 1 %    IMMATURE GRANULOCYTES 1 (H) 0.0 - 0.5 %    ABS. NEUTROPHILS 7.4 1.8 - 8.0 K/UL    ABS. LYMPHOCYTES 1.6 0.8 - 3.5 K/UL    ABS. MONOCYTES 1.1 (H) 0.0 - 1.0 K/UL    ABS. EOSINOPHILS 0.1 0.0 - 0.4 K/UL    ABS. BASOPHILS 0.0 0.0 - 0.1 K/UL    ABS. IMM.  GRANS. 0.1 (H) 0.00 - 0.04 K/UL    DF AUTOMATED     METABOLIC PANEL, BASIC    Collection Time: 08/30/21  3:29 AM   Result Value Ref Range    Sodium 140 136 - 145 mmol/L    Potassium 3.5 3.5 - 5.1 mmol/L    Chloride 111 (H) 97 - 108 mmol/L    CO2 23 21 - 32 mmol/L    Anion gap 6 5 - 15 mmol/L    Glucose 99 65 - 100 mg/dL    BUN 36 (H) 6 - 20 MG/DL    Creatinine 1.55 (H) 0.70 - 1.30 MG/DL    BUN/Creatinine ratio 23 (H) 12 - 20      GFR est AA 51 (L) >60 ml/min/1.73m2    GFR est non-AA 42 (L) >60 ml/min/1.73m2    Calcium 8.2 (L) 8.5 - 10.1 MG/DL   CK    Collection Time: 08/30/21  3:29 AM   Result Value Ref Range     (H) 39 - 308 U/L   ECHO ADULT COMPLETE    Collection Time: 08/30/21  9:36 AM   Result Value Ref Range    IVSd 1.32 (A) 0.60 - 1.00 cm    LVIDd 4.65 4.20 - 5.90 cm    LVIDs 3.30 cm    LVOT d 1.89 cm    LVPWd 1.45 (A) 0.60 - 1.00 cm    BP EF 70.8 55.0 - 100.0 percent    LV Ejection Fraction MOD 2C 73 percent    LV Ejection Fraction MOD 4C 69 percent    LV ED Vol A2C 128.92 mL    LV ED Vol A4C 161.80 mL    LV ED Vol .43 67.0 - 155.0 mL    LV ES Vol A2C 34.65 mL    LV ES Vol A4C 50.34 mL    LV ES Vol BP 42.47 22.0 - 58.0 mL    LVOT Peak Gradient 2.03 mmHg    LVOT Peak Velocity 71.23 cm/s    Left Atrium Major Axis 5.99 cm    LA Volume 138.39 18.0 - 58.0 mL    LA Area 4C 30.58 cm2    LA Vol 2C 148.42 (A) 18.00 - 58.00 mL    LA Vol 4C 109.68 (A) 18.00 - 58.00 mL    Aortic Valve Area by Continuity of Peak Velocity 1.45 cm2    AoV PG 7.59 mmHg    Aortic Valve Systolic Peak Velocity 133.47 cm/s    MV A Terrence 55.85 cm/s    Mitral Valve E Wave Deceleration Time 255.11 ms    MV E Terrence 63.98 cm/s    E/E' ratio (averaged) 9.68     E/E' lateral 10.68     E/E' septal 8.67     LV E' Lateral Velocity 5.99 cm/s    LV E' Septal Velocity 7.38 cm/s    Mitral Valve Pressure Half-time 73.98 ms    MVA (PHT) 2.97 cm2    Pulmonic Valve Systolic Peak Instantaneous Gradient 2.01 mmHg    Triscuspid Valve Regurgitation Peak Gradient 38.39 mmHg    TR Max Velocity 309.79 cm/s    Ao Root D 4.16 cm    MV E/A 1.15     LV Mass .8 88.0 - 224.0 g    LV Mass AL Index 113.1 49.0 - 115.0 g/m2    LVES Vol Index BP 18.7 mL/m2    LVED Vol Index BP 64.1 mL/m2    Left Atrium Minor Axis 2.64 cm    LA Vol Index 60.96 16.00 - 28.00 ml/m2    LA Vol Index 65.38 16.00 - 28.00 ml/m2    LA Vol Index 48.32 16.00 - 28.00 ml/m2    LVED Vol Index A4C 71.3 mL/m2    LVED Vol Index A2C 56.8 mL/m2    LVES Vol Index A4C 22.2 mL/m2    LVES Vol Index A2C 15.3 mL/m2    DAX/BSA Pk Terrence 0.6 cm2/m2     Recent Labs     08/30/21 0329 08/29/21 0320   WBC 10.3 12.9*   HGB 13.7 14.5   HCT 42.3 45.4    250     Recent Labs     08/30/21  0329 08/29/21  0320 08/28/21 2007    142 141   K 3.5 3.6 3.8   * 111* 111*   CO2 23 24 22   BUN 36* 39* 38*   CREA 1.55* 1.55* 1.58*   GLU 99 108* 130*   CA 8.2* 8.2* 8.9   MG  --  2.3 2.3     Recent Labs     08/28/21 2007   ALT 37   *   TBILI 0.7   TP 8.1   ALB 3.1*   GLOB 5.0*     Recent Labs     08/30/21 0329 08/28/21 2007   INR 1.7* 1.7*   PTP 17.4* 17.0*      No results for input(s): FE, TIBC, PSAT, FERR in the last 72 hours. No results found for: FOL, RBCF   No results for input(s): PH, PCO2, PO2 in the last 72 hours.   Recent Labs     08/30/21 0329 08/29/21 0320 08/28/21 2007   * 1,231* 1,434*   TROIQ  --   --  <0.05     Lab Results   Component Value Date/Time    Cholesterol, total 210 (H) 07/17/2020 10:31 AM    HDL Cholesterol 65 07/17/2020 10:31 AM    LDL, calculated 119.8 (H) 07/17/2020 10:31 AM    Triglyceride 126 07/17/2020 10:31 AM    CHOL/HDL Ratio 3.2 07/17/2020 10:31 AM     Lab Results   Component Value Date/Time    Glucose (POC) 117 08/28/2021 08:01 PM     Lab Results   Component Value Date/Time    Color YELLOW/STRAW 08/28/2021 11:32 PM    Appearance CLOUDY (A) 08/28/2021 11:32 PM    Specific gravity 1.027 08/28/2021 11:32 PM    pH (UA) 5.0 08/28/2021 11:32 PM    Protein 300 (A) 08/28/2021 11:32 PM    Glucose 500 (A) 08/28/2021 11:32 PM    Ketone 15 (A) 08/28/2021 11:32 PM    Bilirubin Negative 08/28/2021 11:32 PM    Urobilinogen 1.0 08/28/2021 11:32 PM    Nitrites Negative 08/28/2021 11:32 PM    Leukocyte Esterase Negative 08/28/2021 11:32 PM    Epithelial cells MODERATE (A) 08/28/2021 11:32 PM    Bacteria 1+ (A) 08/28/2021 11:32 PM    WBC 5-10 08/28/2021 11:32 PM    RBC  08/28/2021 11:32 PM       Med list reviewed  Current Facility-Administered Medications   Medication Dose Route Frequency    warfarin (COUMADIN) tablet 10 mg  10 mg Oral ONCE    [START ON 8/31/2021] bacitracin 500 unit/gram ointment   Topical DAILY    hydrALAZINE (APRESOLINE) 20 mg/mL injection 10 mg  10 mg IntraVENous Q6H PRN    amLODIPine (NORVASC) tablet 5 mg  5 mg Oral DAILY    warfarin - pharmacy to dose   Other Rx Dosing/Monitoring  tamsulosin (FLOMAX) capsule 0.4 mg  0.4 mg Oral DAILY    sertraline (ZOLOFT) tablet 100 mg  100 mg Oral DAILY    flecainide (TAMBOCOR) tablet 150 mg  150 mg Oral BID    finasteride (PROSCAR) tablet 5 mg  5 mg Oral DAILY    cholestyramine-aspartame (QUESTRAN LIGHT) packet 4 g  4 g Oral BID    0.9% sodium chloride infusion  100 mL/hr IntraVENous CONTINUOUS    sodium chloride (NS) flush 5-40 mL  5-40 mL IntraVENous Q8H    sodium chloride (NS) flush 5-40 mL  5-40 mL IntraVENous PRN    acetaminophen (TYLENOL) tablet 650 mg  650 mg Oral Q6H PRN    Or    acetaminophen (TYLENOL) suppository 650 mg  650 mg Rectal Q6H PRN    polyethylene glycol (MIRALAX) packet 17 g  17 g Oral DAILY PRN    ondansetron (ZOFRAN ODT) tablet 4 mg  4 mg Oral Q8H PRN    Or    ondansetron (ZOFRAN) injection 4 mg  4 mg IntraVENous Q6H PRN    cefTRIAXone (ROCEPHIN) 1 g in 0.9% sodium chloride (MBP/ADV) 50 mL MBP  1 g IntraVENous Q24H       Care Plan discussed with:  Patient/Family and Nurse    Nick Gonzalez MD  Internal Medicine  Date of Service: 8/30/2021

## 2021-08-30 NOTE — PROGRESS NOTES
[facility] visit. Mr. Joselito Cantor is [facility] [address] He was asleep. Prayer for spiritual communion offered for his well-being.     SINTIA Gonzalez, RN, ACSW, LCSW   Page:  [phone]

## 2021-08-31 LAB
ANION GAP SERPL CALC-SCNC: 8 MMOL/L (ref 5–15)
BUN SERPL-MCNC: 27 MG/DL (ref 6–20)
BUN/CREAT SERPL: 23 (ref 12–20)
CALCIUM SERPL-MCNC: 7.9 MG/DL (ref 8.5–10.1)
CHLORIDE SERPL-SCNC: 111 MMOL/L (ref 97–108)
CK SERPL-CCNC: 415 U/L (ref 39–308)
CO2 SERPL-SCNC: 21 MMOL/L (ref 21–32)
CREAT SERPL-MCNC: 1.18 MG/DL (ref 0.7–1.3)
GLUCOSE SERPL-MCNC: 100 MG/DL (ref 65–100)
INR PPP: 2.1 (ref 0.9–1.1)
POTASSIUM SERPL-SCNC: 3.2 MMOL/L (ref 3.5–5.1)
PROTHROMBIN TIME: 21.6 SEC (ref 9–11.1)
SODIUM SERPL-SCNC: 140 MMOL/L (ref 136–145)

## 2021-08-31 PROCEDURE — 36415 COLL VENOUS BLD VENIPUNCTURE: CPT

## 2021-08-31 PROCEDURE — 85610 PROTHROMBIN TIME: CPT

## 2021-08-31 PROCEDURE — G0378 HOSPITAL OBSERVATION PER HR: HCPCS

## 2021-08-31 PROCEDURE — 65270000029 HC RM PRIVATE

## 2021-08-31 PROCEDURE — 97535 SELF CARE MNGMENT TRAINING: CPT

## 2021-08-31 PROCEDURE — 97116 GAIT TRAINING THERAPY: CPT

## 2021-08-31 PROCEDURE — 82550 ASSAY OF CK (CPK): CPT

## 2021-08-31 PROCEDURE — 74011250637 HC RX REV CODE- 250/637: Performed by: INTERNAL MEDICINE

## 2021-08-31 PROCEDURE — 74011250637 HC RX REV CODE- 250/637: Performed by: HOSPITALIST

## 2021-08-31 PROCEDURE — 74011000250 HC RX REV CODE- 250: Performed by: INTERNAL MEDICINE

## 2021-08-31 PROCEDURE — 80048 BASIC METABOLIC PNL TOTAL CA: CPT

## 2021-08-31 RX ORDER — LOSARTAN POTASSIUM 50 MG/1
50 TABLET ORAL DAILY
Status: DISCONTINUED | OUTPATIENT
Start: 2021-08-31 | End: 2021-09-01

## 2021-08-31 RX ORDER — WARFARIN 7.5 MG/1
7.5 TABLET ORAL ONCE
Status: COMPLETED | OUTPATIENT
Start: 2021-08-31 | End: 2021-08-31

## 2021-08-31 RX ADMIN — BACITRACIN 2 PACKET: 500 OINTMENT TOPICAL at 08:36

## 2021-08-31 RX ADMIN — CHOLESTYRAMINE 4 G: 4 POWDER, FOR SUSPENSION ORAL at 08:36

## 2021-08-31 RX ADMIN — TAMSULOSIN HYDROCHLORIDE 0.4 MG: 0.4 CAPSULE ORAL at 08:36

## 2021-08-31 RX ADMIN — WARFARIN SODIUM 7.5 MG: 7.5 TABLET ORAL at 16:43

## 2021-08-31 RX ADMIN — FLECAINIDE ACETATE 150 MG: 100 TABLET ORAL at 08:40

## 2021-08-31 RX ADMIN — LOSARTAN POTASSIUM 50 MG: 50 TABLET, FILM COATED ORAL at 09:47

## 2021-08-31 RX ADMIN — Medication 10 ML: at 23:12

## 2021-08-31 RX ADMIN — FLECAINIDE ACETATE 150 MG: 100 TABLET ORAL at 18:51

## 2021-08-31 RX ADMIN — AMLODIPINE BESYLATE 10 MG: 5 TABLET ORAL at 08:39

## 2021-08-31 RX ADMIN — SERTRALINE 100 MG: 50 TABLET, FILM COATED ORAL at 08:36

## 2021-08-31 RX ADMIN — FINASTERIDE 5 MG: 5 TABLET, FILM COATED ORAL at 08:36

## 2021-08-31 NOTE — PROGRESS NOTES
Problem: Mobility Impaired (Adult and Pediatric)  Goal: *Acute Goals and Plan of Care (Insert Text)  Description: FUNCTIONAL STATUS PRIOR TO ADMISSION: Patient was independent and active without use of DME.    HOME SUPPORT PRIOR TO ADMISSION: The patient lived with wife who is currently in SNF, pt's son lives nearby. Physical Therapy Goals  Initiated 8/29/2021  1. Patient will move from supine to sit and sit to supine , scoot up and down, and roll side to side in bed with minimal assistance/contact guard assist within 7 day(s). 2.  Patient will transfer from bed to chair and chair to bed with moderate assistance  using the least restrictive device within 7 day(s). 3.  Patient will perform sit to stand with moderate assistance  within 7 day(s). 4.  Patient will ambulate with moderate assistance  for 5 feet with the least restrictive device within 7 day(s). 5.  Patient will ascend/descend 2 stairs with both handrail(s) with moderate assistance  within 7 day(s). Outcome: Progressing Towards Goal     PHYSICAL THERAPY TREATMENT  Patient: Alana Mandujano (80 y.o. male)  Date: 8/31/2021  Diagnosis: Rhabdomyolysis [M62.82] <principal problem not specified>       Precautions: Fall  Chart, physical therapy assessment, plan of care and goals were reviewed. ASSESSMENT  Patient continues with skilled PT services and is progressing towards goals. Pt tolerates sitting EOB with poor control of sliding off bed, educated in technique and assisted into scooting safely. Pt requires min A for standing x2. Pt tolerates gait with some knee flexion and requires cues for full ext. Pt able to tolerate gait with min A and assist steering 15ft+20ft. Pt tolerates sitting well.     Current Level of Function Impacting Discharge (mobility/balance): min A, will require 24/7 supervision at home    Other factors to consider for discharge: lived alone PTA         PLAN :  Patient continues to benefit from skilled intervention to address the above impairments. Continue treatment per established plan of care. to address goals. Recommendation for discharge: (in order for the patient to meet his/her long term goals)  Physical therapy at least 2 days/week in the home . Recommend SNF however patient and son refusing    This discharge recommendation:  Has been made in collaboration with the attending provider and/or case management    IF patient discharges home will need the following DME: to be determined (TBD)       SUBJECTIVE:   Patient stated I need to pee, I need to go.     OBJECTIVE DATA SUMMARY:   Critical Behavior:  Neurologic State: Alert  Orientation Level: Appropriate for age, Oriented to person  Cognition: Follows commands, Appropriate safety awareness  Safety/Judgement: Decreased awareness of need for safety, Decreased awareness of need for assistance  Functional Mobility Training:  Bed Mobility:     Supine to Sit: Minimum assistance  Sit to Supine: Minimum assistance  Scooting: Minimum assistance        Transfers:  Sit to Stand: Minimum assistance;Assist x2  Stand to Sit: Minimum assistance;Assist x2        Bed to Chair: Minimum assistance;Assist x2                    Balance:  Sitting: Intact  Standing: Impaired; Without support  Standing - Static: Fair;Constant support  Standing - Dynamic : Fair;Constant support  Ambulation/Gait Training:  Distance (ft): 20 Feet (ft) (15+20)  Assistive Device: Gait belt;Walker, rolling  Ambulation - Level of Assistance: Contact guard assistance;Minimal assistance;Assist x2        Gait Abnormalities: Decreased step clearance; Step to gait        Base of Support: Widened     Speed/Gricelda: Shuffled;Pace decreased (<100 feet/min)  Step Length: Left shortened;Right shortened        Interventions: Safety awareness training;Verbal cues     Pain Rating:  controlled    Activity Tolerance:   Fair and requires rest breaks    After treatment patient left in no apparent distress:   Sitting in chair and Call bell within reach    COMMUNICATION/COLLABORATION:   The patients plan of care was discussed with: Registered nurse and Case management.      Barbara Wilson, PT   Time Calculation: 18 mins

## 2021-08-31 NOTE — PROGRESS NOTES
Pharmacist Note - Warfarin Dosing  Consult provided for this 89 y.o.male to manage warfarin for Afib    INR Goal: 2 - 3  Home regimen/ tablet size: 7.5 mg (5 mg x 1.5) daily, except 10 mg (5 mg x 2) every Thu, Sat    Drugs that may increase INR: None  Drugs that may decrease INR: None  Other current anticoagulants/ drugs that may increase bleeding risk: None  Risk factors: Age > 65  Daily INR ordered: YES    Recent Labs     08/31/21  0334 08/30/21  0329 08/29/21  0320 08/28/21 2007   HGB  --  13.7 14.5 15.8   INR 2.1* 1.7*  --  1.7*     Date               INR                  Dose  8/28             1.7              ---   8/29                --                     7.5 mg    8/30   1.7     10 mg  8/31   2.1      7.5 mg                                                                               Assessment/ Plan: Will order warfarin 7.5 mg PO x 1 dose. INR now in target range     Pharmacy will continue to monitor daily and adjust therapy as indicated. Please contact the pharmacist at  for outpatient recommendations if needed.       Dorinda Waters, PharmD  Clinical Pharmacist, Orthopedics and Med/Surg  [address] [facility] [employer] [facility] ()

## 2021-08-31 NOTE — PROGRESS NOTES
Physician Progress Note      George Guzman  [facility] #:                  [id number]  :                       7/15/1931  ADMIT DATE:       2021 7:35 PM  [address] [place] DATE:  RESPONDING  PROVIDER #:        Paula Jackson MD          QUERY TEXT:      This patient has CR on CKD 3  documented in the medical record. Per the medical record, her baseline creatinine is 1.4. Currently the patient does not meet KDIGO criteria for CR if the patient has CKD 3. In order to support the diagnosis of CR, please include additional clinical indicators in your documentation. Or please document if the diagnosis of CR has been ruled out after further study. The medical record reflects the following:  Risk Factors: Hx. CKD 3,  Clinical Indicators: -cr 1.58, -Cr 1.55, - Cr 1.55, -Cr 1.18  Per H&P- CR: he appears dry  - Per PN- CR on CKD stage 3 - improved  - cr near baseline  - will monitor renal function  Treatment: Monitor labwork, vital signs, imaging, strict I&O, daily weight, avoid nephrotoxins and hypotension, IV NS @ 100 cc/hour    Thank you,  Liza Mota RN, BSN  Clinical documentation Improvement  [phone]  Options provided:  -- Acute kidney injury as evidenced by, Please document evidence. -- Acute kidney injury ruled out after study, CKD 3 only  -- Other - I will add my own diagnosis  -- Disagree - Not applicable / Not valid  -- Disagree - Clinically unable to determine / Unknown  -- Refer to Clinical Documentation Reviewer    PROVIDER RESPONSE TEXT:    Acute kidney injury was ruled out after study, CKD 3 only.     Query created by: Malik Giordano on 2021 7:52 AM      Electronically signed by:  Paula Jackson MD 2021 7:57 AM

## 2021-08-31 NOTE — PROGRESS NOTES
Hospitalist Progress Note      Hospital summary: 80 y.o man with BPH, afib, bladder cancer, HTN, who presents after being found down. His son didn't hear from him for 2 days, so he checked on him and he was on the floor. EMS noted feces around the apartment. The patient is a poor historian and doesn't know why he's here. He thinks he fell but is unsure. He denies pain 8/28/2021      Assessment/Plan:  Mechanical fall - recurrent   Less like syncope  AMS/encephaopathy: improved  - CT head: Right periorbital soft tissue swelling. No acute intracranial abnormality  - CT neck: Multilevel spondylosis without acute abnormality  - MRI can not be done due to eye implant   -  Echo : EF 55 - 60%.   - PT/OT - need SNF      Rhabdomyolysis: improving   - due to fall/being down  -monitor with IV fluids    CR on CKD stage 3 - improved   - cr near baseline   - will monitor renal function      Leukocytosis: resolved   possible due to dehydration  - blood cultures NGTD . Urine cx negative   - dced ceftriaxone     Abnormal CXR:  - CXR: Bilateral lower lobe interstitial infiltrates. Possible lingular mass lesion.  - rapid covid negative  - will need outpt f/u with CT chest      Paroxysmal afib:  S/p AICD   -continue flecainide  -consult pharmacy for coumadin dosing, however, he should have a risk/benefit assessment of anticoagulation prior to discharge    HTN - BP better controlled. C/w amlodipine. Restarted losartan     Hypokalemia - replaced, will monitor      History of bladder cancer - c/w flomax, proscar  Depression - zoloft     Code status: Full  DVT prophylaxis: warfarin   Disposition: TBD. Home with [facility] - son preference and pt agreed   ----------------------------------------------    CC: Fall     S: patient is seen and examined at bedside this AM. He feels tired and body aches.  Explained that his son is planning to take him home tomorrow   Discussed with nursing and cm     Review of Systems:  RAUDEL comprehensive review of systems was negative.     O:  Visit Vitals  /74   Pulse 68   Temp 98.3 °F (36.8 °C)   Resp 16   Ht 6' (1.829 m)   Wt 105.2 kg (232 lb)   SpO2 95%   BMI 31.46 kg/m²       PHYSICAL EXAM:  Gen: NAD, elderly   HEENT: anicteric sclerae, normal conjunctiva, oropharynx clear, MM moist  Neck: supple, trachea midline, no adenopathy  Heart: RRR, no MRG, no JVD, no peripheral edema  Lungs: CTA b/l, non-labored respirations  Abd: soft, NT, ND, BS+, no organomegaly  Extr: warm  Skin: dry, bruising on knees R>L  Neuro: CN II-XII grossly intact, slow speech, moves all extremities    No intake or output data in the 24 hours ending 08/31/21 1512     Recent labs & imaging reviewed:  Recent Results (from the past 24 hour(s))   PROTHROMBIN TIME + INR    Collection Time: 08/31/21  3:34 AM   Result Value Ref Range    INR 2.1 (H) 0.9 - 1.1      Prothrombin time 21.6 (H) 9.0 - 84.9 sec   METABOLIC PANEL, BASIC    Collection Time: 08/31/21  3:34 AM   Result Value Ref Range    Sodium 140 136 - 145 mmol/L    Potassium 3.2 (L) 3.5 - 5.1 mmol/L    Chloride 111 (H) 97 - 108 mmol/L    CO2 21 21 - 32 mmol/L    Anion gap 8 5 - 15 mmol/L    Glucose 100 65 - 100 mg/dL    BUN 27 (H) 6 - 20 MG/DL    Creatinine 1.18 0.70 - 1.30 MG/DL    BUN/Creatinine ratio 23 (H) 12 - 20      GFR est AA >60 >60 ml/min/1.73m2    GFR est non-AA 58 (L) >60 ml/min/1.73m2    Calcium 7.9 (L) 8.5 - 10.1 MG/DL   CK    Collection Time: 08/31/21  3:34 AM   Result Value Ref Range     (H) 39 - 308 U/L     Recent Labs     08/30/21  0329 08/29/21  0320   WBC 10.3 12.9*   HGB 13.7 14.5   HCT 42.3 45.4    250     Recent Labs     08/31/21  0334 08/30/21  0329 08/29/21 0320 08/28/21 2007 08/28/21 2007    140 142   < > 141   K 3.2* 3.5 3.6   < > 3.8   * 111* 111*   < > 111*   CO2 21 23 24   < > 22   BUN 27* 36* 39*   < > 38*   CREA 1.18 1.55* 1.55*   < > 1.58*    99 108*   < > 130*   CA 7.9* 8.2* 8.2*   < > 8.9   MG  --   -- 2.3  --  2.3    < > = values in this interval not displayed. Recent Labs     08/28/21 2007   ALT 37   *   TBILI 0.7   TP 8.1   ALB 3.1*   GLOB 5.0*     Recent Labs     08/31/21 0334 08/30/21 0329 08/28/21 2007   INR 2.1* 1.7* 1.7*   PTP 21.6* 17.4* 17.0*      No results for input(s): FE, TIBC, PSAT, FERR in the last 72 hours. No results found for: FOL, RBCF   No results for input(s): PH, PCO2, PO2 in the last 72 hours. Recent Labs     08/31/21 0334 08/30/21 0329 08/29/21 0320 08/28/21 2007 08/28/21 2007   * 803* 1,231*   < > 1,434*   TROIQ  --   --   --   --  <0.05    < > = values in this interval not displayed.      Lab Results   Component Value Date/Time    Cholesterol, total 210 (H) 07/17/2020 10:31 AM    HDL Cholesterol 65 07/17/2020 10:31 AM    LDL, calculated 119.8 (H) 07/17/2020 10:31 AM    Triglyceride 126 07/17/2020 10:31 AM    CHOL/HDL Ratio 3.2 07/17/2020 10:31 AM     Lab Results   Component Value Date/Time    Glucose (POC) 117 08/28/2021 08:01 PM     Lab Results   Component Value Date/Time    Color YELLOW/STRAW 08/28/2021 11:32 PM    Appearance CLOUDY (A) 08/28/2021 11:32 PM    Specific gravity 1.027 08/28/2021 11:32 PM    pH (UA) 5.0 08/28/2021 11:32 PM    Protein 300 (A) 08/28/2021 11:32 PM    Glucose 500 (A) 08/28/2021 11:32 PM    Ketone 15 (A) 08/28/2021 11:32 PM    Bilirubin Negative 08/28/2021 11:32 PM    Urobilinogen 1.0 08/28/2021 11:32 PM    Nitrites Negative 08/28/2021 11:32 PM    Leukocyte Esterase Negative 08/28/2021 11:32 PM    Epithelial cells MODERATE (A) 08/28/2021 11:32 PM    Bacteria 1+ (A) 08/28/2021 11:32 PM    WBC 5-10 08/28/2021 11:32 PM    RBC  08/28/2021 11:32 PM       Med list reviewed  Current Facility-Administered Medications   Medication Dose Route Frequency    losartan (COZAAR) tablet 50 mg  50 mg Oral DAILY    amLODIPine (NORVASC) tablet 10 mg  10 mg Oral DAILY    bacitracin 500 unit/gram packet 2 Packet  2 Packet Topical DAILY    hydrALAZINE (APRESOLINE) 20 mg/mL injection 10 mg  10 mg IntraVENous Q6H PRN    warfarin - pharmacy to dose   Other Rx Dosing/Monitoring    tamsulosin (FLOMAX) capsule 0.4 mg  0.4 mg Oral DAILY    sertraline (ZOLOFT) tablet 100 mg  100 mg Oral DAILY    flecainide (TAMBOCOR) tablet 150 mg  150 mg Oral BID    finasteride (PROSCAR) tablet 5 mg  5 mg Oral DAILY    cholestyramine-aspartame (QUESTRAN LIGHT) packet 4 g  4 g Oral BID    0.9% sodium chloride infusion  100 mL/hr IntraVENous CONTINUOUS    sodium chloride (NS) flush 5-40 mL  5-40 mL IntraVENous Q8H    sodium chloride (NS) flush 5-40 mL  5-40 mL IntraVENous PRN    acetaminophen (TYLENOL) tablet 650 mg  650 mg Oral Q6H PRN    Or    acetaminophen (TYLENOL) suppository 650 mg  650 mg Rectal Q6H PRN    polyethylene glycol (MIRALAX) packet 17 g  17 g Oral DAILY PRN    ondansetron (ZOFRAN ODT) tablet 4 mg  4 mg Oral Q8H PRN    Or    ondansetron (ZOFRAN) injection 4 mg  4 mg IntraVENous Q6H PRN    cefTRIAXone (ROCEPHIN) 1 g in 0.9% sodium chloride (MBP/ADV) 50 mL MBP  1 g IntraVENous Q24H       Care Plan discussed with:  Patient/Family and Nurse    Socrates Alejo MD  Internal Medicine  Date of Service: 8/31/2021

## 2021-08-31 NOTE — PROGRESS NOTES
Rounded on [facility] patients and provided Anointing of the Sick and Communion  at request of patient.     Orin Stone

## 2021-08-31 NOTE — PROGRESS NOTES
LAURENT:    RUR 19%    Disposition: Home with HH. Referrals sent to [facility], [facility], [facility] Sent via [employer]!. Referral sent to [facility] for RW. Transportation: Son    Primary Contact: Celi Aponte [phone]    Care Management Interventions  PCP Verified by CM: Yes  Palliative Care Criteria Met (RRAT>21 & CHF Dx)?: No  Mode of Transport at Discharge:  (Son)  Transition of Care Consult (CM Consult): [address]: No  Discharge Durable Medical Equipment: Yes  Physical Therapy Consult: Yes  Occupational Therapy Consult: Yes  Speech Therapy Consult: No  Current Support Network:  (Lives at [address] [place] [address])  Confirm Follow Up Transport: Family  The Patient and/or Patient Representative was Provided with a Choice of Provider and Agrees with the Discharge Plan?: Yes  Freedom of Choice List was Provided with Basic Dialogue that Supports the Patient's Individualized Plan of Care/Goals, Treatment Preferences and Shares the Quality Data Associated with the Providers?: Yes  Discharge Location  Discharge Placement: Home with home health  Reason for Admission:                       RUR Score:                     Plan for utilizing home health:          PCP: First and Last name:  Nikhil Fabian MD     Name of Practice:    Are you a current patient: Yes/No: Yes   Approximate date of last visit:    Can you participate in a virtual visit with your PCP:                     Current Advanced Directive/Advance Care Plan: Full Code      Healthcare Decision Maker:   Click here to complete [address] including selection of the Healthcare Decision Maker Relationship (ie \"Primary\")                             Transition of Care Plan:      CM met with patient to introduce CM role, verify demographics and begin discharge planning. Patient has been living at [address] [place] [address] and his wife Lives at [address] [place] [address].       He has a cane at home and a walker has been ordered this admission. Patient gets prescriptions filled at [facility].    Patient will be staying with his son and getting [facility] at discharge. Insurance verified:    [employer]    Son will transport at discharge.   Srikanth Geller, RN/CRM  [phone]

## 2021-08-31 NOTE — PROGRESS NOTES
Problem: Self Care Deficits Care Plan (Adult)  Goal: *Acute Goals and Plan of Care (Insert Text)  Description:   FUNCTIONAL STATUS PRIOR TO ADMISSION: Patient was independent and active without use of DME. Pt lives at independent living facility    HOME SUPPORT: The patient lived alone and reports wife lives at nearby SNF. Occupational Therapy Goals  Initiated 8/29/2021  1. Patient will perform bathing with minimal assistance/contact guard assist within 7 day(s). 2.  Patient will perform upper body dressing with supervision/set-up within 7 day(s). 3.  Patient will perform lower body dressing with moderate assistance and AE PRN within 7 day(s). 4.  Patient will perform toilet transfers with minimal assistance/contact guard assist within 7 day(s). 5.  Patient will perform all aspects of toileting with moderate assistance  within 7 day(s). Outcome: Progressing Towards Goal   OCCUPATIONAL THERAPY TREATMENT  Patient: Ben Torres (80 y.o. male)  Date: 8/31/2021  Diagnosis: Rhabdomyolysis [M62.82] <principal problem not specified>       Precautions: Fall  Chart, occupational therapy assessment, plan of care, and goals were reviewed. ASSESSMENT  Patient continues with skilled OT services and is progressing towards goals. Pt received supine in bed attempting to get OOB without assistance. Pt asking to use bathroom. Bed mobility at min assist x 1 with bed modified, fair sit balance, verbal cues to scoot back to prevent from sliding OOB. Sit to stand using RW and accessing bathroom with min assist x 2, increase verbal, tactile and visual cues for safety. Pt continues to present with increase instability with ambulation and high fall risk. Pt placed in chair with chair alarm. Pt set-up for grooming. Spoke with son on phone >15 min about home safety, SNF vs HH and DME. Son feels his father will do better at his home (son's) vs SNF.   Expressed concern with pt's level of care and felt that SNF may provide more therapy and time to finalize his father's living arrangements. Son feels like he can handle his dad at home. He realizes that his father is 24/7 fall risk and that he is 100% incontinent. Spoke with CM, PT and nurse. All notified about conversation with son. PT to order RW and son to purchase [facility]. Pt will need to achieve 5 steps to access his son's home. Son is also going to have father's life alert from his apt to be set-up in his home. Son to arrive tomorrow at 10:30. Will con't to follow. Current Level of Function Impacting Discharge (ADLs): min assist x 2 for mobility, mod to max assist with toileting    Other factors to consider for discharge: lived alone         PLAN :  Patient continues to benefit from skilled intervention to address the above impairments. Continue treatment per established plan of care to address goals. Recommend with staff:     Recommend next OT session: see goals    Recommendation for discharge: (in order for the patient to meet his/her long term goals)  Occupational therapy at least 2 days/week in the home     This discharge recommendation:  Has been made in collaboration with the attending provider and/or case management    IF patient discharges home will need the following DME: RW, SPC       SUBJECTIVE:   Patient stated I need to use the bathroom.     OBJECTIVE DATA SUMMARY:   Cognitive/Behavioral Status:  Neurologic State: Alert  Orientation Level: Oriented to person           Safety/Judgement: Decreased awareness of need for safety;Decreased insight into deficits    Functional Mobility and Transfers for ADLs:  Bed Mobility:  Supine to Sit: Minimum assistance  Sit to Supine: Minimum assistance  Scooting: Minimum assistance    Transfers:  Sit to Stand: Minimum assistance;Assist x2  Functional Transfers  Bathroom Mobility: Minimum assistance  Toilet Transfer : Minimum assistance;Assist x2  Bed to Chair: Minimum assistance;Assist x2    Balance:  Sitting: Intact  Standing: Impaired; Without support  Standing - Static: Fair;Constant support  Standing - Dynamic : Fair;Constant support    ADL Intervention:       Grooming  Grooming Assistance: Set-up  Position Performed: Seated in chair         Toileting  Toileting Assistance: Moderate assistance;Maximum assistance  Bowel Hygiene: Moderate assistance  Clothing Management: Maximum assistance    Cognitive Retraining  Safety/Judgement: Decreased awareness of need for safety;Decreased insight into deficits    Therapeutic Exercises:       Pain:  No c/o of pain    Activity Tolerance:   Fair    After treatment patient left in no apparent distress:   Sitting in chair, Call bell within reach, and Bed / chair alarm activated    COMMUNICATION/COLLABORATION:   The patients plan of care was discussed with: Physical therapist and Registered nurse.      Candie Hunter OTR/L  Time Calculation: 49 mins

## 2021-09-01 LAB
ANION GAP SERPL CALC-SCNC: 8 MMOL/L (ref 5–15)
BUN SERPL-MCNC: 21 MG/DL (ref 6–20)
BUN/CREAT SERPL: 16 (ref 12–20)
CALCIUM SERPL-MCNC: 7.7 MG/DL (ref 8.5–10.1)
CHLORIDE SERPL-SCNC: 111 MMOL/L (ref 97–108)
CK SERPL-CCNC: 222 U/L (ref 39–308)
CO2 SERPL-SCNC: 23 MMOL/L (ref 21–32)
CREAT SERPL-MCNC: 1.3 MG/DL (ref 0.7–1.3)
GLUCOSE SERPL-MCNC: 102 MG/DL (ref 65–100)
INR PPP: 2.2 (ref 0.9–1.1)
MAGNESIUM SERPL-MCNC: 2.1 MG/DL (ref 1.6–2.4)
POTASSIUM SERPL-SCNC: 3.1 MMOL/L (ref 3.5–5.1)
PROTHROMBIN TIME: 22.1 SEC (ref 9–11.1)
SODIUM SERPL-SCNC: 142 MMOL/L (ref 136–145)

## 2021-09-01 PROCEDURE — 85610 PROTHROMBIN TIME: CPT

## 2021-09-01 PROCEDURE — 65270000029 HC RM PRIVATE

## 2021-09-01 PROCEDURE — G0378 HOSPITAL OBSERVATION PER HR: HCPCS

## 2021-09-01 PROCEDURE — 97530 THERAPEUTIC ACTIVITIES: CPT

## 2021-09-01 PROCEDURE — 97535 SELF CARE MNGMENT TRAINING: CPT

## 2021-09-01 PROCEDURE — 74011250637 HC RX REV CODE- 250/637: Performed by: HOSPITALIST

## 2021-09-01 PROCEDURE — 74011250637 HC RX REV CODE- 250/637: Performed by: INTERNAL MEDICINE

## 2021-09-01 PROCEDURE — 82550 ASSAY OF CK (CPK): CPT

## 2021-09-01 PROCEDURE — 80048 BASIC METABOLIC PNL TOTAL CA: CPT

## 2021-09-01 PROCEDURE — 36415 COLL VENOUS BLD VENIPUNCTURE: CPT

## 2021-09-01 PROCEDURE — 97116 GAIT TRAINING THERAPY: CPT

## 2021-09-01 PROCEDURE — 83735 ASSAY OF MAGNESIUM: CPT

## 2021-09-01 PROCEDURE — 74011000250 HC RX REV CODE- 250: Performed by: INTERNAL MEDICINE

## 2021-09-01 RX ORDER — LOSARTAN POTASSIUM 50 MG/1
100 TABLET ORAL DAILY
Status: DISCONTINUED | OUTPATIENT
Start: 2021-09-01 | End: 2021-09-02 | Stop reason: HOSPADM

## 2021-09-01 RX ORDER — POTASSIUM CHLORIDE 750 MG/1
40 TABLET, FILM COATED, EXTENDED RELEASE ORAL
Status: COMPLETED | OUTPATIENT
Start: 2021-09-01 | End: 2021-09-01

## 2021-09-01 RX ORDER — WARFARIN 7.5 MG/1
7.5 TABLET ORAL ONCE
Status: COMPLETED | OUTPATIENT
Start: 2021-09-01 | End: 2021-09-01

## 2021-09-01 RX ADMIN — FINASTERIDE 5 MG: 5 TABLET, FILM COATED ORAL at 10:22

## 2021-09-01 RX ADMIN — TAMSULOSIN HYDROCHLORIDE 0.4 MG: 0.4 CAPSULE ORAL at 10:21

## 2021-09-01 RX ADMIN — SERTRALINE 100 MG: 50 TABLET, FILM COATED ORAL at 10:21

## 2021-09-01 RX ADMIN — AMLODIPINE BESYLATE 10 MG: 5 TABLET ORAL at 10:21

## 2021-09-01 RX ADMIN — Medication 10 ML: at 21:14

## 2021-09-01 RX ADMIN — WARFARIN SODIUM 7.5 MG: 7.5 TABLET ORAL at 18:16

## 2021-09-01 RX ADMIN — FLECAINIDE ACETATE 150 MG: 100 TABLET ORAL at 10:40

## 2021-09-01 RX ADMIN — Medication 10 ML: at 08:04

## 2021-09-01 RX ADMIN — POTASSIUM CHLORIDE 40 MEQ: 750 TABLET, FILM COATED, EXTENDED RELEASE ORAL at 08:05

## 2021-09-01 RX ADMIN — Medication 10 ML: at 14:30

## 2021-09-01 RX ADMIN — CHOLESTYRAMINE 4 G: 4 POWDER, FOR SUSPENSION ORAL at 21:14

## 2021-09-01 RX ADMIN — BACITRACIN 2 PACKET: 500 OINTMENT TOPICAL at 10:22

## 2021-09-01 RX ADMIN — LOSARTAN POTASSIUM 100 MG: 50 TABLET, FILM COATED ORAL at 10:22

## 2021-09-01 RX ADMIN — FLECAINIDE ACETATE 150 MG: 100 TABLET ORAL at 18:16

## 2021-09-01 NOTE — PROGRESS NOTES
Hospitalist Progress Note      Hospital summary: 80 y.o man with BPH, afib, bladder cancer, HTN, who presents after being found down. His son didn't hear from him for 2 days, so he checked on him and he was on the floor. EMS noted feces around the apartment. The patient is a poor historian and doesn't know why he's here. He thinks he fell but is unsure. He denies pain 8/28/2021      Assessment/Plan:  Mechanical fall - recurrent   Less like syncope  AMS/encephaopathy: improved  - CT head: Right periorbital soft tissue swelling. No acute intracranial abnormality  - CT neck: Multilevel spondylosis without acute abnormality  - MRI can not be done due to eye implant   -  Echo : EF 55 - 60%.   - PT/OT - need SNF      Rhabdomyolysis: resolved   - due to fall/being down    CR on CKD stage 3 - cr at baseline   - will monitor renal function      Leukocytosis: resolved   possible due to dehydration  - blood cultures NGTD . Urine cx negative   - dced ceftriaxone     Abnormal CXR:  - CXR: Bilateral lower lobe interstitial infiltrates. Possible lingular mass lesion.  - rapid covid negative  - will need outpt f/u with CT chest      Paroxysmal afib:  S/p AICD   -continue flecainide  -consult pharmacy for coumadin dosing, however, he should have a risk/benefit assessment of anticoagulation prior to discharge    HTN - BP better controlled. C/w amlodipine. Increased losartan     Hypokalemia - replaced, will monitor      History of bladder cancer - c/w flomax, proscar  Depression - zoloft     Code status: Full  DVT prophylaxis: warfarin   Disposition: TBD. SNF - CM working on it. Medically stable to be discharged  ----------------------------------------------    CC: Fall     S: patient is seen and examined at bedside this AM. He feels ok. No overnight events. He c/o gritting feeling in his left eye-  no redness or watering - recommended to see his eye doctor.   Discussed with nursing and cm     Spoke with son Willis Rodriguez in phone - he now decided that taking care of his father at home will be too much and would prefer to him to go to SNF ( which was recommended by me and entire staff on Monday). CM will start working on SNF    Review of Systems:  A comprehensive review of systems was negative.     O:  Visit Vitals  BP (!) 171/81   Pulse (!) 58   Temp 97.6 °F (36.4 °C)   Resp 18   Ht 6' (1.829 m)   Wt 105.2 kg (232 lb)   SpO2 96%   BMI 31.46 kg/m²       PHYSICAL EXAM:  Gen: NAD, elderly   HEENT: anicteric sclerae, normal conjunctiva, bruising on forehead  Neck: supple, trachea midline, no adenopathy  Heart: RRR, no MRG, no JVD, no peripheral edema  Lungs: CTA b/l, non-labored respirations  Abd: soft, NT, ND, BS+, no organomegaly  Extr: warm  Skin: dry, bruising on knees R>L  Neuro: CN II-XII grossly intact, slow speech, moves all extremities    No intake or output data in the 24 hours ending 09/01/21 1107     Recent labs & imaging reviewed:  Recent Results (from the past 24 hour(s))   PROTHROMBIN TIME + INR    Collection Time: 09/01/21  4:49 AM   Result Value Ref Range    INR 2.2 (H) 0.9 - 1.1      Prothrombin time 22.1 (H) 9.0 - 78.2 sec   METABOLIC PANEL, BASIC    Collection Time: 09/01/21  4:49 AM   Result Value Ref Range    Sodium 142 136 - 145 mmol/L    Potassium 3.1 (L) 3.5 - 5.1 mmol/L    Chloride 111 (H) 97 - 108 mmol/L    CO2 23 21 - 32 mmol/L    Anion gap 8 5 - 15 mmol/L    Glucose 102 (H) 65 - 100 mg/dL    BUN 21 (H) 6 - 20 MG/DL    Creatinine 1.30 0.70 - 1.30 MG/DL    BUN/Creatinine ratio 16 12 - 20      GFR est AA >60 >60 ml/min/1.73m2    GFR est non-AA 52 (L) >60 ml/min/1.73m2    Calcium 7.7 (L) 8.5 - 10.1 MG/DL   CK    Collection Time: 09/01/21  4:49 AM   Result Value Ref Range     39 - 308 U/L   MAGNESIUM    Collection Time: 09/01/21  4:49 AM   Result Value Ref Range    Magnesium 2.1 1.6 - 2.4 mg/dL     Recent Labs     08/30/21  0329   WBC 10.3   HGB 13.7   HCT 42.3        Recent Labs 09/01/21 0449 08/31/21 0334 08/30/21 0329    140 140   K 3.1* 3.2* 3.5   * 111* 111*   CO2 23 21 23   BUN 21* 27* 36*   CREA 1.30 1.18 1.55*   * 100 99   CA 7.7* 7.9* 8.2*   MG 2.1  --   --      No results for input(s): ALT, AP, TBIL, TBILI, TP, ALB, GLOB, GGT, AML, LPSE in the last 72 hours. No lab exists for component: SGOT, GPT, AMYP, HLPSE  Recent Labs     09/01/21 0449 08/31/21 0334 08/30/21 0329   INR 2.2* 2.1* 1.7*   PTP 22.1* 21.6* 17.4*      No results for input(s): FE, TIBC, PSAT, FERR in the last 72 hours. No results found for: FOL, RBCF   No results for input(s): PH, PCO2, PO2 in the last 72 hours.   Recent Labs     09/01/21 0449 08/31/21 0334 08/30/21 0329    415* 803*     Lab Results   Component Value Date/Time    Cholesterol, total 210 (H) 07/17/2020 10:31 AM    HDL Cholesterol 65 07/17/2020 10:31 AM    LDL, calculated 119.8 (H) 07/17/2020 10:31 AM    Triglyceride 126 07/17/2020 10:31 AM    CHOL/HDL Ratio 3.2 07/17/2020 10:31 AM     Lab Results   Component Value Date/Time    Glucose (POC) 117 08/28/2021 08:01 PM     Lab Results   Component Value Date/Time    Color YELLOW/STRAW 08/28/2021 11:32 PM    Appearance CLOUDY (A) 08/28/2021 11:32 PM    Specific gravity 1.027 08/28/2021 11:32 PM    pH (UA) 5.0 08/28/2021 11:32 PM    Protein 300 (A) 08/28/2021 11:32 PM    Glucose 500 (A) 08/28/2021 11:32 PM    Ketone 15 (A) 08/28/2021 11:32 PM    Bilirubin Negative 08/28/2021 11:32 PM    Urobilinogen 1.0 08/28/2021 11:32 PM    Nitrites Negative 08/28/2021 11:32 PM    Leukocyte Esterase Negative 08/28/2021 11:32 PM    Epithelial cells MODERATE (A) 08/28/2021 11:32 PM    Bacteria 1+ (A) 08/28/2021 11:32 PM    WBC 5-10 08/28/2021 11:32 PM    RBC  08/28/2021 11:32 PM       Med list reviewed  Current Facility-Administered Medications   Medication Dose Route Frequency    losartan (COZAAR) tablet 100 mg  100 mg Oral DAILY    amLODIPine (NORVASC) tablet 10 mg  10 mg Oral DAILY    bacitracin 500 unit/gram packet 2 Packet  2 Packet Topical DAILY    hydrALAZINE (APRESOLINE) 20 mg/mL injection 10 mg  10 mg IntraVENous Q6H PRN    warfarin - pharmacy to dose   Other Rx Dosing/Monitoring    tamsulosin (FLOMAX) capsule 0.4 mg  0.4 mg Oral DAILY    sertraline (ZOLOFT) tablet 100 mg  100 mg Oral DAILY    flecainide (TAMBOCOR) tablet 150 mg  150 mg Oral BID    finasteride (PROSCAR) tablet 5 mg  5 mg Oral DAILY    cholestyramine-aspartame (QUESTRAN LIGHT) packet 4 g  4 g Oral BID    sodium chloride (NS) flush 5-40 mL  5-40 mL IntraVENous Q8H    sodium chloride (NS) flush 5-40 mL  5-40 mL IntraVENous PRN    acetaminophen (TYLENOL) tablet 650 mg  650 mg Oral Q6H PRN    Or    acetaminophen (TYLENOL) suppository 650 mg  650 mg Rectal Q6H PRN    polyethylene glycol (MIRALAX) packet 17 g  17 g Oral DAILY PRN    ondansetron (ZOFRAN ODT) tablet 4 mg  4 mg Oral Q8H PRN    Or    ondansetron (ZOFRAN) injection 4 mg  4 mg IntraVENous Q6H PRN       Care Plan discussed with:  Patient/Family and Nurse    Aydin Diaz MD  Internal Medicine  Date of Service: 9/1/2021

## 2021-09-01 NOTE — PROGRESS NOTES
Problem: Self Care Deficits Care Plan (Adult)  Goal: *Acute Goals and Plan of Care (Insert Text)  Description:   FUNCTIONAL STATUS PRIOR TO ADMISSION: Patient was independent and active without use of DME. Pt lives at independent living facility    HOME SUPPORT: The patient lived alone and reports wife lives at nearby SNF. Occupational Therapy Goals  Initiated 8/29/2021  1. Patient will perform bathing with minimal assistance/contact guard assist within 7 day(s). 2.  Patient will perform upper body dressing with supervision/set-up within 7 day(s). 3.  Patient will perform lower body dressing with moderate assistance and AE PRN within 7 day(s). 4.  Patient will perform toilet transfers with minimal assistance/contact guard assist within 7 day(s). 5.  Patient will perform all aspects of toileting with moderate assistance  within 7 day(s). Outcome: Progressing Towards Goal   OCCUPATIONAL THERAPY TREATMENT  Patient: Anjelica Doss (80 y.o. male)  Date: 9/1/2021  Diagnosis: Rhabdomyolysis [M62.82] <principal problem not specified>       Precautions: Fall  Chart, occupational therapy assessment, plan of care, and goals were reviewed. ASSESSMENT  Patient continues with skilled OT services and is progressing towards goals. Pt continues to be extremely unsteady on feet and requires x 2 for bathroom mobility, toileting and hygiene. Pt able to increase distance using RW demonstrated by accessing hallway. Yesterday, son planned on taking his father home. Today, spoke with CM who reports pt is going to SNF for further rehab. Feel therapy would benefit for pt d/t generalized weakness, decrease balance and h/o of falls. Will con't to fall. Current Level of Function Impacting Discharge (ADLs): min assist x 2 with mobility    Other factors to consider for discharge: lived alone, h/o of falls         PLAN :  Patient continues to benefit from skilled intervention to address the above impairments.   Continue treatment per established plan of care to address goals. Recommend with staff:     Recommend next OT session: see goals    Recommendation for discharge: (in order for the patient to meet his/her long term goals)  Therapy up to 5 days/week in SNF setting    This discharge recommendation:  A follow-up discussion with the attending provider and/or case management is planned    IF patient discharges home will need the following DME: TBD at SNF       SUBJECTIVE:   Patient agreeable to work with therapy    OBJECTIVE DATA SUMMARY:   Cognitive/Behavioral Status:  Neurologic State: Alert  Orientation Level: Appropriate for age  Cognition: Follows commands             Functional Mobility and Transfers for ADLs:  Bed Mobility:  Supine to Sit: Minimum assistance  Sit to Supine: Minimum assistance  Scooting: Minimum assistance    Transfers:  Sit to Stand: Minimum assistance;Assist x2  Functional Transfers  Bathroom Mobility: Moderate assistance  Toilet Transfer : Minimum assistance;Assist x2  Bed to Chair: Minimum assistance;Assist x2    Balance:  Sitting: Intact  Standing: Impaired  Standing - Static: Fair;Constant support  Standing - Dynamic : Fair;Constant support    ADL Intervention:       Grooming  Grooming Assistance: Set-up  Position Performed: Seated in chair         Lower Body Dressing Assistance  Protective Undergarmet: Maximum assistance    Toileting  Bladder Hygiene: Maximum assistance  Clothing Management: Maximum assistance         Therapeutic Exercises:       Pain:  No c/o pain    Activity Tolerance:   Good    After treatment patient left in no apparent distress:   Sitting in chair, Call bell within reach, and Bed / chair alarm activated    COMMUNICATION/COLLABORATION:   The patients plan of care was discussed with: Physical therapist, Registered nurse, and Case management.      KERRY Mata/L  Time Calculation: 30 mins

## 2021-09-01 NOTE — PROGRESS NOTES
Physician Progress Note      Tennille Bloom  [facility] #:                  [id number]  :                       7/15/1931  ADMIT DATE:       2021 7:35 PM  [address] [place] DATE:  RESPONDING  PROVIDER #:        Renetta Morris MD          QUERY TEXT:    Dear attending,      Pt admitted with rhabdomyolysis and has encephalopathy documented. If possible, please document in progress notes and discharge summary further specificity regarding the type of encephalopathy:    The medical record reflects the following:  Risk Factors: 80year old on floor at home  Clinical Indicators: Per H&P- AMS/encephalopathy: due to dehydration, infection, however he has word-finding difficulty and I'm concerned for a stroke  Leukocytosis: possible infection - airspace disease on CXR. Possible early cellulitis (right facial) developing -start ceftriaxone -check UA -send blood cultures  -Per PN- AMS/encephaopathy: improved - unclear etiology. CVA vs cardiac etiology vs Orthostatics - CT head: Right periorbital soft tissue swelling. No acute intracranial abnormality - CT neck: Multilevel spondylosis without acute abnormality  - Leukocytosis: resolved possible due to dehydration - blood cultures NGTD . Urine cx negative - dced ceftriaxone  Treatment: NS 2 liters IV bolus on , Monitor labwork, vital signs, imaging,IV Ceftriaxone IV 1g q 24 hours, NS @ 100 cc/hour x 2 days      Thank you,    Emilia Harper RN, BSN  Clinical documentation Improvement  [phone]  Options provided:  -- Metabolic encephalopathy  -- Encephalopathy due to, please specify cause if known. -- Other - I will add my own diagnosis  -- Disagree - Not applicable / Not valid  -- Disagree - Clinically unable to determine / Unknown  -- Refer to Clinical Documentation Reviewer    PROVIDER RESPONSE TEXT:    This patient has metabolic encephalopathy.     Query created by: Jean Rapp on 2021 7:56 AM      Electronically signed by:  Renetta Morris MD 9/1/2021 8:00 AM

## 2021-09-01 NOTE — PROGRESS NOTES
Hospital follow-up PCP transitional care appointment has been scheduled with Dr. Shanelle Colón for Thursday, 9/9/21 at 11:00 a.m. This is the earliest appointment available. Pending patient discharge.   Chanda Andujar, Care Management Specialist.

## 2021-09-01 NOTE — PROGRESS NOTES
[facility] visit. Mr. Lunsford Ladnoel was asleep and had no family member with him. Prayer for spiritual communion offered for his well-being.     Lynita Closs, SBS, RN, ACSW, LCSW   Page:  [phone]

## 2021-09-01 NOTE — PROGRESS NOTES
Problem: Falls - Risk of  Goal: *Absence of Falls  Description: Document Donte Bishop Fall Risk and appropriate interventions in the flowsheet. Outcome: Progressing Towards Goal  Note: Fall Risk Interventions:  Mobility Interventions: Patient to call before getting OOB    Mentation Interventions: Adequate sleep, hydration, pain control, Bed/chair exit alarm    Medication Interventions: Bed/chair exit alarm    Elimination Interventions: Bed/chair exit alarm    History of Falls Interventions: Bed/chair exit alarm         Problem: Pressure Injury - Risk of  Goal: *Prevention of pressure injury  Description: Document Gene Scale and appropriate interventions in the flowsheet.   Outcome: Progressing Towards Goal  Note: Pressure Injury Interventions:  Sensory Interventions: Assess changes in LOC    Moisture Interventions: Absorbent underpads    Activity Interventions: PT/OT evaluation    Mobility Interventions: HOB 30 degrees or less    Nutrition Interventions: Offer support with meals,snacks and hydration    Friction and Shear Interventions: HOB 30 degrees or less

## 2021-09-01 NOTE — DISCHARGE INSTRUCTIONS
Please bring this form with you to show your primary care provider at your follow-up appointment. Primary care provider:  Dr. Baron Brayden MD    Discharging provider:  Keagan Bird MD    You have been admitted to the hospital with the following diagnoses:  · Rhabdomyolysis [M62.82]    FOLLOW-UP CARE RECOMMENDATIONS:    Monitor Blood pressure daily - goal <160, if persistently high, please call primary care doctor     APPOINTMENTS:  · Follow-up with primary care provider, Dr. Baron Brayden MD  -  Please call [phone] shortly after discharge to set up an appointment to be seen in  1 week      FOLLOW-UP TESTS recommended: BMP in 1 week     PENDING TEST RESULTS:  At the time of your discharge the following test results are still pending: none   Please make sure you review these results with your outpatient follow-up provider(s). SYMPTOMS to watch for: chest pain, shortness of breath, fever, chills, nausea, vomiting, diarrhea, change in mentation, falling, weakness, bleeding. DIET/what to eat:  Cardiac Diet    ACTIVITY:  Activity as tolerated    What to do if new or unexpected symptoms occur? If you experience any of the above symptoms (or should other concerns or questions arise after discharge) please call your primary care physician. Return to the emergency room if you cannot get hold of your doctor. · It is very important that you keep your follow-up appointment(s). · Please bring discharge papers, medication list (and/or medication bottles) to your follow-up appointments for review by your outpatient provider(s). · Please check the list of medications and be sure it includes every medication (even non-prescription medications) that your provider wants you to take. · It is important that you take the medication exactly as they are prescribed.    · Keep your medication in the bottles provided by the pharmacist and keep a list of the medication names, dosages, and times to be taken in your wallet. · Do not take other medications without consulting your doctor. · If you have any questions about your medications or other instructions, please talk to your nurse or care provider before you leave the hospital.    I understand that if any problems occur once I am at home I am to contact my physician. These instructions were explained to me and I had the opportunity to ask questions.         Preventing Falls: Care Instructions  Your Care Instructions     Getting around your home safely can be a challenge if you have injuries or health problems that make it easy for you to fall. Loose rugs and furniture in walkways are among the dangers for many older people who have problems walking or who have poor eyesight. People who have conditions such as arthritis, osteoporosis, or dementia also have to be careful not to fall. You can make your home safer with a few simple measures. Follow-up care is a key part of your treatment and safety. Be sure to make and go to all appointments, and call your doctor if you are having problems. It's also a good idea to know your test results and keep a list of the medicines you take. How can you care for yourself at home? Taking care of yourself  · You may get dizzy if you do not drink enough water. To prevent dehydration, drink plenty of fluids. Choose water and other caffeine-free clear liquids. If you have kidney, heart, or liver disease and have to limit fluids, talk with your doctor before you increase the amount of fluids you drink. · Exercise regularly to improve your strength, muscle tone, and balance. Walk if you can. Swimming may be a good choice if you cannot walk easily. · Have your vision and hearing checked each year or any time you notice a change. If you have trouble seeing and hearing, you might not be able to avoid objects and could lose your balance. · Know the side effects of the medicines you take.  Ask your doctor or pharmacist whether the medicines you take can affect your balance. Sleeping pills or sedatives can affect your balance. · Limit the amount of alcohol you drink. Alcohol can impair your balance and other senses. · Ask your doctor whether calluses or corns on your feet need to be removed. If you wear loose-fitting shoes because of calluses or corns, you can lose your balance and fall. · Talk to your doctor if you have numbness in your feet. Preventing falls at home  · Remove raised doorway thresholds, throw rugs, and clutter. Repair loose carpet or raised areas in the floor. · Move furniture and electrical cords to keep them out of walking paths. · Use nonskid floor wax, and wipe up spills right away, especially on ceramic tile floors. · If you use a walker or cane, put rubber tips on it. If you use crutches, clean the bottoms of them regularly with an abrasive pad, such as steel wool. · Keep your house well lit, especially [facility], and outside walkways. Use night-lights in areas such as hallways and bathrooms. Add extra light switches or use remote switches (such as switches that go on or off when you clap your hands) to make it easier to turn lights on if you have to get up during the night. · Install sturdy handrails on stairways. · Move items in your cabinets so that the things you use a lot are on the lower shelves (about waist level). · Keep a cordless phone and a flashlight with new batteries by your bed. If possible, put a phone in each of the main rooms of your house, or carry a cell phone in case you fall and cannot reach a phone. Or, you can wear a device around your neck or wrist. You push a button that sends a signal for help. · Wear low-heeled shoes that fit well and give your feet good support. Use footwear with nonskid soles. Check the heels and soles of your shoes for wear. Repair or replace worn heels or soles. · Do not wear socks without shoes on wood floors.   · Walk on the grass when the sidewalks are slippery. If you live in an area that gets snow and ice in the winter, sprinkle salt on slippery steps and sidewalks. Preventing falls in the bath  · Install grab bars and nonskid mats inside and outside your shower or tub and near the toilet and sinks. · Use shower chairs and bath benches. · Use a hand-held shower head that will allow you to sit while showering. · Get into a tub or shower by putting the weaker leg in first. Get out of a tub or shower with your strong side first.  · Repair loose toilet seats and consider installing a raised toilet seat to make getting on and off the toilet easier. · Keep your bathroom door unlocked while you are in the shower. Where can you learn more? Go to http://www.stover.com/  Enter G117 in the search box to learn more about \"Preventing Falls: Care Instructions. \"  Current as of: December 7, 2020               Content Version: 12.8  © 2006-2021 [employer], [facility]. Care instructions adapted under license by [employer] (which disclaims liability or warranty for this information).  If you have questions about a medical condition or this instruction, always ask your healthcare professional. [address] any warranty or liability for your use of this information.

## 2021-09-01 NOTE — PROGRESS NOTES
Problem: Mobility Impaired (Adult and Pediatric)  Goal: *Acute Goals and Plan of Care (Insert Text)  Description: FUNCTIONAL STATUS PRIOR TO ADMISSION: Patient was independent and active without use of DME.    HOME SUPPORT PRIOR TO ADMISSION: The patient lived with wife who is currently in SNF, pt's son lives nearby. Physical Therapy Goals  Initiated 8/29/2021  1. Patient will move from supine to sit and sit to supine , scoot up and down, and roll side to side in bed with minimal assistance/contact guard assist within 7 day(s). 2.  Patient will transfer from bed to chair and chair to bed with moderate assistance  using the least restrictive device within 7 day(s). 3.  Patient will perform sit to stand with moderate assistance  within 7 day(s). 4.  Patient will ambulate with moderate assistance  for 5 feet with the least restrictive device within 7 day(s). 5.  Patient will ascend/descend 2 stairs with both handrail(s) with moderate assistance  within 7 day(s). Outcome: Progressing Towards Goal   PHYSICAL THERAPY TREATMENT  Patient: Anjelica Doss (80 y.o. male)  Date: 9/1/2021  Diagnosis: Rhabdomyolysis [M62.82] <principal problem not specified>       Precautions: Fall  Chart, physical therapy assessment, plan of care and goals were reviewed. ASSESSMENT  Patient continues with skilled PT services and is slowly progressing towards goals. Patient demonstrates increased tremor and LE shakiness, unsteadiness during initially standing from bed and during gait, especially first 5-7 feet. With distance gait mechanics and trunk sway improved but again noted lateral LOB with wide turn at doorway. Instructed patient in tighter turn for safety. Patient continues to demonstrate high fall risk, needed to have hands on support with all standing and gait activity today due to unsteadiness and intermittent LOB.   Continue to highly recommend SNF rehab prior to d/c home but if patient were to d/c home he would require someone to be with him throughout his home with all mobility tasks and self care. Current Level of Function Impacting Discharge (mobility/balance): MIN A x 2 for transfers and gait with RW x 30' max    Other factors to consider for discharge: lives alone, + falls         PLAN :  Patient continues to benefit from skilled intervention to address the above impairments. Continue treatment per established plan of care. to address goals. Recommendation for discharge: (in order for the patient to meet his/her long term goals)  Therapy up to 5 days/week in SNF setting vs home with 24/7 hands on assistance with all mobility tasks    This discharge recommendation:  Has been made in collaboration with the attending provider and/or case management    IF patient discharges home will need the following DME: patient owns DME required for discharge       SUBJECTIVE:   Patient stated I'm not feeling as steady today as I have been.     OBJECTIVE DATA SUMMARY:   Critical Behavior:  Neurologic State: Alert  Orientation Level: Appropriate for age, Oriented to person, Disoriented to time, Disoriented to situation, Disoriented to place  Cognition: Follows commands  Safety/Judgement: Decreased awareness of need for safety, Decreased insight into deficits  Functional Mobility Training:  Bed Mobility:     Supine to Sit: Minimum assistance  Sit to Supine: Minimum assistance  Scooting: Minimum assistance        Transfers:  Sit to Stand: Minimum assistance;Assist x2  Stand to Sit: Minimum assistance;Assist x2        Bed to Chair: Minimum assistance;Assist x2                    Balance:  Sitting: Intact  Standing: Impaired  Standing - Static: Fair;Constant support  Standing - Dynamic : Fair;Constant support  Ambulation/Gait Training:  Distance (ft): 30 Feet (ft)  Assistive Device: Gait belt;Walker, rolling  Ambulation - Level of Assistance: Contact guard assistance;Minimal assistance (occ MIN for trunk steadying, LOB) Gait Abnormalities: Decreased step clearance;Trunk sway increased        Base of Support: Widened     Speed/Gricelda: Shuffled;Pace decreased (<100 feet/min)  Step Length: Right shortened;Left shortened                   Pain Rating:  Reports L hip/groin pain, 4/10 via FACES with transfer to sitting EOB    Activity Tolerance:   Fair and requires rest breaks    After treatment patient left in no apparent distress:   Sitting in chair, Call bell within reach, and Bed / chair alarm activated    COMMUNICATION/COLLABORATION:   The patients plan of care was discussed with: Occupational therapist and Registered nurse.      Mariella Ambrose, PT   Time Calculation: 28 mins

## 2021-09-01 NOTE — PROGRESS NOTES
LAURENT:    RUR 19%    Disposition: Home with son and HH. [facility]. [facility] will deliver walker to son's home today. [address]      Transportation: Son    Primary contact: Ed Axel(Son) [phone]    UPDATE: CM received call from patient's son this morning. He has decided that they would prefer SNF. Referrals sent to [facility] and [address] [place] [address]. [place] [facility] today.     Bonnie Barron, RN/CRM  [phone]

## 2021-09-01 NOTE — PROGRESS NOTES
Pharmacist Note - Warfarin Dosing  Consult provided for this 89 y.o.male to manage warfarin for Afib    INR Goal: 2 - 3  Home regimen/ tablet size: 7.5 mg (5 mg x 1.5) daily, except 10 mg (5 mg x 2) every Thu, Sat    Drugs that may increase INR: None  Drugs that may decrease INR: None  Other current anticoagulants/ drugs that may increase bleeding risk: None  Risk factors: Age > 65  Daily INR ordered: YES    Recent Labs     09/01/21  0449 08/31/21  0334 08/30/21  0329   HGB  --   --  13.7   INR 2.2* 2.1* 1.7*     Date               INR                  Dose  8/28             1.7              ---   8/29                --                     7.5 mg    8/30   1.7     10 mg  8/31   2.1      7.5 mg  9/01   2.2     7.5 mg                                                                               Assessment/ Plan: Will order warfarin 7.5 mg PO x 1 dose. INR now in target range     Pharmacy will continue to monitor daily and adjust therapy as indicated. Please contact the pharmacist at  for outpatient recommendations if needed.       Mary Jo Walton, PharmD  Clinical Pharmacist, Orthopedics and Med/Surg  [address] [employer] [facility] ()

## 2021-09-02 VITALS
HEIGHT: 72 IN | SYSTOLIC BLOOD PRESSURE: 125 MMHG | RESPIRATION RATE: 18 BRPM | WEIGHT: 232 LBS | HEART RATE: 64 BPM | BODY MASS INDEX: 31.42 KG/M2 | TEMPERATURE: 98.1 F | DIASTOLIC BLOOD PRESSURE: 74 MMHG | OXYGEN SATURATION: 95 %

## 2021-09-02 LAB
ANION GAP SERPL CALC-SCNC: 4 MMOL/L (ref 5–15)
BASOPHILS # BLD: 0 K/UL (ref 0–0.1)
BASOPHILS NFR BLD: 0 % (ref 0–1)
BUN SERPL-MCNC: 18 MG/DL (ref 6–20)
BUN/CREAT SERPL: 14 (ref 12–20)
CALCIUM SERPL-MCNC: 7.8 MG/DL (ref 8.5–10.1)
CHLORIDE SERPL-SCNC: 110 MMOL/L (ref 97–108)
CO2 SERPL-SCNC: 24 MMOL/L (ref 21–32)
CREAT SERPL-MCNC: 1.27 MG/DL (ref 0.7–1.3)
DIFFERENTIAL METHOD BLD: ABNORMAL
EOSINOPHIL # BLD: 0.4 K/UL (ref 0–0.4)
EOSINOPHIL NFR BLD: 5 % (ref 0–7)
ERYTHROCYTE [DISTWIDTH] IN BLOOD BY AUTOMATED COUNT: 13.5 % (ref 11.5–14.5)
GLUCOSE SERPL-MCNC: 102 MG/DL (ref 65–100)
HCT VFR BLD AUTO: 38.1 % (ref 36.6–50.3)
HGB BLD-MCNC: 13.3 G/DL (ref 12.1–17)
IMM GRANULOCYTES # BLD AUTO: 0 K/UL (ref 0–0.04)
IMM GRANULOCYTES NFR BLD AUTO: 1 % (ref 0–0.5)
INR PPP: 1.9 (ref 0.9–1.1)
LYMPHOCYTES # BLD: 1.5 K/UL (ref 0.8–3.5)
LYMPHOCYTES NFR BLD: 21 % (ref 12–49)
MCH RBC QN AUTO: 31.8 PG (ref 26–34)
MCHC RBC AUTO-ENTMCNC: 34.9 G/DL (ref 30–36.5)
MCV RBC AUTO: 91.1 FL (ref 80–99)
MONOCYTES # BLD: 0.6 K/UL (ref 0–1)
MONOCYTES NFR BLD: 9 % (ref 5–13)
NEUTS SEG # BLD: 4.6 K/UL (ref 1.8–8)
NEUTS SEG NFR BLD: 64 % (ref 32–75)
NRBC # BLD: 0 K/UL (ref 0–0.01)
NRBC BLD-RTO: 0 PER 100 WBC
PLATELET # BLD AUTO: 244 K/UL (ref 150–400)
PMV BLD AUTO: 10.1 FL (ref 8.9–12.9)
POTASSIUM SERPL-SCNC: 3.4 MMOL/L (ref 3.5–5.1)
PROTHROMBIN TIME: 19.5 SEC (ref 9–11.1)
RBC # BLD AUTO: 4.18 M/UL (ref 4.1–5.7)
SODIUM SERPL-SCNC: 138 MMOL/L (ref 136–145)
WBC # BLD AUTO: 7.1 K/UL (ref 4.1–11.1)

## 2021-09-02 PROCEDURE — 74011250637 HC RX REV CODE- 250/637: Performed by: HOSPITALIST

## 2021-09-02 PROCEDURE — 97530 THERAPEUTIC ACTIVITIES: CPT

## 2021-09-02 PROCEDURE — G0378 HOSPITAL OBSERVATION PER HR: HCPCS

## 2021-09-02 PROCEDURE — 36415 COLL VENOUS BLD VENIPUNCTURE: CPT

## 2021-09-02 PROCEDURE — 74011250637 HC RX REV CODE- 250/637: Performed by: INTERNAL MEDICINE

## 2021-09-02 PROCEDURE — 80048 BASIC METABOLIC PNL TOTAL CA: CPT

## 2021-09-02 PROCEDURE — 85610 PROTHROMBIN TIME: CPT

## 2021-09-02 PROCEDURE — 97116 GAIT TRAINING THERAPY: CPT

## 2021-09-02 PROCEDURE — 85025 COMPLETE CBC W/AUTO DIFF WBC: CPT

## 2021-09-02 RX ORDER — WARFARIN SODIUM 5 MG/1
10 TABLET ORAL ONCE
Status: COMPLETED | OUTPATIENT
Start: 2021-09-02 | End: 2021-09-02

## 2021-09-02 RX ORDER — POTASSIUM CHLORIDE 750 MG/1
40 TABLET, FILM COATED, EXTENDED RELEASE ORAL
Status: COMPLETED | OUTPATIENT
Start: 2021-09-02 | End: 2021-09-02

## 2021-09-02 RX ADMIN — WARFARIN SODIUM 10 MG: 5 TABLET ORAL at 16:34

## 2021-09-02 RX ADMIN — Medication 10 ML: at 14:46

## 2021-09-02 RX ADMIN — AMLODIPINE BESYLATE 10 MG: 5 TABLET ORAL at 10:12

## 2021-09-02 RX ADMIN — FLECAINIDE ACETATE 150 MG: 100 TABLET ORAL at 11:00

## 2021-09-02 RX ADMIN — TAMSULOSIN HYDROCHLORIDE 0.4 MG: 0.4 CAPSULE ORAL at 10:13

## 2021-09-02 RX ADMIN — SERTRALINE 100 MG: 50 TABLET, FILM COATED ORAL at 10:13

## 2021-09-02 RX ADMIN — Medication 10 ML: at 05:44

## 2021-09-02 RX ADMIN — LOSARTAN POTASSIUM 100 MG: 50 TABLET, FILM COATED ORAL at 10:13

## 2021-09-02 RX ADMIN — FINASTERIDE 5 MG: 5 TABLET, FILM COATED ORAL at 10:13

## 2021-09-02 RX ADMIN — POTASSIUM CHLORIDE 40 MEQ: 750 TABLET, FILM COATED, EXTENDED RELEASE ORAL at 07:48

## 2021-09-02 NOTE — PROGRESS NOTES
Problem: Self Care Deficits Care Plan (Adult)  Goal: *Acute Goals and Plan of Care (Insert Text)  Description:   FUNCTIONAL STATUS PRIOR TO ADMISSION: Patient was independent and active without use of DME. Pt lives at independent living facility    HOME SUPPORT: The patient lived alone and reports wife lives at nearby SNF. Occupational Therapy Goals  Initiated 8/29/2021  1. Patient will perform bathing with minimal assistance/contact guard assist within 7 day(s). 2.  Patient will perform upper body dressing with supervision/set-up within 7 day(s). 3.  Patient will perform lower body dressing with moderate assistance and AE PRN within 7 day(s). 4.  Patient will perform toilet transfers with minimal assistance/contact guard assist within 7 day(s). 5.  Patient will perform all aspects of toileting with moderate assistance  within 7 day(s). Outcome: Progressing Towards Goal   OCCUPATIONAL THERAPY TREATMENT  Patient: Miladys Moise (80 y.o. male)  Date: 9/2/2021  Diagnosis: Rhabdomyolysis [M62.82] <principal problem not specified>       Precautions: Fall  Chart, occupational therapy assessment, plan of care, and goals were reviewed. ASSESSMENT  Patient continues with skilled OT services and is progressing towards goals. Pt received sitting in chair and asking to return back to bed after sitting up in chair for couple of hours. Pt transferred from chair to bed at min assist x 1 using RW. Bed mobility with SBA. Pt asking to use bedside table mirror to look at skin. Asked pt if he wanted to shave, pt declined. Possible discharge tomorrow. Will con't to follow     Current Level of Function Impacting Discharge (ADLs): min assist with transfers    Other factors to consider for discharge: lived alone         PLAN :  Patient continues to benefit from skilled intervention to address the above impairments. Continue treatment per established plan of care to address goals.     Recommend with staff: OOB for meals, assistance to bathroom using RW    Recommend next OT session: see goals    Recommendation for discharge: (in order for the patient to meet his/her long term goals)  Therapy up to 5 days/week in SNF setting    This discharge recommendation:  A follow-up discussion with the attending provider and/or case management is planned    IF patient discharges home will need the following DME: TBD at rehab       SUBJECTIVE:   Patient stated Nicolas Stein I get back to bed.     OBJECTIVE DATA SUMMARY:   Cognitive/Behavioral Status:  Neurologic State: Alert  Orientation Level: Oriented to person;Oriented to place;Oriented to situation;Disoriented to time  Cognition: Follows commands             Functional Mobility and Transfers for ADLs:  Bed Mobility:       Transfers:  Sit to Stand: Minimum assistance     Bed to Chair: Minimum assistance    Balance:  Sitting: Intact  Standing: Impaired  Standing - Static: Fair;Constant support  Standing - Dynamic : Fair;Constant support        Therapeutic Exercises:       Pain:  L hip pain    Activity Tolerance:   Good    After treatment patient left in no apparent distress:   Supine in bed and Call bell within reach    COMMUNICATION/COLLABORATION:   The patients plan of care was discussed with: Physical therapist and Registered nurse.      KERRY Hernandez/L  Time Calculation: 13 mins

## 2021-09-02 NOTE — PROGRESS NOTES
Problem: Mobility Impaired (Adult and Pediatric)  Goal: *Acute Goals and Plan of Care (Insert Text)  Description: FUNCTIONAL STATUS PRIOR TO ADMISSION: Patient was independent and active without use of DME.    HOME SUPPORT PRIOR TO ADMISSION: The patient lived with wife who is currently in SNF, pt's son lives nearby. Physical Therapy Goals  Initiated 8/29/2021  1. Patient will move from supine to sit and sit to supine , scoot up and down, and roll side to side in bed with minimal assistance/contact guard assist within 7 day(s). 2.  Patient will transfer from bed to chair and chair to bed with moderate assistance  using the least restrictive device within 7 day(s). 3.  Patient will perform sit to stand with moderate assistance  within 7 day(s). 4.  Patient will ambulate with moderate assistance  for 5 feet with the least restrictive device within 7 day(s). 5.  Patient will ascend/descend 2 stairs with both handrail(s) with moderate assistance  within 7 day(s). Outcome: Progressing Towards Goal     PHYSICAL THERAPY TREATMENT  Patient: Brittani Gregg (80 y.o. male)  Date: 9/2/2021  Diagnosis: Rhabdomyolysis [M62.82] <principal problem not specified>       Precautions: Fall  Chart, physical therapy assessment, plan of care and goals were reviewed. ASSESSMENT  Patient continues with skilled PT services and is progressing towards goals. Pt tolerates mobility with increased stability today during gait however continues to ambulate with flexed knees and hips with cues for extension. Pt very fearful of falling and limited by pain with increased soreness from areas patient was on the floor (hips, shoulders) and overall feels unwell. Pt focused on home situation at this time, reportedly cannot DC to previous living situation. Will continue to follow, recommending SNF with possible transition to [facility]. Current Level of Function Impacting Discharge (mobility/balance):      Other factors to consider for discharge: controlled         PLAN :  Patient continues to benefit from skilled intervention to address the above impairments. Continue treatment per established plan of care. to address goals. Recommendation for discharge: (in order for the patient to meet his/her long term goals)  Therapy up to 5 days/week in SNF setting    This discharge recommendation:  Has been made in collaboration with the attending provider and/or case management    IF patient discharges home will need the following DME: to be determined (TBD)       SUBJECTIVE:   Patient stated I am just still so sore.     OBJECTIVE DATA SUMMARY:   Critical Behavior:  Neurologic State: Alert  Orientation Level: Oriented to person, Oriented to place, Oriented to situation, Disoriented to time  Cognition: Follows commands  Safety/Judgement: Decreased awareness of need for safety, Decreased insight into deficits  Functional Mobility Training:  Bed Mobility:                    Transfers:  Sit to Stand: Minimum assistance  Stand to Sit: Minimum assistance        Bed to Chair: Minimum assistance                    Balance:  Sitting: Intact  Standing: Impaired  Standing - Static: Fair;Constant support  Standing - Dynamic : Fair;Constant support  Ambulation/Gait Training:  Distance (ft): 30 Feet (ft)  Assistive Device: Gait belt;Walker, rolling  Ambulation - Level of Assistance: Contact guard assistance;Minimal assistance        Gait Abnormalities: Decreased step clearance;Trunk sway increased        Base of Support: Widened     Speed/Gricelda: Shuffled  Step Length: Right shortened;Left shortened      Pain Rating:  C/o pain in extremities    Activity Tolerance:   Fair and requires frequent rest breaks    After treatment patient left in no apparent distress:   Sitting in chair, Call bell within reach and Caregiver / family present    COMMUNICATION/COLLABORATION:   The patients plan of care was discussed with: Physical therapist, Registered nurse and Case management.      Wayne Wilson, PT   Time Calculation: 24 mins

## 2021-09-02 NOTE — PROGRESS NOTES
Pharmacist Note - Warfarin Dosing  Consult provided for this 89 y.o.male to manage warfarin for Afib    INR Goal: 2 - 3  Home regimen/ tablet size: 7.5 mg (5 mg x 1.5) daily, except 10 mg (5 mg x 2) every Thu, Sat    Drugs that may increase INR: None  Drugs that may decrease INR: None  Other current anticoagulants/ drugs that may increase bleeding risk: None  Risk factors: Age > 65  Daily INR ordered: YES    Recent Labs     09/02/21  0426 09/01/21  0449 08/31/21  0334   HGB 13.3  --   --    INR 1.9* 2.2* 2.1*     Date               INR                  Dose  8/28             1.7              ---   8/29                --                     7.5 mg    8/30   1.7     10 mg  8/31   2.1      7.5 mg  9/01   2.2     7.5 mg  9/02   1.9     10 mg                                                                                Assessment/ Plan: Will order warfarin 10 mg PO x 1 dose. Pharmacy will continue to monitor daily and adjust therapy as indicated. Please contact the pharmacist at  for outpatient recommendations if needed.

## 2021-09-02 NOTE — PROGRESS NOTES
Hospitalist Progress Note      Hospital summary: 80 y.o man with BPH, afib, bladder cancer, HTN, who presents after being found down. His son didn't hear from him for 2 days, so he checked on him and he was on the floor. EMS noted feces around the apartment. The patient is a poor historian and doesn't know why he's here. He thinks he fell but is unsure. He denies pain 8/28/2021    CC: Fall     S: patient is seen and examined at bedside this AM.  Feeling okay. INR 1.9. Blood pressure controlled. Accepted at [facility], pending authorization. No overnight events. Assessment/Plan:  Mechanical fall - recurrent   Less like syncope  AMS/encephaopathy: improved  - CT head: Right periorbital soft tissue swelling. No acute intracranial abnormality  - CT neck: Multilevel spondylosis without acute abnormality  - MRI can not be done due to eye implant   - Echo : EF 55 - 60%.   - PT/OT - need SNF      Rhabdomyolysis: resolved   - Due to fall/being down    CR on CKD stage 3 - cr at baseline   - will monitor renal function      Leukocytosis: resolved   possible due to dehydration  - blood cultures NGTD . Urine cx negative   - dc'd ceftriaxone     Abnormal CXR:  - CXR: Bilateral lower lobe interstitial infiltrates. Possible lingular mass lesion.  - rapid covid negative  - will need outpt f/u with CT chest      Paroxysmal afib:  S/p AICD   - continue flecainide  - consult pharmacy for coumadin dosing, however, he should have a risk/benefit assessment of anticoagulation prior to discharge    HTN - BP better controlled. C/w amlodipine. Increased losartan     Hypokalemia - replaced, will monitor      History of bladder cancer - c/w flomax, proscar  Depression - zoloft     Code status: Full  DVT prophylaxis: warfarin   Disposition: TBD. SNF - CM working on it.  Medically stable to be discharged  ----------------------------------------------      Review of Systems:  A comprehensive review of systems was negative. O:  Visit Vitals  BP (!) 143/77   Pulse 71   Temp 97.4 °F (36.3 °C)   Resp 18   Ht 6' (1.829 m)   Wt 105.2 kg (232 lb)   SpO2 96%   BMI 31.46 kg/m²       PHYSICAL EXAM: As noted below. Unchanged from previous day  Gen: NAD, elderly   HEENT: anicteric sclerae, normal conjunctiva, bruising on forehead  Neck: supple, trachea midline, no adenopathy  Heart: RRR, no MRG, no JVD, no peripheral edema  Lungs: CTA b/l, non-labored respirations  Abd: soft, NT, ND, BS+, no organomegaly  Extr: warm  Skin: dry, bruising on knees R>L  Neuro: CN II-XII grossly intact, slow speech, moves all extremities    No intake or output data in the 24 hours ending 09/02/21 1208     Recent labs & imaging reviewed:  Recent Results (from the past 24 hour(s))   PROTHROMBIN TIME + INR    Collection Time: 09/02/21  4:26 AM   Result Value Ref Range    INR 1.9 (H) 0.9 - 1.1      Prothrombin time 19.5 (H) 9.0 - 11.1 sec   CBC WITH AUTOMATED DIFF    Collection Time: 09/02/21  4:26 AM   Result Value Ref Range    WBC 7.1 4.1 - 11.1 K/uL    RBC 4.18 4.10 - 5.70 M/uL    HGB 13.3 12.1 - 17.0 g/dL    HCT 38.1 36.6 - 50.3 %    MCV 91.1 80.0 - 99.0 FL    MCH 31.8 26.0 - 34.0 PG    MCHC 34.9 30.0 - 36.5 g/dL    RDW 13.5 11.5 - 14.5 %    PLATELET 310 464 - 680 K/uL    MPV 10.1 8.9 - 12.9 FL    NRBC 0.0 0  WBC    ABSOLUTE NRBC 0.00 0.00 - 0.01 K/uL    NEUTROPHILS 64 32 - 75 %    LYMPHOCYTES 21 12 - 49 %    MONOCYTES 9 5 - 13 %    EOSINOPHILS 5 0 - 7 %    BASOPHILS 0 0 - 1 %    IMMATURE GRANULOCYTES 1 (H) 0.0 - 0.5 %    ABS. NEUTROPHILS 4.6 1.8 - 8.0 K/UL    ABS. LYMPHOCYTES 1.5 0.8 - 3.5 K/UL    ABS. MONOCYTES 0.6 0.0 - 1.0 K/UL    ABS. EOSINOPHILS 0.4 0.0 - 0.4 K/UL    ABS. BASOPHILS 0.0 0.0 - 0.1 K/UL    ABS. IMM.  GRANS. 0.0 0.00 - 0.04 K/UL    DF AUTOMATED     METABOLIC PANEL, BASIC    Collection Time: 09/02/21  4:26 AM   Result Value Ref Range    Sodium 138 136 - 145 mmol/L    Potassium 3.4 (L) 3.5 - 5.1 mmol/L    Chloride 110 (H) 97 - 108 mmol/L    CO2 24 21 - 32 mmol/L    Anion gap 4 (L) 5 - 15 mmol/L    Glucose 102 (H) 65 - 100 mg/dL    BUN 18 6 - 20 MG/DL    Creatinine 1.27 0.70 - 1.30 MG/DL    BUN/Creatinine ratio 14 12 - 20      GFR est AA >60 >60 ml/min/1.73m2    GFR est non-AA 53 (L) >60 ml/min/1.73m2    Calcium 7.8 (L) 8.5 - 10.1 MG/DL     Recent Labs     09/02/21 0426   WBC 7.1   HGB 13.3   HCT 38.1        Recent Labs     09/02/21 0426 09/01/21 0449 08/31/21  0334    142 140   K 3.4* 3.1* 3.2*   * 111* 111*   CO2 24 23 21   BUN 18 21* 27*   CREA 1.27 1.30 1.18   * 102* 100   CA 7.8* 7.7* 7.9*   MG  --  2.1  --      No results for input(s): ALT, AP, TBIL, TBILI, TP, ALB, GLOB, GGT, AML, LPSE in the last 72 hours. No lab exists for component: SGOT, GPT, AMYP, HLPSE  Recent Labs     09/02/21 0426 09/01/21 0449 08/31/21  0334   INR 1.9* 2.2* 2.1*   PTP 19.5* 22.1* 21.6*      No results for input(s): FE, TIBC, PSAT, FERR in the last 72 hours. No results found for: FOL, RBCF   No results for input(s): PH, PCO2, PO2 in the last 72 hours.   Recent Labs     09/01/21 0449 08/31/21  0334    415*     Lab Results   Component Value Date/Time    Cholesterol, total 210 (H) 07/17/2020 10:31 AM    HDL Cholesterol 65 07/17/2020 10:31 AM    LDL, calculated 119.8 (H) 07/17/2020 10:31 AM    Triglyceride 126 07/17/2020 10:31 AM    CHOL/HDL Ratio 3.2 07/17/2020 10:31 AM     Lab Results   Component Value Date/Time    Glucose (POC) 117 08/28/2021 08:01 PM     Lab Results   Component Value Date/Time    Color YELLOW/STRAW 08/28/2021 11:32 PM    Appearance CLOUDY (A) 08/28/2021 11:32 PM    Specific gravity 1.027 08/28/2021 11:32 PM    pH (UA) 5.0 08/28/2021 11:32 PM    Protein 300 (A) 08/28/2021 11:32 PM    Glucose 500 (A) 08/28/2021 11:32 PM    Ketone 15 (A) 08/28/2021 11:32 PM    Bilirubin Negative 08/28/2021 11:32 PM    Urobilinogen 1.0 08/28/2021 11:32 PM    Nitrites Negative 08/28/2021 11:32 PM    Leukocyte Esterase Negative 08/28/2021 11:32 PM    Epithelial cells MODERATE (A) 08/28/2021 11:32 PM    Bacteria 1+ (A) 08/28/2021 11:32 PM    WBC 5-10 08/28/2021 11:32 PM    RBC  08/28/2021 11:32 PM       Med list reviewed  Current Facility-Administered Medications   Medication Dose Route Frequency    warfarin (COUMADIN) tablet 10 mg  10 mg Oral ONCE    losartan (COZAAR) tablet 100 mg  100 mg Oral DAILY    amLODIPine (NORVASC) tablet 10 mg  10 mg Oral DAILY    bacitracin 500 unit/gram packet 2 Packet  2 Packet Topical DAILY    hydrALAZINE (APRESOLINE) 20 mg/mL injection 10 mg  10 mg IntraVENous Q6H PRN    warfarin - pharmacy to dose   Other Rx Dosing/Monitoring    tamsulosin (FLOMAX) capsule 0.4 mg  0.4 mg Oral DAILY    sertraline (ZOLOFT) tablet 100 mg  100 mg Oral DAILY    flecainide (TAMBOCOR) tablet 150 mg  150 mg Oral BID    finasteride (PROSCAR) tablet 5 mg  5 mg Oral DAILY    cholestyramine-aspartame (QUESTRAN LIGHT) packet 4 g  4 g Oral BID    sodium chloride (NS) flush 5-40 mL  5-40 mL IntraVENous Q8H    sodium chloride (NS) flush 5-40 mL  5-40 mL IntraVENous PRN    acetaminophen (TYLENOL) tablet 650 mg  650 mg Oral Q6H PRN    Or    acetaminophen (TYLENOL) suppository 650 mg  650 mg Rectal Q6H PRN    polyethylene glycol (MIRALAX) packet 17 g  17 g Oral DAILY PRN    ondansetron (ZOFRAN ODT) tablet 4 mg  4 mg Oral Q8H PRN    Or    ondansetron (ZOFRAN) injection 4 mg  4 mg IntraVENous Q6H PRN       Care Plan discussed with:  Patient/Family and Nurse    Verena Stearns MD  Internal Medicine  Date of Service: 9/2/2021

## 2021-09-02 NOTE — DISCHARGE SUMMARY
Discharge Summary       PATIENT ID: Portillo Kirby MRN: [id number]   YOB: 1931    DATE OF ADMISSION: 8/28/2021  7:35 PM    DATE OF DISCHARGE: 09/02/21   PRIMARY CARE PROVIDER: Lesley Torres MD     ATTENDING PHYSICIAN: Cal Ndiaye MD  DISCHARGING PROVIDER: Cal Ndiaye MD    To contact this individual call [phone] and ask the  to page. If unavailable ask to be transferred the Adult Hospitalist Department. CONSULTATIONS: IP CONSULT TO HOSPITALIST    PROCEDURES/SURGERIES: * No surgery found *    ADMITTING [address] [place] [address] COURSE:   CR, dry clinically  Rhabdomyolysis: due to fall/being down  AMS/encephaopathy: due to dehydration, infection  Leukocytosis: possible infection - airspace disease on CXR. Possible early cellulitis (right facial) developing  Paroxysmal afib  BPH  History of bladder cancer    Hospital summary: 80 y.o man with BPH, afib, bladder cancer, HTN, who presents after being found down. His son didn't hear from him for 2 days, so he checked on him and he was on the floor. EMS noted feces around the apartment. The patient is a poor historian and doesn't know why he's here. He thinks he fell but is unsure. He denies pain 8/28/2021     CC: Fall      S: patient is seen and examined at bedside this AM.  Feeling okay. INR 1.9. Blood pressure controlled. Accepted at [facility], pending authorization. No overnight events. DISCHARGE DIAGNOSES / PLAN:      Mechanical fall - recurrent   Less like syncope  AMS/encephaopathy: improved  - CT head: Right periorbital soft tissue swelling.  No acute intracranial abnormality  - CT neck: Multilevel spondylosis without acute abnormality  - MRI can not be done due to eye implant   - Echo : EF 55 - 60%.   - PT/OT - need SNF      Rhabdomyolysis: resolved   - Due to fall/being down     CR on CKD stage 3 - cr at baseline   - will monitor renal function      Leukocytosis: resolved   possible due to dehydration  - blood cultures NGTD . Urine cx negative   - dc'd ceftriaxone      Abnormal CXR:  - CXR: Bilateral lower lobe interstitial infiltrates. Possible lingular mass lesion.  - rapid covid negative  - will need outpt f/u with CT chest      Paroxysmal afib:  S/p AICD   - continue flecainide  - consult pharmacy for coumadin dosing, however, he should have a risk/benefit assessment of anticoagulation prior to discharge     HTN - BP better controlled. C/w amlodipine. Increased losartan      Hypokalemia - replaced, will monitor      History of bladder cancer - c/w flomax, proscar  Depression - zoloft      Code status: Full  DVT prophylaxis: warfarin   Disposition: SNF -  working on it. Medically stable to be discharged. Accepted at [facility]. Awaiting auth.      ADDITIONAL CARE RECOMMENDATIONS:   Monitor Blood pressure daily - goal <160, if persistently high, please call primary care doctor      APPOINTMENTS:  · Follow-up with primary care provider, Dr. Tiffany Newton MD  -  Please call [phone] shortly after discharge to set up an appointment to be seen in  1 week       FOLLOW-UP TESTS recommended: BMP in 1 week      PENDING TEST RESULTS:  At the time of your discharge the following test results are still pending: none   Please make sure you review these results with your outpatient follow-up provider(s).     SYMPTOMS to watch for: chest pain, shortness of breath, fever, chills, nausea, vomiting, diarrhea, change in mentation, falling, weakness, bleeding.     DIET/what to eat:  Cardiac Diet     ACTIVITY:  Activity as tolerated       FOLLOW UP APPOINTMENTS:    Follow-up Information     Follow up With Specialties Details Why Contact Info    Tiffany Newton MD Internal Medicine On 9/9/2021 Hospital follow up PCP appointment Thursday, 9/9/21 at 11:00 a.m.  Fabricio Velazquez  [address]  [phone]               DISCHARGE MEDICATIONS:  Current Discharge Medication List      CONTINUE these medications which have NOT CHANGED    Details   gabapentin (NEURONTIN) 300 mg capsule TAKE 1 CAPSULE BY MOUTH DAILY INDICATIONS: NEUROPATHIC PAIN  Qty: 90 Capsule, Refills: 1    Comments: This request is for a new prescription for a controlled substance as required by Federal/State law. DX Code Needed  PATIENT WANTS REFILLS. Associated Diagnoses: Chronic bilateral low back pain without sciatica      irbesartan (AVAPRO) 75 mg tablet TAKE 2 TABLETS NIGHTLY  Qty: 180 Tablet, Refills: 3      sertraline (ZOLOFT) 100 mg tablet TAKE 1 TABLET DAILY  Qty: 90 Tablet, Refills: 3    Associated Diagnoses: Major depressive disorder, single episode, moderate (HCC)      warfarin (COUMADIN) 5 mg tablet 10 mg (5 mg x 2) every Thu, Sat; 7.5 mg (5 mg x 1.5) all other days or as directed. Qty: 144 Tablet, Refills: 3    Associated Diagnoses: Paroxysmal atrial fibrillation (HCC)      flecainide (TAMBOCOR) 150 mg tablet Take 1 Tab by mouth two (2) times a day. Qty: 180 Tab, Refills: 3    Associated Diagnoses: Paroxysmal atrial fibrillation (HCC)      colestipoL (COLESTID) 5 gram packet Take 5 g by mouth two (2) times a day. tamsulosin HCl (TAMSULOSIN PO) Take  by mouth. ONE TABLET ONCE DAILY. PT DOES NOT KNOW DOSAGE      amLODIPine (NORVASC) 2.5 mg tablet TAKE 1 TABLET BY MOUTH ONCE DAILY. DECREASED DOSE. Qty: 90 Tab, Refills: 1    Associated Diagnoses: Essential hypertension      cholestyramine-aspartame (Cholestyramine Light) 4 gram packet Take 1 Packet by mouth two (2) times a day. 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 Chest pain, shortness of breath, fever, chills, nausea, vomiting, diarrhea, change in mentation, falling, weakness, bleeding. Severe pain or pain not relieved by medications.   Or, any other signs or symptoms that you may have questions about. DISPOSITION:    Home With:   OT  PT  HH  RN      x Long term SNF/Inpatient Rehab    Independent/assisted living    Hospice    Other:       PATIENT CONDITION AT DISCHARGE:     Functional status    Poor    x Deconditioned     Independent      Cognition    x Lucid     Forgetful     Dementia      Catheters/lines (plus indication)    Dykes     PICC     PEG    x None      Code status   x  Full code     DNR      PHYSICAL EXAMINATION AT DISCHARGE:  Visit Vitals  /74   Pulse 64   Temp 98.1 °F (36.7 °C)   Resp 18   Ht 6' (1.829 m)   Wt 105.2 kg (232 lb)   SpO2 95%   BMI 31.46 kg/m²       PHYSICAL EXAM: As noted below.  Unchanged from previous day  Gen: NAD, elderly   HEENT: anicteric sclerae, normal conjunctiva, bruising on forehead  Neck: supple, trachea midline, no adenopathy  Heart: RRR, no MRG, no JVD, no peripheral edema  Lungs: CTA b/l, non-labored respirations  Abd: soft, NT, ND, BS+, no organomegaly  Extr: warm  Skin: dry, bruising on knees R>L  Neuro: CN II-XII grossly intact, slow speech, moves all extremities      CHRONIC MEDICAL DIAGNOSES:  Problem List as of 9/2/2021 Date Reviewed: 1/12/2021        Codes Class Noted - Resolved    Rhabdomyolysis ICD-10-CM: M62.82  ICD-9-CM: 728.88  8/28/2021 - Present        RLS (restless legs syndrome) ICD-10-CM: G25.81  ICD-9-CM: 333.94  7/3/2017 - Present        Dupuytren's contracture of left hand ICD-10-CM: M72.0  ICD-9-CM: 728.6  Unknown - Present    Overview Signed 3/23/2016  3:09 PM by Derek Fine MD     5th finger             Chronic neck pain ICD-10-CM: M54.2, G89.29  ICD-9-CM: 723.1, 338.29  Unknown - Present    Overview Signed 1/7/2016 10:27 AM by Derek Fine MD     limited motion to side             IVCD (intraventricular conduction defect) ICD-10-CM: I45.4  ICD-9-CM: 426.6  11/30/2015 - Present        Advanced care planning/counseling discussion ICD-10-CM: Z71.89  ICD-9-CM: V65.49  10/8/2015 - Present Overview Addendum 7/3/2017 10:44 AM by Wilfredo Mcknight RN     Has Living Will. His wife Hermelinda is POA. MDM. Next is Son Mu Alberto, daughter Jerald Duarte. Has documents, will bring in  7/3/2017 Patient states that a completed Advanced Medical Directive is at home. NN encouraged patient to bring a copy of the completed Advanced Medical Directive to the office for scanning into the medical record. Patient verbalized understanding & agreement. 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 De Jack. low grade, papillary. repeat 3 month             Fecal incontinence ICD-10-CM: R15.9  ICD-9-CM: 787.60  Unknown - Present    Overview Signed 9/18/2014 10:20 AM by Marques Sales MD     with loose stools             Encounter for monitoring flecainide therapy ICD-10-CM: Z51.81, Z79.899  ICD-9-CM: V58.83, V58.69  7/18/2014 - Present        Loose stools ICD-10-CM: R19.5  ICD-9-CM: 787.7  7/18/2014 - Present        Paroxysmal atrial fibrillation (HCC) ICD-10-CM: I48.0  ICD-9-CM: 427.31  Unknown - Present        BPH with obstruction/lower urinary tract symptoms ICD-10-CM: N40.1, N13.8  ICD-9-CM: 600.01, 599.69  Unknown - Present        Depression, major ICD-10-CM: F32.9  ICD-9-CM: 296.20  Unknown - Present        Long term current use of anticoagulants with INR goal of 2.0-3.0 ICD-10-CM: Z79.01  ICD-9-CM: V58.61  Unknown - Present    Overview Signed 6/18/2014 11:59 AM by Marques Sales MD     Afib             Hearing loss ICD-10-CM: H91.90  ICD-9-CM: 389. 9  Unknown - Present    Overview Signed 6/18/2014  6:18 PM by Ofelia Simone Rubalcava MD     has hearing aids             Insomnia ICD-10-CM: G47.00  ICD-9-CM: 780.52  Unknown - Present        Bladder cancer [facility]) ICD-10-CM: C67.9  ICD-9-CM: 188.9  Unknown - Present    Overview Signed 6/18/2014  6:18 PM by Dae Gibbs MD     s/p surgery, BCG irrigation Fish. saw Dr. Gan Perfect: Chronic back pain ICD-10-CM: M54.9, G89.29  ICD-9-CM: 724.5, 338.29  Unknown - 5/14/2015        RESOLVED: HTN (hypertension) ICD-10-CM: I10  ICD-9-CM: 401.9  Unknown - 11/30/2015            Radiology  XR KNEE LT 3 V    Result Date: 8/29/2021  No acute abnormality. 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 XR KNEE RT 3 V    Result Date: 8/29/2021  No acute abnormality. 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 Labs  Recent Results (from the past 24 hour(s))   PROTHROMBIN TIME + INR    Collection Time: 09/02/21  4:26 AM   Result Value Ref Range    INR 1.9 (H) 0.9 - 1.1      Prothrombin time 19.5 (H) 9.0 - 11.1 sec   CBC WITH AUTOMATED DIFF    Collection Time: 09/02/21  4:26 AM   Result Value Ref Range    WBC 7.1 4.1 - 11.1 K/uL    RBC 4.18 4.10 - 5.70 M/uL    HGB 13.3 12.1 - 17.0 g/dL    HCT 38.1 36.6 - 50.3 %    MCV 91.1 80.0 - 99.0 FL    MCH 31.8 26.0 - 34.0 PG    MCHC 34.9 30.0 - 36.5 g/dL    RDW 13.5 11.5 - 14.5 %    PLATELET 488 739 - 854 K/uL    MPV 10.1 8.9 - 12.9 FL    NRBC 0.0 0  WBC    ABSOLUTE NRBC 0.00 0.00 - 0.01 K/uL    NEUTROPHILS 64 32 - 75 %    LYMPHOCYTES 21 12 - 49 %    MONOCYTES 9 5 - 13 %    EOSINOPHILS 5 0 - 7 %    BASOPHILS 0 0 - 1 %    IMMATURE GRANULOCYTES 1 (H) 0.0 - 0.5 %    ABS. NEUTROPHILS 4.6 1.8 - 8.0 K/UL    ABS. LYMPHOCYTES 1.5 0.8 - 3.5 K/UL    ABS. MONOCYTES 0.6 0.0 - 1.0 K/UL    ABS. EOSINOPHILS 0.4 0.0 - 0.4 K/UL    ABS. BASOPHILS 0.0 0.0 - 0.1 K/UL    ABS. IMM.  GRANS. 0.0 0.00 - 0.04 K/UL    DF AUTOMATED     METABOLIC PANEL, BASIC    Collection Time: 09/02/21  4:26 AM   Result Value Ref Range    Sodium 138 136 - 145 mmol/L    Potassium 3.4 (L) 3.5 - 5.1 mmol/L    Chloride 110 (H) 97 - 108 mmol/L    CO2 24 21 - 32 mmol/L    Anion gap 4 (L) 5 - 15 mmol/L    Glucose 102 (H) 65 - 100 mg/dL    BUN 18 6 - 20 MG/DL    Creatinine 1.27 0.70 - 1.30 MG/DL    BUN/Creatinine ratio 14 12 - 20      GFR est AA >60 >60 ml/min/1.73m2    GFR est non-AA 53 (L) >60 ml/min/1.73m2    Calcium 7.8 (L) 8.5 - 10.1 MG/DL       Greater than 40 minutes were spent with the patient on counseling and coordination of care    Signed:   Hubert Soto MD  9/2/2021  4:31 PM

## 2021-09-02 NOTE — PROGRESS NOTES
LAURENT:    RUR 18%    Disposition: SNF [facility]hab-[facility] has accepted and obtained insurance authorization. CM spoke to [facility] in admissions [phone]    Transportation: [facility] scheduled for 5:30pm.  CM spoke to dispatch to schedule.  [facility] phone [phone]    RN call report to [phone]      Primary contact: Ed Reyna(son) [phone]    Follow up: PCP/Specialist    José Urais RN/CRM  [phone]

## 2021-09-02 NOTE — PROGRESS NOTES
Problem: Falls - Risk of  Goal: *Absence of Falls  Description: Document Promise Martines Fall Risk and appropriate interventions in the flowsheet.   9/2/2021 1912 by Leticia Devries RN  Outcome: Resolved/Met  9/2/2021 1912 by Leticia Devries RN  Outcome: Progressing Towards Goal  Note: Fall Risk Interventions:  Mobility Interventions: Bed/chair exit alarm, Patient to call before getting OOB    Mentation Interventions: Adequate sleep, hydration, pain control, Bed/chair exit alarm, Door open when patient unattended, Reorient patient, Toileting rounds    Medication Interventions: Patient to call before getting OOB, Teach patient to arise slowly, Evaluate medications/consider consulting pharmacy    Elimination Interventions: Bed/chair exit alarm, Call light in reach, Stay With Me (per policy), Patient to call for help with toileting needs, Urinal in reach    History of Falls Interventions: Bed/chair exit alarm, Door open when patient unattended

## 2021-09-03 LAB
BACTERIA SPEC CULT: NORMAL
SERVICE CMNT-IMP: NORMAL

## 2021-09-07 NOTE — PROGRESS NOTES
Physician Progress Note      Daniel Chávez  [facility] #:                  [id number]  :                       7/15/1931  ADMIT DATE:       2021 7:35 PM  [address] DATE:        2021 8:13 PM  RESPONDING  PROVIDER #:        Mateus Barfiled MD          QUERY TEXT:    Dear attending,    Pt admitted with syncope. Pt noted to have documented rhabdomyolysis from a mechanical fall. If possible, please document in progress notes and discharge summary if you are evaluating and/or treating any of the following: The medical record reflects the following:  Risk Factors: AMA,Syncope,fall  Clinical Indicators: on admit CK 1434, creatinine 1.58, + large blood in UA, -Cr 1.27,  Per H&P- 80 y.o man with BPH, afib, bladder cancer, HTN, who presents after being found down. His son didn't hear from him for 2 days, so he checked on him and he was on the floor. EMS noted feces around the apartment. The patient is a poor historian and doesn't know why he's here. He thinks he fell but is unsure. Rhabdomyolysis: due to fall/being down  -monitor with IV fluids  Per Discharge summary- Mechanical fall - recurrent  Less like syncope  AMS/encephaopathy: improved  - CT head: Right periorbital soft tissue swelling. No acute intracranial abnormality  Rhabdomyolysis: resolved  - Due to fall/being down  Treatment: IV NS 2 liters on , then NS@ 100 cc/hour until 21, monitor labwork, imaging  Thank you    Antonio Acuña RN, BSN  Clinical documentation Improvement  [phone]    Per [email or web address]  Traumatic rhabdomyolysis cause examples: crush syndrome, prolonged immobilization  Nontraumatic rhabdomyolysis cause examples:  marked exertion, hyperthermia, metabolic myopathy, drugs or toxins, infections, electrolyte disorders.   Options provided:  -- Traumatic rhabdomyolysis  -- Nontraumatic rhabdomyolysis  -- Other - I will add my own diagnosis  -- Disagree - Not applicable / Not valid  -- Disagree - Clinically unable to determine / Unknown  -- Refer to Clinical Documentation Reviewer    PROVIDER RESPONSE TEXT:    This patient has traumatic rhabdomyolysis.     Query created by: Linnea Barbosa on 9/7/2021 6:44 AM      Electronically signed by:  Anibal Pennington MD 9/7/2021 7:24 AM

## 2021-09-13 ENCOUNTER — TELEPHONE (OUTPATIENT)
Dept: INTERNAL MEDICINE CLINIC | Age: 86
End: 2021-09-13

## 2021-09-13 DIAGNOSIS — I48.0 PAROXYSMAL ATRIAL FIBRILLATION (HCC): ICD-10-CM

## 2021-09-13 NOTE — TELEPHONE ENCOUNTER
Patient's daughter in law called in along with the patient's son to state the patient needs a mart appointment for a fall, and that he was discharged from [facility] on 8/31, and then sent to rehab. He came home to their house on 9/10 but is not taking/not on any medication currently and they are very concerned. [facility] scheduled him for 9/21but they would like him to be seen earlier if possible. Please call back and advise as to his medication issue. His son can be reached at [phone].

## 2021-09-13 NOTE — TELEPHONE ENCOUNTER
I spoke with pts daughter, pt is sleeping and pt only has his wife on hipaa form. I advise that we need records from rehab for pcp to review. Daughter will fax them over once she gets to work.

## 2021-09-14 NOTE — TELEPHONE ENCOUNTER
----- Message from April M Acevedo Clock sent at 9/14/2021 12:01 PM EDT -----  Regarding: Ruchi Sheppard MD/telephone  General Message/Vendor Calls    Caller's first and last name: Warden Lujan Son       Reason for call: requested a call back       Callback required yes/no and why: Yes       Best contact number(s): [phone]      Details to clarify the request: Patient son is checking the status of a appointment request from yesterday he was told he would receive a call back with an appointment and hasn't heard anything back.         April [address] [facility]

## 2021-09-14 NOTE — TELEPHONE ENCOUNTER
I spoke with patients son to advise we were waiting for son's wife to fax over medical records from rehab we went through the stack of records we received for  and those were not included. I asked if he can please have wife re-fax. [facility]. [facility], the call kept getting dropped every time the , Makayla Resendiz tried to transfer it. The last time I spoke with Humberto Hand she took my name and direct office # and will have someone call me back.

## 2021-09-15 PROBLEM — R91.8 MASS OF LINGULA OF LUNG: Status: ACTIVE | Noted: 2021-09-01

## 2021-09-15 RX ORDER — ACETAMINOPHEN 325 MG/1
650 TABLET ORAL
Qty: 120 TABLET | Refills: 5
Start: 2021-09-15 | End: 2022-05-19 | Stop reason: SDUPTHER

## 2021-09-15 RX ORDER — WARFARIN 7.5 MG/1
TABLET ORAL
Qty: 90 TABLET | Refills: 0
Start: 2021-09-15 | End: 2021-10-07 | Stop reason: ALTCHOICE

## 2021-09-15 NOTE — TELEPHONE ENCOUNTER
Estephania Yip,   Can you help me assess this patient? His son and daughter-in-law both work at [employer]. I have received admission records to [facility] and updated medication list as of September 10, 2021. I have updated his med list in our computer. Does not appear that many major changes were made. Patient was admitted there for rehabilitation after being found down and was diagnosed with pneumonia anf rhabdomyolysis. Was treated w IVF in the hospital and sent to rehab. I do not have any notes regarding how he did at rehab, but he was appears to have been sent home to live with his daughter and September 10. Hospital admission also noted a possible lung mass which would benefit from CT scan to diagnose in the future. Since he has had some falls, question of whether he should continue Coumadin was raised. Appears he is discharged on 7.5 mg nightly. Will repeat INR in the next week or 2. He is he medically and physically back to baseline? It is unclear exactly what caused him to fall and remain down, so I agree with observation 24/7 for right now. I believe he was living independently prior to this admission but has had some falls before (admitted to [facility] 1/2/21). Does he have home nursing? What other questions do they have until I see him? Current Outpatient Medications   Medication Sig    warfarin (COUMADIN) 7.5 mg tablet Take 7.5 mg nightly    acetaminophen (TYLENOL) 325 mg tablet Take 2 Tablets by mouth every six (6) hours as needed for Pain or Fever.  gabapentin (NEURONTIN) 300 mg capsule TAKE 1 CAPSULE BY MOUTH DAILY INDICATIONS: NEUROPATHIC PAIN    irbesartan (AVAPRO) 75 mg tablet TAKE 2 TABLETS NIGHTLY    sertraline (ZOLOFT) 100 mg tablet TAKE 1 TABLET DAILY    flecainide (TAMBOCOR) 150 mg tablet Take 1 Tab by mouth two (2) times a day.  colestipoL (COLESTID) 5 gram packet Take 5 g by mouth two (2) times a day.  tamsulosin HCl (TAMSULOSIN PO) Take  by mouth.  ONE TABLET ONCE DAILY. PT DOES NOT KNOW DOSAGE    amLODIPine (NORVASC) 2.5 mg tablet TAKE 1 TABLET BY MOUTH ONCE DAILY. DECREASED DOSE.  finasteride (PROSCAR) 5 mg tablet TAKE 1 TABLET BY MOUTH EVERY DAY     No current facility-administered medications for this visit.

## 2021-09-16 ENCOUNTER — TELEPHONE (OUTPATIENT)
Dept: INTERNAL MEDICINE CLINIC | Age: 86
End: 2021-09-16

## 2021-09-16 DIAGNOSIS — Z74.09 IMPAIRED FUNCTIONAL MOBILITY, BALANCE, GAIT, AND ENDURANCE: ICD-10-CM

## 2021-09-16 DIAGNOSIS — G89.29 CHRONIC BILATERAL LOW BACK PAIN WITHOUT SCIATICA: Primary | ICD-10-CM

## 2021-09-16 DIAGNOSIS — G60.9 IDIOPATHIC PERIPHERAL NEUROPATHY: ICD-10-CM

## 2021-09-16 DIAGNOSIS — M54.50 CHRONIC BILATERAL LOW BACK PAIN WITHOUT SCIATICA: Primary | ICD-10-CM

## 2021-09-16 DIAGNOSIS — R29.6 FREQUENT FALLS: ICD-10-CM

## 2021-09-16 NOTE — TELEPHONE ENCOUNTER
----- Message from [facility] sent at 9/16/2021  9:01 AM EDT -----  Regarding: / telephone  Contact: [phone]       General Message/Vendor Calls    Caller's first and last name: Cher Acosta       Reason for call: Patient needs paper faxed for [facility] and OT for home health orders. [phone]      Callback required yes/no and why: yes, information needed.       Best contact number(s): [phone]    Details to clarify the request: n/a      [facility]

## 2021-09-16 NOTE — TELEPHONE ENCOUNTER
I spoke with Ellen Quinones at [facility]. Pt was admitted to [facility] then transferred to [facility] where he left [place]. Order for home health PT and OT never got sent to [facility] due to him leaving AM. They need PCP to send orders if possible.  Fax # [phone]

## 2021-09-17 ENCOUNTER — TELEPHONE (OUTPATIENT)
Dept: INTERNAL MEDICINE CLINIC | Age: 86
End: 2021-09-17

## 2021-09-17 NOTE — TELEPHONE ENCOUNTER
Elizabeth Sim from [facility] calling to say they got the referral  but it needs a doctors signature - we can fax this to: [phone]

## 2021-09-17 NOTE — TELEPHONE ENCOUNTER
Spoke with Darshana Arriaza from [facility] and informed him that the referral has been refaxed with Dr Eufemia Du.

## 2021-09-21 ENCOUNTER — OFFICE VISIT (OUTPATIENT)
Dept: INTERNAL MEDICINE CLINIC | Age: 86
End: 2021-09-21
Payer: MEDICARE

## 2021-09-21 VITALS
DIASTOLIC BLOOD PRESSURE: 86 MMHG | OXYGEN SATURATION: 95 % | HEIGHT: 72 IN | SYSTOLIC BLOOD PRESSURE: 144 MMHG | HEART RATE: 67 BPM | TEMPERATURE: 98.2 F | WEIGHT: 220.8 LBS | RESPIRATION RATE: 12 BRPM | BODY MASS INDEX: 29.91 KG/M2

## 2021-09-21 DIAGNOSIS — I10 ESSENTIAL HYPERTENSION: ICD-10-CM

## 2021-09-21 DIAGNOSIS — Z79.01 LONG TERM CURRENT USE OF ANTICOAGULANTS WITH INR GOAL OF 2.0-3.0: ICD-10-CM

## 2021-09-21 DIAGNOSIS — M25.562 ACUTE PAIN OF BOTH KNEES: ICD-10-CM

## 2021-09-21 DIAGNOSIS — R42 ORTHOSTATIC DIZZINESS: ICD-10-CM

## 2021-09-21 DIAGNOSIS — M25.561 ACUTE PAIN OF BOTH KNEES: ICD-10-CM

## 2021-09-21 DIAGNOSIS — R91.8 ABNORMAL FINDING ON LUNG IMAGING: ICD-10-CM

## 2021-09-21 DIAGNOSIS — R91.8 MASS OF LINGULA OF LUNG: ICD-10-CM

## 2021-09-21 DIAGNOSIS — N40.1 BPH WITH OBSTRUCTION/LOWER URINARY TRACT SYMPTOMS: ICD-10-CM

## 2021-09-21 DIAGNOSIS — R35.1 FREQUENT URINATION AT NIGHT: ICD-10-CM

## 2021-09-21 DIAGNOSIS — N13.8 BPH WITH OBSTRUCTION/LOWER URINARY TRACT SYMPTOMS: ICD-10-CM

## 2021-09-21 DIAGNOSIS — I48.0 PAROXYSMAL ATRIAL FIBRILLATION (HCC): ICD-10-CM
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
## 2021-09-21 DIAGNOSIS — E55.9 VITAMIN D INSUFFICIENCY: ICD-10-CM

## 2021-09-21 LAB
BILIRUB UR QL STRIP: NEGATIVE
GLUCOSE UR-MCNC: NEGATIVE MG/DL
KETONES P FAST UR STRIP-MCNC: NEGATIVE MG/DL
PH UR STRIP: 6 [PH] (ref 4.6–8)
PROT UR QL STRIP: NORMAL
SP GR UR STRIP: 1.03 (ref 1–1.03)
UA UROBILINOGEN AMB POC: NORMAL (ref 0.2–1)
URINALYSIS CLARITY POC: NORMAL
URINALYSIS COLOR POC: YELLOW
URINE BLOOD POC: NORMAL
URINE LEUKOCYTES POC: NEGATIVE
URINE NITRITES POC: NEGATIVE

## 2021-09-21 PROCEDURE — 81003 URINALYSIS AUTO W/O SCOPE: CPT | Performed by: INTERNAL MEDICINE

## 2021-09-21 PROCEDURE — 1111F DSCHRG MED/CURRENT MED MERGE: CPT | Performed by: INTERNAL MEDICINE

## 2021-09-21 PROCEDURE — G9717 DOC PT DX DEP/BP F/U NT REQ: HCPCS | Performed by: INTERNAL MEDICINE

## 2021-09-21 PROCEDURE — 1101F PT FALLS ASSESS-DOCD LE1/YR: CPT | Performed by: INTERNAL MEDICINE

## 2021-09-21 PROCEDURE — G8536 NO DOC ELDER MAL SCRN: HCPCS | Performed by: INTERNAL MEDICINE

## 2021-09-21 PROCEDURE — G8417 CALC BMI ABV UP PARAM F/U: HCPCS | Performed by: INTERNAL MEDICINE

## 2021-09-21 PROCEDURE — 99214 OFFICE O/P EST MOD 30 MIN: CPT | Performed by: INTERNAL MEDICINE

## 2021-09-21 PROCEDURE — G8427 DOCREV CUR MEDS BY ELIG CLIN: HCPCS | Performed by: INTERNAL MEDICINE

## 2021-09-22 LAB
25(OH)D3 SERPL-MCNC: 15.6 NG/ML (ref 30–100)
ALBUMIN SERPL-MCNC: 3.1 G/DL (ref 3.5–5)
ALBUMIN/GLOB SERPL: 0.8 {RATIO} (ref 1.1–2.2)
ALP SERPL-CCNC: 110 U/L (ref 45–117)
ALT SERPL-CCNC: 16 U/L (ref 12–78)
ANION GAP SERPL CALC-SCNC: 7 MMOL/L (ref 5–15)
AST SERPL-CCNC: 15 U/L (ref 15–37)
BILIRUB SERPL-MCNC: 0.3 MG/DL (ref 0.2–1)
BUN SERPL-MCNC: 17 MG/DL (ref 6–20)
BUN/CREAT SERPL: 11 (ref 12–20)
CALCIUM SERPL-MCNC: 8.4 MG/DL (ref 8.5–10.1)
CHLORIDE SERPL-SCNC: 109 MMOL/L (ref 97–108)
CO2 SERPL-SCNC: 25 MMOL/L (ref 21–32)
CREAT SERPL-MCNC: 1.48 MG/DL (ref 0.7–1.3)
GLOBULIN SER CALC-MCNC: 3.9 G/DL (ref 2–4)
GLUCOSE SERPL-MCNC: 90 MG/DL (ref 65–100)
INR PPP: 1.4 (ref 0.9–1.1)
POTASSIUM SERPL-SCNC: 3.8 MMOL/L (ref 3.5–5.1)
PROT SERPL-MCNC: 7 G/DL (ref 6.4–8.2)
PROTHROMBIN TIME: 14.9 SEC (ref 9–11.1)
SODIUM SERPL-SCNC: 141 MMOL/L (ref 136–145)

## 2021-09-22 NOTE — PROGRESS NOTES
HISTORY OF PRESENT ILLNESS    Chief Complaint   Patient presents with   [facility] Follow Up     [facility] - Fall.  Urinary Frequency    Medication Evaluation     Discuss all meds.  Back Pain     Lower right side.  Knee Pain     Both. Presents for follow-up  He is alone today, drove here himself. He moved to [address] due to the high cost of DDN. Meals are provided. He is also staying w with his son and daughter-in-law. His Wife Casandra Tidwell resides at [facility] since 1/2/21    Patient was admitted to the hospital on August 28, 2021 with altered mental status, rhabdomyolysis secondary to a fall. He was discharged from the hospital on September 2 and then went to a skilled nursing facility where he did physical med and rehab until September 10. When admitted to the hospital, patient did not admit to recalling much of what happened. Today, he states that he was sitting at the edge of the bed and his feet began to slip out and he slid down to the floor. He says that he remained on the floor for about 3 days even though he says that he was awake a lot of the time. He says he was unable to pull himself up to the bed. He says that he crawled around some but could not get to a phone or get to his call button. He reports injuries of his knees from crawling and reports that he was incontinent of stool in his urine all over his condo. He cannot specifically state why he could not crawl to the door or reach out to get the phone. He says that he was supposed to have dinner with his son and did not arrive, so son called the facility for a welfare check and was told to, over right away. On arrival to the emergency room, his BUN was 38, creatinine 1.58, not much above baseline. Liver enzymes stable. No evidence of anemia. White blood cell count was slightly elevated and CK was elevated at 1434.   Chest x-ray revealed evidence of bilateral lower interstitial infiltrates and a possible lingular mass lesion. CT of the cervical spine showed no acute findings other than chronic multilevel spondylosis. CT of the head showed right periorbital soft tissue swelling but no other evidence of stroke. MRI of the brain was not able to be performed because patient has a spiral metal buckle. Echocardiogram showed normal ejection fraction. He was continued on Coumadin for atrial fibrillation. Blood pressure was uncontrolled in the hospital.  Added amlodipine and increase losartan. Patient was evaluated in the hospital by physical therapy, Occupational Therapy, care management. Today, patient state he is doing well. He is ambulating on his own. Does report some mild dizziness. Denies any falls. Denies any cough, fever, chills, weakness. Denies any unusual bleeding or bruising. Review of Systems   All other systems reviewed and are negative, except as noted in HPI    Past Medical and Surgical History   has a past medical history of Advanced care planning/counseling discussion (10/8/2015), Basal cell carcinoma, face, Bladder cancer ([address] (2012), BPH with obstruction/lower urinary tract symptoms, Chronic lower back pain, Chronic neck pain, Depression, major, Dupuytren's contracture of left hand, Epistaxis (2013), Family history of skin cancer, Fecal incontinence, Hearing loss, HTN (hypertension), Insomnia, IVCD (intraventricular conduction defect) (11/30/2015), Long term current use of anticoagulants with INR goal of 2.0-3.0, Loose stools (7/18/2014), 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 He also has no past medical history of Sun-damaged skin, Sunburn, blistering, or Tanning bed exposure.    has a past surgical history that includes hx bladder repair (2012); hx retinal detachment repair (Right); hx hernia repair; hx colonoscopy (2012); pr cystourethroscopy (9/2014); pr cystourethroscopy (5/1/15); pr cystourethroscopy (2/29/16); and hx malignant skin lesion excision (06/14/2018). reports that he has quit smoking. He has never used smokeless tobacco. He reports current alcohol use. He reports that he does not use drugs. family history is not on file. Physical Exam   Nursing note and vitals reviewed. Blood pressure (!) 144/86, pulse 67, temperature 98.2 °F (36.8 °C), temperature source Oral, resp. rate 12, height 6' (1.829 m), weight 220 lb 12.8 oz (100.2 kg), SpO2 95 %. Constitutional:  No distress. Eyes: Conjunctivae are normal.   Ears:  Hearing grossly intact  Cardiovascular: Normal rate. regular rhythm, no murmurs or gallops  No edema  Pulmonary/Chest: Effort normal.   CTAB  Musculoskeletal: moves all 4 extremities   Neurological: Alert and oriented to person, place, and time. Skin: No visible rash noted. Psychiatric: Normal mood and affect. Behavior is normal.     ASSESSMENT and PLAN  Diagnoses and all orders for this visit:    1. Fall, subsequent encounter  Circumstances of his fall are unclear. He had another episode in January of this year. He has had evidence of significant injuries and unfortunately is a poor historian about exactly what happened, although at baseline he is a very good historian. Perhaps pneumonia or dehydration or decreased blood pressure caused altered mental status? He is still living independently although he is currently also staying with his son. Starting home health with physical therapy and Occupational Therapy. Appears relatively safe to be on his own, but I would like home PT and OT to evaluate him and his apartment as well. Will need close monitoring and he is reminded to wear his call button at all times. Assisted living may be a better fit ultimately.   Based on review in the office, I do not have any great concerns about him driving today, but given advanced age and these 2 episodes, need to scale back on driving ultimately. Permissive blood pressure control. We will stop amlodipine to decrease dizziness. Tamsulosin also may contribute. 2. Frequent urination at night  9. BPH with obstruction/lower urinary tract symptoms  Ongoing frequency of urination at night. He is seeing urology. He is on tamsulosin and finasteride which both may cause orthostatic dizziness. That said, still has significant urinary symptoms. Balance safety and risk. I have advised that he constantly told his urologist Dr. José Dove about changing medical therapy if possible. -     AMB POC URINALYSIS DIP STICK AUTO W/O MICRO    3. Orthostatic dizziness? Stop amlodipine. Tamsulosin and finasteride may be contributing. 4. Paroxysmal atrial fibrillation (HCC)  Currently asymptomatic    5. Essential hypertension  Blood pressure is borderline controlled, but I am concerned about his orthostatic symptoms. Amlodipine. Continue Avapro  -     METABOLIC PANEL, COMPREHENSIVE; Future    6. Abnormal finding on lung imaging  He was informed during his visit about a lingular lesion lesion on his lung that was seen on chest x-ray which was portable. Discussed how it is unclear now if this is anything significant, but I do recommend imaging to evaluate. CT chest ordered. He is asymptomatic with no cough or shortness of breath. 7. Acute pain of both knees  Bilateral anterior knee pain secondary to abrasions and patellofemoral syndrome. Monitor for now. 8. Long term current use of anticoagulants with INR goal of 2.0-3.0  Reports he is taking 7.5 mg daily. He has had 2 episodes of fall associated with confusion but does not appear to be a high fall risk in general.  Consider stopping anticoagulant therapy due to fall risk eventually   -     PROTHROMBIN TIME + INR; Future    11.  Vitamin D insufficiency  -     VITAMIN D, 25 HYDROXY; Future      lab results and schedule of future lab studies reviewed with patient  reviewed medications and side effects in detail    Return to clinic for further evaluation if new symptoms develop        Current Outpatient Medications   Medication Sig    acetaminophen (TYLENOL) 325 mg tablet Take 2 Tablets by mouth every six (6) hours as needed for Pain or Fever.  warfarin (COUMADIN) 7.5 mg tablet Take 7.5 mg nightly    gabapentin (NEURONTIN) 300 mg capsule TAKE 1 CAPSULE BY MOUTH DAILY INDICATIONS: NEUROPATHIC PAIN    irbesartan (AVAPRO) 75 mg tablet TAKE 2 TABLETS NIGHTLY    sertraline (ZOLOFT) 100 mg tablet TAKE 1 TABLET DAILY    flecainide (TAMBOCOR) 150 mg tablet Take 1 Tab by mouth two (2) times a day.  colestipoL (COLESTID) 5 gram packet Take 5 g by mouth two (2) times a day.  tamsulosin HCl (TAMSULOSIN PO) Take  by mouth. ONE TABLET ONCE DAILY. PT DOES NOT KNOW DOSAGE    finasteride (PROSCAR) 5 mg tablet TAKE 1 TABLET BY MOUTH EVERY DAY     No current facility-administered medications for this visit.

## 2021-09-23 RX ORDER — ASPIRIN 325 MG
50000 TABLET, DELAYED RELEASE (ENTERIC COATED) ORAL
Qty: 12 CAPSULE | Refills: 3 | Status: SHIPPED | OUTPATIENT
Start: 2021-09-23 | End: 2021-12-06 | Stop reason: SDUPTHER

## 2021-09-23 NOTE — PROGRESS NOTES
Nurse informed patient & his son of the following per PCP:    \"INR is low. Please confirm that he is taking Coumadin 7.5 mg pill once daily and has not missed doses in the last week. If so, increase to 7.5 mg pill 5 days/week and 1.5 pills on Mondays and Fridays. Repeat INR in 2 weeks. Vitamin D levels are very low and I have sent in a high-dose vitamin D to take once per week. Please make sure that he knows to take it only once per week, not daily. His liver and kidney functions look relatively stable\"  Patient states that he has been taking 7.5mg of Coumadin every night (7 nights a week), but he knows that multiple doses have been missed over the last 2 weeks. Son states that patient was on the ground for 2 days, so no Coumadin was taken, then patient admitted to [facility], followed by [facility]/ Rehab; both patient & son feel that Coumadin doses were probably missed while patient was in rehab. Patient & son would like for PCP to be provided this information & then have recommended Coumadin dosing going forward confirmed by PCP, so there is no confusion. Message forwarded to PCP. Patient verbalized understanding of new Vitamin D prescription. Patient & son would like a call back from nurse after PCP provides Coumadin dosing instruction confirmation.  Thank you

## 2021-09-23 NOTE — PROGRESS NOTES
Jason Pagan,  Can you please call patient about his lab results? INR is low. Please confirm that he is taking Coumadin 7.5 mg pill once daily and has not missed doses in the last week. If so, increase to 7.5 mg pill 5 days/week and 1.5 pills on Mondays and Fridays. Repeat INR in 2 weeks. Vitamin D levels are very low and I have sent in a high-dose vitamin D to take once per week. Please make sure that he knows to take it only once per week, not daily.   His liver and kidney functions look relatively stable

## 2021-09-28 ENCOUNTER — TELEPHONE (OUTPATIENT)
Dept: INTERNAL MEDICINE CLINIC | Age: 86
End: 2021-09-28

## 2021-09-28 NOTE — TELEPHONE ENCOUNTER
Left voicemail message for patient providing updated recommended dosing instructions from PCP for patient's Coumadin. Nurse also notified patient that he needs to have his INR checked again on October 5, 2021 per PCP, so please call PCP's office & any Patient  or Nurse can assist in making an appt for an INR check. Thank you.

## 2021-10-01 ENCOUNTER — TELEPHONE (OUTPATIENT)
Dept: INTERNAL MEDICINE CLINIC | Age: 86
End: 2021-10-01

## 2021-10-01 ENCOUNTER — HOSPITAL ENCOUNTER (OUTPATIENT)
Dept: CT IMAGING | Age: 86
Discharge: HOME | End: 2021-10-01
Attending: INTERNAL MEDICINE
Payer: MEDICARE

## 2021-10-01 DIAGNOSIS — R91.8 MASS OF LINGULA OF LUNG: ICD-10-CM

## 2021-10-01 PROCEDURE — 71250 CT THORAX DX C-: CPT

## 2021-10-01 NOTE — TELEPHONE ENCOUNTER
I am concerned that his has not received our messages since no INR appt is scheduled for next week. Please call him and convey message from 9/21/21. \"INR is low.  Please confirm that he is taking Coumadin 7.5 mg pill once daily and has not missed doses in the last week.  If so, increase to 7.5 mg pill 5 days/week and 1.5 pills on Mondays and Fridays.  Repeat INR in 2 weeks. Vitamin D levels are very low and I have sent in a high-dose vitamin D to take once per week.  Please make sure that he knows to take it only once per week, not daily. His liver and kidney functions look relatively stable\"    Please schedule appt for INR check next week with me. Ok to [place]. ALSO, please let him know that his CT scan of his chest today showed no signs of cancer. This is great. He does have a hernia in his abdomen and some gallstones, but there is nothing to do about those at this point unless he is symptomatic. Alejandro Kidd

## 2021-10-01 NOTE — TELEPHONE ENCOUNTER
Return call made to patient spoke to patient and advised of the providers instructions and recommendations. Nurse informed patient on new rx sent to pharmacy that should be taken only once a week. Patient verbalized understanding and was thankful for the call. Patient was scheduled for Oct 5th at 1020am. Nurse added landline number to chart to reach patient.

## 2021-10-05 RX ORDER — FINASTERIDE 5 MG/1
TABLET, FILM COATED ORAL
Qty: 90 TABLET | Refills: 3 | Status: SHIPPED | OUTPATIENT
Start: 2021-10-05 | End: 2021-11-01 | Stop reason: ALTCHOICE

## 2021-10-07 ENCOUNTER — TELEPHONE (OUTPATIENT)
Dept: INTERNAL MEDICINE CLINIC | Age: 86
End: 2021-10-07

## 2021-10-07 ENCOUNTER — OFFICE VISIT (OUTPATIENT)
Dept: INTERNAL MEDICINE CLINIC | Age: 86
End: 2021-10-07
Payer: MEDICARE

## 2021-10-07 VITALS
HEIGHT: 72 IN | OXYGEN SATURATION: 98 % | HEART RATE: 70 BPM | RESPIRATION RATE: 14 BRPM | SYSTOLIC BLOOD PRESSURE: 130 MMHG | BODY MASS INDEX: 29.95 KG/M2 | DIASTOLIC BLOOD PRESSURE: 82 MMHG | TEMPERATURE: 98.2 F

## 2021-10-07 DIAGNOSIS — M25.561 ACUTE PAIN OF BOTH KNEES: ICD-10-CM

## 2021-10-07 DIAGNOSIS — Z79.01 LONG TERM CURRENT USE OF ANTICOAGULANTS WITH INR GOAL OF 2.0-3.0: ICD-10-CM

## 2021-10-07 DIAGNOSIS — N40.1 BPH WITH OBSTRUCTION/LOWER URINARY TRACT SYMPTOMS: ICD-10-CM

## 2021-10-07 DIAGNOSIS — R42 ORTHOSTATIC DIZZINESS: ICD-10-CM

## 2021-10-07 DIAGNOSIS — M25.562 ACUTE PAIN OF BOTH KNEES: ICD-10-CM

## 2021-10-07 DIAGNOSIS — I48.0 PAROXYSMAL ATRIAL FIBRILLATION (HCC): Primary | ICD-10-CM

## 2021-10-07 DIAGNOSIS — I10 ESSENTIAL HYPERTENSION: ICD-10-CM

## 2021-10-07 DIAGNOSIS — N13.8 BPH WITH OBSTRUCTION/LOWER URINARY TRACT SYMPTOMS: ICD-10-CM

## 2021-10-07 LAB
INR BLD: 1.2
PT POC: 14.5 SECONDS
VALID INTERNAL CONTROL?: YES

## 2021-10-07 PROCEDURE — G8417 CALC BMI ABV UP PARAM F/U: HCPCS | Performed by: INTERNAL MEDICINE

## 2021-10-07 PROCEDURE — G8427 DOCREV CUR MEDS BY ELIG CLIN: HCPCS | Performed by: INTERNAL MEDICINE

## 2021-10-07 PROCEDURE — 1101F PT FALLS ASSESS-DOCD LE1/YR: CPT | Performed by: INTERNAL MEDICINE

## 2021-10-07 PROCEDURE — 99213 OFFICE O/P EST LOW 20 MIN: CPT | Performed by: INTERNAL MEDICINE

## 2021-10-07 PROCEDURE — 85610 PROTHROMBIN TIME: CPT | Performed by: INTERNAL MEDICINE

## 2021-10-07 PROCEDURE — G8536 NO DOC ELDER MAL SCRN: HCPCS | Performed by: INTERNAL MEDICINE

## 2021-10-07 PROCEDURE — G9717 DOC PT DX DEP/BP F/U NT REQ: HCPCS | Performed by: INTERNAL MEDICINE

## 2021-10-07 RX ORDER — WARFARIN SODIUM 5 MG/1
TABLET ORAL
Qty: 50 TABLET | Refills: 0
Start: 2021-10-07 | End: 2021-11-11

## 2021-10-07 NOTE — PROGRESS NOTES
HISTORY OF PRESENT ILLNESS    Chief Complaint   Patient presents with    Follow-up     review lasat labs - body pain, back and knees are bothering him, not sleeping due to pain        Presents for follow-up    Anticoagulation  Patient here for followup of chronic anticoagulation. Indication: Atrial Fibrillation. Bleeding Signs/Symptoms:  None. Taking Coumadin 5 mg pills. He states he is taking 1.5 pills (7.5 mg) every day. He did not get the message to change the dose. INR is 1.2 today  Lab Results   Component Value Date/Time    INR 1.4 (H) 09/21/2021 01:19 PM    INR 1.9 (H) 09/02/2021 04:26 AM    INR 2.2 (H) 09/01/2021 04:49 AM    INR POC 1.2 10/07/2021 11:10 AM    Prothrombin time 14.9 (H) 09/21/2021 01:19 PM    Prothrombin time 19.5 (H) 09/02/2021 04:26 AM    Prothrombin time 22.1 (H) 09/01/2021 04:49 AM     Hypertension  Orthostatic dizziness. Still feels somewhat dizzy when standing. We stopped amlodipine last visit. Hypertension ROS: taking medications as instructed, no medication side effects noted, no TIA's, no chest pain on exertion, no dyspnea on exertion, no swelling of ankles     reports that he has quit smoking. He has never used smokeless tobacco.    reports current alcohol use. BP Readings from Last 2 Encounters:   10/07/21 130/82   09/21/21 (!) 144/86     Fortunately, chest CT on October 1 did not show any evidence of lung cancer. He has a 4.7 cm gallstone. He also does have a large abdominal hernia which contains a portion of the transverse colon. No evidence of obstruction.       Review of Systems   All other systems reviewed and are negative, except as noted in HPI    Past Medical and Surgical History   has a past medical history of Advanced care planning/counseling discussion (10/8/2015), Basal cell carcinoma, face, Bladder cancer ([facility] [address] (2012), BPH with obstruction/lower urinary tract symptoms, Chronic lower back pain, Chronic neck pain, Depression, major, Dupuytren's contracture of left hand, Epistaxis (2013), Family history of skin cancer, Fecal incontinence, Hearing loss, HTN (hypertension), Insomnia, IVCD (intraventricular conduction defect) (11/30/2015), Long term current use of anticoagulants with INR goal of 2.0-3.0, Loose stools (7/18/2014), 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 He also has no past medical history of Sun-damaged skin, Sunburn, blistering, or Tanning bed exposure. has a past surgical history that includes hx bladder repair (2012); hx retinal detachment repair (Right); hx hernia repair; hx colonoscopy (2012); pr cystourethroscopy (9/2014); pr cystourethroscopy (5/1/15); pr cystourethroscopy (2/29/16); and hx malignant skin lesion excision (06/14/2018). reports that he has quit smoking. He has never used smokeless tobacco. He reports current alcohol use. He reports that he does not use drugs. family history is not on file. Physical Exam   Nursing note and vitals reviewed. Blood pressure 130/82, pulse 70, temperature 98.2 °F (36.8 °C), temperature source Oral, resp. rate 14, height 6' (1.829 m), SpO2 98 %. Constitutional:  No distress. Eyes: Conjunctivae are normal.   Ears:  Hearing grossly intact  Cardiovascular: Normal rate. regular rhythm, no murmurs or gallops  No edema  Pulmonary/Chest: Effort normal.   CTAB  Musculoskeletal: moves all 4 extremities   Neurological: Alert and oriented to person, place, and time. Skin: No visible rash noted. Psychiatric: Normal mood and affect. Behavior is normal.     Assessment and Plan    Diagnoses and all orders for this visit:    1. Paroxysmal atrial fibrillation (HCC)  Currently asymptomatic    2.  Long term current use of anticoagulants with INR goal of 2.0-3.0  INR is very low today. Will adjust dose as below. Could eventually consider changing to 7.5 mg pills. Repeat INR in 2 weeks. He voices understanding of the dosing and seems fairly certain that he was taking the dose as stated as above, 7.5 mg every day  -     warfarin (COUMADIN) 5 mg tablet; Take 1.5 pills (7.5 mg) Mon Wed Fri and take 2 pills (10 mg) Tues Thur Sat Sun)  Indications: treatment to prevent blood clots in chronic atrial fibrillation  -     AMB POC PT/INR    3. Essential hypertension  Blood pressure is still reasonably controlled. Orthostatic dizziness remains a concern. Will continue current blood pressure medications and stop Flomax. 4. Orthostatic dizziness    5. BPH with obstruction/lower urinary tract symptoms  Stop Flomax due to orthostatic dizziness. Monitor for worsening urinary symptoms and can resume may be every other day if needed. 6. Acute pain of both knees  Knee pain from falling. Recommend use acetaminophen. No NSAIDs because of risk of bleeding on Coumadin    lab results and schedule of future lab studies reviewed with patient  reviewed medications and side effects in detail    Return to clinic for further evaluation if new symptoms develop        Current Outpatient Medications   Medication Sig    warfarin (COUMADIN) 5 mg tablet Take 1.5 pills (7.5 mg) Mon Wed Fri and take 2 pills (10 mg) Tues Thur Sat Sun)  Indications: treatment to prevent blood clots in chronic atrial fibrillation    finasteride (PROSCAR) 5 mg tablet TAKE 1 TABLET BY MOUTH EVERY DAY    cholecalciferol (VITAMIN D3) (50,000 UNITS /1250 MCG) capsule Take 1 Capsule by mouth every seven (7) days. Indications: low vitamin D levels    acetaminophen (TYLENOL) 325 mg tablet Take 2 Tablets by mouth every six (6) hours as needed for Pain or Fever.     gabapentin (NEURONTIN) 300 mg capsule TAKE 1 CAPSULE BY MOUTH DAILY INDICATIONS: NEUROPATHIC PAIN    irbesartan (AVAPRO) 75 mg tablet TAKE 2 TABLETS NIGHTLY    sertraline (ZOLOFT) 100 mg tablet TAKE 1 TABLET DAILY    flecainide (TAMBOCOR) 150 mg tablet Take 1 Tab by mouth two (2) times a day.  colestipoL (COLESTID) 5 gram packet Take 5 g by mouth two (2) times a day.  tamsulosin HCl (TAMSULOSIN PO) Take  by mouth. ONE TABLET ONCE DAILY. PT DOES NOT KNOW DOSAGE     No current facility-administered medications for this visit.

## 2021-10-29 ENCOUNTER — TELEPHONE (OUTPATIENT)
Dept: INTERNAL MEDICINE CLINIC | Age: 86
End: 2021-10-29

## 2021-10-29 NOTE — TELEPHONE ENCOUNTER
Any help is appreciated. Recurrent falls. Seeing PT at home. Make sure he stopped flomax (tamsulosin).

## 2021-10-29 NOTE — TELEPHONE ENCOUNTER
Patient left message stating that he has fallen again and would really like to speak to Dr. Julia Esteban. I called patient for more info but he was unavailable so I left VM with number to call back. Dr. Julia Esteban please let me know if I can help with this.

## 2021-10-31 NOTE — TELEPHONE ENCOUNTER
See notes below. Patient is scheduled with Dr Sera Ortiz as an overbook Monday and needs to see me for f/u of his chronic orthostatic dizziness, falls, tremors. Make sure he stopped flomax and his seeing PT at home. If he has questions that we can not answer in the phone and wants to be seen Monday, please move him to my schedule at 4:00 Monday. Please cancel appt w Dr. Sera Ortiz. I will need to discuss stopping coumadin with him as well.

## 2021-11-01 ENCOUNTER — TELEPHONE (OUTPATIENT)
Dept: INTERNAL MEDICINE CLINIC | Age: 86
End: 2021-11-01

## 2021-11-01 ENCOUNTER — OFFICE VISIT (OUTPATIENT)
Dept: INTERNAL MEDICINE CLINIC | Age: 86
End: 2021-11-01

## 2021-11-01 VITALS
SYSTOLIC BLOOD PRESSURE: 142 MMHG | OXYGEN SATURATION: 96 % | TEMPERATURE: 98.2 F | RESPIRATION RATE: 12 BRPM | HEIGHT: 72 IN | HEART RATE: 63 BPM | BODY MASS INDEX: 29.45 KG/M2 | WEIGHT: 217.4 LBS | DIASTOLIC BLOOD PRESSURE: 84 MMHG

## 2021-11-01 DIAGNOSIS — Z79.01 LONG TERM CURRENT USE OF ANTICOAGULANTS WITH INR GOAL OF 2.0-3.0: ICD-10-CM

## 2021-11-01 DIAGNOSIS — N13.8 BPH WITH OBSTRUCTION/LOWER URINARY TRACT SYMPTOMS: ICD-10-CM

## 2021-11-01 DIAGNOSIS — R29.6 FREQUENT FALLS: Primary | ICD-10-CM

## 2021-11-01 DIAGNOSIS — N40.1 BPH WITH OBSTRUCTION/LOWER URINARY TRACT SYMPTOMS: ICD-10-CM

## 2021-11-01 DIAGNOSIS — G60.9 IDIOPATHIC PERIPHERAL NEUROPATHY: ICD-10-CM

## 2021-11-01 DIAGNOSIS — R42 ORTHOSTATIC DIZZINESS: ICD-10-CM

## 2021-11-01 DIAGNOSIS — I48.0 PAROXYSMAL ATRIAL FIBRILLATION (HCC): ICD-10-CM

## 2021-11-01 DIAGNOSIS — G25.0 ESSENTIAL TREMOR: ICD-10-CM

## 2021-11-01 LAB
INR BLD: 1.8
PT POC: 21.9 SECONDS
VALID INTERNAL CONTROL?: YES

## 2021-11-01 PROCEDURE — G8427 DOCREV CUR MEDS BY ELIG CLIN: HCPCS | Performed by: INTERNAL MEDICINE

## 2021-11-01 PROCEDURE — 85610 PROTHROMBIN TIME: CPT | Performed by: INTERNAL MEDICINE

## 2021-11-01 PROCEDURE — 99214 OFFICE O/P EST MOD 30 MIN: CPT | Performed by: INTERNAL MEDICINE

## 2021-11-01 PROCEDURE — 1101F PT FALLS ASSESS-DOCD LE1/YR: CPT | Performed by: INTERNAL MEDICINE

## 2021-11-01 PROCEDURE — G8417 CALC BMI ABV UP PARAM F/U: HCPCS | Performed by: INTERNAL MEDICINE

## 2021-11-01 PROCEDURE — G9717 DOC PT DX DEP/BP F/U NT REQ: HCPCS | Performed by: INTERNAL MEDICINE

## 2021-11-01 PROCEDURE — G8536 NO DOC ELDER MAL SCRN: HCPCS | Performed by: INTERNAL MEDICINE

## 2021-11-01 RX ORDER — FINASTERIDE 5 MG/1
5 TABLET, FILM COATED ORAL DAILY
Qty: 90 TABLET | Refills: 3
Start: 2021-11-01 | End: 2022-03-31 | Stop reason: SDUPTHER

## 2021-11-01 NOTE — Clinical Note
Chandler,   I hope you are doing well. Mr. Merari Rg has continued to decline in his ability to walk safely and has developed a tremor. I read that flecainide has a 5% chance of tremor risk. If there is any chance of weaning back this medication, please consider. I think it is equally possible that his tremor is not caused by flecainide, so obviously if there is not a great other option, the benefit may outweigh the risk. You see him next week.  Adriana Mack

## 2021-11-01 NOTE — TELEPHONE ENCOUNTER
Please try calling patient again in the next hour to confirm that he knows about the appt change and please advise that he get a ride to today's appt since I am concerned about him falling and do not advise driving. If he does not answer the phone, we may have to have a wellness check since he fell before and was found days later.

## 2021-11-01 NOTE — TELEPHONE ENCOUNTER
I spoke with patient, he is aware of the appt change. I advised that its best he have a  due to frequent falls and driving risk per pcp's recommendation. Pt verbalized understanding and states he doesn't have an issue driving but will call his son to see if he can bring him to the appt.

## 2021-11-01 NOTE — PROGRESS NOTES
HISTORY OF PRESENT ILLNESS    Chief Complaint   Patient presents with   [facility] Follow Up    Fall    Tremors    Medication Evaluation     Discuss meds - unsure of which meds were stopped. Presents for follow-up    Reports another fall last week. He was leaning over in fridge and fell after standing up. No injury. Used his emergency  lanyard button on his neck and help arrived to help him stand up. He can not stand up from the ground on his own power, even holding one. PT comes [facility] on Tues and Thurs. OT is coming as well. They are working on fall risk and standing. Using cane at home all of the time now. Is advised to use rolling walker when going out. His knees and legs do not feel strong when he is trying to stand up. Difficult to stand fro  He notes some tremor which has been present for a while, worsening. Chronic Foot neuropathy. He has some difficulty feeling his feet on the ground when he walks. He states that he has been driving some recently. Reports that he drives to visit his wife twice per week. She is declining and is living in a nursing home. He also drives to the doctor's office and to have dinner at his son's house weekly. He states that he is able to drive, but admits that it is sometimes difficult to get out of the car. Has had no falls while getting out of the car as of this time. We have discontinued amlodipine and finasteride to try to help with orthostatic symptoms. Recent skin cancer surgery by plastic surgery. Had steri-strips removed from left cheek this AM w Dr. Abbe Harry. Healing well. Anticoagulation  Patient here for followup of chronic anticoagulation. Indication: Atrial Fibrillation. Bleeding Signs/Symptoms:  None. Taking Coumadin 7.5 mg Monday Wednesday and Friday and 10 mg Tuesday Thursday Saturday and Sunday.   INR today is 1.8            Review of Systems   All other systems reviewed and are negative, except as noted in HPI    Past Medical and Surgical History   has a past medical history of Advanced care planning/counseling discussion (10/8/2015), Basal cell carcinoma, face, Bladder cancer ([facility] (2012), BPH with obstruction/lower urinary tract symptoms, Chronic lower back pain, Chronic neck pain, Depression, major, Dupuytren's contracture of left hand, Epistaxis (2013), Family history of skin cancer, Fecal incontinence, Hearing loss, HTN (hypertension), Insomnia, IVCD (intraventricular conduction defect) (11/30/2015), Long term current use of anticoagulants with INR goal of 2.0-3.0, Loose stools (7/18/2014), MRI contraindicated due to metal implant, Paroxysmal atrial fibrillation ([facility] Peripheral neuropathy, RLS (restless legs syndrome) (7/3/2017), SCC (squamous cell carcinoma), face, Skin cancer, and Urothelial carcinoma of right distal ureter ([facility] (5/1/15). He also has no past medical history of Sun-damaged skin, Sunburn, blistering, or Tanning bed exposure. has a past surgical history that includes hx bladder repair (2012); hx retinal detachment repair (Right); hx hernia repair; hx colonoscopy (2012); pr cystourethroscopy (9/2014); pr cystourethroscopy (5/1/15); pr cystourethroscopy (2/29/16); hx malignant skin lesion excision (06/14/2018); and hx other surgical (10/2021). reports that he has quit smoking. He has never used smokeless tobacco. He reports current alcohol use. He reports that he does not use drugs. family history is not on file. Physical Exam   Nursing note and vitals reviewed. Blood pressure (!) 142/84, pulse 63, temperature 98.2 °F (36.8 °C), temperature source Oral, resp. rate 12, height 6' (1.829 m), weight 217 lb 6.4 oz (98.6 kg), SpO2 96 %. Constitutional:  No distress. Ambulating slowly. Eyes: Conjunctivae are normal.   Ears:  Hearing grossly intact  Limited range of motion of neck when looking over his right shoulder. Cardiovascular: Normal rate.   regular rhythm, no murmurs or gallops  No edema  Pulmonary/Chest: Effort normal.   CTAB  Musculoskeletal: moves all 4 extremities   It takes him a couple of attempts to stand up from the chair. Ambulating with cane slowly down the burt, paying attention to his feet. Mild instability  4 out of 5 strength lower extremities. Neurological: Alert and oriented to person, place, and time. Mild resting tremor noted bilaterally of lower extremities. Skin: No visible rash noted. Psychiatric: Alert and oriented, blunted affect. Assessment and Plan    Diagnoses and all orders for this visit:    1. Frequent falls  Long discussion regarding his continued significant fall risk. He has bilateral lower extremity weakness and neuropathy of his feet causing decreased sensation. He has tremor as well. Absolutely should have a cane with him at all times and really a walker would be even better. Discussed how it is time to transition away from independent driving. He is at significant risk getting in and out of the car and his decreased range of motion of his neck as well as his decreased sensation of his feet increase his risk of difficulty driving a vehicle. He has been able to accommodate for all of these disabilities for quite some time, but I am especially concerned about his fall risk. Advised that he schedule his visits with his wife when he can get a ride if possible. Advised that he not drive to his son's house for dinner but be picked up. Advised [facility] convenient scheduling of doctor's appointments. We will strongly recommend ongoing physical therapy and occupational therapy to help maintain his stability and strength for as long as possible. He should wear Lanyard button all the time. His mental status does not require nursing home care, but physically has been more challenging. A home aide may be necessary soon and should be considered. Could consider powered mobility device. 2. Orthostatic dizziness  Symptoms are stable.   Off both tamsulosin and amlodipine. Weakness is also a concern. Keep hydrated. 3. Essential tremor  He does have some tremor which has worsened over the past year. Questionable whether this is essential tremor. I doubt parkinsonism. Consider this was a side effect of his flecainide. Will discuss with cardiology. 4. Idiopathic peripheral neuropathy  Decrease to sensation and increases fall risk. Pain is not an issue    5. Paroxysmal atrial fibrillation (HCC)  Asymptomatic. On flecainide and Coumadin. Follow-up with cardiology next week. Consider flecainide as a cause of tremor? 6. Long term current use of anticoagulants with INR goal of 2.0-3.0  INR is slightly below goal today. We will leave his dose the same. I am concerned about his fall risk and would rather his INR be slightly low than slightly high. Repeat INR 1 month. -     AMB POC PT/INR    7. BPH with obstruction/lower urinary tract symptoms  Symptoms worsened after stopping finasteride and stopping Flomax. He is taking finasteride again which I recommend that he continue. If he does have increased risk of instruction, may need to resume Flomax, but I am concerned about the risk of orthostatic hypotension with that medicine. -     finasteride (PROSCAR) 5 mg tablet; Take 1 Tablet by mouth daily. lab results and schedule of future lab studies reviewed with patient  reviewed medications and side effects in detail    Return to clinic for further evaluation if new symptoms develop        Current Outpatient Medications   Medication Sig    finasteride (PROSCAR) 5 mg tablet Take 1 Tablet by mouth daily.  warfarin (COUMADIN) 5 mg tablet Take 1.5 pills (7.5 mg) Mon Wed Fri and take 2 pills (10 mg) Tues Thur Sat Sun)  Indications: treatment to prevent blood clots in chronic atrial fibrillation    cholecalciferol (VITAMIN D3) (50,000 UNITS /1250 MCG) capsule Take 1 Capsule by mouth every seven (7) days.  Indications: low vitamin D levels    acetaminophen (TYLENOL) 325 mg tablet Take 2 Tablets by mouth every six (6) hours as needed for Pain or Fever.  gabapentin (NEURONTIN) 300 mg capsule TAKE 1 CAPSULE BY MOUTH DAILY INDICATIONS: NEUROPATHIC PAIN    irbesartan (AVAPRO) 75 mg tablet TAKE 2 TABLETS NIGHTLY    sertraline (ZOLOFT) 100 mg tablet TAKE 1 TABLET DAILY    flecainide (TAMBOCOR) 150 mg tablet Take 1 Tab by mouth two (2) times a day.  colestipoL (COLESTID) 5 gram packet Take 5 g by mouth two (2) times a day. No current facility-administered medications for this visit.

## 2021-11-01 NOTE — PATIENT INSTRUCTIONS
Make sure that you have stopped takin. Amlodipine (Norvasc) - BP med  2. Tamsulosin (Flmmax) - prostate med    Talk to Dr. Julio César Warner about your Flecainide. 5% risk of tremor due to this medication. I advise that you transition away from driving now. Schedule trips. Current medications 21  Current Outpatient Medications   Medication Sig    finasteride (PROSCAR) 5 mg tablet Take 1 Tablet by mouth daily.  warfarin (COUMADIN) 5 mg tablet Take 1.5 pills (7.5 mg)  and take 2 pills (10 mg)  Sat Sun)  Indications: treatment to prevent blood clots in chronic atrial fibrillation    cholecalciferol (VITAMIN D3) (50,000 UNITS /1250 MCG) capsule Take 1 Capsule by mouth every seven (7) days. Indications: low vitamin D levels    acetaminophen (TYLENOL) 325 mg tablet Take 2 Tablets by mouth every six (6) hours as needed for Pain or Fever.  gabapentin (NEURONTIN) 300 mg capsule TAKE 1 CAPSULE BY MOUTH DAILY INDICATIONS: NEUROPATHIC PAIN    irbesartan (AVAPRO) 75 mg tablet TAKE 2 TABLETS NIGHTLY    sertraline (ZOLOFT) 100 mg tablet TAKE 1 TABLET DAILY    flecainide (TAMBOCOR) 150 mg tablet Take 1 Tab by mouth two (2) times a day.  colestipoL (COLESTID) 5 gram packet Take 5 g by mouth two (2) times a day. No current facility-administered medications for this visit.

## 2021-11-01 NOTE — TELEPHONE ENCOUNTER
I called pt, no answer. LM on VM that appt has been moved to 1120am with pcp. I called pts son, who is on hipaa form. He states he is very concerned about his driving. He is falling more. He is unable to turn his neck to change lanes. Pt can't feel the gas pedal or break due to Neuropathy in his feet. Son states he can't tell his father to stop driving because he wouldn't listen. Son thinks if pcp advised him that it wasn't safe then he would listen. Pt is driving frequently each week to see his wife at [address] [facility]. Son would like to talk with PCP if possible prior to the appt or after the visit. Son asked that I send this information to pcp.  Son can be reached at [phone]

## 2021-11-11 ENCOUNTER — APPOINTMENT (OUTPATIENT)
Dept: CARDIOLOGY CLINIC | Age: 86
End: 2021-11-11

## 2021-11-11 ENCOUNTER — OFFICE VISIT (OUTPATIENT)
Dept: CARDIOLOGY CLINIC | Age: 86
End: 2021-11-11

## 2021-11-11 VITALS
OXYGEN SATURATION: 96 % | WEIGHT: 219 LBS | SYSTOLIC BLOOD PRESSURE: 138 MMHG | RESPIRATION RATE: 16 BRPM | HEART RATE: 70 BPM | DIASTOLIC BLOOD PRESSURE: 80 MMHG | HEIGHT: 72 IN | BODY MASS INDEX: 29.66 KG/M2

## 2021-11-11 DIAGNOSIS — E78.5 DYSLIPIDEMIA: ICD-10-CM

## 2021-11-11 DIAGNOSIS — I10 ESSENTIAL HYPERTENSION: ICD-10-CM

## 2021-11-11 DIAGNOSIS — I45.4 IVCD (INTRAVENTRICULAR CONDUCTION DEFECT): ICD-10-CM

## 2021-11-11 DIAGNOSIS — I48.0 PAROXYSMAL ATRIAL FIBRILLATION (HCC): ICD-10-CM

## 2021-11-11 DIAGNOSIS — I48.0 PAROXYSMAL ATRIAL FIBRILLATION (HCC): Primary | ICD-10-CM

## 2021-11-11 PROCEDURE — G8536 NO DOC ELDER MAL SCRN: HCPCS | Performed by: SPECIALIST

## 2021-11-11 PROCEDURE — 99214 OFFICE O/P EST MOD 30 MIN: CPT | Performed by: SPECIALIST

## 2021-11-11 PROCEDURE — G9717 DOC PT DX DEP/BP F/U NT REQ: HCPCS | Performed by: SPECIALIST

## 2021-11-11 PROCEDURE — G8417 CALC BMI ABV UP PARAM F/U: HCPCS | Performed by: SPECIALIST

## 2021-11-11 PROCEDURE — G8427 DOCREV CUR MEDS BY ELIG CLIN: HCPCS | Performed by: SPECIALIST

## 2021-11-11 PROCEDURE — 1101F PT FALLS ASSESS-DOCD LE1/YR: CPT | Performed by: SPECIALIST

## 2021-11-11 RX ORDER — ASPIRIN 81 MG/1
81 TABLET ORAL DAILY
Qty: 90 TABLET | Refills: 1
Start: 2021-11-11 | End: 2022-01-06 | Stop reason: ALTCHOICE

## 2021-11-11 RX ORDER — METOPROLOL SUCCINATE 25 MG/1
25 TABLET, EXTENDED RELEASE ORAL
Qty: 30 TABLET | Refills: 2 | Status: SHIPPED | OUTPATIENT
Start: 2021-11-11 | End: 2022-03-14

## 2021-11-11 NOTE — PATIENT INSTRUCTIONS
Please stop your Flecainide and your Warfarin. Please start baby Asprin 81mg OTC and metoprolol XL 25mg daily. We will see you back in 2 weeks.

## 2021-11-11 NOTE — PROGRESS NOTES
Jimena Robles .     7/15/1931       Chandler Warner MD, [facility]  Date of Visit-11/11/2021   PCP is Sharmaine Alvarenga MD   [facility]  [facility]  HPI:  Rogerio Cornejo is a 80 y.o. male   7 month f/u of PAF and HTN. He had a ground level fall and has seen his PCP. Records reviewed. His imaging in the ER was normal for CT scan and hip pain and felt it may be more related to balance. He has also seen urology in December 2020 because of his hx of bladder cancer. Today. .. Pt hospitalized in August from the 28th to the 2nd. Was found to have CR and Rhabdo with dehydration. An echo showed normal EF.   08/28/21    ECHO ADULT COMPLETE 08/30/2021 8/30/2021    Interpretation Summary  · LV: Estimated LVEF is 55 - 60%. Normal cavity size and systolic function (ejection fraction normal). Mild concentric hypertrophy. Wall motion: normal.    Signed by: Lindsey Villalobos MD on 8/30/2021 11:09 AM    Pt states that he had 2-3 falls before going to the hospital but notes that they were not significant. He notes that he has had difficulties with tremors that have been more of an annoyance. He reports that he has had some trouble walking, and notes some wobbling, and has fell down the stairs. Denies chest pain, edema, syncope or shortness of breath at rest, has no tachycardia, palpitations or sense of arrhythmia. Assessment/Plan:     Patient Instructions   Please stop your Flecainide and your Warfarin. Please start baby Asprin 81mg OTC and metoprolol XL 25mg daily. We will see you back in 2 weeks. 1. Paroxysmal atrial fibrillation ([address]  Now with many falls. We will stop Flecainide for possible ataxia. Concerned about the falls, I think that this point we are too high risk and will stop warfarin and replace with ASA. 2. IVCD (intraventricular conduction defect)    -following for flecainide use . No syncope.      3. Essential hypertension  At goal , meds and possible side effects reviewed and patient denies  Key CAD CHF Meds             irbesartan (AVAPRO) 75 mg tablet (Taking) TAKE 2 TABLETS NIGHTLY    colestipoL (COLESTID) 5 gram packet (Taking) Take 5 g by mouth two (2) times a day. BP Readings from Last 6 Encounters:   11/11/21 138/80   11/01/21 (!) 142/84   10/07/21 130/82   09/21/21 (!) 144/86   09/02/21 125/74   04/21/21 110/70           4. Dyslipidemia  Stable. At goal , denies excess muscle aches or new liver issues  Key Antihyperlipidemia Meds             colestipoL (COLESTID) 5 gram packet (Taking) Take 5 g by mouth two (2) times a day. Lab Results   Component Value Date/Time    LDL, calculated 119.8 (H) 07/17/2020 10:31 AM         F/u in December     Impression:   1. Paroxysmal atrial fibrillation (HCC)    2. IVCD (intraventricular conduction defect)    3. Essential hypertension    4. Dyslipidemia       Cardiac History:   ECHO 9-10-14=  Left ventricle: Systolic function was normal by EF (biplane method of  disks). Ejection fraction was estimated to be 66 %. There were no regional  wall motion abnormalities. Doppler parameters were consistent with  abnormal left ventricular relaxation (grade 1 diastolic dysfunction). Left atrium: The atrium was mildly to moderately dilated     No future appointments. ROS-except as noted above. . A complete cardiac and respiratory are reviewed and negative except as above ; Resp-denies wheezing  or productive cough,. Const- No unusual weight loss or fever; Neuro-no recent seizure or CVA ; GI- No BRBPR, abdom pain, bloating ; - no  hematuria   see supplement sheet, initialed and to be scanned by staff  Past Medical History:   Diagnosis Date    Advanced care planning/counseling discussion 10/8/2015    Basal cell carcinoma, face     now sees Dr. Chip Cobb.  Bladder cancer [facility]) 2012    Dr. Dorothy Corrigan. cyto 9/2014, 2/2016. s/p surgery, BCG irrigation Fish.   saw Dr. Triston Marcum with obstruction/lower urinary tract symptoms     Chronic lower back pain     injection in [place]. saw Dr. Lorenza Thapa Chronic neck pain     limited motion to side    Depression, major     Dupuytren's contracture of left hand     5th finger    Epistaxis 2013    [facility] ENT [facility] [place] history of skin cancer     Fecal incontinence     with loose stools    Hearing loss     has hearing aids    HTN (hypertension)     Insomnia     IVCD (intraventricular conduction defect) 11/30/2015    Dr Lui Center term current use of anticoagulants with INR goal of 2.0-3.0     Afib    Loose stools 7/18/2014    MRI contraindicated due to metal implant     has scleral buckle    Paroxysmal atrial fibrillation (HCC)     EF 66%, 1-2+ LAE on flecainide;Dr. Maximiliano Owens. saw Dr. Becka Kuhn [facility] Peripheral neuropathy     RLS (restless legs syndrome) 7/3/2017    SCC (squamous cell carcinoma), face     prior hx, now sees Dr. Shanae Melchor Skin cancer     Urothelial carcinoma of right distal ureter ([facility]) 5/1/15    excision Dr. Paula Garg. low grade, papillary. repeat 3 month      Social Hx= reports that he has quit smoking. He has never used smokeless tobacco. He reports current alcohol use. He reports that he does not use drugs. Exam and Labs:  /80   Pulse 70   Resp 16   Ht 6' (1.829 m)   Wt 219 lb (99.3 kg)   SpO2 96%   BMI 29.70 kg/m² Constitutional:  NAD, comfortable  Head: NC,AT. Eyes: No scleral icterus. Neck:  Neck supple. No JVD present. Throat: moist mucous membranes. Chest: Effort normal & normal respiratory excursion . Neurological: alert, conversant and oriented . Skin: Skin is not cold. No obvious systemic rash noted. Not diaphoretic. No erythema. Psychiatric:  Grossly normal mood and affect. Behavior appears normal. Extremities:  no clubbing or cyanosis. Abdomen: non distended    Lungs:breath sounds normal. No stridor. distress, wheezes or  Rales.   Heart: normal rate, regular rhythm, normal S1, S2, no murmurs, rubs, clicks or gallops , PMI non displaced. Edema: Edema is none. Lab Results   Component Value Date/Time    Cholesterol, total 210 (H) 07/17/2020 10:31 AM    HDL Cholesterol 65 07/17/2020 10:31 AM    LDL, calculated 119.8 (H) 07/17/2020 10:31 AM    Triglyceride 126 07/17/2020 10:31 AM    CHOL/HDL Ratio 3.2 07/17/2020 10:31 AM     Lab Results   Component Value Date/Time    Sodium 141 09/21/2021 01:19 PM    Potassium 3.8 09/21/2021 01:19 PM    Chloride 109 (H) 09/21/2021 01:19 PM    CO2 25 09/21/2021 01:19 PM    Anion gap 7 09/21/2021 01:19 PM    Glucose 90 09/21/2021 01:19 PM    BUN 17 09/21/2021 01:19 PM    Creatinine 1.48 (H) 09/21/2021 01:19 PM    BUN/Creatinine ratio 11 (L) 09/21/2021 01:19 PM    GFR est AA 54 (L) 09/21/2021 01:19 PM    GFR est non-AA 45 (L) 09/21/2021 01:19 PM    Calcium 8.4 (L) 09/21/2021 01:19 PM      Wt Readings from Last 3 Encounters:   11/11/21 219 lb (99.3 kg)   11/01/21 217 lb 6.4 oz (98.6 kg)   09/21/21 220 lb 12.8 oz (100.2 kg)      BP Readings from Last 3 Encounters:   11/11/21 138/80   11/01/21 (!) 142/84   10/07/21 130/82      Current Outpatient Medications   Medication Sig    finasteride (PROSCAR) 5 mg tablet Take 1 Tablet by mouth daily.  cholecalciferol (VITAMIN D3) (50,000 UNITS /1250 MCG) capsule Take 1 Capsule by mouth every seven (7) days. Indications: low vitamin D levels    acetaminophen (TYLENOL) 325 mg tablet Take 2 Tablets by mouth every six (6) hours as needed for Pain or Fever.  gabapentin (NEURONTIN) 300 mg capsule TAKE 1 CAPSULE BY MOUTH DAILY INDICATIONS: NEUROPATHIC PAIN    irbesartan (AVAPRO) 75 mg tablet TAKE 2 TABLETS NIGHTLY    sertraline (ZOLOFT) 100 mg tablet TAKE 1 TABLET DAILY    colestipoL (COLESTID) 5 gram packet Take 5 g by mouth two (2) times a day. No current facility-administered medications for this visit. Impression see above.       Written by Siena Thurston, as dictated by Rachael An MD.

## 2021-11-11 NOTE — Clinical Note
11/11/2021    Patient: Brittani Askew. YOB: 1931   Date of Visit: 11/11/2021     Ky Zepeda, [address]  Via In Basket    Dear Ky Zepeda MD,      Thank you for referring Mr. Kim Kirkland to [facility] for evaluation. My notes for this consultation are attached. If you have questions, please do not hesitate to call me. I look forward to following your patient along with you.       Sincerely,    Ladan Bassett MD

## 2021-11-15 ENCOUNTER — TELEPHONE (OUTPATIENT)
Dept: CARDIOLOGY CLINIC | Age: 86
End: 2021-11-15

## 2021-11-15 NOTE — TELEPHONE ENCOUNTER
Patient seen in the office on 11/11/21 and stated the doctor wanted to speak with his son to go over the patient's issues, please advise  Please contact son at [phone] [place])          [phone]

## 2021-11-17 NOTE — TELEPHONE ENCOUNTER
Spoke to patient's son as requested   Went over med changes due to falls-questions answered  Risk for fall so stopped warfarin  Ataxia possible with flecainide so stopped that and replacement discussed  Also he asked about driving, I have no cardiac reason but there seem to neuropathy   Will ask Dr Julia Ojeda 45 if any resources to evalute  In past in know DMV , had pre covid , had elderly testing, but I am not sure if that is a PT or PCP or DMV Niurka espinoza Hum can you address that issue?    Thanks Chandler    Future Appointments   Date Time Provider Ezekiel Munoz   11/22/2021 11:00 AM Chandler Duron MD [facility]

## 2021-11-21 ENCOUNTER — APPOINTMENT (OUTPATIENT)
Dept: GENERAL RADIOLOGY | Age: 86
DRG: 884 | End: 2021-11-21
Attending: EMERGENCY MEDICINE
Payer: MEDICARE

## 2021-11-21 ENCOUNTER — APPOINTMENT (OUTPATIENT)
Dept: CT IMAGING | Age: 86
DRG: 884 | End: 2021-11-21
Attending: EMERGENCY MEDICINE
Payer: MEDICARE

## 2021-11-21 ENCOUNTER — HOSPITAL ENCOUNTER (INPATIENT)
Age: 86
LOS: 3 days | Discharge: HOME | DRG: 884 | End: 2021-11-24
Attending: EMERGENCY MEDICINE | Admitting: FAMILY MEDICINE
Payer: MEDICARE

## 2021-11-21 DIAGNOSIS — R68.89 ALTERATION IN SELF-CARE ABILITY: ICD-10-CM

## 2021-11-21 DIAGNOSIS — R41.0 CONFUSION: Primary | ICD-10-CM

## 2021-11-21 DIAGNOSIS — R26.2 AMBULATORY DYSFUNCTION: ICD-10-CM

## 2021-11-21 PROBLEM — G93.40 ENCEPHALOPATHY: Status: ACTIVE | Noted: 2021-11-21

## 2021-11-21 LAB
ALBUMIN SERPL-MCNC: 3.3 G/DL (ref 3.5–5)
ALBUMIN/GLOB SERPL: 0.8 {RATIO} (ref 1.1–2.2)
ALP SERPL-CCNC: 106 U/L (ref 45–117)
ALT SERPL-CCNC: 16 U/L (ref 12–78)
ANION GAP SERPL CALC-SCNC: 3 MMOL/L (ref 5–15)
AST SERPL-CCNC: 11 U/L (ref 15–37)
BASOPHILS # BLD: 0 K/UL (ref 0–0.1)
BASOPHILS NFR BLD: 1 % (ref 0–1)
BILIRUB SERPL-MCNC: 0.5 MG/DL (ref 0.2–1)
BUN SERPL-MCNC: 31 MG/DL (ref 6–20)
BUN/CREAT SERPL: 16 (ref 12–20)
CALCIUM SERPL-MCNC: 8.9 MG/DL (ref 8.5–10.1)
CHLORIDE SERPL-SCNC: 112 MMOL/L (ref 97–108)
CO2 SERPL-SCNC: 28 MMOL/L (ref 21–32)
CREAT SERPL-MCNC: 1.92 MG/DL (ref 0.7–1.3)
DIFFERENTIAL METHOD BLD: NORMAL
EOSINOPHIL # BLD: 0.4 K/UL (ref 0–0.4)
EOSINOPHIL NFR BLD: 7 % (ref 0–7)
ERYTHROCYTE [DISTWIDTH] IN BLOOD BY AUTOMATED COUNT: 13.8 % (ref 11.5–14.5)
ETHANOL SERPL-MCNC: <10 MG/DL
GLOBULIN SER CALC-MCNC: 3.9 G/DL (ref 2–4)
GLUCOSE BLD STRIP.AUTO-MCNC: 104 MG/DL (ref 65–117)
GLUCOSE SERPL-MCNC: 112 MG/DL (ref 65–100)
HCT VFR BLD AUTO: 39.4 % (ref 36.6–50.3)
HGB BLD-MCNC: 12.7 G/DL (ref 12.1–17)
IMM GRANULOCYTES # BLD AUTO: 0 K/UL (ref 0–0.04)
IMM GRANULOCYTES NFR BLD AUTO: 0 % (ref 0–0.5)
LIPASE SERPL-CCNC: 150 U/L (ref 73–393)
LYMPHOCYTES # BLD: 2 K/UL (ref 0.8–3.5)
LYMPHOCYTES NFR BLD: 33 % (ref 12–49)
MAGNESIUM SERPL-MCNC: 2.3 MG/DL (ref 1.6–2.4)
MCH RBC QN AUTO: 30.7 PG (ref 26–34)
MCHC RBC AUTO-ENTMCNC: 32.2 G/DL (ref 30–36.5)
MCV RBC AUTO: 95.2 FL (ref 80–99)
MONOCYTES # BLD: 0.6 K/UL (ref 0–1)
MONOCYTES NFR BLD: 10 % (ref 5–13)
NEUTS SEG # BLD: 3.1 K/UL (ref 1.8–8)
NEUTS SEG NFR BLD: 49 % (ref 32–75)
NRBC # BLD: 0 K/UL (ref 0–0.01)
NRBC BLD-RTO: 0 PER 100 WBC
PLATELET # BLD AUTO: 207 K/UL (ref 150–400)
PMV BLD AUTO: 10.6 FL (ref 8.9–12.9)
POTASSIUM SERPL-SCNC: 3.4 MMOL/L (ref 3.5–5.1)
PROT SERPL-MCNC: 7.2 G/DL (ref 6.4–8.2)
RBC # BLD AUTO: 4.14 M/UL (ref 4.1–5.7)
SERVICE CMNT-IMP: NORMAL
SODIUM SERPL-SCNC: 143 MMOL/L (ref 136–145)
TROPONIN-HIGH SENSITIVITY: 27 NG/L (ref 0–76)
WBC # BLD AUTO: 6.3 K/UL (ref 4.1–11.1)

## 2021-11-21 PROCEDURE — 80053 COMPREHEN METABOLIC PANEL: CPT

## 2021-11-21 PROCEDURE — 82962 GLUCOSE BLOOD TEST: CPT

## 2021-11-21 PROCEDURE — 93005 ELECTROCARDIOGRAM TRACING: CPT

## 2021-11-21 PROCEDURE — 85025 COMPLETE CBC W/AUTO DIFF WBC: CPT

## 2021-11-21 PROCEDURE — 36415 COLL VENOUS BLD VENIPUNCTURE: CPT

## 2021-11-21 PROCEDURE — 83690 ASSAY OF LIPASE: CPT

## 2021-11-21 PROCEDURE — 84484 ASSAY OF TROPONIN QUANT: CPT

## 2021-11-21 PROCEDURE — 82077 ASSAY SPEC XCP UR&BREATH IA: CPT

## 2021-11-21 PROCEDURE — 83735 ASSAY OF MAGNESIUM: CPT

## 2021-11-21 PROCEDURE — 99285 EMERGENCY DEPT VISIT HI MDM: CPT

## 2021-11-21 PROCEDURE — 70450 CT HEAD/BRAIN W/O DYE: CPT

## 2021-11-21 PROCEDURE — 71045 X-RAY EXAM CHEST 1 VIEW: CPT

## 2021-11-21 PROCEDURE — 65660000000 HC RM CCU STEPDOWN

## 2021-11-21 PROCEDURE — 74011250636 HC RX REV CODE- 250/636: Performed by: EMERGENCY MEDICINE

## 2021-11-21 PROCEDURE — 65270000029 HC RM PRIVATE

## 2021-11-21 RX ADMIN — SODIUM CHLORIDE, POTASSIUM CHLORIDE, SODIUM LACTATE AND CALCIUM CHLORIDE 500 ML: 600; 310; 30; 20 INJECTION, SOLUTION INTRAVENOUS at 22:41

## 2021-11-22 LAB
ANION GAP SERPL CALC-SCNC: 6 MMOL/L (ref 5–15)
BUN SERPL-MCNC: 26 MG/DL (ref 6–20)
BUN/CREAT SERPL: 18 (ref 12–20)
CALCIUM SERPL-MCNC: 9 MG/DL (ref 8.5–10.1)
CALCULATED R AXIS, ECG10: 23 DEGREES
CALCULATED T AXIS, ECG11: 38 DEGREES
CHLORIDE SERPL-SCNC: 111 MMOL/L (ref 97–108)
CO2 SERPL-SCNC: 25 MMOL/L (ref 21–32)
COVID-19 RAPID TEST, COVR: NOT DETECTED
CREAT SERPL-MCNC: 1.44 MG/DL (ref 0.7–1.3)
DIAGNOSIS, 93000: NORMAL
GLUCOSE SERPL-MCNC: 110 MG/DL (ref 65–100)
POTASSIUM SERPL-SCNC: 3.6 MMOL/L (ref 3.5–5.1)
Q-T INTERVAL, ECG07: 448 MS
QRS DURATION, ECG06: 100 MS
QTC CALCULATION (BEZET), ECG08: 436 MS
SODIUM SERPL-SCNC: 142 MMOL/L (ref 136–145)
SOURCE, COVRS: NORMAL
VENTRICULAR RATE, ECG03: 57 BPM

## 2021-11-22 PROCEDURE — 65660000000 HC RM CCU STEPDOWN

## 2021-11-22 PROCEDURE — 87635 SARS-COV-2 COVID-19 AMP PRB: CPT

## 2021-11-22 PROCEDURE — 97535 SELF CARE MNGMENT TRAINING: CPT

## 2021-11-22 PROCEDURE — 74011250637 HC RX REV CODE- 250/637: Performed by: FAMILY MEDICINE

## 2021-11-22 PROCEDURE — 74011250636 HC RX REV CODE- 250/636: Performed by: FAMILY MEDICINE

## 2021-11-22 PROCEDURE — 97161 PT EVAL LOW COMPLEX 20 MIN: CPT

## 2021-11-22 PROCEDURE — 97116 GAIT TRAINING THERAPY: CPT

## 2021-11-22 PROCEDURE — 74011250636 HC RX REV CODE- 250/636: Performed by: INTERNAL MEDICINE

## 2021-11-22 PROCEDURE — 80048 BASIC METABOLIC PNL TOTAL CA: CPT

## 2021-11-22 PROCEDURE — 97165 OT EVAL LOW COMPLEX 30 MIN: CPT

## 2021-11-22 PROCEDURE — 74011250637 HC RX REV CODE- 250/637: Performed by: INTERNAL MEDICINE

## 2021-11-22 PROCEDURE — 36415 COLL VENOUS BLD VENIPUNCTURE: CPT

## 2021-11-22 RX ORDER — ACETAMINOPHEN 325 MG/1
650 TABLET ORAL
Status: DISCONTINUED | OUTPATIENT
Start: 2021-11-22 | End: 2021-11-24 | Stop reason: HOSPADM

## 2021-11-22 RX ORDER — ACETAMINOPHEN 650 MG/1
650 SUPPOSITORY RECTAL
Status: DISCONTINUED | OUTPATIENT
Start: 2021-11-22 | End: 2021-11-24 | Stop reason: HOSPADM

## 2021-11-22 RX ORDER — HYDRALAZINE HYDROCHLORIDE 20 MG/ML
20 INJECTION INTRAMUSCULAR; INTRAVENOUS
Status: DISCONTINUED | OUTPATIENT
Start: 2021-11-22 | End: 2021-11-24 | Stop reason: HOSPADM

## 2021-11-22 RX ORDER — ONDANSETRON 2 MG/ML
4 INJECTION INTRAMUSCULAR; INTRAVENOUS
Status: DISCONTINUED | OUTPATIENT
Start: 2021-11-22 | End: 2021-11-24 | Stop reason: HOSPADM

## 2021-11-22 RX ORDER — SODIUM CHLORIDE 9 MG/ML
100 INJECTION, SOLUTION INTRAVENOUS CONTINUOUS
Status: DISCONTINUED | OUTPATIENT
Start: 2021-11-22 | End: 2021-11-24 | Stop reason: HOSPADM

## 2021-11-22 RX ORDER — ONDANSETRON 4 MG/1
4 TABLET, ORALLY DISINTEGRATING ORAL
Status: DISCONTINUED | OUTPATIENT
Start: 2021-11-22 | End: 2021-11-24 | Stop reason: HOSPADM

## 2021-11-22 RX ORDER — METOPROLOL SUCCINATE 25 MG/1
25 TABLET, EXTENDED RELEASE ORAL
Status: DISCONTINUED | OUTPATIENT
Start: 2021-11-22 | End: 2021-11-24 | Stop reason: HOSPADM

## 2021-11-22 RX ORDER — SERTRALINE HYDROCHLORIDE 50 MG/1
50 TABLET, FILM COATED ORAL EVERY EVENING
Status: DISCONTINUED | OUTPATIENT
Start: 2021-11-22 | End: 2021-11-24 | Stop reason: HOSPADM

## 2021-11-22 RX ORDER — SODIUM CHLORIDE 0.9 % (FLUSH) 0.9 %
5-40 SYRINGE (ML) INJECTION EVERY 8 HOURS
Status: DISCONTINUED | OUTPATIENT
Start: 2021-11-22 | End: 2021-11-24 | Stop reason: HOSPADM

## 2021-11-22 RX ORDER — FINASTERIDE 5 MG/1
5 TABLET, FILM COATED ORAL DAILY
Status: DISCONTINUED | OUTPATIENT
Start: 2021-11-22 | End: 2021-11-24 | Stop reason: HOSPADM

## 2021-11-22 RX ORDER — SODIUM CHLORIDE 0.9 % (FLUSH) 0.9 %
5-40 SYRINGE (ML) INJECTION AS NEEDED
Status: DISCONTINUED | OUTPATIENT
Start: 2021-11-22 | End: 2021-11-24 | Stop reason: HOSPADM

## 2021-11-22 RX ORDER — LOSARTAN POTASSIUM 50 MG/1
50 TABLET ORAL DAILY
Status: DISCONTINUED | OUTPATIENT
Start: 2021-11-22 | End: 2021-11-24 | Stop reason: HOSPADM

## 2021-11-22 RX ORDER — POLYETHYLENE GLYCOL 3350 17 G/17G
17 POWDER, FOR SOLUTION ORAL DAILY PRN
Status: DISCONTINUED | OUTPATIENT
Start: 2021-11-22 | End: 2021-11-24 | Stop reason: HOSPADM

## 2021-11-22 RX ORDER — HEPARIN SODIUM 5000 [USP'U]/ML
5000 INJECTION, SOLUTION INTRAVENOUS; SUBCUTANEOUS EVERY 8 HOURS
Status: DISCONTINUED | OUTPATIENT
Start: 2021-11-22 | End: 2021-11-24 | Stop reason: HOSPADM

## 2021-11-22 RX ORDER — METOPROLOL SUCCINATE 25 MG/1
25 TABLET, EXTENDED RELEASE ORAL
Status: DISCONTINUED | OUTPATIENT
Start: 2021-11-22 | End: 2021-11-22

## 2021-11-22 RX ORDER — ASPIRIN 81 MG/1
81 TABLET ORAL DAILY
Status: DISCONTINUED | OUTPATIENT
Start: 2021-11-22 | End: 2021-11-24 | Stop reason: HOSPADM

## 2021-11-22 RX ADMIN — HEPARIN SODIUM 5000 UNITS: 5000 INJECTION INTRAVENOUS; SUBCUTANEOUS at 02:25

## 2021-11-22 RX ADMIN — SERTRALINE 50 MG: 50 TABLET, FILM COATED ORAL at 18:01

## 2021-11-22 RX ADMIN — HEPARIN SODIUM 5000 UNITS: 5000 INJECTION INTRAVENOUS; SUBCUTANEOUS at 10:59

## 2021-11-22 RX ADMIN — HYDRALAZINE HYDROCHLORIDE 20 MG: 20 INJECTION INTRAMUSCULAR; INTRAVENOUS at 02:25

## 2021-11-22 RX ADMIN — ASPIRIN 81 MG: 81 TABLET, COATED ORAL at 08:48

## 2021-11-22 RX ADMIN — FINASTERIDE 5 MG: 5 TABLET, FILM COATED ORAL at 08:48

## 2021-11-22 RX ADMIN — SODIUM CHLORIDE 100 ML/HR: 9 INJECTION, SOLUTION INTRAVENOUS at 18:02

## 2021-11-22 RX ADMIN — LOSARTAN POTASSIUM 50 MG: 50 TABLET, FILM COATED ORAL at 10:59

## 2021-11-22 RX ADMIN — HEPARIN SODIUM 5000 UNITS: 5000 INJECTION INTRAVENOUS; SUBCUTANEOUS at 18:01

## 2021-11-22 RX ADMIN — Medication 10 ML: at 18:02

## 2021-11-22 RX ADMIN — Medication 10 ML: at 22:00

## 2021-11-22 NOTE — TELEPHONE ENCOUNTER
----- Message from Deidre Lopez sent at 11/22/2021 12:40 PM EST -----  Subject: Message to Provider    QUESTIONS  Information for Provider? Pt son, also named Tennie Aschoff, called in to inform   the office that he was hospitalized at [facility] again and that he would   like a call back to further inform the office of more details  ---------------------------------------------------------------------------  --------------  [address]  What is the best way for the office to contact you? OK to leave message on   voicemail  Preferred Call Back Phone Number? [phone]  ---------------------------------------------------------------------------  --------------  SCRIPT ANSWERS  Relationship to Patient? Other  Representative Name? Tarunsarahsushma Matilda (Son)  Is the Representative on the appropriate HIPAA document in Epic?  Yes

## 2021-11-22 NOTE — PROGRESS NOTES
Transition of Care Plan   RUR: 12%   Disposition: The disposition plan is pending medical progression   F/U with PCP/Specialist     Transport: Son       Reason for Admission:  Encephalopathy                      RUR Score:  12%                   Plan for utilizing home health:     TBD     PCP: First and Last name:  Fletcher Rothman MD     Name of Practice:    Are you a current patient: Yes/No:    Approximate date of last visit:    Can you participate in a virtual visit with your PCP:                     Current Advanced Directive/Advance Care Plan: Full Code      Healthcare Decision Maker:     Scott Sotelo [phone]                 Transition of Care Plan:                      CRM spoke with patient's son/POA, Angelique Gonzáles [phone]. Introduced self, explained role, verified demographics, and offered assistance. Patient lives in an independent living facility. Patient requires assistance with ADL's/IADL's and does not have any DME. The patient's son gets patients medications. Per son, patient has had a big decline in his health within the past 6-8 weeks. Not eating, not taking medications, serious fall risk. Patient fell 6 weeks ago and was on the floor for 2.5 days before someone found him. Family is concerned patient is unable to return home alone. CM discussed possibility of Medicaid to secure financial assistance for LTC; pt makes too much money to qualify. Care Management Interventions  PCP Verified by CM: Yes  Palliative Care Criteria Met (RRAT>21 & CHF Dx)?: No  Mode of Transport at Discharge:  Other (see comment) (son)  Transition of Care Consult (CM Consult): Discharge Planning  MyChart Signup: No  Discharge Durable Medical Equipment: No  Health Maintenance Reviewed: Yes  Physical Therapy Consult: Yes  Occupational Therapy Consult: Yes  Speech Therapy Consult: No  Support Systems: Child(nick)  Confirm Follow Up Transport: Family  [employer] Information Provided?: No    Court CRM

## 2021-11-22 NOTE — ROUTINE PROCESS
Emergency Room Outgoing Transfer Nursing Note      Verbal and/or Written report given to Femi RN by Alex Abdul RN on Елена Dolan. a 80 y.o. male who was admitted on 11/21/2021  9:30 PM and being transferred for routine progression of care. Report consisted of the following information SBAR, Kardex, MAR and Recent Results and the information was reviewed with the receiving nurse.             Code Status: Prior      Admit Diagnosis: Encephalopathy [G93.40]      Surgery: * No surgery found *       Infections: No current active infections      Allergies: Hay fever and allergy relief and Trazodone      Current diet: No diet orders on file      Lines:   Peripheral IV 11/21/21 Right Antecubital (Active)   Site Assessment Clean, dry, & intact 11/21/21 2216   Phlebitis Assessment 0 11/21/21 2216   Infiltration Assessment 0 11/21/21 2216   Dressing Status Clean, dry, & intact 11/21/21 2216   Dressing Type 4 X 4 11/21/21 2216                Vital Signs:     Patient Vitals for the past 12 hrs:   Temp Pulse Resp BP SpO2   11/21/21 2345  62 14 (!) 177/81    11/21/21 2245  (!) 53 18 (!) 177/81 93 %   11/21/21 2215  (!) 52 17 (!) 167/89 94 %   11/21/21 2200  (!) 59 17 (!) 156/82 93 %   11/21/21 2145  60 19 (!) 175/86    11/21/21 2137 98 °F (36.7 °C) 62 18 (!) 172/96 95 %           Intake & Output:   No intake or output data in the 24 hours ending 11/22/21 0024       Laboratory Results:     Recent Results (from the past 12 hour(s))   EKG, 12 LEAD, INITIAL    Collection Time: 11/21/21  9:34 PM   Result Value Ref Range    Ventricular Rate 57 BPM    QRS Duration 100 ms    Q-T Interval 448 ms    QTC Calculation (Bezet) 436 ms    Calculated R Axis 23 degrees    Calculated T Axis 38 degrees    Diagnosis       Atrial fibrillation with slow ventricular response  Nonspecific T wave abnormality  Abnormal ECG  When compared with ECG of 28-AUG-2021 20:10,  Atrial fibrillation has replaced Sinus rhythm  Nonspecific T wave abnormality no longer evident in Inferior leads  Nonspecific T wave abnormality no longer evident in Anterior leads     CBC WITH AUTOMATED DIFF    Collection Time: 11/21/21  9:50 PM   Result Value Ref Range    WBC 6.3 4.1 - 11.1 K/uL    RBC 4.14 4.10 - 5.70 M/uL    HGB 12.7 12.1 - 17.0 g/dL    HCT 39.4 36.6 - 50.3 %    MCV 95.2 80.0 - 99.0 FL    MCH 30.7 26.0 - 34.0 PG    MCHC 32.2 30.0 - 36.5 g/dL    RDW 13.8 11.5 - 14.5 %    PLATELET 417 832 - 206 K/uL    MPV 10.6 8.9 - 12.9 FL    NRBC 0.0 0  WBC    ABSOLUTE NRBC 0.00 0.00 - 0.01 K/uL    NEUTROPHILS 49 32 - 75 %    LYMPHOCYTES 33 12 - 49 %    MONOCYTES 10 5 - 13 %    EOSINOPHILS 7 0 - 7 %    BASOPHILS 1 0 - 1 %    IMMATURE GRANULOCYTES 0 0.0 - 0.5 %    ABS. NEUTROPHILS 3.1 1.8 - 8.0 K/UL    ABS. LYMPHOCYTES 2.0 0.8 - 3.5 K/UL    ABS. MONOCYTES 0.6 0.0 - 1.0 K/UL    ABS. EOSINOPHILS 0.4 0.0 - 0.4 K/UL    ABS. BASOPHILS 0.0 0.0 - 0.1 K/UL    ABS. IMM. GRANS. 0.0 0.00 - 0.04 K/UL    DF AUTOMATED     METABOLIC PANEL, COMPREHENSIVE    Collection Time: 11/21/21  9:50 PM   Result Value Ref Range    Sodium 143 136 - 145 mmol/L    Potassium 3.4 (L) 3.5 - 5.1 mmol/L    Chloride 112 (H) 97 - 108 mmol/L    CO2 28 21 - 32 mmol/L    Anion gap 3 (L) 5 - 15 mmol/L    Glucose 112 (H) 65 - 100 mg/dL    BUN 31 (H) 6 - 20 MG/DL    Creatinine 1.92 (H) 0.70 - 1.30 MG/DL    BUN/Creatinine ratio 16 12 - 20      GFR est AA 40 (L) >60 ml/min/1.73m2    GFR est non-AA 33 (L) >60 ml/min/1.73m2    Calcium 8.9 8.5 - 10.1 MG/DL    Bilirubin, total 0.5 0.2 - 1.0 MG/DL    ALT (SGPT) 16 12 - 78 U/L    AST (SGOT) 11 (L) 15 - 37 U/L    Alk.  phosphatase 106 45 - 117 U/L    Protein, total 7.2 6.4 - 8.2 g/dL    Albumin 3.3 (L) 3.5 - 5.0 g/dL    Globulin 3.9 2.0 - 4.0 g/dL    A-G Ratio 0.8 (L) 1.1 - 2.2     TROPONIN-HIGH SENSITIVITY    Collection Time: 11/21/21  9:50 PM   Result Value Ref Range    Troponin-High Sensitivity 27 0 - 76 ng/L   ETHYL ALCOHOL    Collection Time: 11/21/21  9:50 PM   Result Value Ref Range    ALCOHOL(ETHYL),SERUM <10 <10 MG/DL   LIPASE    Collection Time: 11/21/21  9:50 PM   Result Value Ref Range    Lipase 150 73 - 393 U/L   MAGNESIUM    Collection Time: 11/21/21  9:50 PM   Result Value Ref Range    Magnesium 2.3 1.6 - 2.4 mg/dL   GLUCOSE, POC    Collection Time: 11/21/21 10:12 PM   Result Value Ref Range    Glucose (POC) 104 65 - 117 mg/dL    Performed by [facility]            Patient transported with : Registered Nurse     Opportunity for questions and clarifications were provided.          Mary Reyna RN, BSN, [facility]     11/22/2021, 12:24 AM

## 2021-11-22 NOTE — PROGRESS NOTES
Hospitalist Progress Note:     Patient seen and examined. Can given accurate description of events leading up to the hospital. Overall has a decline over past few weeks. PT/OT pending. Pineda Ruffin DO         Addendum:   Discussed with patient's son, Bharati Monaco, via phone. He has significant concern that his father has been declining, more rapidly in past few weeks. Has had numerous falls, has not been eating well. He is not able to live in independent living. Due to progressive symptoms, will obtain MRI to rule out structural disease and stroke. Plan to call patient's son tomorrow.

## 2021-11-22 NOTE — PROGRESS NOTES
11/22/21 0209   Vital Signs   Temp 97.7 °F (36.5 °C)   Temp Source Oral   Pulse (Heart Rate) (!) 51   Heart Rate Source Monitor   Resp Rate 16   O2 Sat (%) 95 %   Level of Consciousness Alert (0)   BP (!) 211/92   MAP (Calculated) 132   BP 1 Method Automatic   BP 1 Location Right upper arm   BP Patient Position At rest   MEWS Score 3     BP on arrival 211/92 with MEWS of 3.   Paging Dr Coy Francis for orders

## 2021-11-22 NOTE — PROGRESS NOTES
OCCUPATIONAL THERAPY EVALUATION  Patient: Ivis Chatman. (80 y.o. male)  Date: 11/22/2021  Primary Diagnosis: Encephalopathy [G93.40]        Precautions: Falls,       ASSESSMENT  Based on the objective data described below, the patient presents with impaired balance, coordination, and general safety awareness impacting ability to complete ADL/IADL safely. Currently patient lives in [address] [place] [address] not requiring assist with ADL/IADL, has had a history of falls including most recent where he was found down after 2 days despite having a life alert necklace. Reviewed safety scenarios (e.g. a fire in the apartment), patient unable to identify safe sequencing when needing assistance. Patient is oriented to month/year, place, and can discuss past fall history, however when introduced to new commands or tasks has increased difficulty completing (falls safety in room, completing 3 step commands successfully). Patient with good strength, however at this time, patient would benefit from increased supervision upon discharge in LTC or SNF setting to ensure safely return to OF and prevent falls. Current Level of Function Impacting Discharge (ADLs/self-care): up to Min A    Functional Outcome Measure: The patient scored Total: 50/100 on the Barthel Index outcome measure which is indicative of 50% impaired ability to care for basic self needs/dependency on others; inferred 70% dependency on others for instrumental ADLs. Other factors to consider for discharge: recent decline in function, poor safety awareness/falls prevention     Patient will benefit from skilled therapy intervention to address the above noted impairments.        PLAN :  Recommendations and Planned Interventions: self care training, functional mobility training, therapeutic exercise, balance training, therapeutic activities, endurance activities, patient education, home safety training, and family training/education    Frequency/Duration: Patient will be followed by occupational therapy 5 times a week to address goals. Recommendation for discharge: (in order for the patient to meet his/her long term goals)  To be determined: increased supervision in LTC     This discharge recommendation:  Has not yet been discussed the attending provider and/or case management    IF patient discharges home will need the following DME: TBD pending progress       SUBJECTIVE:   Patient stated you didn't like that answer? Jennifer Sarah in regards to safety scenarios    OBJECTIVE DATA SUMMARY:   HISTORY:   Past Medical History:   Diagnosis Date    Advanced care planning/counseling discussion 10/8/2015    Basal cell carcinoma, face     now sees Dr. Chino Arana.  Bladder cancer [facility]) 2012    Dr. Sugar Dickinson. cyto 9/2014, 2/2016. s/p surgery, BCG irrigation [place]. saw Dr. Blake Benitez BPH with obstruction/lower urinary tract symptoms     Chronic lower back pain     injection in [place]. saw Dr. Israel Erickson Chronic neck pain     limited motion to side    Depression, major     Dupuytren's contracture of left hand     5th finger    Epistaxis 2013    saw ENT at [address] [place] [address] history of skin cancer     Fecal incontinence     with loose stools    Hearing loss     has hearing aids    HTN (hypertension)     Insomnia     IVCD (intraventricular conduction defect) 11/30/2015    Dr Chaz Fuentes term current use of anticoagulants with INR goal of 2.0-3.0     Afib    Loose stools 7/18/2014    MRI contraindicated due to metal implant     has scleral buckle    Paroxysmal atrial fibrillation (HCC)     EF 66%, 1-2+ LAE on flecainide;Dr. Joycie Schlatter. saw Dr. Esau Diehl at [facility] Peripheral neuropathy     RLS (restless legs syndrome) 7/3/2017    SCC (squamous cell carcinoma), face     prior hx, now sees Dr. Ursula Franco Skin cancer     Urothelial carcinoma of right distal ureter ([facility] [address] 5/1/15    excision Dr. Osvaldo Ventura. low grade, papillary.  repeat 3 month     Past Surgical History:   Procedure Laterality Date    HX BLADDER REPAIR  2012    cancer    HX COLONOSCOPY  2012    polyps. repeat 2017?  HX HERNIA REPAIR      periumibical    HX MALIGNANT SKIN LESION EXCISION  06/14/2018    basal cell left lip    HX OTHER SURGICAL  10/2021    Skin cancer removed from nose.  HX RETINAL DETACHMENT REPAIR Right     NE CYSTOURETHROSCOPY  9/2014    Dr. Mellissa De La Paz CYSTOURETHROSCOPY  5/1/15    low grade right pap urothelial cancer    NE CYSTOURETHROSCOPY  2/29/16       Expanded or extensive additional review of patient history:     Home Situation  Home Environment: [address] [place] [address] Road Name: Lopez Shankar  # Steps to Enter: 0  One/Two Story Residence: One story  Living Alone: No  Support Systems: Child(nick)  Current DME Used/Available at Home: Emani Shellie, rollator, [employer], straight, Shower chair, Grab bars  Tub or Shower Type: Shower    Hand dominance: Right    EXAMINATION OF PERFORMANCE DEFICITS:  Cognitive/Behavioral Status:  Neurologic State: Confused  Orientation Level: Oriented to person; Oriented to time; Oriented to place; Disoriented to situation  Cognition: Follows commands; Decreased attention/concentration; Poor safety awareness  Perception: Appears intact  Perseveration: No perseveration noted  Safety/Judgement: Decreased insight into deficits; Decreased awareness of need for assistance; Decreased awareness of need for safety    Skin: Intact    Edema: None noted    Hearing:       Vision/Perceptual:    Tracking: Able to track stimulus in all quadrants w/o difficulty    Saccades: Unable to test secondary to decreased visual attention                           Range of Motion:  AROM: Within functional limits  PROM: Within functional limits                      Strength:  Strength: Within functional limits                Coordination:  Coordination: Generally decreased, functional  Fine Motor Skills-Upper: Right Impaired; Left Impaired    Gross Motor Skills-Upper: Left Intact;  Right Intact    Tone & Sensation:  Tone: Normal  Sensation: Intact                      Balance:  Sitting: Impaired; With support  Sitting - Static: Good (unsupported)  Sitting - Dynamic: Fair (occasional)  Standing: Impaired; With support  Standing - Static: Fair  Standing - Dynamic : Fair    Functional Mobility and Transfers for ADLs:  Bed Mobility:  Supine to Sit: Minimum assistance  Sit to Supine: Contact guard assistance    Transfers:  Sit to Stand: Contact guard assistance  Stand to Sit: Contact guard assistance  Toilet Transfer : Minimum assistance    ADL Assessment:  Feeding: Independent    Oral Facial Hygiene/Grooming: Supervision    Bathing: Contact guard assistance    Upper Body Dressing: Contact guard assistance    Lower Body Dressing: Minimum assistance    Toileting: Minimum assistance                ADL Intervention and task modifications:                      Upper Body [address] Gown: Minimum  assistance  Cues: Verbal cues provided    Lower Body Dressing Assistance  Socks: Minimum assistance  Leg Crossed Method Used: Yes  Position Performed: Seated edge of bed  Cues: Verbal cues provided         Cognitive Retraining  Safety/Judgement: Decreased insight into deficits; Decreased awareness of need for assistance; Decreased awareness of need for safety    Functional Measure:  Barthel Index:    Bathin  Bladder: 5  Bowels: 10  Groomin  Dressin  Feeding: 10  Mobility: 0  Stairs: 0  Toilet Use: 5  Transfer (Bed to Chair and Back): 10  Total: 50/100        The Barthel ADL Index: Guidelines  1. The index should be used as a record of what a patient does, not as a record of what a patient could do. 2. The main aim is to establish degree of independence from any help, physical or verbal, however minor and for whatever reason. 3. The need for supervision renders the patient not independent. 4. A patient's performance should be established using the best available evidence.  Asking the patient, friends/relatives and nurses are the usual sources, but direct observation and common sense are also important. However direct testing is not needed. 5. Usually the patient's performance over the preceding 24-48 hours is important, but occasionally longer periods will be relevant. 6. Middle categories imply that the patient supplies over 50 per cent of the effort. 7. Use of aids to be independent is allowed. Jillian Dobbins., Barthel, D.W. (6558). Functional evaluation: the Barthel Index. [facility] (14)2. CONCHIS Goins, Seb Barker., [place]., [place], [address] (1999). Measuring the change indisability after inpatient rehabilitation; comparison of the responsiveness of the Barthel Index and Functional [place] Measure. Journal of Neurology, Neurosurgery, and Psychiatry, 66(4), 442-304. Benjamin Dixon, NCristianeJ.A, ROSEMARIE Mercer, & Yehuda Morrison MOUSMANE. (2004.) Assessment of post-stroke quality of life in cost-effectiveness studies: The usefulness of the Barthel Index and the EuroQoL-5D.  Quality of Life Research, 15, 125-79         Occupational Therapy Evaluation Charge Determination   History Examination Decision-Making   MEDIUM Complexity : Expanded review of history including physical, cognitive and psychosocial  history  LOW Complexity : 1-3 performance deficits relating to physical, cognitive , or psychosocial skils that result in activity limitations and / or participation restrictions  LOW Complexity : No comorbidities that affect functional and no verbal or physical assistance needed to complete eval tasks       Based on the above components, the patient evaluation is determined to be of the following complexity level: LOW   Pain Rating:  None reported    Activity Tolerance:   Good and requires rest breaks    After treatment patient left in no apparent distress:    Supine in bed, Call bell within reach, Bed / chair alarm activated, and Side rails x 3    COMMUNICATION/EDUCATION:   The patients plan of care was discussed with: Physical therapist and Registered nurse. Home safety education was provided and the patient/caregiver indicated understanding., Patient/family have participated as able in goal setting and plan of care. , and Patient/family agree to work toward stated goals and plan of care. This patients plan of care is appropriate for delegation to [facility].     Thank you for this referral.  Sandra Mcgowan OT  Time Calculation: 40 mins

## 2021-11-22 NOTE — ED TRIAGE NOTES
Patient arrived via EMS, patient coming from an [place] [address], per EMS patient had a fall a few weeks ago and was down on the floor for 3 days, family & staff reported worsening alteration in patient's mental status, patient's Cardiologist recently discontinued patient's warfarin.

## 2021-11-22 NOTE — PROGRESS NOTES
Problem: Mobility Impaired (Adult and Pediatric)  Goal: *Acute Goals and Plan of Care (Insert Text)  Description: FUNCTIONAL STATUS PRIOR TO ADMISSION: Patient was modified independent using a rolling walker for functional mobility - driving. Per chart, son reporting significant decline in past 6 - 8 wks - pt not eating , not taking meds, + falls. HOME SUPPORT PRIOR TO ADMISSION: The patient lived Independent Living - apt. Physical Therapy Goals  Initiated 11/22/2021  1. Patient will move from supine to sit and sit to supine , scoot up and down, and roll side to side in bed with independence within 7 day(s). 2.  Patient will transfer from bed to chair and chair to bed with modified independence using the least restrictive device within 7 day(s). 3.  Patient will perform sit to stand with modified independence within 7 day(s). 4.  Patient will ambulate with modified independence for > 300 feet with the least restrictive device within 7 day(s). 5.  Patient will ascend/descend 4 stairs with one handrail(s) with supervision within 7 day(s). PHYSICAL THERAPY EVALUATION  Patient: Brain Wills (80 y.o. male)  Date: 11/22/2021  Primary Diagnosis: Encephalopathy [G93.40]        Precautions:   Fall    ASSESSMENT  Based on the objective data described below, the patient presents alert, oriented except to situation, confused at times, decreased safety awareness, significant decline from [place] functional status/driving, and impaired standing balance/gait fall risk. Pt will require bed/chair alarm at all times. Pt will require increased level of care. Current Level of Function Impacting Discharge (mobility/balance): Supervision/min assist for functional mobility - pt tends to walk away from RW, fall risk    Functional Outcome Measure: The patient scored 50/100 on the Barthel Index outcome measure which is indicative of partially dependent for ADL's.       Other factors to consider for discharge: recent falls, decreased safety awareness     Patient will benefit from skilled therapy intervention to address the above noted impairments. PLAN :  Recommendations and Planned Interventions: bed mobility training, transfer training, gait training, therapeutic exercises, patient and family training/education, and therapeutic activities      Frequency/Duration: Patient will be followed by physical therapy:  5 times a week to address goals. Recommendation for discharge: (in order for the patient to meet his/her long term goals)  Therapy up to 5 days/week in SNF setting    This discharge recommendation:  Has been made in collaboration with the attending provider and/or case management    IF patient discharges home will need the following DME: patient owns DME required for discharge         SUBJECTIVE:   Patient stated this is a hospital right? [facility]    OBJECTIVE DATA SUMMARY:   HISTORY:    Past Medical History:   Diagnosis Date    Advanced care planning/counseling discussion 10/8/2015    Basal cell carcinoma, face     now sees Dr. Nickie Byrd. Bladder cancer [facility]) 2012    Dr. Sapna Cornelius. cyto 9/2014, 2/2016. s/p surgery, BCG irrigation [place]. saw Dr. Amanda Ruiz    BPH with obstruction/lower urinary tract symptoms     Chronic lower back pain     injection in [place].   saw Dr. Marlen Tirado    Chronic neck pain     limited motion to side    Depression, major     Dupuytren's contracture of left hand     5th finger    Epistaxis 2013    saw ENT at [facility] history of skin cancer     Fecal incontinence     with loose stools    Hearing loss     has hearing aids    HTN (hypertension)     Insomnia     IVCD (intraventricular conduction defect) 11/30/2015    Dr Stone Dust term current use of anticoagulants with INR goal of 2.0-3.0     Afib    Loose stools 7/18/2014    MRI contraindicated due to metal implant     has scleral buckle    Paroxysmal atrial fibrillation (HCC)     EF 66%, 1-2+ LAE on flecainide;Dr. Jesse Vernon. saw Dr. Sara Soriano Charanjit at [facility]    Peripheral neuropathy     RLS (restless legs syndrome) 7/3/2017    SCC (squamous cell carcinoma), face     prior hx, now sees Dr. Jules Peng    Skin cancer     Urothelial carcinoma of right distal ureter ([address] 5/1/15    excision Dr. Usha Sands. low grade, papillary. repeat 3 month     Past Surgical History:   Procedure Laterality Date    HX BLADDER REPAIR  2012    cancer    HX COLONOSCOPY  2012    polyps. repeat 2017? HX HERNIA REPAIR      periumibical    HX MALIGNANT SKIN LESION EXCISION  06/14/2018    basal cell left lip    HX OTHER SURGICAL  10/2021    Skin cancer removed from nose. HX RETINAL DETACHMENT REPAIR Right     OR CYSTOURETHROSCOPY  9/2014    Dr. Mariola Christensen CYSTOURETHROSCOPY  5/1/15    low grade right pap urothelial cancer    OR CYSTOURETHROSCOPY  2/29/16       Personal factors and/or comorbidities impacting plan of care: as above    Home Situation  Home Environment: [address] [place] [address] Name: Amrit Mcfadden  # Steps to Enter: 0  One/Two Story Residence: One story  Living Alone: No  Support Systems: Child(nick)  Current DME Used/Available at Home: Si Ripa, rollator, [address] [facility], [address], straight, Shower chair, Grab bars  Tub or Shower Type: Shower    EXAMINATION/PRESENTATION/DECISION MAKING:   Critical Behavior:  Neurologic State: Confused  Orientation Level: Oriented to person, Oriented to time, Oriented to place, Disoriented to situation  Cognition: Follows commands, Decreased attention/concentration, Poor safety awareness  Safety/Judgement: Decreased insight into deficits, Decreased awareness of need for assistance, Decreased awareness of need for safety  Hearing:   [place] - hearing aides in place  Range Of Motion:  AROM: Within functional limits           PROM: Within functional limits           Strength:    Strength:  Within functional limits                    Tone & Sensation:   Tone: Normal              Sensation: Intact               Coordination:  Coordination: Generally decreased, functional  Vision:   Tracking: Able to track stimulus in all quadrants w/o difficulty  Saccades: Unable to test secondary to decreased visual attention  Functional Mobility:  Bed Mobility:     Supine to Sit: Stand-by assistance  Sit to Supine: Contact guard assistance  Scooting: Stand-by assistance  Transfers:  Sit to Stand: Contact guard assistance  Stand to Sit: Contact guard assistance                       Balance:   Sitting: Impaired; With support  Sitting - Static: Good (unsupported)  Sitting - Dynamic: Fair (occasional)  Standing: Impaired; With support  Standing - Static: Good; Constant support  Standing - Dynamic : Fair; Constant support  Ambulation/Gait Training:  Distance (ft): 125 Feet (ft)  Assistive Device: Walker, rolling; Gait belt  Ambulation - Level of Assistance: Contact guard assistance; Minimal assistance (repeated verbal and tactile cues for safety)        Gait Abnormalities: Decreased step clearance        Base of Support: Narrowed     Speed/Gricelda: Slow; Shuffled  Step Length: Right shortened; Left shortened               Functional Measure:  Barthel Index:    Bathin  Bladder: 5  Bowels: 10  Groomin  Dressin  Feeding: 10  Mobility: 0  Stairs: 0  Toilet Use: 5  Transfer (Bed to Chair and Back): 10  Total: 50/100       The Barthel ADL Index: Guidelines  1. The index should be used as a record of what a patient does, not as a record of what a patient could do. 2. The main aim is to establish degree of independence from any help, physical or verbal, however minor and for whatever reason. 3. The need for supervision renders the patient not independent. 4. A patient's performance should be established using the best available evidence. Asking the patient, friends/relatives and nurses are the usual sources, but direct observation and common sense are also important. However direct testing is not needed.   5. Usually the patient's performance over the preceding 24-48 hours is important, but occasionally longer periods will be relevant. 6. Middle categories imply that the patient supplies over 50 per cent of the effort. 7. Use of aids to be independent is allowed. Score Interpretation (from [address])    Independent   60-79 Minimally independent   40-59 Partially dependent   20-39 Very dependent   <20 Totally dependent     -Jody Patel., Barthel, D.W. (1965). Functional evaluation: the Barthel Index. [address] ([address] (1997). The Barthel activities of daily living index: self-reporting versus actual performance in the old (> or = 75 years). Journal of [address] [place] [address] 45(7), [address] [facility], [place], Donna Khalil., Marcos Porter (1999). Measuring the change in disability after inpatient rehabilitation; comparison of the responsiveness of the Barthel Index and Functional [place] Measure. Journal of Neurology, Neurosurgery, and Psychiatry, 66(4), 460-652. Yelena Borrero, N.J.A, ROSEMARIE Mercer, & Kerri Warner M.A. (2004) Assessment of post-stroke quality of life in cost-effectiveness studies: The usefulness of the Barthel Index and the EuroQoL-5D. Quality of Life Research, 15, 043-25           Physical Therapy Evaluation Charge Determination   History Examination Presentation Decision-Making   LOW Complexity : Zero comorbidities / personal factors that will impact the outcome / POC LOW Complexity : 1-2 Standardized tests and measures addressing body structure, function, activity limitation and / or participation in recreation  LOW Complexity : Stable, uncomplicated  LOW Complexity : FOTO score of       Based on the above components, the patient evaluation is determined to be of the following complexity level: LOW     Pain Rating:  Pt denied pain.     Activity Tolerance:   Fair - encourage up in chair for meals    After treatment patient left in no apparent distress:   Sitting in chair, Call bell within reach, and Bed / chair alarm activated    COMMUNICATION/EDUCATION:   The patients plan of care was discussed with: Registered nurse. Fall prevention education was provided and the patient/caregiver indicated understanding., Patient/family have participated as able in goal setting and plan of care. , and Patient/family agree to work toward stated goals and plan of care.     Thank you for this referral.  Quin Dixon, PT   Time Calculation: 35 mins

## 2021-11-22 NOTE — ED PROVIDER NOTES
History is limited at this time as there are no family or friends at the bedside to give you more history. Per EMS. The patient has been having increasing confusion for the past 2 days. He had a fall about a month ago per EMS but no other recent falls. Allegedly the family said that they were concerned about his worsening confusion so they called EMS. Here patient has no complaints. He is not quite sure why he is here. Past Medical History:   Diagnosis Date    Advanced care planning/counseling discussion 10/8/2015    Basal cell carcinoma, face     now sees Dr. Nickie Byrd.  Bladder cancer [facility]) 2012    Dr. Sapna Cornelius. cyto 9/2014, 2/2016. s/p surgery, BCG irrigation [place]. saw Dr. Indu Farias BPH with obstruction/lower urinary tract symptoms     Chronic lower back pain     injection in [place]. saw Dr. Vineet Vogel Chronic neck pain     limited motion to side    Depression, major     Dupuytren's contracture of left hand     5th finger    Epistaxis 2013    saw ENT at [address] [place] [address] history of skin cancer     Fecal incontinence     with loose stools    Hearing loss     has hearing aids    HTN (hypertension)     Insomnia     IVCD (intraventricular conduction defect) 11/30/2015    Dr Lidia Rodriguez term current use of anticoagulants with INR goal of 2.0-3.0     Afib    Loose stools 7/18/2014    MRI contraindicated due to metal implant     has scleral buckle    Paroxysmal atrial fibrillation (HCC)     EF 66%, 1-2+ LAE on flecainide;Dr. Jesse Vernon. [facility] Jojo Raymond [facility] Peripheral neuropathy     RLS (restless legs syndrome) 7/3/2017    SCC (squamous cell carcinoma), face     prior hx, now sees Dr. Conner Stevens Skin cancer     Urothelial carcinoma of right distal [address]ter ([facility] [address] 5/1/15    excision Dr. Jodi Santa. low grade, papillary. repeat 3 month       Past Surgical History:   Procedure Laterality Date    HX BLADDER REPAIR  2012    cancer    HX COLONOSCOPY  2012    polyps. repeat 2017?     HX HERNIA REPAIR      periumibical    HX MALIGNANT SKIN LESION EXCISION  06/14/2018    basal cell left lip    HX OTHER SURGICAL  10/2021    Skin cancer removed from nose.  HX RETINAL DETACHMENT REPAIR Right     [facility]  9/2014    Dr. Reuben Oliva CYSTOURETHROSCOPY  5/1/15    low grade right pap urothelial cancer    [facility] CYSTOURE[facility]ROSCOPY  2/29/16         No family history on file. Social History     Socioeconomic History    Marital status:      Spouse name: Not on file    Number of children: Not on file    Years of education: Not on file    Highest education level: Not on file   Occupational History    Not on file   Tobacco Use    Smoking status: Former Smoker    Smokeless tobacco: Never Used    Tobacco comment: socially   Substance and Sexual Activity    Alcohol use: Yes     Comment: occ    Drug use: Never    Sexual activity: Not Currently   Other Topics Concern    Not on file   Social History Narrative    Not on file     Social Determinants of Health     Financial Resource Strain:     Difficulty of Paying Living Expenses: Not on file   Food Insecurity:     Worried About Running Out of Food in the Last Year: Not on file    Hazel of Food in the Last Year: Not on file   Transportation Needs:     Lack of Transportation (Medical): Not on file    Lack of Transportation (Non-Medical):  Not on file   Physical Activity:     Days of Exercise per Week: Not on file    Minutes of Exercise per Session: Not on file   Stress:     Feeling of Stress : Not on file   Social Connections:     Frequency of Communication with Friends and Family: Not on file    Frequency of Social Gatherings with Friends and Family: Not on file    Attends Religion Services: Not on file    Active Member of Clubs or Organizations: Not on file    Attends Club or Organization Meetings: Not on file    Marital Status: Not on file   Intimate Partner Violence:     Fear of Current or Ex-Partner: Not on file  Emotionally Abused: Not on file    Physically Abused: Not on file    Sexually Abused: Not on file   Housing Stability:     Unable to Pay for Housing in the Last Year: Not on file    Number of Places Lived in the Last Year: Not on file    Unstable Housing in the Last Year: Not on file         ALLERGIES: Hay fever and allergy relief and Trazodone    Review of Systems   Constitutional: Negative for chills, fatigue and fever. HENT: Negative for rhinorrhea and sore throat. Eyes: Negative for discharge and itching. Respiratory: Negative for cough, shortness of breath and wheezing. Cardiovascular: Negative for chest pain, palpitations and leg swelling. Gastrointestinal: Negative for abdominal pain, constipation, diarrhea, nausea and vomiting. Genitourinary: Negative for dysuria, flank pain, frequency and hematuria. Musculoskeletal: Negative for arthralgias, back pain, gait problem, joint swelling, myalgias, neck pain and neck stiffness. Skin: Negative for color change, pallor, rash and wound. Neurological: Negative for dizziness, light-headedness, numbness and headaches. Vitals:    11/21/21 2145 11/21/21 2200 11/21/21 2215 11/21/21 2227   BP: (!) 175/86 (!) 156/82 (!) 167/89    Pulse: 60 (!) 59 (!) 52    Resp: 19 17 17    Temp:       SpO2:  93% 94%    Weight:    96.2 kg (212 lb 1.3 oz)            Physical Exam  Vitals and nursing note reviewed. Constitutional:       General: He is not in acute distress. Appearance: He is well-developed and normal weight. He is not ill-appearing, toxic-appearing or diaphoretic. HENT:      Head: Normocephalic and atraumatic. Mouth/Throat:      Mouth: Mucous membranes are moist.      Pharynx: Oropharynx is clear. Eyes:      General: No scleral icterus. Extraocular Movements: Extraocular movements intact. Right eye: Normal extraocular motion and no nystagmus. Left eye: Normal extraocular motion and no nystagmus.       Pupils: Pupils are equal, round, and reactive to light. Pupils are equal.      Right eye: Pupil is round and reactive. Left eye: Pupil is round and reactive. Comments: Pupils 3 mm and reactive bilaterally. No nystagmus. No proptosis. Neck:      Meningeal: Brudzinski's sign and Kernig's sign absent. Cardiovascular:      Rate and Rhythm: Bradycardia present. Rhythm irregular. Heart sounds: Normal heart sounds. Pulmonary:      Effort: Pulmonary effort is normal.      Breath sounds: Normal breath sounds. Abdominal:      General: Bowel sounds are normal. There is no distension. Palpations: Abdomen is soft. There is no mass. Tenderness: There is no abdominal tenderness. There is no guarding. Comments: Soft reducible ventral hernia. No tenderness. Musculoskeletal:         General: No swelling or tenderness. Normal range of motion. Cervical back: Normal range of motion and neck supple. No rigidity. Lymphadenopathy:      Cervical: No cervical adenopathy. Skin:     General: Skin is warm and dry. Capillary Refill: Capillary refill takes less than 2 seconds. Coloration: Skin is not cyanotic or pale. Findings: No erythema or rash. Neurological:      Mental Status: He is alert and oriented to person, place, and time. Cranial Nerves: No cranial nerve deficit, dysarthria or facial asymmetry. Sensory: No sensory deficit. Motor: No weakness. Deep Tendon Reflexes: Reflexes normal. Babinski sign absent on the right side. Babinski sign absent on the left side. Comments: Patient oriented person place and time. Moving upper and lower extremities with equal strength bilaterally. Sensation to light touch intact in all dermatomes of the upper extremities bilaterally and the L4 L5-S1 dermatomes bilaterally. 2+ and equal patellar DTRs bilaterally. No inducible ankle clonus. Negative Babinski.    Psychiatric:         Mood and Affect: Mood normal. Behavior: Behavior normal.          [facility]  ED Course as of 11/21/21 0776   [employer] Nov 21, 2021   2143 Patient examined. Patient in no acute distress. Patient oriented to person place and time. No focal neuro deficits. No evidence of trauma. Will attempt to get more history from family. However they are not at the bedside. Will get CT head and labs given the report of confusion and altered mental status. [AF]   2151 EKG with A. fib, rate 57, nonspecific ST changes, nonspecific intraventricular conduction delay. [AF]   7806 XR CHEST PORT [AF]   2683 GLUCOSE, POC:    GLUCOSE,FAST -    Performed by Michi Bae [AF]   2214 CBC WITH AUTOMATED DIFF:    WBC 6.3   RBC 4.14   HGB 12.7   HCT 39.4   MCV 95.2   MCH 30.7   MCHC 32.2   RDW 13.8   PLATELET 981   MPV 20.1   NRBC 0.0   ABSOLUTE NRBC 0.00   NEUTROPHILS 49   LYMPHOCYTES 33   MONOCYTES 10   EOSINOPHILS 7   BASOPHILS 1   IMMATURE GRANULOCYTES 0   ABS. NEUTROPHILS 3.1   ABS. LYMPHOCYTES 2.0   ABS. MONOCYTES 0.6   ABS. EOSINOPHILS 0.4   ABS. BASOPHILS 0.0   ABS. IMM. GRANS. 0.0   DF AUTOMATED [AF]   2237 ETHYL ALCOHOL:    ALCOHOL(ETHYL),SERUM <10 [AF]   2239 MAGNESIUM:    Magnesium 2.3 [AF]   2239 LIPASE:    Lipase 150 [AF]   2240 COMPREHENSIVE METABOLIC PANEL(!):    Sodium 143   Potassium 3.4(!)   Chloride 112(!)   CO2 28   Anion gap 3(!)   Glucose 112(!)   BUN 31(!)   Creatinine 1.92(!)   BUN/Creatinine ratio 16   GFR est AA 40(!)   GFR est non-AA 33(!)   Calcium 8.9   Bilirubin, total 0.5   ALT 16   AST 11(!)   Alk. phosphatase 106   Protein, total 7.2   Albumin 3.3(!)   Globulin 3.9   A-G Ratio 0.8(!) [AF]   0 Cmp with CR. Will give gentle IVF [AF]   2245 TROPONIN-HIGH SENSITIVITY:    Troponin-High Sensitivity 27 [AF]   1562 Spoke with son who is listed as emergency contact. Son is saying that the patient lives in a independent living area.   Has had multiple falls and and son is concerned that he no longer will be able to take care of himself and that he is not safe to be left alone. He states that the patient is no longer eating or drinking normally and he has gone a few days without eating. The patient is appearing more disheveled. The son is very concerned for the patient's ability to perform ADLs. The son is stating that the patient can no longer take care of himself and live independently. The son wants to make sure there is not a medical cause for this gradually worsening of mental decline and ambulatory dysfunction. The son also states that he wants to have the patient placed in a more appropriate facility where he is able to have more supervision and people helping him with his ADLs. Son is requesting that once all labs and imaging have been resulted that the admitting medical team give him a call \"at anytime day or night. \"  Son is very concerned for his father safety and verbalizes that he does not want his father discharged back to independent living. [AF]   200 Pt signed out to overnight team.  [AF]      ED Course User Index  [AF] Jesus Postal, DO [facility] Serve Consult for Admission  11:27 PM    ED Room Number: ER12/12  Patient Name and age:  Milana Sylvester. 80 y.o.  male  Working Diagnosis:   1. Confusion    2. Ambulatory dysfunction    3. Alteration in self-care ability        COVID-19 Suspicion:  no  Sepsis present:  no  Reassessment needed: no  Code Status:  Full Code  Readmission: no  Isolation Requirements:  no  Recommended Level of Care:  med/surg  Department:[facility] Adult ED - 21   Other: Worsening confusion over several days to weeks. Patient not taking care of himself. Not eating and drinking normally. He lives in independent living facility. Has had multiple falls. Family is concerned that he not take care of himself. Here CT head is currently pending. My shift is ending and the oncoming physician will monitor for results and page you with results.   Patient needs admission because he cannot go home and live independently. Family cannot take care of him. They want full medical work-up as well as assistance with eventual disposition to a nursing facility.     Procedures

## 2021-11-22 NOTE — H&P
[address] [facility]  Hospitalist Group  History and Physical    Primary Care Provider: Daysi Donaldson MD  Date of Service:  11/22/2021    Subjective:     Ben Torres is a 80 y.o. male with a pmhx bladder cancer s/p resection and bcg tx, BPH,  frequent falls, HTN, pAF not on anticoagulation, who presents with 2 days of increasing confusion. He was admitted  From 8/28 to 9/2 after mechanical fall resulting in him being on his apartment floor for three days causing dehydration, and rhabdo with CR and encephalopathy. He was discharged to IP rehab for several weeks before returning to his independent living. Apartment. Per report, patient's son called EMS due to concern that patient was confused. Patient states that he forgets that year sometimes, but otherwise does not feel confused. He is able to describe the events of his last hospitalization, and his, and his wives past medical and social history. He denies headache, fever, chills, chest pain, abdominal pain, diarrhea, lightheadedness, syncope, or dysuria. In the ED SBP was elevated to the 170's, and he was bradycardic to the low 50's without symptoms. Labs were significant for BUN 31, creatinine 1.92 (b/l a.2-1.3),  EKG showed a-fib. CXR was negative for acute cardiopulmonary process. CT head was negative for acute intracranial process. In the ED, he was treated with IVF. Review of Systems:    All other review of systems were negative except for that written in the History of Present Illness. Past Medical History:   Diagnosis Date    Advanced care planning/counseling discussion 10/8/2015    Basal cell carcinoma, face     now sees Dr. Robbi Garcia.  Bladder cancer [facility]) 2012    Dr. Fili Rmaírez. cyto 9/2014, 2/2016. s/p surgery, BCG irrigation [place]. saw Dr. Humza Terrazas BPH with obstruction/lower urinary tract symptoms     Chronic lower back pain     injection in [place].   saw Dr. Angelina Sanchez Chronic neck pain     limited motion to side    Depression, major     Dupuytren's contracture of left hand     5th finger    Epistaxis 2013    saw ENT at [facility] history of skin cancer     Fecal incontinence     with loose stools    Hearing loss     has hearing aids    HTN (hypertension)     Insomnia     IVCD (intraventricular conduction defect) 11/30/2015    Dr Keo Callejas term current use of anticoagulants with INR goal of 2.0-3.0     Afib    Loose stools 7/18/2014    MRI contraindicated due to metal implant     has scleral buckle    Paroxysmal atrial fibrillation (HCC)     EF 66%, 1-2+ LAE on flecainide;Dr. Ronni Cabrera. saw Dr. Lulu Mabry at [facility] Peripheral neuropathy     RLS (restless legs syndrome) 7/3/2017    SCC (squamous cell carcinoma), face     prior hx, now sees Dr. Leobardo Guzmán Skin cancer     Urothelial carcinoma of right distal ureter ([facility] [address] 5/1/15    excision Dr. Eloise Fabry. low grade, papillary. repeat 3 month      Past Surgical History:   Procedure Laterality Date    HX BLADDER REPAIR  2012    cancer    HX COLONOSCOPY  2012    polyps. repeat 2017?  HX HERNIA REPAIR      periumibical    HX MALIGNANT SKIN LESION EXCISION  06/14/2018    basal cell left lip    HX OTHER SURGICAL  10/2021    Skin cancer removed from nose.  HX RETINAL DETACHMENT REPAIR Right     OK CYSTOURETHROSCOPY  9/2014    Dr. Jeff Garza CYSTOURETHROSCOPY  5/1/15    low grade right pap urothelial cancer    OK CYSTOURETHROSCOPY  2/29/16     Prior to Admission medications    Medication Sig Start Date End Date Taking? Authorizing Provider   aspirin delayed-release 81 mg tablet Take 1 Tablet by mouth daily. 11/11/21   Chandler Duron MD   metoprolol succinate (TOPROL-XL) 25 mg XL tablet Take 1 Tablet by mouth nightly. 11/11/21   Chandler Duron MD   finasteride (PROSCAR) 5 mg tablet Take 1 Tablet by mouth daily.  11/1/21   Simone Falcon MD   cholecalciferol (VITAMIN D3) (50,000 UNITS /1250 MCG) capsule Take 1 Capsule by mouth every seven (7) days. Indications: low vitamin D levels 9/23/21   Claudio Falcon MD   acetaminophen (TYLENOL) 325 mg tablet Take 2 Tablets by mouth every six (6) hours as needed for Pain or Fever. 9/15/21   Claudio Falcon MD   gabapentin (NEURONTIN) 300 mg capsule TAKE 1 CAPSULE BY MOUTH DAILY INDICATIONS: NEUROPATHIC PAIN 7/26/21   Leona Opitz, MD   irbesartan (AVAPRO) 75 mg tablet TAKE 2 TABLETS NIGHTLY 5/31/21   Claudio Falcon MD   sertraline (ZOLOFT) 100 mg tablet TAKE 1 TABLET DAILY 5/31/21   Claudio Falcon MD   colestipoL (COLESTID) 5 gram packet Take 5 g by mouth two (2) times a day. Provider, Historical     Allergies   Allergen Reactions    Hay Fever And Allergy Relief Other (comments)     Watery eyes    Trazodone Palpitations      Family hx reviewed, and not pertinent to presenting problem    SOCIAL HISTORY:  Patient resides at Home, residential independent living facility   Smoking history: none  Alcohol history: rare    Objective:       Physical Exam:   Visit Vitals  BP (!) 170/81   Pulse 66   Temp 97.7 °F (36.5 °C)   Resp 16   Wt 96.2 kg (212 lb 1.3 oz)   SpO2 95%   BMI 28.76 kg/m²     General:  Alert, and oriented to person, place, and event. He only has difficulty recalling the year, cooperative, no distress, appears stated age. Head:  Normocephalic, without obvious abnormality, atraumatic. Eyes:  Conjunctivae/corneas clear. EOMs intact. Nose: Nares normal. Septum midline. Throat: Lips, mucosa, and tongue normal.    Neck: Supple, symmetrical, trachea midline   Back:   Symmetric, no curvature. ROM normal. .   Lungs:   Clear to auscultation bilaterally. Chest wall:  No tenderness or deformity. Heart:  Irregularly-irregular, S1, S2 normal, no murmur, click, rub or gallop. Abdomen:   Soft, non-tender. Bowel sounds normal. No masses,             Extremities: Extremities normal, atraumatic, no cyanosis or edema.    Pulses: 2+ radial pulse   Skin: Skin color, texture, turgor normal. No rashes or lesions Neurologic: CNII-XII intact. Normal strength, and sensation throughout. ECG:  Atrial fibrillation with slow ventricular response    Data Review: All diagnostic labs and studies have been reviewed. Chest x-ray: no acute cardiopulmonary process. Assessment:     Active Problems:    Encephalopathy (11/21/2021)        Plan:     #Confusion-resolved  -pt reportedly with confusion earlier. He is now alert and oriented to person, place and event. He knows it is November and Thanksgiving is this week. He does have difficulty remembering the year which he says is normal for him. He is able to discuss the events of his past hospitalization which were confirmed with chart review, and he discussed he and his wives medical history and social history. He did forget my last name, but appropriately asked me to tell him again. -CT head negative, infe  -I suspect more chronic cognitive decline     #CR  -prerenal likely due to dehydration  -pt. States that he eats three meals a day that his facility provides for him. He denies vomiting or diarrhea, but may just need to take more fluids PO.  -hold home avapro  -IVF, monitor, avoid renal toxins and hypotension, renally dose meds    #HTN  -held arb 2/2 CR  -continue metoprolol, and added prn hydralazine    #Frequent Falls   -last fall resulted in him being on the ground for 3 days with subsequent rhabdo, dehydration, CR, and encephalopathy. He required hospitalization followed by rehab, then returned home to his independent living facility. His coumadin has been stopped by cardiologist.  -patient states that his children want him to go to a skilled nursing facility, and that he does not want to be a burden on them, but prefers his independent living quarters. #pAF  -now in a-fib again  -coumadin was recently d/c'ed due to pt.  With frequent falls    #BPH  -continue proscar, and zoloft    #Depression  -patients wife is in skilled nursing facility, and his daughter committed suicide 11 years ago  -continue zoloft    FEN: cardiac  dvt ppx: heparin  MPOA: Joseline Thomson (son)  Code; Full    Signed By: Lindsey Bright MD     November 22, 2021

## 2021-11-23 LAB
ANION GAP SERPL CALC-SCNC: 6 MMOL/L (ref 5–15)
BUN SERPL-MCNC: 27 MG/DL (ref 6–20)
BUN/CREAT SERPL: 18 (ref 12–20)
CALCIUM SERPL-MCNC: 8.5 MG/DL (ref 8.5–10.1)
CHLORIDE SERPL-SCNC: 113 MMOL/L (ref 97–108)
CO2 SERPL-SCNC: 23 MMOL/L (ref 21–32)
CREAT SERPL-MCNC: 1.51 MG/DL (ref 0.7–1.3)
ERYTHROCYTE [DISTWIDTH] IN BLOOD BY AUTOMATED COUNT: 13.9 % (ref 11.5–14.5)
GLUCOSE SERPL-MCNC: 100 MG/DL (ref 65–100)
HCT VFR BLD AUTO: 40.4 % (ref 36.6–50.3)
HGB BLD-MCNC: 13.3 G/DL (ref 12.1–17)
MCH RBC QN AUTO: 30.7 PG (ref 26–34)
MCHC RBC AUTO-ENTMCNC: 32.9 G/DL (ref 30–36.5)
MCV RBC AUTO: 93.3 FL (ref 80–99)
NRBC # BLD: 0 K/UL (ref 0–0.01)
NRBC BLD-RTO: 0 PER 100 WBC
PLATELET # BLD AUTO: 197 K/UL (ref 150–400)
PMV BLD AUTO: 10.9 FL (ref 8.9–12.9)
POTASSIUM SERPL-SCNC: 3.5 MMOL/L (ref 3.5–5.1)
RBC # BLD AUTO: 4.33 M/UL (ref 4.1–5.7)
SODIUM SERPL-SCNC: 142 MMOL/L (ref 136–145)
TSH SERPL DL<=0.05 MIU/L-ACNC: 2.78 UIU/ML (ref 0.36–3.74)
VIT B12 SERPL-MCNC: 403 PG/ML (ref 193–986)
WBC # BLD AUTO: 6.9 K/UL (ref 4.1–11.1)

## 2021-11-23 PROCEDURE — 85027 COMPLETE CBC AUTOMATED: CPT

## 2021-11-23 PROCEDURE — 97535 SELF CARE MNGMENT TRAINING: CPT

## 2021-11-23 PROCEDURE — 80048 BASIC METABOLIC PNL TOTAL CA: CPT

## 2021-11-23 PROCEDURE — 36415 COLL VENOUS BLD VENIPUNCTURE: CPT

## 2021-11-23 PROCEDURE — 74011250636 HC RX REV CODE- 250/636: Performed by: INTERNAL MEDICINE

## 2021-11-23 PROCEDURE — 74011250637 HC RX REV CODE- 250/637: Performed by: NURSE PRACTITIONER

## 2021-11-23 PROCEDURE — 65660000000 HC RM CCU STEPDOWN

## 2021-11-23 PROCEDURE — 97116 GAIT TRAINING THERAPY: CPT

## 2021-11-23 PROCEDURE — 82607 VITAMIN B-12: CPT

## 2021-11-23 PROCEDURE — 74011250636 HC RX REV CODE- 250/636: Performed by: FAMILY MEDICINE

## 2021-11-23 PROCEDURE — 84443 ASSAY THYROID STIM HORMONE: CPT

## 2021-11-23 PROCEDURE — 74011250637 HC RX REV CODE- 250/637: Performed by: INTERNAL MEDICINE

## 2021-11-23 PROCEDURE — 74011250637 HC RX REV CODE- 250/637: Performed by: FAMILY MEDICINE

## 2021-11-23 RX ADMIN — HEPARIN SODIUM 5000 UNITS: 5000 INJECTION INTRAVENOUS; SUBCUTANEOUS at 02:39

## 2021-11-23 RX ADMIN — HEPARIN SODIUM 5000 UNITS: 5000 INJECTION INTRAVENOUS; SUBCUTANEOUS at 09:21

## 2021-11-23 RX ADMIN — HEPARIN SODIUM 5000 UNITS: 5000 INJECTION INTRAVENOUS; SUBCUTANEOUS at 18:03

## 2021-11-23 RX ADMIN — Medication 10 ML: at 22:00

## 2021-11-23 RX ADMIN — FINASTERIDE 5 MG: 5 TABLET, FILM COATED ORAL at 09:21

## 2021-11-23 RX ADMIN — LOSARTAN POTASSIUM 50 MG: 50 TABLET, FILM COATED ORAL at 09:21

## 2021-11-23 RX ADMIN — SERTRALINE 50 MG: 50 TABLET, FILM COATED ORAL at 18:03

## 2021-11-23 RX ADMIN — METOPROLOL SUCCINATE 25 MG: 25 TABLET, EXTENDED RELEASE ORAL at 21:16

## 2021-11-23 RX ADMIN — ASPIRIN 81 MG: 81 TABLET, COATED ORAL at 09:21

## 2021-11-23 RX ADMIN — SODIUM CHLORIDE 100 ML/HR: 9 INJECTION, SOLUTION INTRAVENOUS at 16:02

## 2021-11-23 RX ADMIN — HYDRALAZINE HYDROCHLORIDE 20 MG: 20 INJECTION INTRAMUSCULAR; INTRAVENOUS at 09:21

## 2021-11-23 RX ADMIN — Medication 10 ML: at 16:02

## 2021-11-23 NOTE — PROGRESS NOTES
[address] [facility] Adult  Hospitalist Group                                                                                          Hospitalist Progress Note  [address],   Answering service: [phone] from in house phone        Date of Service:  2021  NAME:  Ren Fernandez. :  7/15/1931  MRN:  [id number]      Admission Summary:   80 y.o. male with a pmhx bladder cancer s/p resection and bcg tx, BPH,  frequent falls, HTN, pAF not on anticoagulation, who presents with 2 days of increasing confusion. He was admitted  From  to  after mechanical fall resulting in him being on his apartment floor for three days causing dehydration, and rhabdo with CR and encephalopathy. He was discharged to IP rehab for several weeks before returning to his independent living. Apartment. Per report, patient's son called EMS due to concern that patient was confused. Patient states that he forgets that year sometimes, but otherwise does not feel confused. He is able to describe the events of his last hospitalization, and his, and his wives past medical and social history. He denies headache, fever, chills, chest pain, abdominal pain, diarrhea, lightheadedness, syncope, or dysuria. In the ED SBP was elevated to the 170's, and he was bradycardic to the low 50's without symptoms. Labs were significant for BUN 31, creatinine 1.92 (b/l a.2-1.3),  EKG showed a-fib. CXR was negative for acute cardiopulmonary process. CT head was negative for acute intracranial process  In the ED, he was treated with IVF.         Interval history / Subjective: Follow up confusion. Patient seen and examined earlier today. No acute complaints. Patient completely alert during my conversation. Sometimes will get intermittently confused with staff. Patient plans to enter assisted living in the future but would like to be discharged home with son in the meantime.       Assessment & Plan:     #Confusion-resolved  -pt reportedly with confusion prior to admission. He continues alert and oriented to person, place and event during encounters and can recall specific events. -CT head negative. Unable to complete MRI   -suspect chronic cognitive decline      #CR on CKD stage III  -s/p IVFs     #HTN  -continue metoprolol, restart ARB     #Frequent Falls   -PT/OT consult      #pAF  -now in a-fib again  -coumadin was recently d/c'ed due to pt. With frequent falls    BPH  -continue proscar, and zoloft     #Depression  -patients wife is in skilled nursing facility, and his daughter committed suicide 11 years ago  -continue zoloft        Code status: full  DVT prophylaxis: heparin    Care Plan discussed with: Patient/Family  Anticipated Disposition: Home w/Family  Anticipated Discharge: 24 hours to 48 hours     Hospital Problems  Date Reviewed: 1/12/2021          Codes Class Noted POA    Encephalopathy ICD-10-CM: G93.40  ICD-9-CM: 348.30  11/21/2021 Unknown                Review of Systems:   Negative unless stated above    Vital Signs:    Last 24hrs VS reviewed since prior progress note. Most recent are:  Visit Vitals  BP (!) 170/90 (BP 1 Location: Left upper arm, BP Patient Position: Sitting)   Pulse 81   Temp 98.1 °F (36.7 °C)   Resp 16   Wt 96.2 kg (212 lb 1.3 oz)   SpO2 98%   BMI 28.76 kg/m²       No intake or output data in the 24 hours ending 11/23/21 1842     Physical Examination:     I had a face to face encounter with this patient and independently examined them on 11/23/2021 as outlined below:          Constitutional:  No acute distress, cooperative, pleasant, elderly male    ENT:  Oral mucosa moist, oropharynx benign. Resp:  CTA bilaterally. No wheezing/rhonchi/rales. No accessory muscle use   CV:  Regular rhythm, normal rate, no murmurs, gallops, rubs    GI:  Soft, non distended, non tender.  normoactive bowel sounds, no hepatosplenomegaly     Musculoskeletal:  No edema, warm, 2+ pulses throughout    Neurologic:  Moves all extremities            Data Review:    Review and/or order of clinical lab test  Review and/or order of tests in the radiology section of CPT  Review and/or order of tests in the medicine section of CPT      Labs:     Recent Labs     11/23/21 0259 11/21/21 2150   WBC 6.9 6.3   HGB 13.3 12.7   HCT 40.4 39.4    207     Recent Labs     11/23/21  0259 11/22/21  0859 11/21/21 2150    142 143   K 3.5 3.6 3.4*   * 111* 112*   CO2 23 25 28   BUN 27* 26* 31*   CREA 1.51* 1.44* 1.92*    110* 112*   CA 8.5 9.0 8.9   MG  --   --  2.3     Recent Labs     11/21/21 2150   ALT 16      TBILI 0.5   TP 7.2   ALB 3.3*   GLOB 3.9   LPSE 150     No results for input(s): INR, PTP, APTT, INREXT in the last 72 hours. No results for input(s): FE, TIBC, PSAT, FERR in the last 72 hours. No results found for: FOL, RBCF   No results for input(s): PH, PCO2, PO2 in the last 72 hours. No results for input(s): CPK, CKNDX, TROIQ in the last 72 hours.     No lab exists for component: CPKMB  Lab Results   Component Value Date/Time    Cholesterol, total 210 (H) 07/17/2020 10:31 AM    HDL Cholesterol 65 07/17/2020 10:31 AM    LDL, calculated 119.8 (H) 07/17/2020 10:31 AM    Triglyceride 126 07/17/2020 10:31 AM    CHOL/HDL Ratio 3.2 07/17/2020 10:31 AM     Lab Results   Component Value Date/Time    Glucose (POC) 104 11/21/2021 10:12 PM    Glucose (POC) 117 08/28/2021 08:01 PM     Lab Results   Component Value Date/Time    Color YELLOW/STRAW 08/28/2021 11:32 PM    Appearance CLOUDY (A) 08/28/2021 11:32 PM    Specific gravity 1.027 08/28/2021 11:32 PM    pH (UA) 5.0 08/28/2021 11:32 PM    Protein 300 (A) 08/28/2021 11:32 PM    Glucose 500 (A) 08/28/2021 11:32 PM    Ketone 15 (A) 08/28/2021 11:32 PM    Bilirubin Negative 08/28/2021 11:32 PM    Urobilinogen 1.0 08/28/2021 11:32 PM    Nitrites Negative 08/28/2021 11:32 PM    Leukocyte Esterase Negative 08/28/2021 11:32 PM    Epithelial cells MODERATE (A) 08/28/2021 11:32 PM    Bacteria 1+ (A) 08/28/2021 11:32 PM    WBC 5-10 08/28/2021 11:32 PM    RBC  08/28/2021 11:32 PM         Medications Reviewed:     Current Facility-Administered Medications   Medication Dose Route Frequency    sodium chloride (NS) flush 5-40 mL  5-40 mL IntraVENous Q8H    sodium chloride (NS) flush 5-40 mL  5-40 mL IntraVENous PRN    acetaminophen (TYLENOL) tablet 650 mg  650 mg Oral Q6H PRN    Or    acetaminophen (TYLENOL) suppository 650 mg  650 mg Rectal Q6H PRN    polyethylene glycol (MIRALAX) packet 17 g  17 g Oral DAILY PRN    ondansetron (ZOFRAN ODT) tablet 4 mg  4 mg Oral Q8H PRN    Or    ondansetron (ZOFRAN) injection 4 mg  4 mg IntraVENous Q6H PRN    heparin (porcine) injection 5,000 Units  5,000 Units SubCUTAneous Q8H    hydrALAZINE (APRESOLINE) 20 mg/mL injection 20 mg  20 mg IntraVENous Q6H PRN    aspirin delayed-release tablet 81 mg  81 mg Oral DAILY    finasteride (PROSCAR) tablet 5 mg  5 mg Oral DAILY    sertraline (ZOLOFT) tablet 50 mg  50 mg Oral QPM    losartan (COZAAR) tablet 50 mg  50 mg Oral DAILY    0.9% sodium chloride infusion  100 mL/hr IntraVENous CONTINUOUS    metoprolol succinate (TOPROL-XL) XL tablet 25 mg  25 mg Oral QHS     ______________________________________________________________________  EXPECTED LENGTH OF STAY: 2d 14h  ACTUAL LENGTH OF STAY:          2                 Janice Joy DO

## 2021-11-23 NOTE — PROGRESS NOTES
Bedside and Verbal shift change report given to DEONNA Albright (oncoming nurse) by Klaudia Soler RN (offgoing nurse). Report included the following information SBAR, Kardex, Procedure Summary, Intake/Output, MAR, Accordion and Recent Results.

## 2021-11-23 NOTE — PROGRESS NOTES
Problem: Mobility Impaired (Adult and Pediatric)  Goal: *Acute Goals and Plan of Care (Insert Text)  Description: FUNCTIONAL STATUS PRIOR TO ADMISSION: Patient was modified independent using a rolling walker for functional mobility - driving. Per chart, son reporting significant decline in past 6 - 8 wks - pt not eating , not taking meds, + falls. HOME SUPPORT PRIOR TO ADMISSION: The patient lived Independent Living - apt. Physical Therapy Goals  Initiated 11/22/2021  1. Patient will move from supine to sit and sit to supine , scoot up and down, and roll side to side in bed with independence within 7 day(s). 2.  Patient will transfer from bed to chair and chair to bed with modified independence using the least restrictive device within 7 day(s). 3.  Patient will perform sit to stand with modified independence within 7 day(s). 4.  Patient will ambulate with modified independence for > 300 feet with the least restrictive device within 7 day(s). 5.  Patient will ascend/descend 4 stairs with one handrail(s) with supervision within 7 day(s). Outcome: Progressing Towards Goal   PHYSICAL THERAPY TREATMENT  Patient: Rigoberto Vega (80 y.o. male)  Date: 11/23/2021  Diagnosis: Encephalopathy [G93.40]   <principal problem not specified>       Precautions: Fall  Chart, physical therapy assessment, plan of care and goals were reviewed. ASSESSMENT  Patient continues with skilled PT services and is progressing towards goals. Patient exhibiting improvements today in cognition and command following. Overall oriented x 3, almost 4 as he knows some of his situation that brought him here. Patient exhibited improved RW transfer and gait technique, needing only infrequent cues for safety. Improved enduarnce to 300' gait and completed toileting with clothing management with only SBA. Patient following more complicated 2-3 step commands, sometimes with minor prompts.   Patient reporting plan is for him to d/c home with son and then transition to skilled nursing (currently at another ILF). Agree with this recommendation for d/c with HHPT at son's home. Current Level of Function Impacting Discharge (mobility/balance): SBA for gait and transfers x 300'    Other factors to consider for discharge: family assistance         PLAN :  Patient continues to benefit from skilled intervention to address the above impairments. Continue treatment per established plan of care. to address goals. Recommendation for discharge: (in order for the patient to meet his/her long term goals)  Physical therapy at least 2 days/week in the home AND ensure assist and/or supervision for safety with 24/7 supervision    This discharge recommendation:  Has been made in collaboration with the attending provider and/or case management    IF patient discharges home will need the following DME: patient owns DME required for discharge       SUBJECTIVE:   Patient stated I've been here before and seen you.     OBJECTIVE DATA SUMMARY:   Critical Behavior:  Neurologic State: Alert  Orientation Level: Oriented to person, Oriented to place, Oriented to time  Cognition: Follows commands  Safety/Judgement: Decreased insight into deficits, Decreased awareness of need for assistance, Decreased awareness of need for safety  Functional Mobility Training:  Bed Mobility:     Supine to Sit: Stand-by assistance     Scooting: Stand-by assistance        Transfers:  Sit to Stand: Stand-by assistance (cues for safe hand positions)  Stand to Sit: Stand-by assistance        Bed to Chair: Stand-by assistance                    Balance:  Sitting: Intact  Sitting - Static: Good (unsupported)  Sitting - Dynamic: Good (unsupported)  Standing: Impaired  Standing - Static: Good; Constant support  Standing - Dynamic : Fair; Constant support  Ambulation/Gait Training:  Distance (ft): 300 Feet (ft)  Assistive Device: Gait belt; Walker, rolling  Ambulation - Level of Assistance: Stand-by assistance        Gait Abnormalities: Decreased step clearance        Base of Support: Narrowed     Speed/Gricelda: Pace decreased (<100 feet/min)  Step Length: Right shortened; Left shortened                Pain Rating:  None reported    Activity Tolerance:   Good and Fair    After treatment patient left in no apparent distress:   Sitting in chair, Call bell within reach, and Bed / chair alarm activated    COMMUNICATION/COLLABORATION:   The patients plan of care was discussed with: Occupational therapist and Registered nurse.      Marietta Bustillo, PT   Time Calculation: 16 mins

## 2021-11-23 NOTE — PROGRESS NOTES
Provided pastoral care visit to [facility] patient. Did not include sacramental care.     Dk Gonzalez

## 2021-11-23 NOTE — PROGRESS NOTES
Problem: Self Care Deficits Care Plan (Adult)  Goal: *Acute Goals and Plan of Care (Insert Text)  Description:   FUNCTIONAL STATUS PRIOR TO ADMISSION: Patient was modified independent using a rollator and single point cane for functional mobility. HOME SUPPORT: The patient lived alone in [address] [place] [address] and had family assist with med management    Occupational Therapy Goals  Initiated 11/22/2021  1. Patient will perform grooming with supervision/set-up within 7 day(s). 2.  Patient will perform upper body dressing with supervision/set-up within 7 day(s). 3.  Patient will perform lower body dressing with supervision/set-up within 7 day(s). 4.  Patient will perform all aspects of toileting with supervision/set-up within 7 day(s). 5.  Patient will utilize energy conservation techniques during functional activities with verbal cues within 7 day(s). Outcome: Progressing Towards Goal    OCCUPATIONAL THERAPY TREATMENT  Patient: Suzanne Butler (80 y.o. male)  Date: 11/23/2021  Diagnosis: Encephalopathy [G93.40]   <principal problem not specified>       Precautions: Fall  Chart, occupational therapy assessment, plan of care, and goals were reviewed. ASSESSMENT  Patient continues with skilled OT services and is progressing towards goals. Pt noted with progressive mobility/balance, functionally evidenced by completing standing RW level grooming/toileting with SBA; however, continued decreased safety noted, as pt required Max cues for RW positioning and hand placement, as pt noted to position RW off to side during sustained reach challenges. Pt continues to benefit from skilled OT, with reporting therapist believing pt would benefit from  [facility] and constant ADL/IADL Supervision upon discharge, with MD speaking with pt during OT treatment and POC for pt to discharge to son's home with constant Supervision, with plans to transition into [facility].     Current Level of Function Impacting Discharge (ADLs): SBA    Other factors to consider for discharge: confusion with decreased safety         PLAN :  Patient continues to benefit from skilled intervention to address the above impairments. Continue treatment per established plan of care to address goals. Recommend with staff: OOB meals, Active ADL engagement, assist x1 to/from bathroom    Recommend next OT session: POC progression    Recommendation for discharge: (in order for the patient to meet his/her long term goals)  To be determined: return to son's home with constant Supervision, HHOT and plans to transition to intermediate    This discharge recommendation:  Has been made in collaboration with the attending provider and/or case management    IF patient discharges home will need the following DME: patient owns DME required for discharge       SUBJECTIVE:   Patient stated I was an avid golfer, I had 1 hole in one.     OBJECTIVE DATA SUMMARY:   Cognitive/Behavioral Status:  Neurologic State: Alert  Orientation Level: Oriented to person; Oriented to place; Oriented to time  Cognition: Follows commands  Perception: Appears intact  Perseveration: No perseveration noted  Safety/Judgement: Decreased awareness of environment    Functional Mobility and Transfers for ADLs:  Bed Mobility:  Supine to Sit: Stand-by assistance  Scooting: Stand-by assistance    Transfers:  Sit to Stand: Stand-by assistance (cues for safe hand positions)  Functional Transfers  Bathroom Mobility: Stand-by assistance  Bed to Chair: Stand-by assistance    Pt educated on safe transfer techniques, with specific emphasis on proper hand placement to push up from seated surface rather than attempt to pull self up, fully positioning self in-front of desired seated location, feeling chair on back of legs and reaching back with 1-2 UE to slowly lower self to seated position.     Balance:  Sitting: Intact  Sitting - Static: Good (unsupported)  Sitting - Dynamic: Good (unsupported)  Standing: Impaired  Standing - Static: Good; Constant support  Standing - Dynamic : Fair; Constant support    ADL Intervention:  Grooming  Grooming Assistance: Stand-by assistance  Position Performed: Standing (RW at sink)  Washing Face: Stand-by assistance  Washing Hands: Stand-by assistance  Brushing Teeth: Stand-by assistance  Cues: Verbal cues provided (for RW positioning/hand placement)    Toileting  Toileting Assistance: Stand-by assistance  Bladder Hygiene: Supervision  Bowel Hygiene: Supervision  Clothing Management: Stand-by assistance  Cues: Verbal cues provided (for RW positioning/hand placement)  Adaptive Equipment: Walker    Cognitive Retraining  Safety/Judgement: Decreased awareness of environment    Pain:  No c/o pain    Activity Tolerance:   Good and requires rest breaks    After treatment patient left in no apparent distress:   Sitting in chair, Call bell within reach, and Bed / chair alarm activated    COMMUNICATION/COLLABORATION:   The patients plan of care was discussed with: Physical therapist, Registered nurse, Physician, and Case management.      Douglas Dick OT  Time Calculation: 38 mins

## 2021-11-23 NOTE — PROGRESS NOTES
LAURENT: The patient plans to discharge home tomorrow to son's home with son to transport when stable for discharge. No needs at this time. RUR: 12%    1. The patient is from independent living. 2. Hospitalist, PT/OT following. 3. The patient plans to go to his son's to live until assisted living is arranged. FELIX spoke with the MD and she agrees with this plan. Family of the patient are in the process of finding assisted living and the patient will live with his son, Meredith Holder until plan is in place. CM following for discharge needs.     Nehemiah Herrera RN/CRM

## 2021-11-23 NOTE — PROGRESS NOTES
Problem: Falls - Risk of  Goal: *Absence of Falls  Description: Document Skylar Velarde Fall Risk and appropriate interventions in the flowsheet.   Outcome: Progressing Towards Goal  Note: Fall Risk Interventions:  Mobility Interventions: Communicate number of staff needed for ambulation/transfer    Mentation Interventions: Bed/chair exit alarm, Door open when patient unattended    Medication Interventions: Bed/chair exit alarm, Patient to call before getting OOB    Elimination Interventions: Call light in reach    History of Falls Interventions: Door open when patient unattended         Problem: Patient Education: Go to Patient Education Activity  Goal: Patient/Family Education  Outcome: Progressing Towards Goal

## 2021-11-24 VITALS
HEART RATE: 85 BPM | BODY MASS INDEX: 28.76 KG/M2 | DIASTOLIC BLOOD PRESSURE: 82 MMHG | SYSTOLIC BLOOD PRESSURE: 148 MMHG | RESPIRATION RATE: 19 BRPM | OXYGEN SATURATION: 94 % | WEIGHT: 212.08 LBS | TEMPERATURE: 97.4 F

## 2021-11-24 PROCEDURE — 74011250636 HC RX REV CODE- 250/636: Performed by: INTERNAL MEDICINE

## 2021-11-24 PROCEDURE — 74011250637 HC RX REV CODE- 250/637: Performed by: FAMILY MEDICINE

## 2021-11-24 PROCEDURE — 74011250637 HC RX REV CODE- 250/637: Performed by: INTERNAL MEDICINE

## 2021-11-24 PROCEDURE — 97116 GAIT TRAINING THERAPY: CPT

## 2021-11-24 PROCEDURE — 74011250636 HC RX REV CODE- 250/636: Performed by: FAMILY MEDICINE

## 2021-11-24 RX ADMIN — ASPIRIN 81 MG: 81 TABLET, COATED ORAL at 08:33

## 2021-11-24 RX ADMIN — LOSARTAN POTASSIUM 50 MG: 50 TABLET, FILM COATED ORAL at 08:33

## 2021-11-24 RX ADMIN — SODIUM CHLORIDE 100 ML/HR: 9 INJECTION, SOLUTION INTRAVENOUS at 04:32

## 2021-11-24 RX ADMIN — Medication 10 ML: at 06:00

## 2021-11-24 RX ADMIN — HEPARIN SODIUM 5000 UNITS: 5000 INJECTION INTRAVENOUS; SUBCUTANEOUS at 01:58

## 2021-11-24 RX ADMIN — FINASTERIDE 5 MG: 5 TABLET, FILM COATED ORAL at 08:33

## 2021-11-24 NOTE — PROGRESS NOTES
Problem: Mobility Impaired (Adult and Pediatric)  Goal: *Acute Goals and Plan of Care (Insert Text)  Description: FUNCTIONAL STATUS PRIOR TO ADMISSION: Patient was modified independent using a rolling walker for functional mobility - driving. Per chart, son reporting significant decline in past 6 - 8 wks - pt not eating , not taking meds, + falls. HOME SUPPORT PRIOR TO ADMISSION: The patient lived Independent Living - apt. Physical Therapy Goals  Initiated 11/22/2021  1. Patient will move from supine to sit and sit to supine , scoot up and down, and roll side to side in bed with independence within 7 day(s). 2.  Patient will transfer from bed to chair and chair to bed with modified independence using the least restrictive device within 7 day(s). 3.  Patient will perform sit to stand with modified independence within 7 day(s). 4.  Patient will ambulate with modified independence for > 300 feet with the least restrictive device within 7 day(s). 5.  Patient will ascend/descend 4 stairs with one handrail(s) with supervision within 7 day(s). Outcome: Progressing Towards Goal   PHYSICAL THERAPY TREATMENT  Patient: Alana Mandujano (80 y.o. male)  Date: 11/24/2021  Diagnosis: Encephalopathy [G93.40]   <principal problem not specified>       Precautions: Fall  Chart, physical therapy assessment, plan of care and goals were reviewed. ASSESSMENT  Patient continues with skilled PT services and is progressing towards goals. Pt less confused and following directions appropriately - safe with amb with RW and stairs with CGA. Met briefly with son to discuss discharge plans. Pt ready for discharge from PT standpoint with home PT services and son's supervision.     Current Level of Function Impacting Discharge (mobility/balance): Supervision for functional mobility level surfaces; CGA for stairs    Other factors to consider for discharge: son to provide appropriate supervision as needed         PLAN :  Patient continues to benefit from skilled intervention to address the above impairments. Continue treatment per established plan of care. to address goals. Recommendation for discharge: (in order for the patient to meet his/her long term goals)  Physical therapy at least 2 days/week in the home     This discharge recommendation:  Has been made in collaboration with the attending provider and/or case management    IF patient discharges home will need the following DME: patient owns DME required for discharge       SUBJECTIVE:   Patient stated i'm going to stay at my son's home.     OBJECTIVE DATA SUMMARY:   Critical Behavior:  Neurologic State: Alert  Orientation Level: Oriented X4  Cognition: Decreased command following  Safety/Judgement: Decreased awareness of environment  Functional Mobility Training:  Bed Mobility:   Deferred - pt up in bedside chair and returned to bedside chair. Transfers:  Sit to Stand: Supervision  Stand to Sit: Supervision        Bed to Chair: Supervision                    Balance:  Sitting: Intact  Sitting - Static: Good (unsupported)  Sitting - Dynamic: Good (unsupported)  Standing: Intact; With support  Standing - Static: Good; Constant support  Standing - Dynamic : Good; Constant support  Ambulation/Gait Training:  Distance (ft): 150 Feet (ft)  Assistive Device: Walker, rolling; Gait belt  Ambulation - Level of Assistance: Supervision        Gait Abnormalities: Decreased step clearance        Base of Support: Narrowed     Speed/Gricelda: Slow  Step Length: Right shortened; Left shortened           Stairs:  Number of Stairs Trained: 4  Stairs - Level of Assistance: Contact guard assistance; Other (comment) (ascended step over step; descended on step at a time)   Rail Use: Right     Pain Rating:  Pt denied pain.     Activity Tolerance:   Good - OOB most of day    After treatment patient left in no apparent distress:   Sitting in chair, Call bell within reach, and Bed / chair alarm activated    COMMUNICATION/COLLABORATION:   The patients plan of care was discussed with: Registered nurse and patient's son .      Abbey Arriola, PT   Time Calculation: 15 mins

## 2021-11-24 NOTE — ROUTINE PROCESS
Pt and son at bedside, both verbalize understanding of d/c instructions, escorted out of facility by staff via [facility].

## 2021-11-29 NOTE — DISCHARGE SUMMARY
Discharge Summary       PATIENT ID: Alexis Walsh. MRN: [id number]   YOB: 1931    DATE OF ADMISSION: 11/21/2021  9:30 PM    DATE OF DISCHARGE: 11/24/2201  PRIMARY CARE PROVIDER: Franky Reese MD     ATTENDING PHYSICIAN: Radha Trevino DO   DISCHARGING PROVIDER: Radha Trevino DO    To contact this individual call [phone] and ask the  to page. If unavailable ask to be transferred the Adult Hospitalist Department. CONSULTATIONS: None    PROCEDURES/SURGERIES: * No surgery found *    ADMITTING DIAGNOSES & HOSPITAL COURSE:   Admission History:  \"89 y.o. male with a pmhx bladder cancer s/p resection and bcg tx, BPH,  frequent falls, HTN, pAF not on anticoagulation, who presents with 2 days of increasing confusion. He was admitted [address] [place] [address] 8/28 to 9/2 after mechanical fall resulting in him being on his apartment floor for three days causing dehydration, and rhabdo with CR and encephalopathy. Leslie Huang was discharged to IP rehab for several weeks before returning to his independent living.  Apartment.  Per report, patient's son called EMS due to concern that patient was confused. Desmond Knapp states that he forgets that year sometimes, but otherwise does not feel confused. Leslie Huang is able to describe the events of his last hospitalization, and his, and his wives past medical and social history. Leslie Huang denies headache, fever, chills, chest pain, abdominal pain, diarrhea, lightheadedness, syncope, or dysuria. In the ED SBP was elevated to the 170's, and he was bradycardic to the low 50's without symptoms.  Labs were significant for BUN 31, creatinine 1.92 (b/l a.2-1.3),  EKG showed a-fib. CXR was negative for acute cardiopulmonary process.  CT head was negative for acute intracranial process  In the ED, he was treated with IVF. \"            DISCHARGE DIAGNOSES / PLAN:      #Confusion-resolved  -pt reportedly with confusion prior to admission.  He continues alert and oriented to person, place and event during encounters and can recall specific events. He then continued to improve throughout his hospitalization   -CT head negative. Unable to complete MRI   -suspect chronic cognitive decline and decreased ability to independently care for himself      #CR on CKD stage III  -s/p IVFs     #HTN  -continue metoprolol, ARB     #Frequent Falls   -PT/OT consult      #pAF  -now in a-fib again  -coumadin was recently d/c'ed due to pt. With frequent falls     BPH  -continue proscar, and zoloft    #Depression  -patients wife is in skilled nursing facility, and his daughter committed suicide 11 years ago  -continue zoloft             PENDING TEST RESULTS:   At the time of discharge the following test results are still pending:none    FOLLOW UP APPOINTMENTS:    Follow-up Information     Follow up With Specialties Details Why Contact Info    Julia Ojeda 45, Axel Patel MD Internal Medicine   [facility]  [phone]            DISCHARGE MEDICATIONS:  Discharge Medication List as of 11/24/2021 10:43 AM      CONTINUE these medications which have NOT CHANGED    Details   aspirin delayed-release 81 mg tablet Take 1 Tablet by mouth daily. , No Print, Disp-90 Tablet, R-1      metoprolol succinate (TOPROL-XL) 25 mg XL tablet Take 1 Tablet by mouth nightly., Normal, Disp-30 Tablet, R-2      finasteride (PROSCAR) 5 mg tablet Take 1 Tablet by mouth daily. , No Print, Disp-90 Tablet, R-3REQUESTING REFILLS      cholecalciferol (VITAMIN D3) (50,000 UNITS /1250 MCG) capsule Take 1 Capsule by mouth every seven (7) days.  Indications: low vitamin D levels, Normal, Disp-12 Capsule, R-3      acetaminophen (TYLENOL) 325 mg tablet Take 2 Tablets by mouth every six (6) hours as needed for Pain or Fever., No Print, Disp-120 Tablet, R-5      gabapentin (NEURONTIN) 300 mg capsule TAKE 1 CAPSULE BY MOUTH DAILY INDICATIONS: NEUROPATHIC PAIN, Normal, Disp-90 Capsule, R-1This request is for a new prescription for a controlled substance as required by Federal/State law. DX Code Needed  PATIENT WANTS REFILLS. irbesartan (AVAPRO) 75 mg tablet TAKE 2 TABLETS NIGHTLY, Normal, Disp-180 Tablet, R-3      sertraline (ZOLOFT) 100 mg tablet TAKE 1 TABLET DAILY, Normal, Disp-90 Tablet, R-3      colestipoL (COLESTID) 5 gram packet Take 5 g by mouth two (2) times a day., Historical Med               NOTIFY YOUR PHYSICIAN FOR ANY OF THE FOLLOWING:   Fever over 101 degrees for 24 hours. Chest pain, shortness of breath, fever, chills, nausea, vomiting, diarrhea, change in mentation, falling, weakness, bleeding. Severe pain or pain not relieved by medications. Or, any other signs or symptoms that you may have questions about. DISPOSITION:  x  Home With:   OT  PT  HH  RN       Long term SNF/Inpatient Rehab    Independent/assisted living    Hospice    Other:       PATIENT CONDITION AT DISCHARGE:     Functional status    Poor    x Deconditioned     Independent      Cognition     Lucid    x Forgetful     Dementia      Catheters/lines (plus indication)    Dykes     PICC     PEG    x None      Code status   x  Full code     DNR      PHYSICAL EXAMINATION AT DISCHARGE:  Constitutional:  No acute distress, cooperative, pleasant, elderly male    ENT:  Oral mucosa moist, oropharynx benign. Resp:  CTA bilaterally. No wheezing/rhonchi/rales. No accessory muscle use   CV:  Regular rhythm, normal rate, no murmurs, gallops, rubs    GI:  Soft, non distended, non tender.  normoactive bowel sounds, no hepatosplenomegaly     Musculoskeletal:  No edema, warm, 2+ pulses throughout    Neurologic:  Moves all extremities                      CHRONIC MEDICAL DIAGNOSES:  Problem List as of 11/24/2021 Date Reviewed: 1/12/2021          Codes Class Noted - Resolved    Encephalopathy ICD-10-CM: G93.40  ICD-9-CM: 348.30  11/21/2021 - Present        MRI contraindicated due to metal implant ICD-10-CM: Z53.09  ICD-9-CM: V64.1  Unknown - Present    Overview Signed 9/21/2021 12:31 PM by Nikhil Fabian MD     has scleral buckle             RLS (restless legs syndrome) ICD-10-CM: G25.81  ICD-9-CM: 333.94  7/3/2017 - Present        Dupuytren's contracture of left hand ICD-10-CM: M72.0  ICD-9-CM: 728.6  Unknown - Present    Overview Signed 3/23/2016  3:09 PM by Nikhil Fabian MD     5th finger             Chronic neck pain ICD-10-CM: M54.2, G89.29  ICD-9-CM: 723.1, 338.29  Unknown - Present    Overview Signed 1/7/2016 10:27 AM by Nikhil Fabian MD     limited motion to side             IVCD (intraventricular conduction defect) ICD-10-CM: I45.4  ICD-9-CM: 426.6  11/30/2015 - Present        Advanced care planning/counseling discussion ICD-10-CM: Z71.89  ICD-9-CM: V65.49  10/8/2015 - Present    Overview Addendum 7/3/2017 10:44 AM by Wilfredo Newberry RN     Has Living Will. His wife Furman Schlatter is POA. MDM. Next is Son Kinga Feng, daughter Ariadne Guzman. Has documents, will bring in  7/3/2017 Patient states that a completed Advanced Medical Directive is at home. NN encouraged patient to bring a copy of the completed Advanced Medical Directive to the office for scanning into the medical record. Patient verbalized understanding & agreement. 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 Pastora Silva. low grade, papillary.  repeat 3 month             Fecal incontinence ICD-10-CM: R15.9  ICD-9-CM: 787.60  Unknown - Present    Overview Signed 9/18/2014 10:20 AM by Nikhil Fabian MD     with loose stools             Encounter for monitoring flecainide therapy ICD-10-CM: Z51.81, Z79.899  ICD-9-CM: V58.83, V58.69 7/18/2014 - Present        Loose stools ICD-10-CM: R19.5  ICD-9-CM: 787.7  7/18/2014 - Present        Paroxysmal atrial fibrillation (HCC) ICD-10-CM: I48.0  ICD-9-CM: 427.31  Unknown - Present        BPH with obstruction/lower urinary tract symptoms ICD-10-CM: N40.1, N13.8  ICD-9-CM: 600.01, 599.69  Unknown - Present        Depression, major ICD-10-CM: F32.9  ICD-9-CM: 296.20  Unknown - Present        Long term current use of anticoagulants with INR goal of 2.0-3.0 ICD-10-CM: Z79.01  ICD-9-CM: V58.61  Unknown - Present    Overview Signed 6/18/2014 11:59 AM by Suzi Stahl MD     Afib             Hearing loss ICD-10-CM: H91.90  ICD-9-CM: 389. 9  Unknown - Present    Overview Signed 6/18/2014  6:18 PM by Suzi Stahl MD     has hearing aids             Insomnia ICD-10-CM: G47.00  ICD-9-CM: 780.52  Unknown - Present        Bladder cancer [facility]) ICD-10-CM: C67.9  ICD-9-CM: 188.9  Unknown - Present    Overview Signed 6/18/2014  6:18 PM by Suzi Stahl MD     s/p surgery, BCG irrigation Fish.   saw Dr. Thomson : Chronic back pain ICD-10-CM: M54.9, G89.29  ICD-9-CM: 724.5, 338.29  Unknown - 5/14/2015        RESOLVED: HTN (hypertension) ICD-10-CM: I10  ICD-9-CM: 401.9  Unknown - 11/30/2015              Greater than 30 minutes were spent with the patient on counseling and coordination of care    Signed:   Janny Marquez DO  11/28/2021  8:51 PM

## 2021-12-06 DIAGNOSIS — E55.9 VITAMIN D INSUFFICIENCY: Primary | ICD-10-CM

## 2021-12-06 RX ORDER — ASPIRIN 325 MG
50000 TABLET, DELAYED RELEASE (ENTERIC COATED) ORAL
Qty: 12 CAPSULE | Refills: 3 | Status: SHIPPED | OUTPATIENT
Start: 2021-12-06 | End: 2022-01-21 | Stop reason: SDUPTHER

## 2021-12-07 ENCOUNTER — TELEPHONE (OUTPATIENT)
Dept: INTERNAL MEDICINE CLINIC | Age: 86
End: 2021-12-07

## 2021-12-07 RX ORDER — COLESTIPOL HYDROCHLORIDE 5 G/5G
5 GRANULE, FOR SUSPENSION ORAL 2 TIMES DAILY
Qty: 60 PACKET | Refills: 5 | Status: SHIPPED | OUTPATIENT
Start: 2021-12-07 | End: 2021-12-13 | Stop reason: ALTCHOICE

## 2021-12-07 NOTE — TELEPHONE ENCOUNTER
[facility] called to state the patient's son is at the pharmacy trying to get a refill of colestipoL. PSR advised a refill request has already been sent and it appears that it is pending approval by his PCP. PSR advised the pharmacy will notify him when it is ready.

## 2021-12-13 ENCOUNTER — OFFICE VISIT (OUTPATIENT)
Dept: INTERNAL MEDICINE CLINIC | Age: 86
End: 2021-12-13
Payer: MEDICARE

## 2021-12-13 VITALS
SYSTOLIC BLOOD PRESSURE: 158 MMHG | RESPIRATION RATE: 13 BRPM | HEART RATE: 68 BPM | BODY MASS INDEX: 29.15 KG/M2 | DIASTOLIC BLOOD PRESSURE: 82 MMHG | HEIGHT: 72 IN | TEMPERATURE: 98.1 F | OXYGEN SATURATION: 97 % | WEIGHT: 215.2 LBS

## 2021-12-13 DIAGNOSIS — G25.0 ESSENTIAL TREMOR: ICD-10-CM

## 2021-12-13 DIAGNOSIS — R42 ORTHOSTATIC DIZZINESS: ICD-10-CM

## 2021-12-13 DIAGNOSIS — C67.9 MALIGNANT NEOPLASM OF URINARY BLADDER, UNSPECIFIED SITE (HCC): ICD-10-CM

## 2021-12-13 DIAGNOSIS — M25.561 CHRONIC PAIN OF BOTH KNEES: ICD-10-CM

## 2021-12-13 DIAGNOSIS — M25.562 CHRONIC PAIN OF BOTH KNEES: ICD-10-CM

## 2021-12-13 DIAGNOSIS — I10 ESSENTIAL HYPERTENSION: Primary | ICD-10-CM

## 2021-12-13 DIAGNOSIS — G89.29 CHRONIC PAIN OF BOTH KNEES: ICD-10-CM

## 2021-12-13 DIAGNOSIS — R29.6 FREQUENT FALLS: ICD-10-CM

## 2021-12-13 DIAGNOSIS — C66.1 UROTHELIAL CARCINOMA OF RIGHT DISTAL URETER (HCC): ICD-10-CM

## 2021-12-13 DIAGNOSIS — K52.9 CHRONIC DIARRHEA: ICD-10-CM

## 2021-12-13 DIAGNOSIS — G60.9 IDIOPATHIC PERIPHERAL NEUROPATHY: ICD-10-CM

## 2021-12-13 PROCEDURE — G8427 DOCREV CUR MEDS BY ELIG CLIN: HCPCS | Performed by: INTERNAL MEDICINE

## 2021-12-13 PROCEDURE — 99215 OFFICE O/P EST HI 40 MIN: CPT | Performed by: INTERNAL MEDICINE

## 2021-12-13 PROCEDURE — 1101F PT FALLS ASSESS-DOCD LE1/YR: CPT | Performed by: INTERNAL MEDICINE

## 2021-12-13 PROCEDURE — G8536 NO DOC ELDER MAL SCRN: HCPCS | Performed by: INTERNAL MEDICINE

## 2021-12-13 PROCEDURE — 1111F DSCHRG MED/CURRENT MED MERGE: CPT | Performed by: INTERNAL MEDICINE

## 2021-12-13 PROCEDURE — G8417 CALC BMI ABV UP PARAM F/U: HCPCS | Performed by: INTERNAL MEDICINE

## 2021-12-13 PROCEDURE — G9717 DOC PT DX DEP/BP F/U NT REQ: HCPCS | Performed by: INTERNAL MEDICINE

## 2021-12-13 RX ORDER — MONTELUKAST SODIUM 4 MG/1
1 TABLET, CHEWABLE ORAL 2 TIMES DAILY
Qty: 60 TABLET | Refills: 5 | Status: SHIPPED | OUTPATIENT
Start: 2021-12-13 | End: 2022-03-31 | Stop reason: SDUPTHER

## 2021-12-13 RX ORDER — MONTELUKAST SODIUM 4 MG/1
1 TABLET, CHEWABLE ORAL 2 TIMES DAILY
Qty: 60 TABLET | Refills: 5 | Status: SHIPPED | OUTPATIENT
Start: 2021-12-13 | End: 2021-12-13 | Stop reason: SDUPTHER

## 2021-12-14 NOTE — PROGRESS NOTES
HISTORY OF PRESENT ILLNESS    Chief Complaint   Patient presents with   [facility] Follow Up     encephalopathy - [facility]       Presents for follow-up  He is accompanied by his son, Elyssa Heredia. Patient was admitted once again from November 21-24 with encephalopathy. Previous admission August 28- Sept 2 after a ground-level fall resulting in rhabdomyolysis and encephalopathy. At his current admission, his son called EMS due to concern of confusion. Patient was found to be mildly confused upon presentation to the emergency room but his blood pressure was markedly elevated labs were significant for BUN 30, creatinine above baseline 1.92. Atrial fibrillation on EKG. Normal head CT and chest x-ray. During admission, he was hydrated and continued to improve. Suspected acute on chronic cognitive decline. Continues not to take Coumadin to multiple recent falls. This was discontinued at his visit with cardiology Dr Joycie Schlatter, 11/11/21. Recommend to take aspirin which his son says he is not taking. Flecainide was discontinued by cardiology due to possibility of it causing worsening tremor. Tremor has seemed to improve a little bit since stopping it. Since this admission, patient has been living with his son Ed and and his wife. He still has an independent living condo at [facility]. Patient has difficulty standing from a seated position. Is using a cane to walk once he stands up. He has significant neuropathy of his feet with difficulties sensing his feet. Son wishes to patient's ability to drive clearly. At his appointment with me on November 1, I advised patient not to drive due to concern about ability to look over his shoulders due to neck stiffness, difficulty standing up from the car, difficulty feeling the pedals, fall risk and occasional encephalopathy. Patient has continued to try to his visit in the nursing home 3 times per week.   Son is applying to get it into assisted living in January if possible. Review of Systems   All other systems reviewed and are negative, except as noted in HPI    Past Medical and Surgical History   has a past medical history of Advanced care planning/counseling discussion (10/8/2015), Basal cell carcinoma, face, Bladder cancer ([facility] [address] (2012), BPH with obstruction/lower urinary tract symptoms, Chronic lower back pain, Chronic neck pain, Depression, major, Dupuytren's contracture of left hand, Epistaxis (2013), Family history of skin cancer, Fecal incontinence, Hearing loss, HTN (hypertension), Insomnia, IVCD (intraventricular conduction defect) (11/30/2015), Long term current use of anticoagulants with INR goal of 2.0-3.0, Loose stools (7/18/2014), MRI contraindicated due to metal implant, Paroxysmal atrial fibrillation ([facility] [address] Peripheral neuropathy, RLS (restless legs syndrome) (7/3/2017), SCC (squamous cell carcinoma), face, Skin cancer, and Urothelial carcinoma of right distal ureter ([facility] [address] (5/1/15). He also has no past medical history of Sun-damaged skin, Sunburn, blistering, or Tanning bed exposure. has a past surgical history that includes hx bladder repair (2012); hx retinal detachment repair (Right); hx hernia repair; hx colonoscopy (2012); pr cystourethroscopy (9/2014); pr cystourethroscopy (5/1/15); pr cystourethroscopy (2/29/16); hx malignant skin lesion excision (06/14/2018); and hx other surgical (10/2021). reports that he has quit smoking. He has never used smokeless tobacco. He reports current alcohol use. He reports that he does not use drugs. family history is not on file. Physical Exam   Nursing note and vitals reviewed. Blood pressure (!) 158/82, pulse 68, temperature 98.1 °F (36.7 °C), temperature source Oral, resp. rate 13, height 6' (1.829 m), weight 215 lb 3.2 oz (97.6 kg), SpO2 97 %. Constitutional:  No distress. Eyes: Conjunctivae are normal.   Ears:  Hearing grossly intact  Cardiovascular: Normal rate.   regular rhythm, no murmurs or gallops  1+ pitting bilateral  Pulmonary/Chest: Effort normal.   CTAB  Musculoskeletal: moves all 4 extremities   Neurological: Alert and oriented to person, place, and time. Skin: No visible rash noted. Psychiatric: Patient is currently alert and oriented. Assessment and Plan    Diagnoses and all orders for this visit:    1. Essential hypertension  Blood pressure is uncontrolled, but some concern about medication compliance on a daily basis. Will continue irbesartan 150 daily, metoprolol XL 25 mg daily. Consider increasing irbesartan, but aggressive blood pressure control is not warranted in this 80year-old who has frequent falls. Orthostatic symptoms are considered times. Will monitor at home for now. 2. Urothelial carcinoma of right distal ureter ([facility] [address]  3. Malignant neoplasm of urinary bladder, unspecified site [facility])  He is seeing Dr. Meliton Henry in urology for this condition. Apparently Dr. Meliton Henry would like to order an MRI to evaluate some back and hip pain. No prior evidence of metastatic disease, but I have recommended that son and patient follow-up with urology about this is. 4. Frequent falls  He would benefit from physical therapy and Occupational Therapy at home to transfers, standing and sitting, strengthening, gait training, and consideration of additional DME. Likely would eventually benefit from having a power mobility device. Discussed length with patient and son that patient should not be driving. Expressed empathy that this is a very hard thing and simply too high risk on the road to be driving on his own. -     REFERRAL TO PHYSICAL THERAPY    5. Essential tremor  Limits ability to function at times. Monitoring for now. He is taking low-dose metoprolol. 6. Chronic pain of both knees  Significant pain. Makes it quite difficult for him to stand up with your physician. Recommend home PT OT.  -     REFERRAL TO PHYSICAL THERAPY    7.  Orthostatic dizziness  Reasonably improved right now. He is taking finasteride to try to help with ongoing risk of BPH.  -     REFERRAL TO PHYSICAL THERAPY    8. Idiopathic peripheral neuropathy  Literally cannot sense the floor or gas pedals. Increasing fall risk from this. -     REFERRAL TO PHYSICAL THERAPY    9. Chronic diarrhea  Very well controlled with colestipol pills. Does not wish to take however. -     colestipoL (COLESTID) 1 gram tablet; Take 1 Tablet by mouth two (2) times a day. lab results and schedule of future lab studies reviewed with patient  reviewed medications and side effects in detail    Return to clinic for further evaluation if new symptoms develop        Current Outpatient Medications   Medication Sig    colestipoL (COLESTID) 1 gram tablet Take 1 Tablet by mouth two (2) times a day.  cholecalciferol (VITAMIN D3) (50,000 UNITS /1250 MCG) capsule Take 1 Capsule by mouth every seven (7) days. Indications: low vitamin D levels    aspirin delayed-release 81 mg tablet Take 1 Tablet by mouth daily.  metoprolol succinate (TOPROL-XL) 25 mg XL tablet Take 1 Tablet by mouth nightly.  finasteride (PROSCAR) 5 mg tablet Take 1 Tablet by mouth daily.  acetaminophen (TYLENOL) 325 mg tablet Take 2 Tablets by mouth every six (6) hours as needed for Pain or Fever.  gabapentin (NEURONTIN) 300 mg capsule TAKE 1 CAPSULE BY MOUTH DAILY INDICATIONS: NEUROPATHIC PAIN    irbesartan (AVAPRO) 75 mg tablet TAKE 2 TABLETS NIGHTLY    sertraline (ZOLOFT) 100 mg tablet TAKE 1 TABLET DAILY     No current facility-administered medications for this visit.

## 2021-12-16 ENCOUNTER — TELEPHONE (OUTPATIENT)
Dept: INTERNAL MEDICINE CLINIC | Age: 86
End: 2021-12-16

## 2021-12-16 NOTE — TELEPHONE ENCOUNTER
Patients son was calling to say that patient has had five serious bowel accidents within he last 24 hours - saw provider on Monday and if there was any problems he could call and let him know [urine and feces]. He request a call back as soon as possible, patients son is very upset as this is happening very very frequently.

## 2021-12-16 NOTE — TELEPHONE ENCOUNTER
Spoke with Ed/Son (HIPPA VERIFIED). Ed reports that his father has had 5 LBM's in the last 24 hrs. He reports that this father is incontinent of B&B. He is not eating unless food is made for him and served. He is high fall risk and has tremors. Son voices frustration and concern for his failing father. Advised to give the colestipol time to work and to insure he is taking his medications routinely and properly, eating properly and getting some exercise. Home care has been arranged and he is waiting for their eval. Reassurance given that given a stable environment he may do better. Son reports that his mother is in a nursing home ([facility])  and they can not afford for their father to be in one too. Advised to talk with the  at [facility] for any information or assistance they could provide. Son grateful for the call.

## 2021-12-21 ENCOUNTER — TELEPHONE (OUTPATIENT)
Dept: INTERNAL MEDICINE CLINIC | Age: 86
End: 2021-12-21

## 2022-01-04 ENCOUNTER — TELEPHONE (OUTPATIENT)
Dept: INTERNAL MEDICINE CLINIC | Age: 87
End: 2022-01-04

## 2022-01-04 ENCOUNTER — NURSE TRIAGE (OUTPATIENT)
Dept: OTHER | Facility: CLINIC | Age: 87
End: 2022-01-04

## 2022-01-04 RX ORDER — IRBESARTAN 75 MG/1
75 TABLET ORAL 2 TIMES DAILY
Qty: 180 TABLET | Refills: 3
Start: 2022-01-04 | End: 2022-01-09 | Stop reason: ALTCHOICE

## 2022-01-04 NOTE — TELEPHONE ENCOUNTER
No contact call. Elevated /113 yesterday, and 170/76 today.       Reason for Disposition   Caller has already spoken with the PCP (or office), and has no further questions    Protocols used: NO CONTACT OR DUPLICATE CONTACT CALL-ADULT-[place]

## 2022-01-05 ENCOUNTER — TELEPHONE (OUTPATIENT)
Dept: INTERNAL MEDICINE CLINIC | Age: 87
End: 2022-01-05

## 2022-01-05 NOTE — TELEPHONE ENCOUNTER
His son and in FIRMLY told him he cannot drive and I thought son was going to take his keys.   I will talk to him tomorrow, but please TAKE THE KEYS!!!

## 2022-01-05 NOTE — TELEPHONE ENCOUNTER
Spoke with Edward/Son (HIPPA verified)  And relayed Dr. Taylor Senate recommendation to take the keys away from the patient that he should not be driving any longer. Son states understanding and Grateful for the call.

## 2022-01-05 NOTE — TELEPHONE ENCOUNTER
Patient's daughter would like to know if the doctor can strongly tell her father he should no longer be driving for his safety as well as others. She states he is 89 and is no longer safe behind the wheel. She says Dr. Farhan Cabezas advised him politely of this last time but he is still driving. He is coming in for an appointment tomorrow.

## 2022-01-06 ENCOUNTER — OFFICE VISIT (OUTPATIENT)
Dept: INTERNAL MEDICINE CLINIC | Age: 87
End: 2022-01-06
Payer: MEDICARE

## 2022-01-06 VITALS
WEIGHT: 215 LBS | HEIGHT: 72 IN | SYSTOLIC BLOOD PRESSURE: 158 MMHG | OXYGEN SATURATION: 97 % | RESPIRATION RATE: 13 BRPM | TEMPERATURE: 98.3 F | DIASTOLIC BLOOD PRESSURE: 78 MMHG | HEART RATE: 73 BPM | BODY MASS INDEX: 29.12 KG/M2

## 2022-01-06 DIAGNOSIS — R32 URINARY INCONTINENCE, UNSPECIFIED TYPE: ICD-10-CM

## 2022-01-06 DIAGNOSIS — G31.84 MILD COGNITIVE IMPAIRMENT: ICD-10-CM

## 2022-01-06 DIAGNOSIS — C67.9 MALIGNANT NEOPLASM OF URINARY BLADDER, UNSPECIFIED SITE (HCC): ICD-10-CM

## 2022-01-06 DIAGNOSIS — Z23 NEEDS FLU SHOT: ICD-10-CM

## 2022-01-06 DIAGNOSIS — Z97.4 HEARING AID WORN: ICD-10-CM

## 2022-01-06 DIAGNOSIS — Z66 DNR (DO NOT RESUSCITATE): ICD-10-CM

## 2022-01-06 DIAGNOSIS — G60.9 IDIOPATHIC PERIPHERAL NEUROPATHY: ICD-10-CM

## 2022-01-06 DIAGNOSIS — F33.9 EPISODE OF RECURRENT MAJOR DEPRESSIVE DISORDER, UNSPECIFIED DEPRESSION EPISODE SEVERITY (HCC): ICD-10-CM

## 2022-01-06 DIAGNOSIS — Z86.69 HISTORY OF ENCEPHALOPATHY: ICD-10-CM

## 2022-01-06 DIAGNOSIS — C66.1 UROTHELIAL CARCINOMA OF RIGHT DISTAL URETER (HCC): ICD-10-CM

## 2022-01-06 DIAGNOSIS — F32.1 MAJOR DEPRESSIVE DISORDER, SINGLE EPISODE, MODERATE (HCC): ICD-10-CM

## 2022-01-06 DIAGNOSIS — I10 ESSENTIAL HYPERTENSION: ICD-10-CM

## 2022-01-06 DIAGNOSIS — Z00.00 MEDICARE ANNUAL WELLNESS VISIT, SUBSEQUENT: Primary | ICD-10-CM

## 2022-01-06 DIAGNOSIS — R29.6 RECURRENT FALLS: ICD-10-CM

## 2022-01-06 DIAGNOSIS — Z23 ENCOUNTER FOR IMMUNIZATION: ICD-10-CM

## 2022-01-06 DIAGNOSIS — I48.0 PAROXYSMAL ATRIAL FIBRILLATION (HCC): ICD-10-CM

## 2022-01-06 PROCEDURE — 90732 PPSV23 VACC 2 YRS+ SUBQ/IM: CPT | Performed by: INTERNAL MEDICINE

## 2022-01-06 PROCEDURE — 90694 VACC AIIV4 NO PRSRV 0.5ML IM: CPT | Performed by: INTERNAL MEDICINE

## 2022-01-06 PROCEDURE — G0439 PPPS, SUBSEQ VISIT: HCPCS | Performed by: INTERNAL MEDICINE

## 2022-01-06 PROCEDURE — G0009 ADMIN PNEUMOCOCCAL VACCINE: HCPCS | Performed by: INTERNAL MEDICINE

## 2022-01-06 PROCEDURE — G0008 ADMIN INFLUENZA VIRUS VAC: HCPCS | Performed by: INTERNAL MEDICINE

## 2022-01-06 PROCEDURE — 99215 OFFICE O/P EST HI 40 MIN: CPT | Performed by: INTERNAL MEDICINE

## 2022-01-06 NOTE — PROGRESS NOTES
Patient  present for routine immunizations. Pt denies any symptoms , reactions or allergies that would exclude them from being immunized today. Risks and adverse reactions were discussed and the VIS was given to them. All questions were addressed. Pt was observed for 10 min post injection. There were no reactions observed.       Lisa Vidales LPN

## 2022-01-07 NOTE — PROGRESS NOTES
This is a Subsequent Medicare Annual Wellness Visit providing Personalized Prevention Plan Services (PPPS) (Performed 12 months after initial AWV and PPPS )    I have reviewed the patient's medical history in detail and updated the computerized patient record. Presents today with his son, Joel Soria. Patient is moving into [facility] somewhere between January 17 and 24, depending on when the facility actually opens for residents. Forms are presented to me for completion today    Patient has been unable to visit his wife because her nursing home has been cut off to visitors because of COVID-19. Son reports that patient has occasionally driven locally, despite him being clearly told not to at his last visit with me. Mr. Hayley Khan says he is doing fairly well. He is excepting about moving because he knows he needs more help, but is still reluctant to give up driving and lose some independence. Hypertension. His blood pressure has been markedly elevated at times. He was taking irbesartan 75 mg pills, 2 pills nightly. Spoke to his cardiologist, Dr. Luigi Palomino, and he has change it to 75 mg twice daily as of January 4. Blood pressure remains somewhat elevated in the office today. Patient was having significant issues with orthostatic hypotension and falls, so trying to not be too aggressive with blood pressure control. No further episodes of encephalopathy since November 21, 2021. Was taken off of Coumadin due to multiple falls. Significant lower back pain which is chronic. Also significant degenerative disc disease of the neck which limits his ability to look to the left into the right. Chronic bilateral neuropathic pain and numbness of his feet. Limits ability to sense when he is walking. He is using a rolling walker.     History     Past Medical History:   Diagnosis Date    Advanced care planning/counseling discussion 10/8/2015    Basal cell carcinoma, face     now sees Dr. Olegario Olivarez.  Bladder cancer [facility])     Dr. Kirt Ordoñez. cyto 2014, 2016. s/p surgery, BCG irrigation [place]. saw Dr. Vinayak Weldon BPH with obstruction/lower urinary tract symptoms     Chronic lower back pain     injection in [place]. saw Dr. Jae Godfrey Chronic neck pain     limited motion to side    Depression, major     Dupuytren's contracture of left hand     5th finger    Encephalopathy 2021, 21    admitted     Epistaxis     saw ENT at [address] [place] [address] history of skin cancer     Fecal incontinence     with loose stools    Hearing loss     has hearing aids    HTN (hypertension)     Insomnia     IVCD (intraventricular conduction defect) 2015    Dr Taina Rich    Loose stools 2014    MRI contraindicated due to metal implant     has scleral buckle    Paroxysmal atrial fibrillation (HCC)     EF 66%, 1-2+ LAE on flecainide;Dr. Taina Rich. saw Dr. Royetta Apgar at [facility] Peripheral neuropathy     severe foot neuropathy    RLS (restless legs syndrome) 7/3/2017    SCC (squamous cell carcinoma), face     prior hx, now sees Dr. Silva Shah Skin cancer     Urothelial carcinoma of right distal ureter ([address] 5/1/15    excision Dr. Alex Barker. low grade, papillary. repeat 3 month       Past Surgical History:   Procedure Laterality Date    HX BLADDER REPAIR      cancer    HX COLONOSCOPY      polyps. repeat ?  HX HERNIA REPAIR      periumibical    HX MALIGNANT SKIN LESION EXCISION  2018    basal cell left lip    HX OTHER SURGICAL  10/2021    Skin cancer removed from nose.  HX RETINAL DETACHMENT REPAIR Right     DC CYSTOURETHROSCOPY  2014    Dr. Pedro Plasencia DC CYSTOURETHROSCOPY  5/1/15    low grade right pap urothelial cancer    DC CYSTOURETHROSCOPY  16       Current Outpatient Medications   Medication Sig    irbesartan (AVAPRO) 75 mg tablet Take 1 Tablet by mouth two (2) times a day.  Divided dose per Dr. Rosi Aden colestipoL (COLESTID) 1 gram tablet Take 1 Tablet by mouth two (2) times a day.  cholecalciferol (VITAMIN D3) (50,000 UNITS /1250 MCG) capsule Take 1 Capsule by mouth every seven (7) days. Indications: low vitamin D levels    metoprolol succinate (TOPROL-XL) 25 mg XL tablet Take 1 Tablet by mouth nightly.  finasteride (PROSCAR) 5 mg tablet Take 1 Tablet by mouth daily.  acetaminophen (TYLENOL) 325 mg tablet Take 2 Tablets by mouth every six (6) hours as needed for Pain or Fever.  gabapentin (NEURONTIN) 300 mg capsule TAKE 1 CAPSULE BY MOUTH DAILY INDICATIONS: NEUROPATHIC PAIN    sertraline (ZOLOFT) 100 mg tablet TAKE 1 TABLET DAILY     No current facility-administered medications for this visit. Allergies   Allergen Reactions    Hay Fever And Allergy Relief Other (comments)     Watery eyes    Trazodone Palpitations       No family history on file. reports that he has quit smoking. He has never used smokeless tobacco.   reports current alcohol use. Depression Risk Factor Screening:       Alcohol Risk Factor Screening: On any occasion during the past 3 months, have you had more than 3 drinks containing alcohol? No    Do you average more than 14 drinks per week? No      Functional Ability and Level of Safety:     Hearing Loss   mild    Activities of Daily Living   Self-care. Requires assistance with: no ADLs    Fall Risk     Fall Risk Assessment, last 12 mths 11/11/2021   Able to walk? Yes   Fall in past 12 months? 0   Do you feel unsteady? 0   Are you worried about falling 0   Number of falls in past 12 months -   Fall with injury? -         Abuse Screen   Patient is not abused    Review of Systems   A comprehensive review of systems was negative except for that written in the HPI.     Physical Examination     Evaluation of Cognitive Function:  Mood/affect:  neutral, happy  Appearance: age appropriate  Family member/caregiver input: none    Blood pressure (!) 158/78, pulse 73, temperature 98.3 °F (36.8 °C), temperature source Oral, resp. rate 13, height 6' (1.829 m), weight 215 lb (97.5 kg), SpO2 97 %. General appearance: alert, cooperative, no distress, appears stated age  Neck: supple, symmetrical, trachea midline, no adenopathy, thyroid: not enlarged, symmetric, no tenderness/mass/nodules, no carotid bruit and no JVD  Lungs: clear to auscultation bilaterally  Heart: regular rate and rhythm, S1, S2 normal, no murmur, click, rub or gallop  Extremities: extremities normal, atraumatic, no cyanosis or edema    Patient Care Team:  Stacy Cheung MD as PCP - General (Internal Medicine)  Stacy Cheung MD as PCP - [facility] Empaneled Provider      Advice/Referrals/Counseling   Education and counseling provided. See below for specific orders    Assessment/Plan   Diagnoses and all orders for this visit:    1. Medicare annual wellness visit, subsequent  ACP updated. Patient is currently able to make his own medical decisions. He wishes to be DNR. Durable DNR form signed. Pneumovax and flu shot given today. We will defer Tdap and Shingrix for now. 2. Encounter for immunization  -     PNEUMOCOCCAL POLYSACCHARIDE VACCINE, 23-VALENT, ADULT OR IMMUNOSUPPRESSED PT DOSE,  -     ADMIN PNEUMOCOCCAL VACCINE    3. Needs flu shot  -     FLU (FLUAD QUAD INFLUENZA VACCINE,QUAD,ADJUVANTED)    4. Essential hypertension  Blood pressure remains uncontrolled. We just divided up his irbesartan so we will give it 1 week to see how that works. I strongly suspect we will need to increase to 150 mg twice daily. Continue metoprolol. 5. Major depressive disorder, single episode, moderate ([address]    6. Urothelial carcinoma of right distal ureter ([address]  13. Malignant neoplasm of urinary bladder, unspecified site [facility])  He is recommended to follow-up with urology Dr. Cirilo Spears. 7. Paroxysmal atrial fibrillation [facility])  Currently asymptomatic  Follow-up with cardiology. Continue metoprolol. No anticoagulant due to high fall risk.     8. Idiopathic peripheral neuropathy   Significant, limiting ability to feel the floor when he walks. This does also limit his ability to drive significantly. 9. Episode of recurrent major depressive disorder, unspecified depression episode severity (HCC)  Taking sertraline 100 mg daily and symptoms are relatively stable, despite very challenging circumstances right now. 10. Mild cognitive impairment  At baseline, he does exhibit some degree of cognitive impairment. Insight into his condition is not great. He does tend to have episodes of significant encephalopathy of unclear origin and is absolutely not safe to operate a vehicle. Also does require some assistance with remembering to take his medications and additional help. Assisted living should be acceptable for now, as long as he has a call bell in reach at all times. Someone should check on him at least once a day. 11. Recurrent falls  Would benefit from ongoing physical therapy. Will renew with [facility] PT once he moves into assisted living. 12. History of encephalopathy    14. DNR (do not resuscitate)  Durable DNR signed today. 15. Urinary incontinence, unspecified type   Uses garments. Continuing finasteride for BPH symptoms. Recently stopped Flomax because of possible increase of orthostatic hypotension. 16. Hearing aid worn  Still has significant hearing loss. Requires hearing aid. Agatha Alston Potential medication side effects were discussed with the patient; let me know if any occur.   Return for yearly Annual Wellness Visits

## 2022-01-09 RX ORDER — IRBESARTAN 150 MG/1
150 TABLET ORAL 2 TIMES DAILY
Qty: 60 TABLET | Refills: 2 | Status: SHIPPED | OUTPATIENT
Start: 2022-01-09 | End: 2022-03-31 | Stop reason: SDUPTHER

## 2022-01-10 ENCOUNTER — TELEPHONE (OUTPATIENT)
Dept: INTERNAL MEDICINE CLINIC | Age: 87
End: 2022-01-10

## 2022-01-10 NOTE — TELEPHONE ENCOUNTER
----- Message from Olga Sharp sent at 1/8/2022 12:26 PM EST -----  Subject: Message to Provider    QUESTIONS  Information for Provider? Pt fell Today and hit his head @ 6:30 AM  Pt   refused to go to the Hospital BP= 208/114 today @ 6:30 AM Pt has had AM   meds PT Therapy arrived @ Jan 7th and BP is? 168/84 Pt's son would like a   phone call @ [phone]  ---------------------------------------------------------------------------  --------------  [address]  What is the best way for the office to contact you? OK to leave message on   voicemail  Preferred Call Back Phone Number? [phone]  ---------------------------------------------------------------------------  --------------  SCRIPT ANSWERS  Relationship to Patient? Other  Representative Name? Ethridge Cushing  Is the Representative on the appropriate HIPAA document in Epic?  Yes

## 2022-01-10 NOTE — TELEPHONE ENCOUNTER
Spoke with Son/Cirilo (HIPPA Verified) and he reports patient S/P Fall and hit his head 1/8/22 @ 6:30 AM  Pt   refused to go to the Hospital BP= 208/114 today @ 6:30 AM Pt has had AM meds PT Therapy arrived @ Jan 7th and BP is? 168/84 . Son reports compliance with medications. He also reports that patient is sleeping a lot and can only hang for about 3 hours and then falls asleep. Denies any respiratory symptoms. Today B/P 156/72 taking by therapist. Please advise.

## 2022-01-10 NOTE — TELEPHONE ENCOUNTER
Spoke with Edward/Son (HIPPA Verified) and updated on Dr. Maribel Elliott comments and recommendations and increase in patients Irbesartan to 150 mg bid for HTN. Advised if patien has a new severe headache, if newly confused or difficult to arouse, then needs ER evaluation. If he is just tired, but at baseline mentally, can observe. Updated that paper work completed for  his Assisted Living [facility] He states understanding and grateful for the call.

## 2022-01-10 NOTE — TELEPHONE ENCOUNTER
I increased his irbesartan to 150 mg bid for BP. If he as a new severe headache, if newly confused or difficult to arouse, then needs ER evaluation. If he is just tired, but at baseline mentally, can observe. I have completed his Assisted Living Forms and will give them to you now to fax to [facility].

## 2022-01-13 ENCOUNTER — HOSPITAL ENCOUNTER (EMERGENCY)
Age: 87
Discharge: HOME | End: 2022-01-14
Attending: EMERGENCY MEDICINE
Payer: MEDICARE

## 2022-01-13 ENCOUNTER — APPOINTMENT (OUTPATIENT)
Dept: CT IMAGING | Age: 87
End: 2022-01-13
Attending: EMERGENCY MEDICINE
Payer: MEDICARE

## 2022-01-13 VITALS
HEART RATE: 58 BPM | WEIGHT: 215 LBS | OXYGEN SATURATION: 94 % | DIASTOLIC BLOOD PRESSURE: 91 MMHG | BODY MASS INDEX: 30.1 KG/M2 | TEMPERATURE: 98.5 F | RESPIRATION RATE: 16 BRPM | HEIGHT: 71 IN | SYSTOLIC BLOOD PRESSURE: 172 MMHG

## 2022-01-13 DIAGNOSIS — S09.90XA CLOSED HEAD INJURY, INITIAL ENCOUNTER: Primary | ICD-10-CM

## 2022-01-13 DIAGNOSIS — I10 HYPERTENSION, UNSPECIFIED TYPE: ICD-10-CM

## 2022-01-13 LAB
ALBUMIN SERPL-MCNC: 3.2 G/DL (ref 3.5–5)
ALBUMIN/GLOB SERPL: 0.8 {RATIO} (ref 1.1–2.2)
ALP SERPL-CCNC: 100 U/L (ref 45–117)
ALT SERPL-CCNC: 22 U/L (ref 12–78)
ANION GAP SERPL CALC-SCNC: 7 MMOL/L (ref 5–15)
AST SERPL-CCNC: 18 U/L (ref 15–37)
BASOPHILS # BLD: 0.1 K/UL (ref 0–0.1)
BASOPHILS NFR BLD: 1 % (ref 0–1)
BILIRUB SERPL-MCNC: 0.3 MG/DL (ref 0.2–1)
BUN SERPL-MCNC: 22 MG/DL (ref 6–20)
BUN/CREAT SERPL: 13 (ref 12–20)
CALCIUM SERPL-MCNC: 9.1 MG/DL (ref 8.5–10.1)
CHLORIDE SERPL-SCNC: 109 MMOL/L (ref 97–108)
CO2 SERPL-SCNC: 24 MMOL/L (ref 21–32)
COMMENT, HOLDF: NORMAL
CREAT SERPL-MCNC: 1.72 MG/DL (ref 0.7–1.3)
DIFFERENTIAL METHOD BLD: NORMAL
EOSINOPHIL # BLD: 0.4 K/UL (ref 0–0.4)
EOSINOPHIL NFR BLD: 6 % (ref 0–7)
ERYTHROCYTE [DISTWIDTH] IN BLOOD BY AUTOMATED COUNT: 13.2 % (ref 11.5–14.5)
GLOBULIN SER CALC-MCNC: 4.2 G/DL (ref 2–4)
GLUCOSE SERPL-MCNC: 97 MG/DL (ref 65–100)
HCT VFR BLD AUTO: 40.6 % (ref 36.6–50.3)
HGB BLD-MCNC: 13.1 G/DL (ref 12.1–17)
IMM GRANULOCYTES # BLD AUTO: 0 K/UL (ref 0–0.04)
IMM GRANULOCYTES NFR BLD AUTO: 0 % (ref 0–0.5)
LYMPHOCYTES # BLD: 2 K/UL (ref 0.8–3.5)
LYMPHOCYTES NFR BLD: 28 % (ref 12–49)
MCH RBC QN AUTO: 30.2 PG (ref 26–34)
MCHC RBC AUTO-ENTMCNC: 32.3 G/DL (ref 30–36.5)
MCV RBC AUTO: 93.5 FL (ref 80–99)
MONOCYTES # BLD: 0.5 K/UL (ref 0–1)
MONOCYTES NFR BLD: 8 % (ref 5–13)
NEUTS SEG # BLD: 3.9 K/UL (ref 1.8–8)
NEUTS SEG NFR BLD: 57 % (ref 32–75)
NRBC # BLD: 0 K/UL (ref 0–0.01)
NRBC BLD-RTO: 0 PER 100 WBC
PLATELET # BLD AUTO: 238 K/UL (ref 150–400)
PMV BLD AUTO: 10.1 FL (ref 8.9–12.9)
POTASSIUM SERPL-SCNC: 3.8 MMOL/L (ref 3.5–5.1)
PROT SERPL-MCNC: 7.4 G/DL (ref 6.4–8.2)
RBC # BLD AUTO: 4.34 M/UL (ref 4.1–5.7)
SAMPLES BEING HELD,HOLD: NORMAL
SODIUM SERPL-SCNC: 140 MMOL/L (ref 136–145)
WBC # BLD AUTO: 6.9 K/UL (ref 4.1–11.1)

## 2022-01-13 PROCEDURE — 85025 COMPLETE CBC W/AUTO DIFF WBC: CPT

## 2022-01-13 PROCEDURE — 36415 COLL VENOUS BLD VENIPUNCTURE: CPT

## 2022-01-13 PROCEDURE — 99283 EMERGENCY DEPT VISIT LOW MDM: CPT

## 2022-01-13 PROCEDURE — 70450 CT HEAD/BRAIN W/O DYE: CPT

## 2022-01-13 PROCEDURE — 80053 COMPREHEN METABOLIC PANEL: CPT

## 2022-01-13 PROCEDURE — 74011250636 HC RX REV CODE- 250/636: Performed by: EMERGENCY MEDICINE

## 2022-01-13 RX ADMIN — SODIUM CHLORIDE 500 ML: 9 INJECTION, SOLUTION INTRAVENOUS at 23:14

## 2022-01-14 ENCOUNTER — TELEPHONE (OUTPATIENT)
Dept: INTERNAL MEDICINE CLINIC | Age: 87
End: 2022-01-14

## 2022-01-14 NOTE — ED TRIAGE NOTES
Pt arrives from home via EMS for a recent hx of falls. Pt states he tripped at the bottom of a flight of stairs, fell and hit his head. Pt states he might have felt dizzy prior to his fall but states he is unsure if he was really dizzy or not. A&Ox4 on arrival to ED.

## 2022-01-14 NOTE — ED NOTES
Pt discharged with paperwork. Pt had no questions about discharge plan and was alert & oriented in no distress. Pt was given ride home with his son.

## 2022-01-14 NOTE — TELEPHONE ENCOUNTER
ER record reviewed. This was another one of his typical falls. CT head showed no injury. No need to see me.    Assisted Living papers done last week

## 2022-01-14 NOTE — TELEPHONE ENCOUNTER
Spoke with pt's son and scheduled pt for an ED f/u with Dr Julia Esteban. Advised to pt's son that I would also forward this message to Dr Hua Esteban wants to fit pt in for a sooner apt and because son was advised to inform Dr Julia Esteban immediately if pt had another fall.

## 2022-01-14 NOTE — TELEPHONE ENCOUNTER
----- Message from Ernesto Carbone sent at 1/14/2022 11:07 AM EST -----  Subject: Message to Provider    QUESTIONS  Information for Provider? Was instructed to call and notify of an   incident, had another fall hit head and diagnosis with a mild concussion,   unbalanced and wabbly , son is keeping an eye on the the pt, getting there   today would be an issue ,please call   ---------------------------------------------------------------------------  --------------  CALL BACK INFO  What is the best way for the office to contact you? OK to leave message on   voicemail  Preferred Call Back Phone Number? [phone]  ---------------------------------------------------------------------------  --------------  SCRIPT ANSWERS  Relationship to Patient? Third Party  Representative Name?  Mariluz Tai

## 2022-01-14 NOTE — ED PROVIDER NOTES
60-year-old male with past medical history significant for hypertension, A. fib currently on Eliquis per patient, presents after trip and fall with head trauma. Denies loss of consciousness. Patient reports he is going to move into an assisted living facility next week as planned. Patient reports he gets 4 days physical/Occupational Therapy per week at home currently. Patient reports he does at times feel unsteady on his feet. Denies any recent illness, fever, chills, nausea, vomiting, chest pain, shortness of breath. Denies urinary complaints. Former smoker  Denies drug use  Occasional alcohol use  Primary careport           Past Medical History:   Diagnosis Date    Advanced care planning/counseling discussion 10/8/2015    Basal cell carcinoma, face     now sees Dr. Elia Hemphill.  Bladder cancer [facility]) 2012    Dr. Tereza Benites. cyto 9/2014, 2/2016. s/p surgery, BCG irrigation [place]. saw Dr. Jeniffer Ruiz BPH with obstruction/lower urinary tract symptoms     Chronic lower back pain     injection in [place].   saw Dr. Acosta Jeffers Chronic neck pain     limited motion to side    Depression, major     Dupuytren's contracture of left hand     5th finger    Encephalopathy 8/2021, 11/21/21    admitted     Epistaxis 2013    saw ENT at [address] [place] [address] history of skin cancer     Fecal incontinence     with loose stools    Hearing loss     has hearing aids    HTN (hypertension)     Insomnia     IVCD (intraventricular conduction defect) 11/30/2015    Dr Tory Munoz    Loose stools 7/18/2014    MRI contraindicated due to metal implant     has scleral buckle    Paroxysmal atrial fibrillation (HCC)     EF 66%, 1-2+ LAE on flecainide;Dr. Tory Munoz. saw [facility]. Pacheco Vu [facility] Peripheral neuropathy     severe foot neuropathy    RLS (restless legs syndrome) 7/3/2017    SCC (squamous cell carcinoma), face     prior hx, now sees Dr. Alisha Christianson Seasonal allergic reaction     Skin cancer     Urothelial carcinoma of right distal ureter ([facility] [address] 5/1/15    excision Dr. Lopez Rivera. low grade, papillary. repeat 3 month       Past Surgical History:   Procedure Laterality Date    HX BLADDER REPAIR  2012    cancer    HX COLONOSCOPY  2012    polyps. repeat 2017?  HX HERNIA REPAIR      periumibical    HX MALIGNANT SKIN LESION EXCISION  06/14/2018    basal cell left lip    HX OTHER SURGICAL  10/2021    Skin cancer removed from nose.  HX RETINAL DETACHMENT REPAIR Right     MT CYSTOURETHROSCOPY  9/2014    Dr. Kinza Martin CYSTOURETHROSCOPY  5/1/15    low grade right pap urothelial cancer    MT CYSTOURETHROSCOPY  2/29/16         No family history on file. Social History     Socioeconomic History    Marital status:      Spouse name: Not on file    Number of children: Not on file    Years of education: Not on file    Highest education level: Not on file   Occupational History    Not on file   Tobacco Use    Smoking status: Former Smoker    Smokeless tobacco: Never Used    Tobacco comment: socially   Substance and Sexual Activity    Alcohol use: Yes     Comment: occ    Drug use: Never    Sexual activity: Not Currently   Other Topics Concern    Not on file   Social History Narrative    Not on file     Social Determinants of Health     Financial Resource Strain:     Difficulty of Paying Living Expenses: Not on file   Food Insecurity:     Worried About Running Out of Food in the Last Year: Not on file    Hazel of Food in the Last Year: Not on file   Transportation Needs:     Lack of Transportation (Medical): Not on file    Lack of Transportation (Non-Medical):  Not on file   Physical Activity:     Days of Exercise per Week: Not on file    Minutes of Exercise per Session: Not on file   Stress:     Feeling of Stress : Not on file   Social Connections:     Frequency of Communication with Friends and Family: Not on file    Frequency of Social Gatherings with Friends and Family: Not on file    Attends Druze Services: Not on file    Active Member of Clubs or Organizations: Not on file    Attends Club or Organization Meetings: Not on file    Marital Status: Not on file   Intimate Partner Violence:     Fear of Current or Ex-Partner: Not on file    Emotionally Abused: Not on file    Physically Abused: Not on file    Sexually Abused: Not on file   Housing Stability:     Unable to Pay for Housing in the Last Year: Not on file    Number of [place] in the Last Year: Not on file    Unstable Housing in the Last Year: Not on file         ALLERGIES: Trazodone    Review of Systems   Constitutional: Negative for chills and fever. HENT: Negative for congestion, nosebleeds and sore throat. Eyes: Negative for pain and discharge. Respiratory: Negative for cough and shortness of breath. Cardiovascular: Negative for chest pain and palpitations. Gastrointestinal: Negative for abdominal pain, constipation, nausea and vomiting. Genitourinary: Negative for decreased urine volume, dysuria, flank pain and urgency. Musculoskeletal: Negative for gait problem and myalgias. Skin: Negative for rash and wound. Neurological: Negative for seizures and syncope. Hematological: Does not bruise/bleed easily. Psychiatric/Behavioral: Negative for confusion, self-injury and suicidal ideas. Vitals:    01/13/22 2141   BP: (!) 172/91   Pulse: (!) 58   Resp: 16   Temp: 98.5 °F (36.9 °C)   SpO2: 94%   Weight: 97.5 kg (215 lb)   Height: 5' 11\" (1.803 m)            Physical Exam  Vitals and nursing note reviewed. Constitutional:       Appearance: He is well-developed. HENT:      Head: Normocephalic and atraumatic. Eyes:      Pupils: Pupils are equal, round, and reactive to light. Cardiovascular:      Rate and Rhythm: Normal rate and regular rhythm. Heart sounds: Normal heart sounds. Pulmonary:      Effort: Pulmonary effort is normal. No respiratory distress. Breath sounds: Normal breath sounds. No wheezing. Abdominal:      General: Bowel sounds are normal.      Palpations: Abdomen is soft. Tenderness: There is no abdominal tenderness. There is no guarding or rebound. Musculoskeletal:         General: Normal range of motion. Cervical back: Normal range of motion and neck supple. Skin:     General: Skin is warm and dry. Neurological:      Mental Status: He is alert and oriented to person, place, and time. Psychiatric:         Behavior: Behavior normal.          MDM  Number of Diagnoses or Management Options  Diagnosis management comments: 66-year-old male with hypertension, A. fib on Eliquis, coronary insufficiency presents after trip and fall with head trauma. Patient with plan to move into assisted living facility next week. Well-appearing, no acute distress, hemodynamically stable, afebrile, nontoxic, no signs of head trauma, no C-spine tenderness palpation step-offs or crepitus. Plan head CT, CBC/CMP. Labs and CT unremarkable           Amount and/or Complexity of Data Reviewed  Clinical lab tests: ordered and reviewed  Tests in the radiology section of CPT®: ordered and reviewed  Independent visualization of images, tracings, or specimens: yes    Patient Progress  Patient progress: stable         Procedures      10:39 PM  Patient's results have been reviewed with them. Patient and/or family have verbally conveyed their understanding and agreement of the patient's signs, symptoms, diagnosis, treatment and prognosis and additionally agree to follow up as recommended or return to the Emergency Room should their condition change prior to follow-up. Discharge instructions have also been provided to the patient with some educational information regarding their diagnosis as well a list of reasons why they would want to return to the ER prior to their follow-up appointment should their condition change.

## 2022-01-17 ENCOUNTER — TELEPHONE (OUTPATIENT)
Dept: INTERNAL MEDICINE CLINIC | Age: 87
End: 2022-01-17

## 2022-01-17 RX ORDER — HYDRALAZINE HYDROCHLORIDE 25 MG/1
25 TABLET, FILM COATED ORAL 2 TIMES DAILY
Qty: 60 TABLET | Refills: 2 | Status: SHIPPED | OUTPATIENT
Start: 2022-01-17 | End: 2022-03-06

## 2022-01-17 NOTE — TELEPHONE ENCOUNTER
Thank you. I am also concerned about him. Regarding his frequent falls, he has poor judgment and insight into his condition and really needs 24/7 observation or he will continue to fall. Even though they are planning on transferring him into assisted living next week, this does not provide enough care since nobody will have eyes on him all the time. A home aide or nursing home care may be preferable. Regarding his blood pressure, please make sure that he is taking the higher dose irbesartan 150 mg twice daily since last week. That said, this is likely not enough to lower his blood pressure enough. So, assuming he is taking the higher dose of this, I have also added on hydralazine 25 mg twice daily and I sent that to the pharmacy now. Regarding his umbilical hernia, if it is becoming increasingly large and uncomfortable, we would have to follow-up with general surgery, but he is not a good surgical candidate, so I would recommend observation as long as it is not causing a lot of pain. I will look at it when I see him next week.

## 2022-01-17 NOTE — TELEPHONE ENCOUNTER
Spoke with Edward/Son (HIPPA Verified) and he reports that patient has fallen and hit his head on 1/13/22 and was taken to ER where he was told he had a mild concussion and a hernia was noted in same are he has a wire mesh. He reports that he is concerned do to patient has been having an ongoing mental and physical decline in last 3 weeks 3 falls not including ones that were averted by family's presence and catching him. He reports that today's B/P is 170/90. He reports that the OT also reported the hernia and he is concerned because his father has has multiple surgeries in the past for this. He requests that Dr. Juma Garsia be notified. Appt already scheduled for 1/25/22 @11:20 AM. Grateful for the rtc.

## 2022-01-17 NOTE — TELEPHONE ENCOUNTER
Spoke with Cirilo/Son and Sarika/Letitia in law and updated on Dr. Hector Cifuentes comments and recommendations. Son reports that patient has been taking the higher dose of Irbesartan since last week. Updated on the prescription sent in for Hydralazine 25 mg twice daily for the HTN. They both state understanding. Son requests that Dr. Qamar Feldman notify the patient during next weeks visit of him needing 24 hour care/nursing facility-not assisted living. Grateful for the call.

## 2022-01-21 DIAGNOSIS — F32.1 MAJOR DEPRESSIVE DISORDER, SINGLE EPISODE, MODERATE (HCC): ICD-10-CM

## 2022-01-21 DIAGNOSIS — E55.9 VITAMIN D INSUFFICIENCY: ICD-10-CM

## 2022-01-21 RX ORDER — SERTRALINE HYDROCHLORIDE 100 MG/1
100 TABLET, FILM COATED ORAL DAILY
Qty: 90 TABLET | Refills: 3 | Status: SHIPPED | OUTPATIENT
Start: 2022-01-21 | End: 2022-03-31

## 2022-01-21 RX ORDER — ASPIRIN 325 MG
50000 TABLET, DELAYED RELEASE (ENTERIC COATED) ORAL
Qty: 12 CAPSULE | Refills: 3 | Status: SHIPPED | OUTPATIENT
Start: 2022-01-21 | End: 2022-03-31 | Stop reason: SDUPTHER

## 2022-01-25 ENCOUNTER — OFFICE VISIT (OUTPATIENT)
Dept: INTERNAL MEDICINE CLINIC | Age: 87
End: 2022-01-25
Payer: MEDICARE

## 2022-01-25 VITALS
SYSTOLIC BLOOD PRESSURE: 131 MMHG | OXYGEN SATURATION: 96 % | RESPIRATION RATE: 13 BRPM | HEIGHT: 71 IN | BODY MASS INDEX: 29.72 KG/M2 | HEART RATE: 57 BPM | TEMPERATURE: 98.6 F | DIASTOLIC BLOOD PRESSURE: 71 MMHG | WEIGHT: 212.3 LBS

## 2022-01-25 DIAGNOSIS — Z66 DNR (DO NOT RESUSCITATE): ICD-10-CM

## 2022-01-25 DIAGNOSIS — I48.0 PAROXYSMAL ATRIAL FIBRILLATION (HCC): ICD-10-CM

## 2022-01-25 DIAGNOSIS — I10 ESSENTIAL HYPERTENSION: ICD-10-CM

## 2022-01-25 DIAGNOSIS — G31.84 MILD COGNITIVE IMPAIRMENT: ICD-10-CM

## 2022-01-25 DIAGNOSIS — R29.6 RECURRENT FALLS: Primary | ICD-10-CM

## 2022-01-25 DIAGNOSIS — G25.0 ESSENTIAL TREMOR: ICD-10-CM

## 2022-01-25 PROCEDURE — 1101F PT FALLS ASSESS-DOCD LE1/YR: CPT | Performed by: INTERNAL MEDICINE

## 2022-01-25 PROCEDURE — G8417 CALC BMI ABV UP PARAM F/U: HCPCS | Performed by: INTERNAL MEDICINE

## 2022-01-25 PROCEDURE — G8427 DOCREV CUR MEDS BY ELIG CLIN: HCPCS | Performed by: INTERNAL MEDICINE

## 2022-01-25 PROCEDURE — G8536 NO DOC ELDER MAL SCRN: HCPCS | Performed by: INTERNAL MEDICINE

## 2022-01-25 PROCEDURE — 99214 OFFICE O/P EST MOD 30 MIN: CPT | Performed by: INTERNAL MEDICINE

## 2022-01-25 PROCEDURE — G9717 DOC PT DX DEP/BP F/U NT REQ: HCPCS | Performed by: INTERNAL MEDICINE

## 2022-01-25 RX ORDER — ASPIRIN 81 MG/1
81 TABLET ORAL DAILY
Qty: 30 TABLET | Refills: 5
Start: 2022-01-25 | End: 2022-03-06

## 2022-01-25 NOTE — PATIENT INSTRUCTIONS
Current Outpatient Medications   Medication Sig    aspirin delayed-release 81 mg tablet Take 1 Tablet by mouth daily.  cholecalciferol (VITAMIN D3) (50,000 UNITS /1250 MCG) capsule Take 1 Capsule by mouth every seven (7) days. Indications: low vitamin D levels    sertraline (ZOLOFT) 100 mg tablet Take 1 Tablet by mouth daily.  hydrALAZINE (APRESOLINE) 25 mg tablet Take 1 Tablet by mouth two (2) times a day.  irbesartan (AVAPRO) 150 mg tablet Take 1 Tablet by mouth two (2) times a day. Increased dose 1/9/22    colestipoL (COLESTID) 1 gram tablet Take 1 Tablet by mouth two (2) times a day.  metoprolol succinate (TOPROL-XL) 25 mg XL tablet Take 1 Tablet by mouth nightly.  finasteride (PROSCAR) 5 mg tablet Take 1 Tablet by mouth daily.  acetaminophen (TYLENOL) 325 mg tablet Take 2 Tablets by mouth every six (6) hours as needed for Pain or Fever.  gabapentin (NEURONTIN) 300 mg capsule TAKE 1 CAPSULE BY MOUTH DAILY INDICATIONS: NEUROPATHIC PAIN     No current facility-administered medications for this visit.

## 2022-01-25 NOTE — PROGRESS NOTES
HISTORY OF PRESENT ILLNESS    Chief Complaint   Patient presents with    ED Follow-up     Concussion    Fall    Form Completion     Assisted Living facility - move in this Thursday    Medication Evaluation     Discuss meds - made him a little unsteady - b/p med makes him shaky. Presents for follow-up  He is accompanied by his son, Jeanine Landon. He is scheduled to move into assisted living at [facility] on January 27. Forms are completed at his last visit, but they need an update on his medication list and DNR form was apparently not received. Experienced another ground-level fall on January 13. Patient was stepping down from a step and lost balance, fell forward. He did hit his head on the corner of some furniture. Did not lose consciousness. He was in an awkward position and is very difficult to lift up, so EMS was called. Was brought to the emergency room for failure evaluation. Head CT showed no evidence of hemorrhage or hematoma with stable white matter disease. EKG was not performed. Blood work showed stable renal function, glucose, electrolytes and CBC. Son states that patient has seemed a little bit more tremulous recently. He has an essential tremor which is being treated with maximally tolerated dose of metoprolol. He is ambulating with a rolling walker but sometimes forgets to use it. He is no longer driving and he make sure to tell me that fact. Hypertension. Due to uncontrolled blood pressures, irbesartan was increased to 150 mg twice daily and I sent in a prescription for hydralazine 25 mg twice daily on January 17. Son states patient started the medication around January 20. Blood pressure at home on January 20 was 166/78. Previous days were 174/82, 162/70, 170/90 with pulses in the 50-72 range. No blood pressure checked at home since starting hydralazine.   Blood pressure today in the office is 131/71    Review of Systems   All other systems reviewed and are negative, except as noted in HPI    Past Medical and Surgical History   has a past medical history of Advanced care planning/counseling discussion (10/8/2015), Basal cell carcinoma, face, Bladder cancer ([facility] [address] (2012), BPH with obstruction/lower urinary tract symptoms, Chronic lower back pain, Chronic neck pain, Depression, major, Dupuytren's contracture of left hand, Encephalopathy (8/2021, 11/21/21), Epistaxis (2013), Family history of skin cancer, Fecal incontinence, Hearing loss, HTN (hypertension), Insomnia, IVCD (intraventricular conduction defect) (11/30/2015), Loose stools (7/18/2014), MRI contraindicated due to metal implant, Paroxysmal atrial fibrillation ([address] Peripheral neuropathy, RLS (restless legs syndrome) (7/3/2017), SCC (squamous cell carcinoma), face, Seasonal allergic reaction, Skin cancer, and Urothelial carcinoma of right distal ureter ([facility] [address] (5/1/15). He has no past medical history of Sun-damaged skin, Sunburn, blistering, or Tanning bed exposure. has a past surgical history that includes hx bladder repair (2012); hx retinal detachment repair (Right); hx hernia repair; hx colonoscopy (2012); pr cystourethroscopy (9/2014); pr cystourethroscopy (5/1/15); pr cystourethroscopy (2/29/16); hx malignant skin lesion excision (06/14/2018); and hx other surgical (10/2021). reports that he has quit smoking. He has never used smokeless tobacco. He reports current alcohol use. He reports that he does not use drugs. family history is not on file. Physical Exam   Nursing note and vitals reviewed. Blood pressure 131/71, pulse (!) 57, temperature 98.6 °F (37 °C), temperature source Oral, resp. rate 13, height 5' 11\" (1.803 m), weight 212 lb 4.8 oz (96.3 kg), SpO2 96 %. Constitutional:  No distress. Eyes: Conjunctivae are normal.   Ears:  Hearing grossly intact  Cardiovascular: Normal rate.   regular rhythm, no murmurs or gallops  No edema  Pulmonary/Chest: Effort normal.   CTAB  Musculoskeletal: moves all 4 extremities   Neurological: Alert and oriented to person, place, and time. Skin: No visible rash noted. Psychiatric: Normal mood and affect. Behavior is normal.     Assessment and Plan    Diagnoses and all orders for this visit:    1. Recurrent falls  Recurrent falls which is multifactorial secondary to essential tremor, neuropathy of his feet with limited sensation, lumbar stenosis with leg weakness, osteoarthritis of knees, chronic neck pain, and perhaps some degree of vestibular dysfunction. He also has very poor judgement and perhaps poor insight about actions that cause him to risk falling, despite being instructed many times to use caution. Dementia is playing a role here, but I also suspect some degree of denial and perhaps even defiance against restrictions in his life. He requires supervision to make sure that he has not been down and that action is taken if he falls. Assisted living will have a nurse checking on him twice daily personally and he will also have physical therapy coming to see him 7 days/week. He requires a wrist band that will detect falls as well as a button that he can push if he is having any sort of distress and needs help. May need to escalate level of care if this is not adequate. There is no way to 100% guarantee that he will not fall unless he has 24/7 direct observed supervision which is not practical at this point, so goal is to mitigate risk. 2. Mild cognitive impairment  Forgetful of conversations although alert and fairly oriented. Also has impaired judgment, making him forgetful to use his walker at times. Some degree of obstinence here as well. 3. Essential hypertension  Blood pressure today is well controlled, but this is the first blood pressure checked since he started hydralazine. Would continue current medications as listed. Could increase hydralazine if needed. Definitely do not want to exacerbate risk of orthostasis, dizziness or falls.   Permissive hypertension is acceptable with a target of 130-150/80-90 .    4. Paroxysmal atrial fibrillation (HCC)   Remains asymptomatic. Resume aspirin therapy. Low threshold for stopping aspirin if he has any evidence of significant increasing bruising or epistaxis. -     aspirin delayed-release 81 mg tablet; Take 1 Tablet by mouth daily. 5. Essential tremor  Significant essential tremor. Cannot tolerate higher doses of metoprolol due to risk for bradycardia and dizziness. Could consider a trial of primidone or referral for targeted ultrasound therapy    6. DNR (do not resuscitate)  -     DO NOT RESUSCITATE      lab results and schedule of future lab studies reviewed with patient  reviewed medications and side effects in detail    Return to clinic for further evaluation if new symptoms develop      Current Outpatient Medications   Medication Sig    aspirin delayed-release 81 mg tablet Take 1 Tablet by mouth daily.  cholecalciferol (VITAMIN D3) (50,000 UNITS /1250 MCG) capsule Take 1 Capsule by mouth every seven (7) days. Indications: low vitamin D levels    sertraline (ZOLOFT) 100 mg tablet Take 1 Tablet by mouth daily.  hydrALAZINE (APRESOLINE) 25 mg tablet Take 1 Tablet by mouth two (2) times a day.  irbesartan (AVAPRO) 150 mg tablet Take 1 Tablet by mouth two (2) times a day. Increased dose 1/9/22    colestipoL (COLESTID) 1 gram tablet Take 1 Tablet by mouth two (2) times a day.  metoprolol succinate (TOPROL-XL) 25 mg XL tablet Take 1 Tablet by mouth nightly.  finasteride (PROSCAR) 5 mg tablet Take 1 Tablet by mouth daily.  acetaminophen (TYLENOL) 325 mg tablet Take 2 Tablets by mouth every six (6) hours as needed for Pain or Fever.  gabapentin (NEURONTIN) 300 mg capsule TAKE 1 CAPSULE BY MOUTH DAILY INDICATIONS: NEUROPATHIC PAIN     No current facility-administered medications for this visit.

## 2022-01-28 ENCOUNTER — APPOINTMENT (OUTPATIENT)
Dept: CT IMAGING | Age: 87
End: 2022-01-28
Attending: EMERGENCY MEDICINE
Payer: MEDICARE

## 2022-01-28 LAB
BASOPHILS # BLD: 0 K/UL (ref 0–0.1)
BASOPHILS NFR BLD: 0 % (ref 0–1)
COMMENT, HOLDF: NORMAL
COMMENT, HOLDF: NORMAL
DIFFERENTIAL METHOD BLD: NORMAL
EOSINOPHIL # BLD: 0.2 K/UL (ref 0–0.4)
EOSINOPHIL NFR BLD: 3 % (ref 0–7)
ERYTHROCYTE [DISTWIDTH] IN BLOOD BY AUTOMATED COUNT: 13.5 % (ref 11.5–14.5)
HCT VFR BLD AUTO: 40.7 % (ref 36.6–50.3)
HGB BLD-MCNC: 13.2 G/DL (ref 12.1–17)
IMM GRANULOCYTES # BLD AUTO: 0 K/UL (ref 0–0.04)
IMM GRANULOCYTES NFR BLD AUTO: 0 % (ref 0–0.5)
LYMPHOCYTES # BLD: 2 K/UL (ref 0.8–3.5)
LYMPHOCYTES NFR BLD: 21 % (ref 12–49)
MCH RBC QN AUTO: 30.4 PG (ref 26–34)
MCHC RBC AUTO-ENTMCNC: 32.4 G/DL (ref 30–36.5)
MCV RBC AUTO: 93.8 FL (ref 80–99)
MONOCYTES # BLD: 0.8 K/UL (ref 0–1)
MONOCYTES NFR BLD: 9 % (ref 5–13)
NEUTS SEG # BLD: 6.2 K/UL (ref 1.8–8)
NEUTS SEG NFR BLD: 67 % (ref 32–75)
NRBC # BLD: 0 K/UL (ref 0–0.01)
NRBC BLD-RTO: 0 PER 100 WBC
PLATELET # BLD AUTO: 209 K/UL (ref 150–400)
PMV BLD AUTO: 10.6 FL (ref 8.9–12.9)
RBC # BLD AUTO: 4.34 M/UL (ref 4.1–5.7)
SAMPLES BEING HELD,HOLD: NORMAL
SAMPLES BEING HELD,HOLD: NORMAL
WBC # BLD AUTO: 9.3 K/UL (ref 4.1–11.1)

## 2022-01-28 PROCEDURE — 36415 COLL VENOUS BLD VENIPUNCTURE: CPT

## 2022-01-28 PROCEDURE — 85025 COMPLETE CBC W/AUTO DIFF WBC: CPT

## 2022-01-28 PROCEDURE — 85610 PROTHROMBIN TIME: CPT

## 2022-01-28 PROCEDURE — 80053 COMPREHEN METABOLIC PANEL: CPT

## 2022-01-28 PROCEDURE — 74011000250 HC RX REV CODE- 250: Performed by: EMERGENCY MEDICINE

## 2022-01-28 PROCEDURE — 99284 EMERGENCY DEPT VISIT MOD MDM: CPT

## 2022-01-28 PROCEDURE — 70450 CT HEAD/BRAIN W/O DYE: CPT

## 2022-01-28 RX ORDER — LIDOCAINE HYDROCHLORIDE 10 MG/ML
5 INJECTION, SOLUTION EPIDURAL; INFILTRATION; INTRACAUDAL; PERINEURAL ONCE
Status: COMPLETED | OUTPATIENT
Start: 2022-01-28 | End: 2022-01-28

## 2022-01-28 RX ADMIN — LIDOCAINE HYDROCHLORIDE 5 ML: 10 INJECTION, SOLUTION EPIDURAL; INFILTRATION; INTRACAUDAL; PERINEURAL at 23:59

## 2022-01-29 ENCOUNTER — APPOINTMENT (OUTPATIENT)
Dept: CT IMAGING | Age: 87
End: 2022-01-29
Attending: EMERGENCY MEDICINE
Payer: MEDICARE

## 2022-01-29 ENCOUNTER — APPOINTMENT (OUTPATIENT)
Dept: GENERAL RADIOLOGY | Age: 87
End: 2022-01-29
Attending: EMERGENCY MEDICINE
Payer: MEDICARE

## 2022-01-29 ENCOUNTER — HOSPITAL ENCOUNTER (EMERGENCY)
Age: 87
Discharge: NURSING, REHAB OR LONG-TERM CARE | End: 2022-01-29
Attending: EMERGENCY MEDICINE
Payer: MEDICARE

## 2022-01-29 VITALS
TEMPERATURE: 98.2 F | RESPIRATION RATE: 15 BRPM | SYSTOLIC BLOOD PRESSURE: 160 MMHG | DIASTOLIC BLOOD PRESSURE: 90 MMHG | OXYGEN SATURATION: 93 % | HEART RATE: 81 BPM

## 2022-01-29 DIAGNOSIS — S61.412A LACERATION OF LEFT HAND WITHOUT FOREIGN BODY, INITIAL ENCOUNTER: ICD-10-CM
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
## 2022-01-29 DIAGNOSIS — M54.9 ACUTE BACK PAIN, UNSPECIFIED BACK LOCATION, UNSPECIFIED BACK PAIN LATERALITY: ICD-10-CM

## 2022-01-29 DIAGNOSIS — S09.90XA CLOSED HEAD INJURY, INITIAL ENCOUNTER: ICD-10-CM

## 2022-01-29 LAB
ALBUMIN SERPL-MCNC: 3.2 G/DL (ref 3.5–5)
ALBUMIN/GLOB SERPL: 0.8 {RATIO} (ref 1.1–2.2)
ALP SERPL-CCNC: 98 U/L (ref 45–117)
ALT SERPL-CCNC: 12 U/L (ref 12–78)
ANION GAP SERPL CALC-SCNC: 5 MMOL/L (ref 5–15)
AST SERPL-CCNC: 16 U/L (ref 15–37)
BILIRUB SERPL-MCNC: 0.6 MG/DL (ref 0.2–1)
BUN SERPL-MCNC: 20 MG/DL (ref 6–20)
BUN/CREAT SERPL: 14 (ref 12–20)
CALCIUM SERPL-MCNC: 8.8 MG/DL (ref 8.5–10.1)
CHLORIDE SERPL-SCNC: 111 MMOL/L (ref 97–108)
CO2 SERPL-SCNC: 24 MMOL/L (ref 21–32)
CREAT SERPL-MCNC: 1.43 MG/DL (ref 0.7–1.3)
GLOBULIN SER CALC-MCNC: 3.9 G/DL (ref 2–4)
GLUCOSE SERPL-MCNC: 102 MG/DL (ref 65–100)
INR PPP: 1 (ref 0.9–1.1)
POTASSIUM SERPL-SCNC: 3.4 MMOL/L (ref 3.5–5.1)
PROT SERPL-MCNC: 7.1 G/DL (ref 6.4–8.2)
PROTHROMBIN TIME: 10.8 SEC (ref 9–11.1)
SODIUM SERPL-SCNC: 140 MMOL/L (ref 136–145)

## 2022-01-29 PROCEDURE — 73130 X-RAY EXAM OF HAND: CPT

## 2022-01-29 PROCEDURE — 75810000293 HC SIMP/SUPERF WND  RPR

## 2022-01-29 PROCEDURE — 74011000250 HC RX REV CODE- 250: Performed by: EMERGENCY MEDICINE

## 2022-01-29 PROCEDURE — 72131 CT LUMBAR SPINE W/O DYE: CPT

## 2022-01-29 PROCEDURE — 74011250637 HC RX REV CODE- 250/637: Performed by: NURSE PRACTITIONER

## 2022-01-29 PROCEDURE — 73030 X-RAY EXAM OF SHOULDER: CPT

## 2022-01-29 PROCEDURE — 72128 CT CHEST SPINE W/O DYE: CPT

## 2022-01-29 PROCEDURE — 72125 CT NECK SPINE W/O DYE: CPT

## 2022-01-29 RX ORDER — LIDOCAINE HYDROCHLORIDE 10 MG/ML
INJECTION, SOLUTION EPIDURAL; INFILTRATION; INTRACAUDAL; PERINEURAL
Status: DISCONTINUED
Start: 2022-01-29 | End: 2022-01-29 | Stop reason: WASHOUT

## 2022-01-29 RX ORDER — ACETAMINOPHEN 325 MG/1
975 TABLET ORAL
Status: COMPLETED | OUTPATIENT
Start: 2022-01-29 | End: 2022-01-29

## 2022-01-29 RX ORDER — BACITRACIN 500 UNIT/G
1 PACKET (EA) TOPICAL
Status: COMPLETED | OUTPATIENT
Start: 2022-01-29 | End: 2022-01-29

## 2022-01-29 RX ADMIN — BACITRACIN 1 PACKET: 500 OINTMENT TOPICAL at 01:31

## 2022-01-29 RX ADMIN — ACETAMINOPHEN 975 MG: 325 TABLET ORAL at 01:28

## 2022-01-29 NOTE — ED NOTES
Pt verbalizes understanding DC instructions and offered time for questions. MD is aware of pt's elevated BP but has no concerns, otherwise other VSS. PIV removed, bleeding controlled. Pt is leaving via stretcher with [facility].

## 2022-01-29 NOTE — ED NOTES
Pt ambulated to bathroom using a walker and this RN was standby. Pt reports using a cane or walker at home.

## 2022-01-29 NOTE — ED NOTES
This RN spoke with Kathia Lopez from [facility], [phone], to give report and inform her of the pt's estimated time of departure from the ED. TANISHA has also been contacted with an ETA of [phone].

## 2022-01-29 NOTE — ED PROVIDER NOTES
HPI   81 yo M presents after fall. Pt states he lives in assisted living and was sitting on the couch when his phone rang, he got up quickly to get the phone and fell, hitting the left side of his head on the floor. Denies LOC. C/o neck pain, left shoulder pain, mid and low back pain and left hand pain. Laceration to left thumb and 2nd finger. Pt states he is not on blood thinners. Has frequent falls. Denies recent illness, nausea, vomiting, diarrhea, sob, cp. No other complaints. Tetanus is UTD. Past Medical History:   Diagnosis Date    Advanced care planning/counseling discussion 10/8/2015    Basal cell carcinoma, face     now sees Dr. Kacie Walter.  Bladder cancer [facility]) 2012    Dr. Negra Brown. cyto 9/2014, 2/2016. s/p surgery, BCG irrigation [place]. saw Dr. Shira Novak BPH with obstruction/lower urinary tract symptoms     Chronic lower back pain     injection in [place]. saw Dr. Brooklyn Anthony Chronic neck pain     limited motion to side    Depression, major     Dupuytren's contracture of left hand     5th finger    Encephalopathy 8/2021, 11/21/21    admitted     Epistaxis 2013    saw ENT at [address] [place] [address] history of skin cancer     Fecal incontinence     with loose stools    Hearing loss     has hearing aids    HTN (hypertension)     Insomnia     IVCD (intraventricular conduction defect) 11/30/2015    Dr Moose Ken    Loose stools 7/18/2014    MRI contraindicated due to metal implant     has scleral buckle    Paroxysmal atrial fibrillation (HCC)     EF 66%, 1-2+ LAE on flecainide;Dr. Moose Ken. saw Dr. Matthieu Ni at [facility] Peripheral neuropathy     severe foot neuropathy    RLS (restless legs syndrome) 7/3/2017    SCC (squamous cell carcinoma), face     prior hx, now sees Dr. Adan Marrow Seasonal allergic reaction     Skin cancer     Urothelial carcinoma of right distal ureter ([address] 5/1/15    excision Dr. Lucero Garber. low grade, papillary.  repeat 3 month       Past Surgical History:   Procedure Laterality Date  HX BLADDER REPAIR  2012    cancer    HX COLONOSCOPY  2012    polyps. repeat 2017?  HX HERNIA REPAIR      periumibical    HX MALIGNANT SKIN LESION EXCISION  06/14/2018    basal cell left lip    HX OTHER SURGICAL  10/2021    Skin cancer removed from nose.  HX RETINAL DETACHMENT REPAIR Right     [place] CYSTOURETHROSCOPY  9/2014    Dr. Del Cid Solid CYSTOURETHROSCOPY  5/1/15    low grade right pap urothelial cancer    [place] CYSTOURETHROSCOPY  2/29/16         No family history on file. Social History     Socioeconomic History    Marital status:      Spouse name: Not on file    Number of children: Not on file    Years of education: Not on file    Highest education level: Not on file   Occupational History    Not on file   Tobacco Use    Smoking status: Former Smoker    Smokeless tobacco: Never Used    Tobacco comment: socially   Substance and Sexual Activity    Alcohol use: Yes     Comment: occ    Drug use: Never    Sexual activity: Not Currently   Other Topics Concern    Not on file   Social History Narrative    Not on file     Social Determinants of Health     Financial Resource Strain:     Difficulty of Paying Living Expenses: Not on file   Food Insecurity:     Worried About Running Out of Food in the Last Year: Not on file    Hazel of Food in the Last Year: Not on file   Transportation Needs:     Lack of Transportation (Medical): Not on file    Lack of Transportation (Non-Medical):  Not on file   Physical Activity:     Days of Exercise per Week: Not on file    Minutes of Exercise per Session: Not on file   Stress:     Feeling of Stress : Not on file   Social Connections:     Frequency of Communication with Friends and Family: Not on file    Frequency of Social Gatherings with Friends and Family: Not on file    Attends [employer] Services: Not on file    Active Member of Clubs or Organizations: Not on file    Attends Club or Organization Meetings: Not on file    Marital Status: Not on file   Intimate Partner Violence:     Fear of Current or Ex-Partner: Not on file    Emotionally Abused: Not on file    Physically Abused: Not on file    Sexually Abused: Not on file   Housing Stability:     Unable to Pay for Housing in the Last Year: Not on file    Number of [place] in the Last Year: Not on file    Unstable Housing in the Last Year: Not on file         ALLERGIES: Trazodone    Review of Systems   Constitutional: Negative for chills and fever. Respiratory: Negative for cough and shortness of breath. Gastrointestinal: Negative for abdominal pain, diarrhea, nausea and vomiting. Genitourinary: Negative for dysuria. Musculoskeletal: Positive for neck pain. Negative for neck stiffness. Skin: Positive for wound. Neurological: Negative for syncope, weakness, numbness and headaches. All other systems reviewed and are negative.       Vitals:    01/28/22 2040   BP: (!) 213/96   Pulse: 81   Resp: 15   Temp: 98.2 °F (36.8 °C)   SpO2: 97%            Physical Exam   Physical Examination: General appearance - alert, elderly, no acute distress, oriented to person, place, and time and normal appearing weight  Eyes - pupils equal and reactive, extraocular eye movements intact  HEENT-superficial abrasion/swelling to left forehead  Neck - supple, no significant adenopathy, mild b/l paraspinal muscle tenderness in cervical spinal region  Chest - clear to auscultation, no wheezes, rales or rhonchi, symmetric air entry  Heart - normal rate, regular rhythm, normal S1, S2, no murmurs, rubs, clicks or gallops  Abdomen - soft, nontender, nondistended, no masses or organomegaly  Back exam - full range of motion, no tenderness, palpable spasm or pain on motion, tenderness along thoracic spine, no stepoffs, tenderness to right paraspinal muscle region in lumbar spinal region  Neurological - alert, oriented, normal speech, no focal findings or movement disorder noted  Musculoskeletal - no joint tenderness, deformity or swelling, full rom of b/l hips and knees, NVI distally  Extremities - peripheral pulses normal, no pedal edema, no clubbing or cyanosis, abrasion to left thumb, laceration over dorsum of left 2nd finger over PIP joint, full rom, NVI with cap refill <2 sec  Skin - normal coloration and turgor, no rashes, no suspicious skin lesions noted  MDM  Number of Diagnoses or Management Options     Amount and/or Complexity of Data Reviewed  Clinical lab tests: ordered and reviewed  Tests in the radiology section of CPT®: ordered and reviewed  Decide to obtain previous medical records or to obtain history from someone other than the patient: yes  Review and summarize past medical records: yes  Independent visualization of images, tracings, or specimens: yes    Patient Progress  Patient progress: improved         Wound Closure by Adhesive    Date/Time: 1/29/2022 2:07 AM  Performed by: Huey Sicard, NP  Authorized by: Huey Sicard, NP     Consent:     Consent obtained:  Verbal    Consent given by:  Patient    Risks discussed:  Pain    Alternatives discussed:  No treatment  Anesthesia (see MAR for exact dosages): Anesthesia method:  None  Laceration details:     Location:  Shoulder/arm    Shoulder/arm location:  L upper arm    Length (cm):  3    Depth (mm):  1  Repair type:     Repair type:  Simple  Exploration:     Wound exploration: wound explored through full range of motion and entire depth of wound probed and visualized      Contaminated: no    Treatment:     Area cleansed with:  Betadine    Amount of cleaning:  Standard    Irrigation solution:  Sterile saline    Irrigation method:  Syringe    Visualized foreign bodies/material removed: no    Skin repair:     Repair method:  Steri-Strips    Number of Steri-Strips:  2  Approximation:     Approximation:  Close  Post-procedure details:     Dressing:  Open (no dressing)    Patient tolerance of procedure:   Tolerated well, no immediate complications  Wound Repair    Date/Time: 1/29/2022 2:09 AM  Performed by: NPSupervising provider: Otho Essex  Preparation: skin prepped with Betadine  Pre-procedure re-eval: Immediately prior to the procedure, the patient was reevaluated and found suitable for the planned procedure and any planned medications. Time out: Immediately prior to the procedure a time out was called to verify the correct patient, procedure, equipment, staff and marking as appropriate. .  Location details: left index finger  Wound length:2.5 cm or less  Anesthesia: digital block    Anesthesia:  Local Anesthetic: lidocaine 1% without epinephrine  Anesthetic total: 4 mL  Foreign bodies: no foreign bodies  Irrigation solution: saline  Irrigation method: syringe  Debridement: none  Skin closure: 4-0 nylon  Number of sutures: 5  Technique: simple  Approximation: close  Dressing: antibiotic ointment  Patient tolerance: patient tolerated the procedure well with no immediate complications  My total time at bedside, performing this procedure was 1-15 minutes. 4:19 AM  Updated son on ED course and test results.

## 2022-01-29 NOTE — DISCHARGE INSTRUCTIONS
We hope that we have addressed all of your medical concerns. The examination and treatment you received in the Emergency Department were for an emergent problem and were not intended as complete care. It is important that you follow up with your healthcare provider(s) for ongoing care. If your symptoms worsen or do not improve as expected, and you are unable to reach your usual health care provider(s), you should return to the Emergency Department. Today's healthcare is undergoing tremendous change, and patient satisfaction surveys are one of the many tools to assess the quality of medical care. You may receive a survey from the [employer] regarding your experience in the Emergency Department. I hope that your experience has been completely positive, particularly the medical care that I provided. As such, please participate in the survey; anything less than excellent does not meet my expectations or intentions. [facility] and [address] [place] [address] participate in nationally recognized quality of care measures. If your blood pressure is greater than 120/80, as reported below, we urge that you seek medical care to address the potential of high blood pressure, commonly known as hypertension. Hypertension can be hereditary or can be caused by certain medical conditions, pain, stress, or \"white coat syndrome. \"       Please make an appointment with your health care provider(s) for follow up of your Emergency Department visit. VITALS:   Patient Vitals for the past 8 hrs:   Temp Pulse Resp BP SpO2   01/29/22 0100 -- -- -- (!) 177/113 --   01/28/22 2040 98.2 °F (36.8 °C) 81 15 (!) 213/96 97 %          Thank you for allowing us to provide you with medical care today. We realize that you have many choices for your emergency care needs. Please choose us in the future for any continued health care needs. Peggy Em, MD    [address] [facility].   Office: [phone]            Recent Results (from the past 24 hour(s))   SAMPLES BEING HELD    Collection Time: 01/28/22  9:10 PM   Result Value Ref Range    SAMPLES BEING HELD 1RED,1BLU,1PST,1LAV     COMMENT        Add-on orders for these samples will be processed based on acceptable specimen integrity and analyte stability, which may vary by analyte. CBC WITH AUTOMATED DIFF    Collection Time: 01/28/22 11:16 PM   Result Value Ref Range    WBC 9.3 4.1 - 11.1 K/uL    RBC 4.34 4.10 - 5.70 M/uL    HGB 13.2 12.1 - 17.0 g/dL    HCT 40.7 36.6 - 50.3 %    MCV 93.8 80.0 - 99.0 FL    MCH 30.4 26.0 - 34.0 PG    MCHC 32.4 30.0 - 36.5 g/dL    RDW 13.5 11.5 - 14.5 %    PLATELET 685 271 - 207 K/uL    MPV 10.6 8.9 - 12.9 FL    NRBC 0.0 0  WBC    ABSOLUTE NRBC 0.00 0.00 - 0.01 K/uL    NEUTROPHILS 67 32 - 75 %    LYMPHOCYTES 21 12 - 49 %    MONOCYTES 9 5 - 13 %    EOSINOPHILS 3 0 - 7 %    BASOPHILS 0 0 - 1 %    IMMATURE GRANULOCYTES 0 0.0 - 0.5 %    ABS. NEUTROPHILS 6.2 1.8 - 8.0 K/UL    ABS. LYMPHOCYTES 2.0 0.8 - 3.5 K/UL    ABS. MONOCYTES 0.8 0.0 - 1.0 K/UL    ABS. EOSINOPHILS 0.2 0.0 - 0.4 K/UL    ABS. BASOPHILS 0.0 0.0 - 0.1 K/UL    ABS. IMM. GRANS. 0.0 0.00 - 0.04 K/UL    DF AUTOMATED     METABOLIC PANEL, COMPREHENSIVE    Collection Time: 01/28/22 11:16 PM   Result Value Ref Range    Sodium 140 136 - 145 mmol/L    Potassium 3.4 (L) 3.5 - 5.1 mmol/L    Chloride 111 (H) 97 - 108 mmol/L    CO2 24 21 - 32 mmol/L    Anion gap 5 5 - 15 mmol/L    Glucose 102 (H) 65 - 100 mg/dL    BUN 20 6 - 20 MG/DL    Creatinine 1.43 (H) 0.70 - 1.30 MG/DL    BUN/Creatinine ratio 14 12 - 20      GFR est AA 56 (L) >60 ml/min/1.73m2    GFR est non-AA 46 (L) >60 ml/min/1.73m2    Calcium 8.8 8.5 - 10.1 MG/DL    Bilirubin, total 0.6 0.2 - 1.0 MG/DL    ALT (SGPT) 12 12 - 78 U/L    AST (SGOT) 16 15 - 37 U/L    Alk.  phosphatase 98 45 - 117 U/L    Protein, total 7.1 6.4 - 8.2 g/dL    Albumin 3.2 (L) 3.5 - 5.0 g/dL    Globulin 3.9 2.0 - 4.0 g/dL    A-G Ratio 0.8 (L) 1.1 - 2.2     PROTHROMBIN TIME + INR    Collection Time: 01/28/22 11:16 PM   Result Value Ref Range    INR 1.0 0.9 - 1.1      Prothrombin time 10.8 9.0 - 11.1 sec   SAMPLES BEING HELD    Collection Time: 01/28/22 11:16 PM   Result Value Ref Range    SAMPLES BEING HELD 1red     COMMENT        Add-on orders for these samples will be processed based on acceptable specimen integrity and analyte stability, which may vary by analyte. XR SHOULDER LT AP/LAT MIN 2 V    Result Date: 1/29/2022  EXAM: XR SHOULDER LT AP/LAT MIN 2 V, XR HAND LT MIN 3 V INDICATION: Left shoulder and hand pain after fall. COMPARISON: None. FINDINGS: 3 views left shoulder. There is no acute fracture or dislocation. There is mild acromioclavicular and glenohumeral degenerative change. The soft tissues are unremarkable. 3 views left hand. There is no acute fracture or dislocation. There is mild joint space narrowing of the digits. The soft tissues are unremarkable. No acute abnormality in the left shoulder or hand. Mild left shoulder and left hand degenerative degenerative changes. XR HAND LT MIN 3 V    Result Date: 1/29/2022  EXAM: XR SHOULDER LT AP/LAT MIN 2 V, XR HAND LT MIN 3 V INDICATION: Left shoulder and hand pain after fall. COMPARISON: None. FINDINGS: 3 views left shoulder. There is no acute fracture or dislocation. There is mild acromioclavicular and glenohumeral degenerative change. The soft tissues are unremarkable. 3 views left hand. There is no acute fracture or dislocation. There is mild joint space narrowing of the digits. The soft tissues are unremarkable. No acute abnormality in the left shoulder or hand. Mild left shoulder and left hand degenerative degenerative changes. CT HEAD WO CONT    Result Date: 1/28/2022  EXAM: CT HEAD WO CONT INDICATION: fall, hit head, left frontal hematoma COMPARISON: 1/13/2022. CONTRAST: None.  TECHNIQUE: Unenhanced CT of the head was performed using 5 mm images. Brain and bone windows were generated. Coronal and sagittal reformats. CT dose reduction was achieved through use of a standardized protocol tailored for this examination and automatic exposure control for dose modulation. FINDINGS: The ventricles and sulci are normal in size, shape and configuration. . Small vessel ischemic changes are stable compared to the prior exam.. There is no intracranial hemorrhage, extra-axial collection, or mass effect. The basilar cisterns are open. No CT evidence of acute infarct. The bone windows demonstrate no abnormalities. The visualized portions of the paranasal sinuses and mastoid air cells are clear.      No acute process or change compared to the prior exam.

## 2022-01-29 NOTE — ED TRIAGE NOTES
Pt here for mechanical fall. Hit head and cut finger. unsure if he lost consciousness. On floor x1 hour until EMS arrived. 3 falls this week. Denies feeling dizzy or weak prior to fall. Hx Afib, denies blood thinners. Hematoma noted to left forehead and left pointer finger wrapped.

## 2022-01-31 ENCOUNTER — TELEPHONE (OUTPATIENT)
Dept: INTERNAL MEDICINE CLINIC | Age: 87
End: 2022-01-31

## 2022-01-31 NOTE — TELEPHONE ENCOUNTER
I am aware of the ER visit. Likely needs to have sutures removed about 10 days after they were placed. Please schedule with me for follow-up about that.   I am sure that his assisted living facility is taking extra precautions to help prevent him from falling

## 2022-01-31 NOTE — TELEPHONE ENCOUNTER
----- Message from Fidel Elizondo sent at 1/31/2022  3:28 PM EST -----  Subject: Message to Provider    QUESTIONS  Information for Provider? Patient son is calling to notify you his father   was hospitalized on Saturday due to fall he got stiches and bruises son   wanted a call back for further instructions 4th fall in a month  ---------------------------------------------------------------------------  --------------  CALL BACK INFO  What is the best way for the office to contact you? OK to leave message on   voicemail  Preferred Call Back Phone Number? [phone]  ---------------------------------------------------------------------------  --------------  SCRIPT ANSWERS  Relationship to Patient? Other  Representative Name? Julia Montoya  Is the Representative on the appropriate HIPAA document in Epic?  Yes

## 2022-01-31 NOTE — TELEPHONE ENCOUNTER
Spoke with Edward/Son (HIPPA Verified) and he reports 1/29/22 his father fell again resulting in an ED visit-he sustained a laceration to his index finger requiring 5-6 ssutures and was diagnosed with a mild concussion. This occurred at the assisted living facility. He is concerned and is reporting as per Dr. Rasheeda Rdz request. Dr. Gordy Lesch notified.

## 2022-02-01 NOTE — TELEPHONE ENCOUNTER
T/C to Ed/Son (HIPPA Verified) regarding f/u for sutures removal on possibly the 7th. No answer and LVM.

## 2022-02-07 ENCOUNTER — OFFICE VISIT (OUTPATIENT)
Dept: INTERNAL MEDICINE CLINIC | Age: 87
End: 2022-02-07
Payer: MEDICARE

## 2022-02-07 VITALS
OXYGEN SATURATION: 96 % | TEMPERATURE: 98.1 F | RESPIRATION RATE: 14 BRPM | DIASTOLIC BLOOD PRESSURE: 85 MMHG | SYSTOLIC BLOOD PRESSURE: 152 MMHG | BODY MASS INDEX: 29.46 KG/M2 | HEART RATE: 73 BPM | HEIGHT: 71 IN | WEIGHT: 210.4 LBS

## 2022-02-07 DIAGNOSIS — R53.82 CHRONIC FATIGUE: ICD-10-CM

## 2022-02-07 DIAGNOSIS — G89.29 CHRONIC BILATERAL LOW BACK PAIN WITHOUT SCIATICA: ICD-10-CM

## 2022-02-07 DIAGNOSIS — I10 ESSENTIAL HYPERTENSION: ICD-10-CM

## 2022-02-07 DIAGNOSIS — G60.9 IDIOPATHIC PERIPHERAL NEUROPATHY: ICD-10-CM

## 2022-02-07 DIAGNOSIS — M54.50 CHRONIC BILATERAL LOW BACK PAIN WITHOUT SCIATICA: ICD-10-CM

## 2022-02-07 DIAGNOSIS — S61.211A LACERATION OF LEFT INDEX FINGER, FOREIGN BODY PRESENCE UNSPECIFIED, NAIL DAMAGE STATUS UNSPECIFIED, INITIAL ENCOUNTER: Primary | ICD-10-CM

## 2022-02-07 PROCEDURE — G8536 NO DOC ELDER MAL SCRN: HCPCS | Performed by: INTERNAL MEDICINE

## 2022-02-07 PROCEDURE — G8427 DOCREV CUR MEDS BY ELIG CLIN: HCPCS | Performed by: INTERNAL MEDICINE

## 2022-02-07 PROCEDURE — G8417 CALC BMI ABV UP PARAM F/U: HCPCS | Performed by: INTERNAL MEDICINE

## 2022-02-07 PROCEDURE — 99214 OFFICE O/P EST MOD 30 MIN: CPT | Performed by: INTERNAL MEDICINE

## 2022-02-07 PROCEDURE — G9717 DOC PT DX DEP/BP F/U NT REQ: HCPCS | Performed by: INTERNAL MEDICINE

## 2022-02-07 PROCEDURE — 1101F PT FALLS ASSESS-DOCD LE1/YR: CPT | Performed by: INTERNAL MEDICINE

## 2022-02-07 NOTE — PATIENT INSTRUCTIONS
Please change bandage on left first finger twice daily and apply antibiotic ointment (such as neosporin) until 2/17/22.

## 2022-02-08 NOTE — PROGRESS NOTES
HISTORY OF PRESENT ILLNESS    Chief Complaint   Patient presents with   [facility] Follow Up     F/u    Suture Removal       Presents for follow-up  He is accompanied by his son. He had another ground-level fall while in his independent living apartments on January 29. Suffered some bruising of his left chest wall, abrasion of left forearm and a laceration of his left pointer finger. He is here today for suture removal.  Denies any further falling. He is well supervised by nursing twice a day, physical therapy. He is escorted to dining facility    Hypertension  Hypertension ROS: taking medications as instructed, no medication side effects noted, no TIA's, no chest pain on exertion, no dyspnea on exertion, no swelling of ankles     reports that he has quit smoking. He has never used smokeless tobacco.    reports current alcohol use. BP Readings from Last 2 Encounters:   02/07/22 (!) 152/85   01/29/22 (!) 160/90         Review of Systems   All other systems reviewed and are negative, except as noted in HPI    Past Medical and Surgical History   has a past medical history of Advanced care planning/counseling discussion (10/8/2015), Basal cell carcinoma, face, Bladder cancer ([address] (2012), BPH with obstruction/lower urinary tract symptoms, Chronic lower back pain, Chronic neck pain, Depression, major, Dupuytren's contracture of left hand, Encephalopathy (8/2021, 11/21/21), Epistaxis (2013), Family history of skin cancer, Fecal incontinence, Hearing loss, HTN (hypertension), Insomnia, IVCD (intraventricular conduction defect) (11/30/2015), Loose stools (7/18/2014), MRI contraindicated due to metal implant, Paroxysmal atrial fibrillation ([address] Peripheral neuropathy, RLS (restless legs syndrome) (7/3/2017), SCC (squamous cell carcinoma), face, Seasonal allergic reaction, Skin cancer, and Urothelial carcinoma of right distal ureter ([address] (5/1/15). He has no past medical history of Sun-damaged skin, Sunburn, blistering, or Tanning bed exposure. has a past surgical history that includes hx bladder repair (2012); hx retinal detachment repair (Right); hx hernia repair; hx colonoscopy (2012); pr cystourethroscopy (9/2014); pr cystourethroscopy (5/1/15); pr cystourethroscopy (2/29/16); hx malignant skin lesion excision (06/14/2018); and hx other surgical (10/2021). reports that he has quit smoking. He has never used smokeless tobacco. He reports current alcohol use. He reports that he does not use drugs. family history is not on file. Physical Exam   Nursing note and vitals reviewed. Blood pressure (!) 152/85, pulse 73, temperature 98.1 °F (36.7 °C), temperature source Oral, resp. rate 14, height 5' 11\" (1.803 m), weight 210 lb 6.4 oz (95.4 kg), SpO2 96 %. Constitutional:  No distress. Eyes: Conjunctivae are normal.   Ears:  Hearing grossly intact  Cardiovascular: Normal rate. regular rhythm, no murmurs or gallops  No edema  Pulmonary/Chest: Effort normal.   CTAB  Musculoskeletal: moves all 4 extremities   Remove 2 sutures from his left index finger. There is still some separation and healing of the wound. No drainage. Neurological: Alert and oriented to person, place, and time. Skin: No visible rash noted. Psychiatric: Normal mood and affect. Behavior is normal.     Assessment and Plan    Diagnoses and all orders for this visit:    1. Laceration of left index finger, foreign body presence unspecified, nail damage status unspecified, initial encounter  Please change bandage on left first finger twice daily and apply antibiotic ointment (such as neosporin) until 2/17/22. 2. Chronic bilateral low back pain without sciatica  Continue physical therapy. 3. Chronic fatigue  We will discontinue gabapentin since he says his neuropathic pain is resting well controlled. I do not think his blood pressure medications are contributing although beta-blocker could be considered.     4. Idiopathic peripheral neuropathy  Well controlled. Discontinue gabapentin to see if he really truly needs it. 5. Essential hypertension  Blood pressure is borderline controlled in the office, but if this fracture is some degree of whitecoat effect. Check at home. Could consider increasing hydralazine. Also is following up with cardiology. lab results and schedule of future lab studies reviewed with patient  reviewed medications and side effects in detail    Return to clinic for further evaluation if new symptoms develop        Current Outpatient Medications   Medication Sig    aspirin delayed-release 81 mg tablet Take 1 Tablet by mouth daily.  cholecalciferol (VITAMIN D3) (50,000 UNITS /1250 MCG) capsule Take 1 Capsule by mouth every seven (7) days. Indications: low vitamin D levels    sertraline (ZOLOFT) 100 mg tablet Take 1 Tablet by mouth daily.  hydrALAZINE (APRESOLINE) 25 mg tablet Take 1 Tablet by mouth two (2) times a day.  irbesartan (AVAPRO) 150 mg tablet Take 1 Tablet by mouth two (2) times a day. Increased dose 1/9/22    colestipoL (COLESTID) 1 gram tablet Take 1 Tablet by mouth two (2) times a day.  metoprolol succinate (TOPROL-XL) 25 mg XL tablet Take 1 Tablet by mouth nightly.  finasteride (PROSCAR) 5 mg tablet Take 1 Tablet by mouth daily.  acetaminophen (TYLENOL) 325 mg tablet Take 2 Tablets by mouth every six (6) hours as needed for Pain or Fever. No current facility-administered medications for this visit.

## 2022-03-06 ENCOUNTER — APPOINTMENT (OUTPATIENT)
Dept: GENERAL RADIOLOGY | Age: 87
DRG: 308 | End: 2022-03-06
Attending: STUDENT IN AN ORGANIZED HEALTH CARE EDUCATION/TRAINING PROGRAM
Payer: MEDICARE

## 2022-03-06 ENCOUNTER — APPOINTMENT (OUTPATIENT)
Dept: CT IMAGING | Age: 87
DRG: 308 | End: 2022-03-06
Attending: STUDENT IN AN ORGANIZED HEALTH CARE EDUCATION/TRAINING PROGRAM
Payer: MEDICARE

## 2022-03-06 ENCOUNTER — HOSPITAL ENCOUNTER (INPATIENT)
Age: 87
LOS: 7 days | Discharge: NURSING, REHAB OR LONG-TERM CARE | DRG: 308 | End: 2022-03-14
Attending: STUDENT IN AN ORGANIZED HEALTH CARE EDUCATION/TRAINING PROGRAM | Admitting: INTERNAL MEDICINE
Payer: MEDICARE

## 2022-03-06 DIAGNOSIS — R00.1 BRADYCARDIA: Primary | ICD-10-CM

## 2022-03-06 DIAGNOSIS — I16.0 HYPERTENSIVE URGENCY: ICD-10-CM

## 2022-03-06 DIAGNOSIS — R55 NEAR SYNCOPE: ICD-10-CM

## 2022-03-06 DIAGNOSIS — R47.9 SPEECH DISTURBANCE, UNSPECIFIED TYPE: ICD-10-CM

## 2022-03-06 DIAGNOSIS — G93.40 ENCEPHALOPATHY: ICD-10-CM

## 2022-03-06 PROBLEM — I49.5 SSS (SICK SINUS SYNDROME) (HCC): Status: ACTIVE | Noted: 2022-03-06

## 2022-03-06 LAB
ALBUMIN SERPL-MCNC: 3.3 G/DL (ref 3.5–5)
ALBUMIN/GLOB SERPL: 0.8 {RATIO} (ref 1.1–2.2)
ALP SERPL-CCNC: 105 U/L (ref 45–117)
ALT SERPL-CCNC: 18 U/L (ref 12–78)
ANION GAP SERPL CALC-SCNC: 3 MMOL/L (ref 5–15)
APPEARANCE UR: CLEAR
AST SERPL-CCNC: 22 U/L (ref 15–37)
ATRIAL RATE: 46 BPM
BACTERIA URNS QL MICRO: NEGATIVE /HPF
BASOPHILS # BLD: 0 K/UL (ref 0–0.1)
BASOPHILS NFR BLD: 1 % (ref 0–1)
BILIRUB SERPL-MCNC: 0.7 MG/DL (ref 0.2–1)
BILIRUB UR QL: NEGATIVE
BNP SERPL-MCNC: 1821 PG/ML
BUN SERPL-MCNC: 21 MG/DL (ref 6–20)
BUN/CREAT SERPL: 14 (ref 12–20)
CALCIUM SERPL-MCNC: 8.9 MG/DL (ref 8.5–10.1)
CALCULATED P AXIS, ECG09: 52 DEGREES
CALCULATED R AXIS, ECG10: 32 DEGREES
CALCULATED T AXIS, ECG11: 58 DEGREES
CHLORIDE SERPL-SCNC: 109 MMOL/L (ref 97–108)
CO2 SERPL-SCNC: 25 MMOL/L (ref 21–32)
COLOR UR: ABNORMAL
COMMENT, HOLDF: NORMAL
CREAT SERPL-MCNC: 1.53 MG/DL (ref 0.7–1.3)
DIAGNOSIS, 93000: NORMAL
DIFFERENTIAL METHOD BLD: ABNORMAL
EOSINOPHIL # BLD: 0.3 K/UL (ref 0–0.4)
EOSINOPHIL NFR BLD: 5 % (ref 0–7)
EPITH CASTS URNS QL MICRO: ABNORMAL /LPF
ERYTHROCYTE [DISTWIDTH] IN BLOOD BY AUTOMATED COUNT: 13.7 % (ref 11.5–14.5)
GLOBULIN SER CALC-MCNC: 4.3 G/DL (ref 2–4)
GLUCOSE SERPL-MCNC: 101 MG/DL (ref 65–100)
GLUCOSE UR STRIP.AUTO-MCNC: NEGATIVE MG/DL
HCT VFR BLD AUTO: 42.7 % (ref 36.6–50.3)
HGB BLD-MCNC: 13.6 G/DL (ref 12.1–17)
HGB UR QL STRIP: NEGATIVE
HYALINE CASTS URNS QL MICRO: ABNORMAL /LPF (ref 0–5)
IMM GRANULOCYTES # BLD AUTO: 0 K/UL (ref 0–0.04)
IMM GRANULOCYTES NFR BLD AUTO: 1 % (ref 0–0.5)
KETONES UR QL STRIP.AUTO: NEGATIVE MG/DL
LEUKOCYTE ESTERASE UR QL STRIP.AUTO: NEGATIVE
LIPASE SERPL-CCNC: 147 U/L (ref 73–393)
LYMPHOCYTES # BLD: 1.4 K/UL (ref 0.8–3.5)
LYMPHOCYTES NFR BLD: 22 % (ref 12–49)
MAGNESIUM SERPL-MCNC: 2.4 MG/DL (ref 1.6–2.4)
MCH RBC QN AUTO: 29.9 PG (ref 26–34)
MCHC RBC AUTO-ENTMCNC: 31.9 G/DL (ref 30–36.5)
MCV RBC AUTO: 93.8 FL (ref 80–99)
MONOCYTES # BLD: 0.4 K/UL (ref 0–1)
MONOCYTES NFR BLD: 6 % (ref 5–13)
NEUTS SEG # BLD: 4.1 K/UL (ref 1.8–8)
NEUTS SEG NFR BLD: 65 % (ref 32–75)
NITRITE UR QL STRIP.AUTO: NEGATIVE
NRBC # BLD: 0 K/UL (ref 0–0.01)
NRBC BLD-RTO: 0 PER 100 WBC
P-R INTERVAL, ECG05: 158 MS
PH UR STRIP: 6.5 [PH] (ref 5–8)
PLATELET # BLD AUTO: 174 K/UL (ref 150–400)
PMV BLD AUTO: 11.4 FL (ref 8.9–12.9)
POTASSIUM SERPL-SCNC: 4.4 MMOL/L (ref 3.5–5.1)
PROCALCITONIN SERPL-MCNC: <0.05 NG/ML
PROT SERPL-MCNC: 7.6 G/DL (ref 6.4–8.2)
PROT UR STRIP-MCNC: 30 MG/DL
Q-T INTERVAL, ECG07: 492 MS
QRS DURATION, ECG06: 96 MS
QTC CALCULATION (BEZET), ECG08: 430 MS
RBC # BLD AUTO: 4.55 M/UL (ref 4.1–5.7)
RBC #/AREA URNS HPF: ABNORMAL /HPF (ref 0–5)
SAMPLES BEING HELD,HOLD: NORMAL
SODIUM SERPL-SCNC: 137 MMOL/L (ref 136–145)
SP GR UR REFRACTOMETRY: 1.01 (ref 1–1.03)
TROPONIN-HIGH SENSITIVITY: 23 NG/L (ref 0–76)
TSH SERPL DL<=0.05 MIU/L-ACNC: 3.12 UIU/ML (ref 0.36–3.74)
UR CULT HOLD, URHOLD: NORMAL
UROBILINOGEN UR QL STRIP.AUTO: 0.2 EU/DL (ref 0.2–1)
VENTRICULAR RATE, ECG03: 46 BPM
VIT B12 SERPL-MCNC: 296 PG/ML (ref 193–986)
WBC # BLD AUTO: 6.3 K/UL (ref 4.1–11.1)
WBC URNS QL MICRO: ABNORMAL /HPF (ref 0–4)

## 2022-03-06 PROCEDURE — 84484 ASSAY OF TROPONIN QUANT: CPT

## 2022-03-06 PROCEDURE — 82607 VITAMIN B-12: CPT

## 2022-03-06 PROCEDURE — 70450 CT HEAD/BRAIN W/O DYE: CPT

## 2022-03-06 PROCEDURE — 84443 ASSAY THYROID STIM HORMONE: CPT

## 2022-03-06 PROCEDURE — 80053 COMPREHEN METABOLIC PANEL: CPT

## 2022-03-06 PROCEDURE — 83690 ASSAY OF LIPASE: CPT

## 2022-03-06 PROCEDURE — 99285 EMERGENCY DEPT VISIT HI MDM: CPT

## 2022-03-06 PROCEDURE — 83921 ORGANIC ACID SINGLE QUANT: CPT

## 2022-03-06 PROCEDURE — 93005 ELECTROCARDIOGRAM TRACING: CPT

## 2022-03-06 PROCEDURE — 83880 ASSAY OF NATRIURETIC PEPTIDE: CPT

## 2022-03-06 PROCEDURE — 85025 COMPLETE CBC W/AUTO DIFF WBC: CPT

## 2022-03-06 PROCEDURE — 83735 ASSAY OF MAGNESIUM: CPT

## 2022-03-06 PROCEDURE — 73030 X-RAY EXAM OF SHOULDER: CPT

## 2022-03-06 PROCEDURE — 71101 X-RAY EXAM UNILAT RIBS/CHEST: CPT

## 2022-03-06 PROCEDURE — G0378 HOSPITAL OBSERVATION PER HR: HCPCS

## 2022-03-06 PROCEDURE — 81001 URINALYSIS AUTO W/SCOPE: CPT

## 2022-03-06 PROCEDURE — 96374 THER/PROPH/DIAG INJ IV PUSH: CPT

## 2022-03-06 PROCEDURE — 74011250637 HC RX REV CODE- 250/637: Performed by: INTERNAL MEDICINE

## 2022-03-06 PROCEDURE — 72125 CT NECK SPINE W/O DYE: CPT

## 2022-03-06 PROCEDURE — 74011000250 HC RX REV CODE- 250: Performed by: INTERNAL MEDICINE

## 2022-03-06 PROCEDURE — 74011250636 HC RX REV CODE- 250/636: Performed by: STUDENT IN AN ORGANIZED HEALTH CARE EDUCATION/TRAINING PROGRAM

## 2022-03-06 PROCEDURE — 99222 1ST HOSP IP/OBS MODERATE 55: CPT | Performed by: SPECIALIST

## 2022-03-06 PROCEDURE — 84145 PROCALCITONIN (PCT): CPT

## 2022-03-06 RX ORDER — SODIUM CHLORIDE 0.9 % (FLUSH) 0.9 %
5-40 SYRINGE (ML) INJECTION AS NEEDED
Status: DISCONTINUED | OUTPATIENT
Start: 2022-03-06 | End: 2022-03-14 | Stop reason: HOSPADM

## 2022-03-06 RX ORDER — HYDRALAZINE HYDROCHLORIDE 20 MG/ML
20 INJECTION INTRAMUSCULAR; INTRAVENOUS
Status: DISCONTINUED | OUTPATIENT
Start: 2022-03-06 | End: 2022-03-14 | Stop reason: HOSPADM

## 2022-03-06 RX ORDER — ONDANSETRON 2 MG/ML
4 INJECTION INTRAMUSCULAR; INTRAVENOUS
Status: DISCONTINUED | OUTPATIENT
Start: 2022-03-06 | End: 2022-03-14 | Stop reason: HOSPADM

## 2022-03-06 RX ORDER — ACETAMINOPHEN 325 MG/1
650 TABLET ORAL
Status: DISCONTINUED | OUTPATIENT
Start: 2022-03-06 | End: 2022-03-14 | Stop reason: HOSPADM

## 2022-03-06 RX ORDER — ONDANSETRON 4 MG/1
4 TABLET, ORALLY DISINTEGRATING ORAL
Status: DISCONTINUED | OUTPATIENT
Start: 2022-03-06 | End: 2022-03-14 | Stop reason: HOSPADM

## 2022-03-06 RX ORDER — LOSARTAN POTASSIUM 50 MG/1
100 TABLET ORAL DAILY
Status: DISCONTINUED | OUTPATIENT
Start: 2022-03-06 | End: 2022-03-07

## 2022-03-06 RX ORDER — ACETAMINOPHEN 650 MG/1
650 SUPPOSITORY RECTAL
Status: DISCONTINUED | OUTPATIENT
Start: 2022-03-06 | End: 2022-03-14 | Stop reason: HOSPADM

## 2022-03-06 RX ORDER — SODIUM CHLORIDE 0.9 % (FLUSH) 0.9 %
5-40 SYRINGE (ML) INJECTION EVERY 8 HOURS
Status: DISCONTINUED | OUTPATIENT
Start: 2022-03-06 | End: 2022-03-14 | Stop reason: HOSPADM

## 2022-03-06 RX ORDER — LOSARTAN POTASSIUM 50 MG/1
50 TABLET ORAL 2 TIMES DAILY
Status: DISCONTINUED | OUTPATIENT
Start: 2022-03-06 | End: 2022-03-06

## 2022-03-06 RX ORDER — HYDRALAZINE HYDROCHLORIDE 20 MG/ML
10 INJECTION INTRAMUSCULAR; INTRAVENOUS ONCE
Status: COMPLETED | OUTPATIENT
Start: 2022-03-06 | End: 2022-03-06

## 2022-03-06 RX ORDER — POLYETHYLENE GLYCOL 3350 17 G/17G
17 POWDER, FOR SOLUTION ORAL DAILY PRN
Status: DISCONTINUED | OUTPATIENT
Start: 2022-03-06 | End: 2022-03-09

## 2022-03-06 RX ORDER — AMLODIPINE BESYLATE 5 MG/1
5 TABLET ORAL DAILY
Status: DISCONTINUED | OUTPATIENT
Start: 2022-03-06 | End: 2022-03-07

## 2022-03-06 RX ORDER — LANOLIN ALCOHOL/MO/W.PET/CERES
1000 CREAM (GRAM) TOPICAL DAILY
Status: DISCONTINUED | OUTPATIENT
Start: 2022-03-07 | End: 2022-03-14 | Stop reason: HOSPADM

## 2022-03-06 RX ADMIN — LOSARTAN POTASSIUM 100 MG: 50 TABLET, FILM COATED ORAL at 15:06

## 2022-03-06 RX ADMIN — HYDRALAZINE HYDROCHLORIDE 10 MG: 20 INJECTION, SOLUTION INTRAMUSCULAR; INTRAVENOUS at 11:16

## 2022-03-06 RX ADMIN — ACETAMINOPHEN 650 MG: 325 TABLET ORAL at 15:07

## 2022-03-06 RX ADMIN — SODIUM CHLORIDE, PRESERVATIVE FREE 10 ML: 5 INJECTION INTRAVENOUS at 15:10

## 2022-03-06 RX ADMIN — SODIUM CHLORIDE, PRESERVATIVE FREE 10 ML: 5 INJECTION INTRAVENOUS at 21:40

## 2022-03-06 RX ADMIN — AMLODIPINE BESYLATE 5 MG: 5 TABLET ORAL at 15:06

## 2022-03-06 RX ADMIN — ACETAMINOPHEN 650 MG: 325 TABLET ORAL at 21:49

## 2022-03-06 NOTE — H&P
[address] [facility] Adult  Hospitalist Group  History and Physical      Date of Service:  3/6/2022  Primary Care Provider: Jeyson Batista MD  Source of information: The patient and Chart review    Chief Complaint: Fall      History of Presenting Illness:   Frank Salas is a 80 y.o. male with h/o hypertension, paroxysmal A. 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 He states that he was getting ready to go downstairs from his senior facility apartment and suddenly starting going down and couldn't catch his fall. He denies feeling lightheaded or dizzy, denies cp or palpitations. Once he was on the ground he felt some sob. Notes he's had several falls just like this over the last few weeks. C/o left back pain where he landed. Otherwise has felt well. The patient denies any headache, blurry vision, sore throat, trouble swallowing, trouble with speech, chest pain, SOB, cough, fever, chills, N/V/D, abd pain, urinary symptoms, constipation, recent travels, sick contacts, focal or generalized neurological symptoms, falls, injuries, rashes, contact with COVID-19 diagnosed patients, hematemesis, melena, hemoptysis, hematuria, rashes, denies starting any new medications and denies any other concerns or problems besides as mentioned above. REVIEW OF SYSTEMS:  A comprehensive review of systems was negative except for that written in the History of Present Illness. Past Medical History:   Diagnosis Date    Advanced care planning/counseling discussion 10/8/2015    Basal cell carcinoma, face     now sees Dr. Wiley Ho.  Bladder cancer [facility]) 2012    Dr. Calin Orozco. cyto 9/2014, 2/2016. s/p surgery, BCG irrigation [place]. saw Dr. Sherron Sky BPH with obstruction/lower urinary tract symptoms     Chronic lower back pain     injection in [place].   saw Dr. Elodia Sanabria Chronic neck pain     limited motion to side    Depression, major     Dupuytren's contracture of left hand     5th finger    Encephalopathy 2021, 21    admitted     Epistaxis     saw ENT at [facility] history of skin cancer     Fecal incontinence     with loose stools    Hearing loss     has hearing aids    HTN (hypertension)     Insomnia     IVCD (intraventricular conduction defect) 2015    Dr Taina Rich    Loose stools 2014    MRI contraindicated due to metal implant     has scleral buckle    Paroxysmal atrial fibrillation (HCC)     EF 66%, 1-2+ LAE on flecainide;Dr. Taina Rich. saw Dr. Royetta Apgar at [facility] Peripheral neuropathy     severe foot neuropathy    RLS (restless legs syndrome) 7/3/2017    SCC (squamous cell carcinoma), face     prior hx, now sees Dr. Silva Shah Seasonal allergic reaction     Skin cancer     Urothelial carcinoma of right distal ureter ([address] 5/1/15    excision Dr. Alex Barker. low grade, papillary. repeat 3 month      Past Surgical History:   Procedure Laterality Date    HX BLADDER REPAIR      cancer    HX COLONOSCOPY      polyps. repeat ?  HX HERNIA REPAIR      periumibical    HX MALIGNANT SKIN LESION EXCISION  2018    basal cell left lip    HX OTHER SURGICAL  10/2021    Skin cancer removed from nose.  HX RETINAL DETACHMENT REPAIR Right     [place] CYSTOURETHROSCOPY  2014    Dr. Donovan Query CYSTOURETHROSCOPY  5/1/15    low grade right pap urothelial cancer    MT CYSTOURETHROSCOPY  16     Prior to Admission medications    Medication Sig Start Date End Date Taking? Authorizing Provider   cholecalciferol (VITAMIN D3) (50,000 UNITS /1250 MCG) capsule Take 1 Capsule by mouth every seven (7) days. Indications: low vitamin D levels 22  Yes Candy Falcon MD   sertraline (ZOLOFT) 100 mg tablet Take 1 Tablet by mouth daily. 22  Yes Candy Falcon MD   irbesartan (AVAPRO) 150 mg tablet Take 1 Tablet by mouth two (2) times a day.  Increased dose 22  Yes Candy Falcon MD   colestipoL (COLESTID) 1 gram tablet Take 1 Tablet by mouth two (2) times a day. 12/13/21  Yes Liane Falcon MD   metoprolol succinate (TOPROL-XL) 25 mg XL tablet Take 1 Tablet by mouth nightly. 11/11/21  Yes Gloria Falk MD   finasteride (PROSCAR) 5 mg tablet Take 1 Tablet by mouth daily. 11/1/21  Yes Liane Falcon MD   acetaminophen (TYLENOL) 325 mg tablet Take 2 Tablets by mouth every six (6) hours as needed for Pain or Fever. 9/15/21  Yes Liane Falcon MD     Allergies   Allergen Reactions    Trazodone Palpitations      History reviewed. No pertinent family history. Social History:  reports that he has quit smoking. He has never used smokeless tobacco. He reports current alcohol use. He reports that he does not use drugs. Family and social history were personally reviewed, all pertinent and relevant details are outlined as above. Objective:     Visit Vitals  BP (!) 189/78   Pulse (!) 49   Temp 98.1 °F (36.7 °C)   Resp 20   Ht 5' 11\" (1.803 m)   Wt 96.9 kg (213 lb 10 oz)   SpO2 97%   BMI 29.79 kg/m²      O2 Device: None (Room air)    PHYSICAL EXAM:   General:          Alert, cooperative, no distress  Head:    Normocephalic, atraumatic  Eyes:   Conjunctivae clear, no scleral icterus, EOMs intact  ENT:   Mucosa normal. No drainage or sinus tenderness  Neck:               Supple, symmetrical, trachea midline  Lungs:             Clear to auscultation bilaterally, symmetric expansion, no respiratory distress  Chest wall:      No tenderness or deformity  Heart:              Alan, irregular  Abdomen:        Soft, non-tender, non-distended  Extremities:     Extremities normal, atraumatic, no cyanosis or edema  Skin:                Warm, dry, turgor normal, no rashes or lesions  Neurologic:      CNII-XII grossly intact. Sensory grossly within normal limits. A&O. MONTANO    Data Review: All diagnostic labs and studies have been reviewed.     Abnormal Labs Reviewed   CBC WITH AUTOMATED DIFF - Abnormal; Notable for the following components:       Result Value    IMMATURE GRANULOCYTES 1 (*)     All other components within normal limits   METABOLIC PANEL, COMPREHENSIVE - Abnormal; Notable for the following components:    Chloride 109 (*)     Anion gap 3 (*)     Glucose 101 (*)     BUN 21 (*)     Creatinine 1.53 (*)     GFR est AA 52 (*)     GFR est non-AA 43 (*)     Albumin 3.3 (*)     Globulin 4.3 (*)     A-G Ratio 0.8 (*)     All other components within normal limits   NT-PRO BNP - Abnormal; Notable for the following components:    NT pro-BNP 1,821 (*)     All other components within normal limits   URINALYSIS W/MICROSCOPIC - Abnormal; Notable for the following components:    Protein 30 (*)     All other components within normal limits     All Micro Results     Procedure Component Value Units Date/Time    URINE CULTURE HOLD SAMPLE [[id number]] Collected: 03/06/22 1013    Order Status: Completed Specimen: Urine from Serum Updated: 03/06/22 1016     Urine culture hold       Urine on hold in Microbiology dept for 2 days. If unpreserved urine is submitted, it cannot be used for addtional testing after 24 hours, recollection will be required. IMAGING:   CT HEAD WO CONT   Final Result   Atrophy and chronic small vessel ischemic disease the white matter again shown. No acute findings. CT SPINE CERV WO CONT   Final Result   Osteopenia and degenerative findings. No acute fracture or   dislocation demonstrated. XR RIBS RT W PA CXR MIN 3 V   Final Result   No rib fracture identified. XR SHOULDER RT AP/LAT MIN 2 V   Final Result   No acute abnormality.          ECG/ECHO:    Results for orders placed or performed during the hospital encounter of 03/06/22   EKG, 12 LEAD, INITIAL   Result Value Ref Range    Ventricular Rate 46 BPM    Atrial Rate 46 BPM    P-R Interval 158 ms    QRS Duration 96 ms    Q-T Interval 492 ms    QTC Calculation (Bezet) 430 ms    Calculated P Axis 52 degrees Calculated R Axis 32 degrees    Calculated T Axis 58 degrees    Diagnosis       Sinus bradycardia with premature supraventricular complexes  Otherwise normal ECG  When compared with ECG of 21-NOV-2021 21:34,  Previous ECG has undetermined rhythm, needs review         Assessment:   Given the patient's current clinical presentation, there is a high level of concern for decompensation if discharged from the emergency department. Complex decision making was performed, which includes reviewing the patient's available past medical records, laboratory results, and imaging studies. Active Problems:    SSS (sick sinus syndrome) (HCC) (3/6/2022)      Bradycardia (3/6/2022)      Plan:   Bradycardia and with recurrent falls  Cont to monitor on tele  Dc home metoprolol, last dose presumably last night  TSH wnl  check orthostats  PT/OT  Cardiology to see    B12 borderline, confirm with MMA    HTN urgency  Cont home ARB  Start norvasc  IV prn hydralazine    CKD3 at baseline Cr ~1.5    Admit as observation for now    DIET: ADULT DIET Easy to Chew   ISOLATION PRECAUTIONS: There are currently no Active Isolations  CODE STATUS: DNR  DVT PROPHYLAXIS: SCDs  FUNCTIONAL STATUS PRIOR TO HOSPITALIZATION: Fully active and ambulatory; able to carry on all self-care without restriction.   EARLY MOBILITY ASSESSMENT: Recommend an assessment from physical therapy and/or occupational therapy  EMERGENCY CONTACT/SURROGATE DECISION MAKER: son  Care Plan discussed with: Patient/Family  Anticipated Disposition:  PT, OT, RN  Anticipated Discharge: 24 hours to 48 hours    CRITICAL CARE WAS PERFORMED FOR THIS ENCOUNTER: NO.    Signed By: Haris Feng MD     March 6, 2022

## 2022-03-06 NOTE — ROUTINE PROCESS
TRANSFER - OUT REPORT:    Verbal report given to [place](name[place] on Marcio Campbellncer.  being transferred to [facility](unit) for routine progression of care       Report consisted of patients Situation, Background, Assessment and   Recommendations(SBAR). Information from the following report(s) SBAR, ED Summary, Procedure Summary, MAR and Recent Results was reviewed with the receiving nurse. Lines:   Peripheral IV 03/06/22 Left Wrist (Active)   Site Assessment Clean, dry, & intact 03/06/22 1018   Phlebitis Assessment 0 03/06/22 1018   Infiltration Assessment 0 03/06/22 1018   Dressing Status Clean, dry, & intact 03/06/22 1018   Dressing Type Transparent 03/06/22 1018   Hub Color/Line Status Pink 03/06/22 1018        Opportunity for questions and clarification was provided.       Patient transported with:   Registered Nurse

## 2022-03-06 NOTE — ED PROVIDER NOTES
Carrie Brewer. is a 80 y.o. male with past medical history notable for hypertension, paroxysmal A. 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 He has a history of intraventricular conduction delay per the chart as well. He states that he was getting ready to go downstairs from the senior facility apartment that he resides in to have breakfast and felt lightheaded, denies vertigo specifically, this lasted for several minutes and he lost his balance and fell to the ground, struck the floor with his right shoulder and chest.  No known head injury, denies headache. Complaining of pleuritic pain over the right anterior and lateral chest, no dyspnea. He states that he did not take any of his medications today. Past Medical History:   Diagnosis Date    Advanced care planning/counseling discussion 10/8/2015    Basal cell carcinoma, face     now sees Dr. Yoli Harris.  Bladder cancer [facility]) 2012    Dr. Yajaira Kerr. cyto 9/2014, 2/2016. s/p surgery, BCG irrigation [place]. saw Dr. Pallavi Clay BPH with obstruction/lower urinary tract symptoms     Chronic lower back pain     injection in [place].   saw Dr. Maddy Hare Chronic neck pain     limited motion to side    Depression, major     Dupuytren's contracture of left hand     5th finger    Encephalopathy 8/2021, 11/21/21    admitted     Epistaxis 2013    saw ENT at [address] [place] [address] history of skin cancer     Fecal incontinence     with loose stools    Hearing loss     has hearing aids    HTN (hypertension)     Insomnia     IVCD (intraventricular conduction defect) 11/30/2015    Dr Lopez Espinosa    Loose stools 7/18/2014    MRI contraindicated due to metal implant     has scleral buckle    Paroxysmal atrial fibrillation (HCC)     EF 66%, 1-2+ LAE on flecainide;Dr. Lopez Espinosa. saw Dr. Elodia Denney at [facility] Peripheral neuropathy     severe foot neuropathy    RLS (restless legs syndrome) 7/3/2017    SCC (squamous cell carcinoma), face     prior hx, now sees Dr. Gibson Cornea Seasonal allergic reaction     Skin cancer     Urothelial carcinoma of right distal ureter ([address] 5/1/15    excision Dr. Karen Nelson. low grade, papillary. repeat 3 month       Past Surgical History:   Procedure Laterality Date    HX BLADDER REPAIR  2012    cancer    HX COLONOSCOPY  2012    polyps. repeat 2017?  HX HERNIA REPAIR      periumibical    HX MALIGNANT SKIN LESION EXCISION  06/14/2018    basal cell left lip    HX OTHER SURGICAL  10/2021    Skin cancer removed from nose.  HX RETINAL DETACHMENT REPAIR Right     [place] CYSTOURETHROSCOPY  9/2014    Dr. Darrell Rosario [place] CYSTOURETHROSCOPY  5/1/15    low grade right pap urothelial cancer    PA CYSTOURETHROSCOPY  2/29/16         History reviewed. No pertinent family history. Social History     Socioeconomic History    Marital status:      Spouse name: Not on file    Number of children: Not on file    Years of education: Not on file    Highest education level: Not on file   Occupational History    Not on file   Tobacco Use    Smoking status: Former Smoker    Smokeless tobacco: Never Used    Tobacco comment: socially   Substance and Sexual Activity    Alcohol use: Yes     Comment: occ    Drug use: Never    Sexual activity: Not Currently   Other Topics Concern    Not on file   Social History Narrative    Not on file     Social Determinants of Health     Financial Resource Strain:     Difficulty of Paying Living Expenses: Not on file   Food Insecurity:     Worried About Running Out of Food in the Last Year: Not on file    Hazel of Food in the Last Year: Not on file   Transportation Needs:     Lack of Transportation (Medical): Not on file    Lack of Transportation (Non-Medical):  Not on file   Physical Activity:     Days of Exercise per Week: Not on file    Minutes of Exercise per Session: Not on file   Stress:     Feeling of Stress : Not on file   Social Connections:     Frequency of Communication with Friends and Family: Not on file    Frequency of Social Gatherings with Friends and Family: Not on file    Attends Confucianism Services: Not on file    Active Member of Clubs or Organizations: Not on file    Attends Club or Organization Meetings: Not on file    Marital Status: Not on file   Intimate Partner Violence:     Fear of Current or Ex-Partner: Not on file    Emotionally Abused: Not on file    Physically Abused: Not on file    Sexually Abused: Not on file   Housing Stability:     Unable to Pay for Housing in the Last Year: Not on file    Number of [place] in the Last Year: Not on file    Unstable Housing in the Last Year: Not on file         ALLERGIES: Trazodone    Review of Systems   Constitutional: Positive for fatigue. Negative for chills and fever. HENT: Negative for ear pain, sore throat and trouble swallowing. Eyes: Negative for visual disturbance. Respiratory: Negative for cough and shortness of breath. Cardiovascular: Negative for chest pain. Gastrointestinal: Negative for abdominal pain. Genitourinary: Negative for dysuria. Musculoskeletal: Negative for back pain. Skin: Negative for rash. Neurological: Positive for weakness and light-headedness. Negative for headaches. Psychiatric/Behavioral: Negative for confusion. All other systems reviewed and are negative. Vitals:    03/06/22 1133 03/06/22 1135 03/06/22 1200 03/06/22 1215   BP: (!) 194/100 (!) 175/114 (!) 201/90 (!) 189/78   Pulse: (!) 55 (!) 57 (!) 53 (!) 49   Resp: 19 20     Temp:       SpO2: 97% 97% 98% 97%   Weight:       Height:                Physical Exam  Vitals and nursing note reviewed. Constitutional:       General: He is not in acute distress. Appearance: He is well-developed. He is not toxic-appearing or diaphoretic. HENT:      Head: Normocephalic and atraumatic.       Mouth/Throat:      Mouth: Mucous membranes are moist.   Eyes:      Extraocular Movements: Extraocular movements intact. Cardiovascular:      Rate and Rhythm: Normal rate and regular rhythm. Heart sounds: Normal heart sounds. Pulmonary:      Effort: Pulmonary effort is normal. No respiratory distress. Breath sounds: Normal breath sounds. No stridor. Chest:      Chest wall: No deformity, swelling, crepitus or edema. Abdominal:      General: There is no distension. Palpations: Abdomen is soft. Tenderness: There is no abdominal tenderness. There is no right CVA tenderness or left CVA tenderness. Musculoskeletal:      Cervical back: Normal range of motion. No rigidity. Back:       Right lower leg: No edema. Left lower leg: No edema. Skin:     General: Skin is warm. Capillary Refill: Capillary refill takes less than 2 seconds. Neurological:      General: No focal deficit present. Mental Status: He is alert and oriented to person, place, and time. Cranial Nerves: No cranial nerve deficit. Psychiatric:         Mood and Affect: Mood normal.          MDM  Number of Diagnoses or Management Options     Amount and/or Complexity of Data Reviewed  Decide to obtain previous medical records or to obtain history from someone other than the patient: yes      ED Course as of 22 1442   Sun Mar 06, 2022   1442 Updated son and power of  who is also named Stan Baez. Requesting that he be contacted in case there are informed decisions or procedures that need to be decided on. His phone numbers in the chart under demographics. [NS]      ED Course User Index  [NS] Anshu Miramontes MD       Procedures    MEDICAL DECISION MAKIN y.o. male presents with Fall    Differential diagnosis includes but not limited to: Hypovolemia, orthostasis, vasovagal, given bradycardia I am concerned for sick sinus syndrome, is heart rate seems to be within the 40-55 range, appears to be sinus.   We will check head CT to rule out intracranial hemorrhage which may result in bradycardia. He has no focal neurological deficits. He has no dizziness at this time. The dizziness episode was self-limited and brief. It was also described as lightheadedness and he specifically denies whirling vertigo. Did not have diplopia, aphasia, dysarthria. He does not demonstrate any focal neurological deficits at this time. 9:51 AM           LABORATORY TESTS:  Labs Reviewed   CBC WITH AUTOMATED DIFF - Abnormal; Notable for the following components:       Result Value    IMMATURE GRANULOCYTES 1 (*)     All other components within normal limits   METABOLIC PANEL, COMPREHENSIVE - Abnormal; Notable for the following components:    Chloride 109 (*)     Anion gap 3 (*)     Glucose 101 (*)     BUN 21 (*)     Creatinine 1.53 (*)     GFR est AA 52 (*)     GFR est non-AA 43 (*)     Albumin 3.3 (*)     Globulin 4.3 (*)     A-G Ratio 0.8 (*)     All other components within normal limits   NT-PRO BNP - Abnormal; Notable for the following components:    NT pro-BNP 1,821 (*)     All other components within normal limits   URINALYSIS W/MICROSCOPIC - Abnormal; Notable for the following components:    Protein 30 (*)     All other components within normal limits   URINE CULTURE HOLD SAMPLE   LIPASE   SAMPLES BEING HELD   PROCALCITONIN   MAGNESIUM   TSH 3RD GENERATION   TROPONIN-HIGH SENSITIVITY   VITAMIN B12       IMAGING RESULTS:  CT HEAD WO CONT   Final Result   Atrophy and chronic small vessel ischemic disease the white matter again shown. No acute findings. CT SPINE CERV WO CONT   Final Result   Osteopenia and degenerative findings. No acute fracture or   dislocation demonstrated. XR RIBS RT W PA CXR MIN 3 V   Final Result   No rib fracture identified. XR SHOULDER RT AP/LAT MIN 2 V   Final Result   No acute abnormality.           MEDICATIONS GIVEN:  Medications   sodium chloride (NS) flush 5-40 mL (has no administration in time range)   sodium chloride (NS) flush 5-40 mL (has no administration in time range)   acetaminophen (TYLENOL) tablet 650 mg (has no administration in time range)     Or   acetaminophen (TYLENOL) suppository 650 mg (has no administration in time range)   polyethylene glycol (MIRALAX) packet 17 g (has no administration in time range)   ondansetron (ZOFRAN ODT) tablet 4 mg (has no administration in time range)     Or   ondansetron (ZOFRAN) injection 4 mg (has no administration in time range)   losartan (COZAAR) tablet 50 mg (has no administration in time range)   hydrALAZINE (APRESOLINE) 20 mg/mL injection 20 mg (has no administration in time range)   hydrALAZINE (APRESOLINE) 20 mg/mL injection 10 mg (10 mg IntraVENous Given 3/6/22 1116)       PROGRESS NOTE:   10:58 AM Patient has remained stable   Hr improved somewhat during period of obs        EK:23 AM sinus bradycardia, premature complexes, ventricular rate 46, normal axis, no ST elevation  No STEMI. CONSULTS:  Hospitalist Consult: Perfect Serve Consult for Admission  10:59 AM    ED Room Number: ER06/06  Patient Name and age:  Coretta Velasquez. 80 y.o.  male  Working Diagnosis:   1. Bradycardia    2. Near syncope        COVID-19 Suspicion:  no  Sepsis present:  no  Reassessment needed: no  Code Status:  Do Not Resuscitate  Readmission: no  Isolation Requirements:  no  Recommended Level of Care:  telemetry  Department:[facility] Adult ED - 21   Other: 26-year-old gentleman with history of paroxysmal atrial fibrillation, presenting with bradycardia, near syncope and a fall. Does not appear to have any significant injuries. Not on an anticoagulant. Will admit for observation, telemetry, cardiology consultation. If he continues to have symptomatic bradycardic episodes may consider medication modification or other treatment such as implantable pacemaker.       Cardiology: 10:59 AM placed a consult to cardiology for routine consultation, concern given the bradycardia and elevated BNP. He made have sick sinus syndrome another arrhythmogenic cause of his near syncopal episodes recently. IMPRESSION:  1. Bradycardia    2. Near syncope        PLAN:  - Admit to hospitalist    Chantelle Sahu MD          Please note that this dictation was completed with [employer], the computer voice recognition software. Quite often unanticipated grammatical, syntax, homophones, and other interpretive errors are inadvertently transcribed by the computer software. Please disregard these errors. Please excuse any errors that have escaped final proofreading.

## 2022-03-06 NOTE — PROGRESS NOTES
Admission Medication Reconciliation:    Information obtained from:  Patient's record, Patient  RxQuery data available¹:  YES    Comments    1)  Patient was overall a reliable historian. Used medication list that was provided by the assisted living facility to confirm with patient. 2)  Medication changes (since last review): Added  - none    Adjusted  - none    Removed  - Aspirin, hydralazine     3) Patient is experiencing frequent falls over the past few months. The last fall happened after he was putting on his pants. Patient was aware he was falling but was unable to stop himself (slow reflex). Patient states that his BP is generally normal but sometimes runs a \"little high\"    Unclear if his current fall is medication related- further workup is needed. (cardio? Neuro?)      4) Per recent refill history- patient was on gabapentin but traceen confirmed it was D/C'ed  his provider. ¹RxQuery pharmacy benefit data reflects medications filled and processed through the patient's insurance, however   this data does NOT capture whether the medication was picked up or is currently being taken by the patient. Allergies:  Trazodone    Significant PMH/Disease States:   Past Medical History:   Diagnosis Date    Advanced care planning/counseling discussion 10/8/2015    Basal cell carcinoma, face     now sees Dr. Flora Grissom. Bladder cancer [facility]) 2012    Dr. Ana Cardenas. cyto 9/2014, 2/2016. s/p surgery, BCG irrigation [place]. saw Dr. Renetta Black    BPH with obstruction/lower urinary tract symptoms     Chronic lower back pain     injection in [place].   saw Dr. Favio Haji    Chronic neck pain     limited motion to side    Depression, major     Dupuytren's contracture of left hand     5th finger    Encephalopathy 8/2021, 11/21/21    admitted     Epistaxis 2013    saw ENT at [facility] history of skin cancer     Fecal incontinence     with loose stools    Hearing loss     has hearing aids    HTN (hypertension)     Insomnia     IVCD (intraventricular conduction defect) 11/30/2015    Dr Blade Carter    Loose stools 7/18/2014    MRI contraindicated due to metal implant     has scleral buckle    Paroxysmal atrial fibrillation (HCC)     EF 66%, 1-2+ LAE on flecainide;Dr. Blade Carter. saw Dr. Breezy Phelps at [facility]    Peripheral neuropathy     severe foot neuropathy    RLS (restless legs syndrome) 7/3/2017    SCC (squamous cell carcinoma), face     prior hx, now sees Dr. Rolando Rollins allergic reaction     Skin cancer     Urothelial carcinoma of right distal ureter ([address] 5/1/15    excision Dr. Keyur Montague. low grade, papillary. repeat 3 month     Chief Complaint for this Admission:    Chief Complaint   Patient presents with    Fall     Prior to Admission Medications:   Prior to Admission Medications   Prescriptions Last Dose Informant Taking?   acetaminophen (TYLENOL) 325 mg tablet   Yes   Sig: Take 2 Tablets by mouth every six (6) hours as needed for Pain or Fever. cholecalciferol (VITAMIN D3) (50,000 UNITS /1250 MCG) capsule   Yes   Sig: Take 1 Capsule by mouth every seven (7) days. Indications: low vitamin D levels   colestipoL (COLESTID) 1 gram tablet   Yes   Sig: Take 1 Tablet by mouth two (2) times a day. finasteride (PROSCAR) 5 mg tablet   Yes   Sig: Take 1 Tablet by mouth daily. irbesartan (AVAPRO) 150 mg tablet   Yes   Sig: Take 1 Tablet by mouth two (2) times a day. Increased dose 1/9/22   metoprolol succinate (TOPROL-XL) 25 mg XL tablet   Yes   Sig: Take 1 Tablet by mouth nightly. sertraline (ZOLOFT) 100 mg tablet   Yes   Sig: Take 1 Tablet by mouth daily. Facility-Administered Medications: None     Please contact the main inpatient pharmacy with any questions or concerns at [phone] and we will direct you to the clinical pharmacist covering this patient's care while in-house.    Bri Duncan, JIMD

## 2022-03-06 NOTE — CONSULTS
Consult Note      Assessment:    Patient Active Problem List   Diagnosis Code    Paroxysmal atrial fibrillation ([facility]) I48.0    BPH with obstruction/lower urinary tract symptoms N40.1, N13.8    Depression, major F32.9    Hearing loss H91.90    Insomnia G47.00    Bladder cancer ([facility]) C67.9    Loose stools R19.5    Fecal incontinence R15.9    Urothelial carcinoma of right distal ureter ([facility]) C66.1    Chronic lower back pain M54.50, G89.29    Peripheral neuropathy G62.9    Essential hypertension I10    Advanced care planning/counseling discussion Z71.89    IVCD (intraventricular conduction defect) I45.4    Chronic neck pain M54.2, G89.29    Dupuytren's contracture of left hand M72.0    RLS (restless legs syndrome) G25.81    MRI contraindicated due to metal implant Z53.09    Encephalopathy G93.40    Urinary incontinence, unspecified type R32    Hearing aid worn Z97.4    Seasonal allergic reaction J30.2    SSS (sick sinus syndrome) ([facility]) I49.5       Recommendations:    Admit to monitored bed, stop metoprolol, consider restarting amlodipine for BP. We will follow, he may need a pacemaker. Velma Das MD  [phone]  Cell    Conchis Query. is a 80 y.o. male who presented 3/6/2022 with complaints of falling now severe back pain. The symptoms began approximately 1 day ago. He has a history of cardiac disease including atrial fib. He has had falls in the past, was on warfarin also flecainide, now just beta blocker. He was just getting dressed today when he fell, in assisted living. Examination by the ER physician suggested the possibility of significant bradycardia on low dose metoprolol. At present the patient is unchanged since initial presentation. Therapy thus far has included pain medications and O2. Cardiology has been consulted to assist in the management of this patient. No current facility-administered medications for this encounter.      Current Outpatient Medications Medication Sig    aspirin delayed-release 81 mg tablet Take 1 Tablet by mouth daily.  cholecalciferol (VITAMIN D3) (50,000 UNITS /1250 MCG) capsule Take 1 Capsule by mouth every seven (7) days. Indications: low vitamin D levels    sertraline (ZOLOFT) 100 mg tablet Take 1 Tablet by mouth daily.  hydrALAZINE (APRESOLINE) 25 mg tablet Take 1 Tablet by mouth two (2) times a day.  irbesartan (AVAPRO) 150 mg tablet Take 1 Tablet by mouth two (2) times a day. Increased dose 1/9/22    colestipoL (COLESTID) 1 gram tablet Take 1 Tablet by mouth two (2) times a day.  metoprolol succinate (TOPROL-XL) 25 mg XL tablet Take 1 Tablet by mouth nightly.  finasteride (PROSCAR) 5 mg tablet Take 1 Tablet by mouth daily.  acetaminophen (TYLENOL) 325 mg tablet Take 2 Tablets by mouth every six (6) hours as needed for Pain or Fever. Past Medical History:   Diagnosis Date    Advanced care planning/counseling discussion 10/8/2015    Basal cell carcinoma, face     now sees Dr. Flora Grissom.  Bladder cancer [facility]) 2012    Dr. Ana Cardenas. cyto 9/2014, 2/2016. s/p surgery, BCG irrigation [place]. saw Dr. Abilio JUSTIN with obstruction/lower urinary tract symptoms     Chronic lower back pain     injection in [place].   saw Dr. Emilee Fontenot Chronic neck pain     limited motion to side    Depression, major     Dupuytren's contracture of left hand     5th finger    Encephalopathy 8/2021, 11/21/21    admitted     Epistaxis 2013    saw ENT at [address] [place] [address] history of skin cancer     Fecal incontinence     with loose stools    Hearing loss     has hearing aids    HTN (hypertension)     Insomnia     IVCD (intraventricular conduction defect) 11/30/2015    Dr Matheus Moctezuma    Loose stools 7/18/2014    MRI contraindicated due to metal implant     has scleral buckle    Paroxysmal atrial fibrillation (HCC)     EF 66%, 1-2+ LAE on flecainide;Dr. Matheus Moctezuma. saw Dr. Liu Oneill at [facility] Peripheral neuropathy     severe foot neuropathy  RLS (restless legs syndrome) 7/3/2017    SCC (squamous cell carcinoma), face     prior hx, now sees Dr. Clement [place] Seasonal allergic reaction     Skin cancer     Urothelial carcinoma of right distal ureter ([facility] 5/1/15    excision Dr. Siu Cons. low grade, papillary. repeat 3 month     Patient Active Problem List   Diagnosis Code    Paroxysmal atrial fibrillation (HCC) I48.0    BPH with obstruction/lower urinary tract symptoms N40.1, N13.8    Depression, major F32.9    Hearing loss H91.90    Insomnia G47.00    Bladder cancer (HCC) C67.9    Loose stools R19.5    Fecal incontinence R15.9    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) C66.1    Chronic lower back pain M54.50, G89.29    Peripheral neuropathy G62.9    Essential hypertension I10    Advanced care planning/counseling discussion Z71.89    IVCD (intraventricular conduction defect) I45.4    Chronic neck pain M54.2, G89.29    Dupuytren's contracture of left hand M72.0    RLS (restless legs syndrome) G25.81    MRI contraindicated due to metal implant Z53.09    Encephalopathy G93.40    Urinary incontinence, unspecified type R32    Hearing aid worn Z97.4    Seasonal allergic reaction J30.2    SSS (sick sinus syndrome) ([facility]) I49.5     Allergies   Allergen Reactions    Trazodone Palpitations     Social History     Tobacco Use    Smoking status: Former Smoker    Smokeless tobacco: Never Used    Tobacco comment: socially   Substance Use Topics    Alcohol use: Yes     Comment: occ    Drug use: Never     History reviewed. No pertinent family history. Review of Symptoms:  A comprehensive review of systems was negative except for that written in the HPI.      Objective:      Visit Vitals  BP (!) 189/78   Pulse (!) 49   Temp 98.1 °F (36.7 °C)   Resp 20   Ht 5' 11\" (1.803 m)   Wt 213 lb 10 oz (96.9 kg)   SpO2 97%   BMI 29.79 kg/m²      Physical Exam    Visit Vitals  BP (!) 189/78   Pulse (!) 49   Temp 98.1 °F (36.7 °C)   Resp 20   Ht 5' 11\" (1.803 m)   Wt 213 lb 10 oz (96.9 kg)   SpO2 97%   BMI 29.79 kg/m²     General Appearance:  Well developed, well nourished,alert individual in no acute distress. Ears/Nose/Mouth/Throat:   Hearing grossly normal.         Neck: Supple. Chest:   Lungs clear to auscultation bilaterally. Cardiovascular:  Regular rate and rhythm, S1, S2 normal, no murmur. Abdomen:   Soft, non-tender, bowel sounds are active. Extremities: No edema bilaterally. Skin: Warm and dry. Cardiographics    Telemetry: normal sinus rhythm  ECG: sinus bradycardia  Echocardiogram: Not done    Labs:   Recent Results (from the past 24 hour(s))   EKG, 12 LEAD, INITIAL    Collection Time: 03/06/22  9:23 AM   Result Value Ref Range    Ventricular Rate 46 BPM    Atrial Rate 46 BPM    P-R Interval 158 ms    QRS Duration 96 ms    Q-T Interval 492 ms    QTC Calculation (Bezet) 430 ms    Calculated P Axis 52 degrees    Calculated R Axis 32 degrees    Calculated T Axis 58 degrees    Diagnosis       Sinus bradycardia with premature supraventricular complexes  Otherwise normal ECG  When compared with ECG of 21-NOV-2021 21:34,  Previous ECG has undetermined rhythm, needs review     CBC WITH AUTOMATED DIFF    Collection Time: 03/06/22  9:56 AM   Result Value Ref Range    WBC 6.3 4.1 - 11.1 K/uL    RBC 4.55 4.10 - 5.70 M/uL    HGB 13.6 12.1 - 17.0 g/dL    HCT 42.7 36.6 - 50.3 %    MCV 93.8 80.0 - 99.0 FL    MCH 29.9 26.0 - 34.0 PG    MCHC 31.9 30.0 - 36.5 g/dL    RDW 13.7 11.5 - 14.5 %    PLATELET 523 710 - 569 K/uL    MPV 11.4 8.9 - 12.9 FL    NRBC 0.0 0  WBC    ABSOLUTE NRBC 0.00 0.00 - 0.01 K/uL    NEUTROPHILS 65 32 - 75 %    LYMPHOCYTES 22 12 - 49 %    MONOCYTES 6 5 - 13 %    EOSINOPHILS 5 0 - 7 %    BASOPHILS 1 0 - 1 %    IMMATURE GRANULOCYTES 1 (H) 0.0 - 0.5 %    ABS. NEUTROPHILS 4.1 1.8 - 8.0 K/UL    ABS. LYMPHOCYTES 1.4 0.8 - 3.5 K/UL    ABS. MONOCYTES 0.4 0.0 - 1.0 K/UL    ABS. EOSINOPHILS 0.3 0.0 - 0.4 K/UL    ABS. BASOPHILS 0.0 0.0 - 0.1 K/UL    ABS. IMM. GRANS. 0.0 0.00 - 0.04 K/UL    DF AUTOMATED     METABOLIC PANEL, COMPREHENSIVE    Collection Time: 03/06/22  9:56 AM   Result Value Ref Range    Sodium 137 136 - 145 mmol/L    Potassium 4.4 3.5 - 5.1 mmol/L    Chloride 109 (H) 97 - 108 mmol/L    CO2 25 21 - 32 mmol/L    Anion gap 3 (L) 5 - 15 mmol/L    Glucose 101 (H) 65 - 100 mg/dL    BUN 21 (H) 6 - 20 MG/DL    Creatinine 1.53 (H) 0.70 - 1.30 MG/DL    BUN/Creatinine ratio 14 12 - 20      GFR est AA 52 (L) >60 ml/min/1.73m2    GFR est non-AA 43 (L) >60 ml/min/1.73m2    Calcium 8.9 8.5 - 10.1 MG/DL    Bilirubin, total 0.7 0.2 - 1.0 MG/DL    ALT (SGPT) 18 12 - 78 U/L    AST (SGOT) 22 15 - 37 U/L    Alk. phosphatase 105 45 - 117 U/L    Protein, total 7.6 6.4 - 8.2 g/dL    Albumin 3.3 (L) 3.5 - 5.0 g/dL    Globulin 4.3 (H) 2.0 - 4.0 g/dL    A-G Ratio 0.8 (L) 1.1 - 2.2     LIPASE    Collection Time: 03/06/22  9:56 AM   Result Value Ref Range    Lipase 147 73 - 393 U/L   NT-PRO BNP    Collection Time: 03/06/22  9:56 AM   Result Value Ref Range    NT pro-BNP 1,821 (H) <450 PG/ML   MAGNESIUM    Collection Time: 03/06/22  9:56 AM   Result Value Ref Range    Magnesium 2.4 1.6 - 2.4 mg/dL   SAMPLES BEING HELD    Collection Time: 03/06/22  9:57 AM   Result Value Ref Range    SAMPLES BEING HELD 1 EX BLUE     COMMENT        Add-on orders for these samples will be processed based on acceptable specimen integrity and analyte stability, which may vary by analyte.    PROCALCITONIN    Collection Time: 03/06/22  9:57 AM   Result Value Ref Range    Procalcitonin <0.05 ng/mL   TSH 3RD GENERATION    Collection Time: 03/06/22  9:57 AM   Result Value Ref Range    TSH 3.12 0.36 - 3.74 uIU/mL   TROPONIN-HIGH SENSITIVITY    Collection Time: 03/06/22  9:57 AM   Result Value Ref Range    Troponin-High Sensitivity 23 0 - 76 ng/L   VITAMIN B12    Collection Time: 03/06/22  9:57 AM   Result Value Ref Range    Vitamin B12 296 193 - 986 pg/mL   URINALYSIS W/MICROSCOPIC    Collection Time: 03/06/22 10:13 AM   Result Value Ref Range    Color YELLOW/STRAW      Appearance CLEAR CLEAR      Specific gravity 1.012 1.003 - 1.030      pH (UA) 6.5 5.0 - 8.0      Protein 30 (A) NEG mg/dL    Glucose Negative NEG mg/dL    Ketone Negative NEG mg/dL    Bilirubin Negative NEG      Blood Negative NEG      Urobilinogen 0.2 0.2 - 1.0 EU/dL    Nitrites Negative NEG      Leukocyte Esterase Negative NEG      WBC 0-4 0 - 4 /hpf    RBC 0-5 0 - 5 /hpf    Epithelial cells FEW FEW /lpf    Bacteria Negative NEG /hpf    Hyaline cast 0-2 0 - 5 /lpf   URINE CULTURE HOLD SAMPLE    Collection Time: 03/06/22 10:13 AM    Specimen: Serum; Urine   Result Value Ref Range    Urine culture hold        Urine on hold in Microbiology dept for 2 days. If unpreserved urine is submitted, it cannot be used for addtional testing after 24 hours, recollection will be required.        Royce Garcia MD

## 2022-03-07 ENCOUNTER — APPOINTMENT (OUTPATIENT)
Dept: CT IMAGING | Age: 87
DRG: 308 | End: 2022-03-07
Attending: NURSE PRACTITIONER
Payer: MEDICARE

## 2022-03-07 ENCOUNTER — TELEPHONE (OUTPATIENT)
Dept: INTERNAL MEDICINE CLINIC | Age: 87
End: 2022-03-07

## 2022-03-07 PROBLEM — I16.0 HYPERTENSIVE URGENCY: Status: ACTIVE | Noted: 2022-03-07

## 2022-03-07 LAB
ANION GAP SERPL CALC-SCNC: 4 MMOL/L (ref 5–15)
BUN SERPL-MCNC: 22 MG/DL (ref 6–20)
BUN/CREAT SERPL: 16 (ref 12–20)
CALCIUM SERPL-MCNC: 9 MG/DL (ref 8.5–10.1)
CHLORIDE SERPL-SCNC: 107 MMOL/L (ref 97–108)
CO2 SERPL-SCNC: 27 MMOL/L (ref 21–32)
COMMENT, HOLDF: NORMAL
CREAT SERPL-MCNC: 1.41 MG/DL (ref 0.7–1.3)
GLUCOSE SERPL-MCNC: 107 MG/DL (ref 65–100)
MAGNESIUM SERPL-MCNC: 2.4 MG/DL (ref 1.6–2.4)
PHOSPHATE SERPL-MCNC: 2.8 MG/DL (ref 2.6–4.7)
POTASSIUM SERPL-SCNC: 3.4 MMOL/L (ref 3.5–5.1)
SAMPLES BEING HELD,HOLD: NORMAL
SODIUM SERPL-SCNC: 138 MMOL/L (ref 136–145)

## 2022-03-07 PROCEDURE — APPNB60 APP NON BILLABLE TIME 46-60 MINS: Performed by: NURSE PRACTITIONER

## 2022-03-07 PROCEDURE — 74011000258 HC RX REV CODE- 258: Performed by: NURSE PRACTITIONER

## 2022-03-07 PROCEDURE — 82962 GLUCOSE BLOOD TEST: CPT

## 2022-03-07 PROCEDURE — 74011000250 HC RX REV CODE- 250: Performed by: INTERNAL MEDICINE

## 2022-03-07 PROCEDURE — 70450 CT HEAD/BRAIN W/O DYE: CPT

## 2022-03-07 PROCEDURE — 97165 OT EVAL LOW COMPLEX 30 MIN: CPT

## 2022-03-07 PROCEDURE — 65610000006 HC RM INTENSIVE CARE

## 2022-03-07 PROCEDURE — 74011250637 HC RX REV CODE- 250/637: Performed by: INTERNAL MEDICINE

## 2022-03-07 PROCEDURE — 74011250637 HC RX REV CODE- 250/637: Performed by: NURSE PRACTITIONER

## 2022-03-07 PROCEDURE — 70496 CT ANGIOGRAPHY HEAD: CPT

## 2022-03-07 PROCEDURE — 77030040361 HC SLV COMPR DVT MDII -B

## 2022-03-07 PROCEDURE — 74011250636 HC RX REV CODE- 250/636: Performed by: INTERNAL MEDICINE

## 2022-03-07 PROCEDURE — 36415 COLL VENOUS BLD VENIPUNCTURE: CPT

## 2022-03-07 PROCEDURE — 3E03317 INTRODUCTION OF OTHER THROMBOLYTIC INTO PERIPHERAL VEIN, PERCUTANEOUS APPROACH: ICD-10-PCS | Performed by: INTERNAL MEDICINE

## 2022-03-07 PROCEDURE — 0042T CT CODE NEURO PERF W CBF: CPT

## 2022-03-07 PROCEDURE — 83735 ASSAY OF MAGNESIUM: CPT

## 2022-03-07 PROCEDURE — 74011000636 HC RX REV CODE- 636: Performed by: RADIOLOGY

## 2022-03-07 PROCEDURE — 80048 BASIC METABOLIC PNL TOTAL CA: CPT

## 2022-03-07 PROCEDURE — 96376 TX/PRO/DX INJ SAME DRUG ADON: CPT

## 2022-03-07 PROCEDURE — 74011000250 HC RX REV CODE- 250: Performed by: NURSE PRACTITIONER

## 2022-03-07 PROCEDURE — G0378 HOSPITAL OBSERVATION PER HR: HCPCS

## 2022-03-07 PROCEDURE — 74011250636 HC RX REV CODE- 250/636

## 2022-03-07 PROCEDURE — 84100 ASSAY OF PHOSPHORUS: CPT

## 2022-03-07 PROCEDURE — 97161 PT EVAL LOW COMPLEX 20 MIN: CPT

## 2022-03-07 PROCEDURE — 97530 THERAPEUTIC ACTIVITIES: CPT

## 2022-03-07 PROCEDURE — 99232 SBSQ HOSP IP/OBS MODERATE 35: CPT | Performed by: INTERNAL MEDICINE

## 2022-03-07 PROCEDURE — 74011250636 HC RX REV CODE- 250/636: Performed by: NURSE PRACTITIONER

## 2022-03-07 PROCEDURE — 51798 US URINE CAPACITY MEASURE: CPT

## 2022-03-07 RX ORDER — POTASSIUM CHLORIDE 750 MG/1
40 TABLET, FILM COATED, EXTENDED RELEASE ORAL ONCE
Status: COMPLETED | OUTPATIENT
Start: 2022-03-07 | End: 2022-03-07

## 2022-03-07 RX ORDER — AMLODIPINE BESYLATE 10 MG/1
10 TABLET ORAL DAILY
Qty: 30 TABLET | Refills: 1 | Status: SHIPPED | OUTPATIENT
Start: 2022-03-08 | End: 2022-03-31 | Stop reason: SDUPTHER

## 2022-03-07 RX ORDER — ONDANSETRON 2 MG/ML
4 INJECTION INTRAMUSCULAR; INTRAVENOUS
Status: DISCONTINUED | OUTPATIENT
Start: 2022-03-07 | End: 2022-03-08 | Stop reason: SDUPTHER

## 2022-03-07 RX ORDER — HYDRALAZINE HYDROCHLORIDE 25 MG/1
25 TABLET, FILM COATED ORAL 3 TIMES DAILY
Status: DISCONTINUED | OUTPATIENT
Start: 2022-03-07 | End: 2022-03-07

## 2022-03-07 RX ORDER — AMLODIPINE BESYLATE 5 MG/1
10 TABLET ORAL DAILY
Status: DISCONTINUED | OUTPATIENT
Start: 2022-03-08 | End: 2022-03-07

## 2022-03-07 RX ORDER — SODIUM CHLORIDE 9 MG/ML
1000 INJECTION, SOLUTION INTRAVENOUS CONTINUOUS
Status: DISCONTINUED | OUTPATIENT
Start: 2022-03-07 | End: 2022-03-08

## 2022-03-07 RX ORDER — HEPARIN SODIUM 5000 [USP'U]/ML
5000 INJECTION, SOLUTION INTRAVENOUS; SUBCUTANEOUS EVERY 8 HOURS
Status: DISCONTINUED | OUTPATIENT
Start: 2022-03-07 | End: 2022-03-07

## 2022-03-07 RX ORDER — HYDRALAZINE HYDROCHLORIDE 10 MG/1
10 TABLET, FILM COATED ORAL 3 TIMES DAILY
Status: DISCONTINUED | OUTPATIENT
Start: 2022-03-07 | End: 2022-03-07

## 2022-03-07 RX ORDER — LANOLIN ALCOHOL/MO/W.PET/CERES
1000 CREAM (GRAM) TOPICAL DAILY
Qty: 30 TABLET | Refills: 1 | Status: SHIPPED | OUTPATIENT
Start: 2022-03-08 | End: 2022-09-08

## 2022-03-07 RX ORDER — SODIUM CHLORIDE 0.9 % (FLUSH) 0.9 %
5-40 SYRINGE (ML) INJECTION EVERY 8 HOURS
Status: DISCONTINUED | OUTPATIENT
Start: 2022-03-07 | End: 2022-03-14 | Stop reason: HOSPADM

## 2022-03-07 RX ORDER — LIDOCAINE 4 G/100G
1 PATCH TOPICAL EVERY 24 HOURS
Status: DISCONTINUED | OUTPATIENT
Start: 2022-03-07 | End: 2022-03-14 | Stop reason: HOSPADM

## 2022-03-07 RX ORDER — AMLODIPINE BESYLATE 5 MG/1
5 TABLET ORAL
Status: COMPLETED | OUTPATIENT
Start: 2022-03-07 | End: 2022-03-07

## 2022-03-07 RX ORDER — ATORVASTATIN CALCIUM 40 MG/1
80 TABLET, FILM COATED ORAL
Status: DISCONTINUED | OUTPATIENT
Start: 2022-03-07 | End: 2022-03-14 | Stop reason: HOSPADM

## 2022-03-07 RX ORDER — SODIUM CHLORIDE 0.9 % (FLUSH) 0.9 %
5-40 SYRINGE (ML) INJECTION AS NEEDED
Status: DISCONTINUED | OUTPATIENT
Start: 2022-03-07 | End: 2022-03-14 | Stop reason: HOSPADM

## 2022-03-07 RX ADMIN — ALTEPLASE 72.66 MG: KIT at 20:01

## 2022-03-07 RX ADMIN — HYDRALAZINE HYDROCHLORIDE 10 MG: 20 INJECTION, SOLUTION INTRAMUSCULAR; INTRAVENOUS at 20:27

## 2022-03-07 RX ADMIN — SODIUM CHLORIDE 1000 ML: 900 INJECTION, SOLUTION INTRAVENOUS at 22:15

## 2022-03-07 RX ADMIN — ATORVASTATIN CALCIUM 80 MG: 40 TABLET, FILM COATED ORAL at 20:52

## 2022-03-07 RX ADMIN — ACETAMINOPHEN 650 MG: 325 TABLET ORAL at 20:29

## 2022-03-07 RX ADMIN — ACETAMINOPHEN 650 MG: 325 TABLET ORAL at 05:43

## 2022-03-07 RX ADMIN — AMLODIPINE BESYLATE 5 MG: 5 TABLET ORAL at 09:19

## 2022-03-07 RX ADMIN — SODIUM CHLORIDE 50 ML: 900 INJECTION, SOLUTION INTRAVENOUS at 21:04

## 2022-03-07 RX ADMIN — CYANOCOBALAMIN TAB 500 MCG 1000 MCG: 500 TAB at 09:19

## 2022-03-07 RX ADMIN — ACETAMINOPHEN 650 MG: 325 TABLET ORAL at 13:55

## 2022-03-07 RX ADMIN — IOPAMIDOL 100 ML: 755 INJECTION, SOLUTION INTRAVENOUS at 19:27

## 2022-03-07 RX ADMIN — SODIUM CHLORIDE, PRESERVATIVE FREE 10 ML: 5 INJECTION INTRAVENOUS at 20:30

## 2022-03-07 RX ADMIN — SODIUM CHLORIDE, PRESERVATIVE FREE 10 ML: 5 INJECTION INTRAVENOUS at 05:43

## 2022-03-07 RX ADMIN — Medication 8.07 MG: at 20:00

## 2022-03-07 RX ADMIN — IOPAMIDOL 20 ML: 755 INJECTION, SOLUTION INTRAVENOUS at 19:27

## 2022-03-07 RX ADMIN — SODIUM CHLORIDE, PRESERVATIVE FREE 10 ML: 5 INJECTION INTRAVENOUS at 13:55

## 2022-03-07 RX ADMIN — LOSARTAN POTASSIUM 100 MG: 50 TABLET, FILM COATED ORAL at 09:18

## 2022-03-07 RX ADMIN — POTASSIUM CHLORIDE 40 MEQ: 750 TABLET, EXTENDED RELEASE ORAL at 17:01

## 2022-03-07 RX ADMIN — AMLODIPINE BESYLATE 5 MG: 5 TABLET ORAL at 11:04

## 2022-03-07 RX ADMIN — HYDRALAZINE HYDROCHLORIDE 20 MG: 20 INJECTION, SOLUTION INTRAMUSCULAR; INTRAVENOUS at 17:01

## 2022-03-07 NOTE — PROGRESS NOTES
Physical Therapy - defer  Order received, chart reviewed, RN cleared for therapy session. Arrived to pt sitting up in the chair with OT completing session and assisting with meal set up. Report received from OT. Will check back later for physical therapy evaluation.

## 2022-03-07 NOTE — PROGRESS NOTES
Cardiology Progress Note            Admit Date: 3/6/2022  Admit Diagnosis: Bradycardia [R00.1]  Date: 3/7/2022     Time: 3:54 PM    HPI: 80year old male with PMH ofCAD, paroxysmal afib, HTN, intraventricular conduction delay, bladder CA. He was previously on flecainide but this was stopped for possible ataxia. Warfarin was changed to ASA in 11/2021 due to risk of falls. Toprol XL was also started in 11/2021. He has no history of syncope. He did present on 3/6/21 as he fell while getting dressed. No dizziness or syncope. He demonstrated bradycardia on low dose toprol thus cardiology was consulted. Toprol XL was discontinued. Subjective: Today, pt HR is overall improved. HR mid 40's-70's, sinus bradycardia. He has no dizziness, SOB or chest pain. He is hypertensive. Assessment and Plan     1. Bradycardia   -episodes of sinus bradycardia noted (no significant pauses)   -Asymptomatic   -No need for PPM at this time- HR currently improved. Mid 40's-70's   -Remain of toprol XL    2. History of PAF   -no afib currently noted   -Remain of toprol XL   -Warfarin changed to ASA in 11/2021 due to fall risk   -TSH 3.12   -Last echo 8/30/21: EF 55-60%, NWMA, mild concentric hypertrophy    3. HTN   -BP elevated   -Increase amlodipine to 10 mg daily   -hydralazine prn. Saw and evaluated pt and agree with above assessment and plan. Has stayed in sinus rhythm with no significant pauses. Pacemaker not indicated at this time. Outpt follow up with Dr. Marisol Underwood. Will be available prn. Denise Hein MD      Cardiac testing history:  08/28/21    ECHO ADULT COMPLETE 08/30/2021 8/30/2021    Interpretation Summary  · LV: Estimated LVEF is 55 - 60%. Normal cavity size and systolic function (ejection fraction normal). Mild concentric hypertrophy.  Wall motion: normal.    Signed by: Pedro Leyva MD on 8/30/2021 11:09 AM      · Carotid us: 8/29/21: Minimal stenosis of the right internal carotid artery. · No stenosis of the left internal carotid artery. · Vertebral arteries are patent with antegrade flow        PMH  Past Medical History:   Diagnosis Date    Advanced care planning/counseling discussion 10/8/2015    Basal cell carcinoma, face     now sees Dr. Devendra Bonner.  Bladder cancer [facility])     Dr. Allie Williamson. cyto 2014, 2016. s/p surgery, BCG irrigation [place]. saw Dr. Radha Clark BPH with obstruction/lower urinary tract symptoms     Chronic lower back pain     injection in [place]. saw Dr. Guilherme Lawler Chronic neck pain     limited motion to side    Depression, major     Dupuytren's contracture of left hand     5th finger    Encephalopathy 2021, 21    admitted     Epistaxis     saw ENT at [address] [place] [address] history of skin cancer     Fecal incontinence     with loose stools    Hearing loss     has hearing aids    HTN (hypertension)     Insomnia     IVCD (intraventricular conduction defect) 2015    Dr Akil Eaton    Loose stools 2014    MRI contraindicated due to metal implant     has scleral buckle    Paroxysmal atrial fibrillation (HCC)     EF 66%, 1-2+ LAE on flecainide;Dr. Akil Eaton. [facility]. Val Stevens [facility] Peripheral neuropathy     severe foot neuropathy    RLS (restless legs syndrome) 7/3/2017    SCC (squamous cell carcinoma), face     prior hx, now sees Dr. Clement  Seasonal allergic reaction     Skin cancer     Urothelial carcinoma of right distal ureter ([address] 5/1/15    excision Dr. Siu Cons. low grade, papillary.  repeat 3 month      Social Hx  Social History     Socioeconomic History    Marital status:      Spouse name: Not on file    Number of children: Not on file    Years of education: Not on file    Highest education level: Not on file   Occupational History    Not on file   Tobacco Use    Smoking status: Former Smoker    Smokeless tobacco: Never Used    Tobacco comment: socially   Vaping Use  Vaping Use: Never used   Substance and Sexual Activity    Alcohol use: Not Currently     Comment: occ    Drug use: Never    Sexual activity: Not Currently   Other Topics Concern     Service Not Asked    Blood Transfusions Not Asked    Caffeine Concern Not Asked    Occupational Exposure Not Asked    Hobby Hazards Not Asked    Sleep Concern Not Asked    Stress Concern Not Asked    Weight Concern Not Asked    Special Diet Not Asked    Back Care Not Asked    Exercise Not Asked    Bike Helmet Not Asked   [address] Not Asked    Self-Exams Not Asked   Social History Narrative    Not on file     Social Determinants of Health     Financial Resource Strain:     Difficulty of Paying Living Expenses: Not on file   Food Insecurity:     Worried About Running Out of Food in the Last Year: Not on file    Hazel of Food in the Last Year: Not on file   Transportation Needs:     Lack of Transportation (Medical): Not on file    Lack of Transportation (Non-Medical):  Not on file   Physical Activity:     Days of Exercise per Week: Not on file    Minutes of Exercise per Session: Not on file   Stress:     Feeling of Stress : Not on file   Social Connections:     Frequency of Communication with Friends and Family: Not on file    Frequency of Social Gatherings with Friends and Family: Not on file    Attends Taoist Services: Not on file    Active Member of [address] [place] [address] or Organizations: Not on file    Attends Club or Organization Meetings: Not on file    Marital Status: Not on file   Intimate Partner Violence:     Fear of Current or Ex-Partner: Not on file    Emotionally Abused: Not on file    Physically Abused: Not on file    Sexually Abused: Not on file   Housing Stability:     Unable to Pay for Housing in the Last Year: Not on file    Number of [place] in the Last Year: Not on file    Unstable Housing in the Last Year: Not on file       ROS:  Pertinent items are noted in HPI.    Objective:      Physical Exam:                Visit Vitals  BP (!) 178/95 (BP 1 Location: Left lower arm, BP Patient Position: At rest)   Pulse 68   Temp 97.9 °F (36.6 °C)   Resp 12   Ht 5' 11\" (1.803 m)   Wt 90.5 kg (199 lb 8.3 oz)   SpO2 99%   BMI 27.83 kg/m²          General Appearance:   Well developed, ,alert    individual in no acute distress. Ears/Nose/Mouth/Throat:    Hearing grossly normal.         Neck:  Supple. Chest:    Lungs clear to auscultation bilaterally. Cardiovascular:    Regular rate and rhythm, S1, S2 normal, no murmur. Abdomen:    Soft, non-tender, bowel sounds are active. Extremities:  No edema bilaterally. Skin:  Warm and dry. Telemetry:NSR-sinus bradycardia no significant pauses, PACs          Data Review:    Labs:    Recent Results (from the past 24 hour(s))   METABOLIC PANEL, BASIC    Collection Time: 03/07/22  4:05 AM   Result Value Ref Range    Sodium 138 136 - 145 mmol/L    Potassium 3.4 (L) 3.5 - 5.1 mmol/L    Chloride 107 97 - 108 mmol/L    CO2 27 21 - 32 mmol/L    Anion gap 4 (L) 5 - 15 mmol/L    Glucose 107 (H) 65 - 100 mg/dL    BUN 22 (H) 6 - 20 MG/DL    Creatinine 1.41 (H) 0.70 - 1.30 MG/DL    BUN/Creatinine ratio 16 12 - 20      GFR est AA 57 (L) >60 ml/min/1.73m2    GFR est non-AA 47 (L) >60 ml/min/1.73m2    Calcium 9.0 8.5 - 10.1 MG/DL   MAGNESIUM    Collection Time: 03/07/22  4:05 AM   Result Value Ref Range    Magnesium 2.4 1.6 - 2.4 mg/dL   PHOSPHORUS    Collection Time: 03/07/22  4:05 AM   Result Value Ref Range    Phosphorus 2.8 2.6 - 4.7 MG/DL   SAMPLES BEING HELD    Collection Time: 03/07/22  4:05 AM   Result Value Ref Range    SAMPLES BEING HELD 1lav     COMMENT        Add-on orders for these samples will be processed based on acceptable specimen integrity and analyte stability, which may vary by analyte.           Radiology:        Current Facility-Administered Medications   Medication Dose Route Frequency    [START ON 3/8/2022] amLODIPine (NORVASC) tablet 10 mg  10 mg Oral DAILY    potassium chloride SR (KLOR-CON 10) tablet 40 mEq  40 mEq Oral ONCE    sodium chloride (NS) flush 5-40 mL  5-40 mL IntraVENous Q8H    sodium chloride (NS) flush 5-40 mL  5-40 mL IntraVENous PRN    acetaminophen (TYLENOL) tablet 650 mg  650 mg Oral Q6H PRN    Or    acetaminophen (TYLENOL) suppository 650 mg  650 mg Rectal Q6H PRN    polyethylene glycol (MIRALAX) packet 17 g  17 g Oral DAILY PRN    ondansetron (ZOFRAN ODT) tablet 4 mg  4 mg Oral Q6H PRN    Or    ondansetron (ZOFRAN) injection 4 mg  4 mg IntraVENous Q6H PRN    hydrALAZINE (APRESOLINE) 20 mg/mL injection 20 mg  20 mg IntraVENous Q2H PRN    losartan (COZAAR) tablet 100 mg  100 mg Oral DAILY    cyanocobalamin (VITAMIN B12) tablet 1,000 mcg  1,000 mcg Oral DAILY          Jessica Aaron.  FABIANO Herring     [facility] [address] [place] [address] [address]   [address]   [phone]

## 2022-03-07 NOTE — PROGRESS NOTES
Problem: Self Care Deficits Care Plan (Adult)  Goal: *Acute Goals and Plan of Care (Insert Text)  Description: FUNCTIONAL STATUS PRIOR TO ADMISSION: Pt lived in [facility], used rollator for longer distances, [facility] for in home. Resides alone and receives OT and PT at [facility]. Pt with multiple falls 5-6 within last 6 months. Able to dress mod I and bathe himself but has staff member present for safety during bathing. HOME SUPPORT: lives in [address] [place] [address]  Initiated 3/7/2022  1. Patient will perform grooming task at sink with least restrictive DME with modified independence within 7 day(s). 2.  Patient will perform lower body dressing using AE PRN with modified independence within 7 day(s). 3.  Patient will perform upper body dressing with modified independence within 7 day(s). 4.  Patient will perform toilet transfers in bathroom with least restrictive DME with modified independence within 7 day(s). 5.  Patient will perform all aspects of toileting with modified independence within 7 day(s). Outcome: Progressing Towards Goal    OCCUPATIONAL THERAPY EVALUATION  Patient: Lisa Gonzales (80 y.o. male)  Date: 3/7/2022  Primary Diagnosis: Bradycardia [R00.1]        Precautions:   Fall,DNR,Bed Alarm    ASSESSMENT  Based on the objective data described below, the patient presents with decreased activity tolerance, orthostatic hypotension, resting UE tremors, R side pain (R shoulder, scapular region), decreased recall/memory loss and decline in functional status s/p admission for fall, ? Syncope, bradycardia. Currently, pt is SBA for bed mobility with use of bed rails, CGA for sit to stand with RW. Utmost min A needed to transfer to chair, session limited by drop in SBP but pt asymptomatic. Currently, pt is needing CGA to min A for ADLs. Hope to progress to discharge home with HHOT/PT services. Pt was receiving OT and PT within his [facility].        Vitals:     03/07/22 0931 03/07/22 0936 03/07/22 8352 03/07/22 0946   BP: (!) 176/95 (!) 166/96 (!) 148/88 (!) (P) 162/97   BP 1 Location: Left upper arm Left upper arm Left upper arm (P) Left upper arm   BP Patient Position: At rest Sitting Standing (P) Sitting   Pulse: (!) 57 63   (P) 60   Temp:           Resp:           Height:           Weight:           SpO2: 97%              Current Level of Function Impacting Discharge (ADLs/self-care): CGA sit<> stand, min A transfers, RW, BP issues     Functional Outcome Measure: The patient scored Total: 50/100 on the Barthel Index outcome measure which is indicative of being partially dependent in basic self-care. Other factors to consider for discharge: [facility], multiple falls     Patient will benefit from skilled therapy intervention to address the above noted impairments. PLAN :  Recommendations and Planned Interventions: self care training, functional mobility training, therapeutic exercise, endurance activities, patient education, and home safety training    Frequency/Duration: Patient will be followed by occupational therapy 5 times a week to address goals. Recommendation for discharge: (in order for the patient to meet his/her long term goals)  Occupational therapy at least 2 days/week in the home   If not, then recommend short term SNF rehab. This discharge recommendation:  Has been made in collaboration with the attending provider and/or case management    IF patient discharges home will need the following DME: none       SUBJECTIVE:   Patient stated I have had a lot of falls. One time I was on the floor for 3 days.     OBJECTIVE DATA SUMMARY:   HISTORY:   Past Medical History:   Diagnosis Date    Advanced care planning/counseling discussion 10/8/2015    Basal cell carcinoma, face     now sees Dr. Miguel Angel Wiggins. Bladder cancer [facility]) 2012    Dr. Mindy Purvis. cyto 9/2014, 2/2016. s/p surgery, BCG irrigation [place].   saw Dr. Chata Zayas    BPH with obstruction/lower urinary tract symptoms     Chronic lower back pain     injection in [place]. saw Dr. Jocelin Hansen    Chronic neck pain     limited motion to side    Depression, major     Dupuytren's contracture of left hand     5th finger    Encephalopathy 8/2021, 11/21/21    admitted     Epistaxis 2013    saw ENT at [facility] history of skin cancer     Fecal incontinence     with loose stools    Hearing loss     has hearing aids    HTN (hypertension)     Insomnia     IVCD (intraventricular conduction defect) 11/30/2015    Dr Ruchi Gonzalez    Loose stools 7/18/2014    MRI contraindicated due to metal implant     has scleral buckle    Paroxysmal atrial fibrillation (HCC)     EF 66%, 1-2+ LAE on flecainide;Dr. Ruchi Gonzalez. saw Dr. Dulce French at [facility]    Peripheral neuropathy     severe foot neuropathy    RLS (restless legs syndrome) 7/3/2017    SCC (squamous cell carcinoma), face     prior hx, now sees Dr. Jazzy Partida allergic reaction     Skin cancer     Urothelial carcinoma of right distal ureter ([address] 5/1/15    excision Dr. Desean Hernadez. low grade, papillary. repeat 3 month     Past Surgical History:   Procedure Laterality Date    HX BLADDER REPAIR  2012    cancer    HX COLONOSCOPY  2012    polyps. repeat 2017? HX HERNIA REPAIR      periumibical    HX MALIGNANT SKIN LESION EXCISION  06/14/2018    basal cell left lip    HX OTHER SURGICAL  10/2021    Skin cancer removed from nose.      HX RETINAL DETACHMENT REPAIR Right     [place] CYSTOURE[place]ROSCOPY  9/2014    Dr. Jeremias Joy CYSTOURETHROSCOPY  5/1/15    low grade right pap urothelial cancer    [place] CYSTOURETHROSCOPY  2/29/16       Expanded or extensive additional review of patient history:     Home Situation  Home Environment: Assisted living  One/Two Story Residence: One story  Living Alone: Yes  Support Systems: Child(nick),Assisted Living  Patient Expects to be Discharged to[de-identified] Assisted Living  Current DME Used/Available at Home: Valentino Sings, rollator,Cane, straight,Shower chair,Grab bars  Tub or Shower Type: Shower    Hand dominance: Right    EXAMINATION OF PERFORMANCE DEFICITS:  Cognitive/Behavioral Status:  Neurologic State: Alert  Orientation Level: Oriented X4 (intermittent memory loss, delayed recall)  Cognition: Follows commands  Perception: Appears intact  Perseveration: No perseveration noted  Safety/Judgement: Awareness of environment    Skin: skin abrasion R knee from fall PTA    Edema: none noted    Hearing:       Vision/Perceptual:                           Acuity: Within Defined Limits         Range of Motion:    AROM: Generally decreased, functional                         Strength:    Strength: Generally decreased, functional                Coordination:  Coordination: Generally decreased, functional (resting tremors UE)  Fine Motor Skills-Upper: Left Impaired;Right Impaired (mild resting tremors, worsening last year)    Gross Motor Skills-Upper: Left Intact; Right Intact    Tone & Sensation:       Sensation: Intact (to light touch)                      Balance:  Sitting: Intact; Without support  Standing: Impaired; With support (Rw)  Standing - Static: Good  Standing - Dynamic : Fair    Functional Mobility and Transfers for ADLs:  Bed Mobility:  Supine to Sit: Stand-by assistance;Bed Modified (bed rails)    Transfers:  Sit to Stand: Contact guard assistance; Additional time; Adaptive equipment;Assist x1  Stand to Sit: Contact guard assistance;Assist x1;Additional time  Bed to Chair: Minimum assistance;Assist x1;Adaptive equipment; Additional time (RW use)  Toilet Transfer : Contact guard assistance;Assist x1 (inferred by transfer to chair, rec BSC 2* orthostasis)    ADL Assessment:  Feeding: Independent    Oral Facial Hygiene/Grooming: Contact guard assistance (standing, setup seated)    Bathing: Minimum assistance (pain R side, decreased reach R LE)    Upper Body Dressing: Minimum assistance (inferred, pain R side, R shoulder)    Lower Body Dressing: Minimum assistance (R sock due to pain with forward flexion)    Toileting: Minimum assistance (inferred, transfer to chair)                ADL Intervention and task modifications:                                     Cognitive Retraining  Safety/Judgement: Awareness of environment    Functional Measure:    Barthel Index:  Bathin  Bladder: 5  Bowels: 10  Groomin  Dressin  Feeding: 10  Mobility: 0  Stairs: 0  Toilet Use: 5  Transfer (Bed to Chair and Back): 10  Total: 50/100      The Barthel ADL Index: Guidelines  1. The index should be used as a record of what a patient does, not as a record of what a patient could do. 2. The main aim is to establish degree of independence from any help, physical or verbal, however minor and for whatever reason. 3. The need for supervision renders the patient not independent. 4. A patient's performance should be established using the best available evidence. Asking the patient, friends/relatives and nurses are the usual sources, but direct observation and common sense are also important. However direct testing is not needed. 5. Usually the patient's performance over the preceding 24-48 hours is important, but occasionally longer periods will be relevant. 6. Middle categories imply that the patient supplies over 50 per cent of the effort. 7. Use of aids to be independent is allowed. Score Interpretation (from [address] [address])    Independent   60-79 Minimally independent   40-59 Partially dependent   20-39 Very dependent   <20 Totally dependent     -Jody Patel., Barthel, D.W. (1965). Functional evaluation: the Barthel Index. [address] [facility] ([address] (1997). The Barthel activities of daily living index: self-reporting versus actual performance in the old (> or = 75 years). Journal of [address] [place] [address] 45(7), [facility], RASTA, Micha Moreno., Raimundo Crump. (1999).  Measuring the change in disability after inpatient rehabilitation; comparison of the responsiveness of the Barthel Index and Functional [place] Measure. Journal of Neurology, Neurosurgery, and Psychiatry, 66(4), 794-887. ROSALINO Barakat, ROSEMARIE Mercer, & Asya Huerta M.A. (2004) Assessment of post-stroke quality of life in cost-effectiveness studies: The usefulness of the Barthel Index and the EuroQoL-5D. Quality of Life Research, 15, 870-97     Occupational Therapy Evaluation Charge Determination   History Examination Decision-Making   MEDIUM Complexity : Expanded review of history including physical, cognitive and psychosocial  history  MEDIUM Complexity : 3-5 performance deficits relating to physical, cognitive , or psychosocial skils that result in activity limitations and / or participation restrictions MEDIUM Complexity : Patient may present with comorbidities that affect occupational performnce. Miniml to moderate modification of tasks or assistance (eg, physical or verbal ) with assesment(s) is necessary to enable patient to complete evaluation       Based on the above components, the patient evaluation is determined to be of the following complexity level: MEDIUM  Pain Rating:  R shoulder pain, R scapula    Activity Tolerance:   Fair    After treatment patient left in no apparent distress:    Sitting in chair, Call bell within reach, and Bed / chair alarm activated    COMMUNICATION/EDUCATION:   The patients plan of care was discussed with: Physical therapist and Registered nurse. Patient/family have participated as able in goal setting and plan of care. This patients plan of care is appropriate for delegation to [facility].     Thank you for this referral.  Nabor Gomez OT  Time Calculation: 42 mins

## 2022-03-07 NOTE — TELEPHONE ENCOUNTER
----- Message from Andi Acevedo sent at 3/7/2022  3:16 PM EST -----  Subject: Message to Provider    QUESTIONS  Information for Provider? Pt is currently hospitalized at [facility] and   would like a call back to his son who is concerned about the fact that   they are changing all his medication due to a multiple fall risk, he wants   dr. ramos permission or to tell him if that's not the right move.  ---------------------------------------------------------------------------  --------------  CALL BACK INFO  What is the best way for the office to contact you? OK to leave message on   voicemail  Preferred Call Back Phone Number? [phone]  ---------------------------------------------------------------------------  --------------  SCRIPT ANSWERS  Relationship to Patient? Other  Representative Name? Drew Lara  Is the Representative on the appropriate HIPAA document in Epic?  Yes

## 2022-03-07 NOTE — TELEPHONE ENCOUNTER
RTC to Ed/Son (HIPPA VERIFIED) regarding patients medications are being changed while at the hospital. No answer and LVM.

## 2022-03-07 NOTE — TELEPHONE ENCOUNTER
----- Message from Roro Galicia sent at 3/7/2022  3:49 PM EST -----  Subject: Message to Provider    QUESTIONS  Information for Provider? Patients son Ed states he is on the way to the   office to speak with MsCristiane Shwetha regarding the pt. Ed tried to return call,   writer unable to get through to office. ---------------------------------------------------------------------------  --------------  Honey SageCloude INFO  What is the best way for the office to contact you? OK to leave message on   voicemail  Preferred Call Back Phone Number?  [phone]  ---------------------------------------------------------------------------  --------------  SCRIPT ANSWERS  undefined

## 2022-03-07 NOTE — PROGRESS NOTES
Spiritual Care Assessment/Progress Note  ST. [address]      NAME: Sharmaine Ormond. MRN: [id number]  AGE: 80 y.o.  SEX: male  Alevism Affiliation: Mu-ism   Language: English     3/7/2022     Total Time (in minutes): 5     Spiritual Assessment begun in [facility] 4 CV SERVICES UNIT through conversation with:         [x]Patient        [] Family    [] Friend(s)        Reason for Consult: [facility]     Spiritual beliefs: (Please include comment if needed)     [x] Identifies with a haley tradition:  Mu-ism       [x] Supported by a haley community:            [] Claims no spiritual orientation:           [] Seeking spiritual identity:                [] Adheres to an individual form of spirituality:           [] Not able to assess:                           Identified resources for coping:      [x] Prayer                               [x] Music                  [] Guided Imagery     [x] Family/friends                 [] Pet visits     [] Devotional reading                         [] Unknown     [] Other:                                               Interventions offered during this visit: (See comments for more details)    Patient Interventions: Affirmation of haley,Communion (Mu-ism),Initial/Spiritual assessment, patient floor,Prayer (actual),Prayer (assurance of)           Plan of Care:     [x] Support spiritual and/or cultural needs    [] Support AMD and/or advance care planning process      [] Support grieving process   [] Coordinate Rites and/or Rituals    [] Coordination with community clergy   [] No spiritual needs identified at this time   [] Detailed Plan of Care below (See Comments)  [] Make referral to Music Therapy  [] Make referral to Pet Therapy     [] Make referral to Addiction services  [] Make referral to [facility]  [] Make referral to Spiritual Care Partner  [] No future visits requested        [] Contact Spiritual Care for further referrals     Comments: Mr. Shyam Kulkarni was sitting up in his chair reading. He declined communion today and said the  visited this morning. He shared he has sister who are twins. They are 80 and [employer] in [place]. Assured of my prayers for his well-being.     SINTIA May, RN, ACSW, LCSW   Page:  [phone]

## 2022-03-07 NOTE — PROGRESS NOTES
I prayed with Stevan Pritchett and celebrated with him the Anointing of the Sick.   Father Linus Schmidt

## 2022-03-07 NOTE — PROGRESS NOTES
Transitions of Care Plan   RUR: N/A   Admission Dx:  Chest Pain   Consults: Cardiology   Baseline: ambulates w assistance (RW/rollator); resides at [facility]   Barriers to Discharge: medical   Disposition: return to [address] [place] [address] Estimated Discharge Date: 3/7/22    Medicare Outpatient Observation Notice (MOON)/ [place] Outpatient Observation Notice (Bhargav Lenraman) provided to patient/representative with verbal explanation of the notice. Time allotted for questions regarding the notice. Patient /representative provided a completed copy of the MOON/VOON notice. Copy placed on bedside chart. Reason for Admission:  Chest Pain/Fall at [facility]                   RUR Score:          N/A           Plan for utilizing home health:  Possible at [address]  - therapy on board        PCP: First and Last name:  Nellie Huntley MD     Name of Practice:    Are you a current patient: Yes/No:    Approximate date of last visit:    Can you participate in a virtual visit with your PCP:                     Current Advanced Directive/Advance Care Plan: DNR      Healthcare Decision Maker:   Click here to complete [address] including selection of the Healthcare Decision Maker Relationship (ie \"Primary\")             Primary Decision Maker: Dinah Monaco  [phone]                  Transition of Care Plan:                      Return to [address] [facility]  ([facility]) with son to transport. Possible HH set up at facility. [facility] manages patient's medications. Assistive devices are at facility. Re Godfrey, MPH  Care Manager [facility]  Available via [address] [place] [address] or      Care Management Interventions  PCP Verified by CM: Yes  Palliative Care Criteria Met (RRAT>21 & CHF Dx)?: No  Mode of Transport at Discharge:  Other (see comment) (Son)  Transition of Care Consult (CM Consult): Assisted Living,Discharge Planning  MyChart Signup: No  Discharge Durable Medical Equipment: No  Health Maintenance Reviewed: Yes  Physical Therapy Consult: Yes  Occupational Therapy Consult: Yes  Speech Therapy Consult: No  Support Systems: Child(nick),Assisted Living  Confirm Follow Up Transport: Family  Discharge Location  Patient Expects to be Discharged to[de-identified] Assisted Living

## 2022-03-07 NOTE — PROGRESS NOTES
Problem: Mobility Impaired (Adult and Pediatric)  Goal: *Acute Goals and Plan of Care (Insert Text)  Description: FUNCTIONAL STATUS PRIOR TO ADMISSION: Pt lived in [facility], used rollator for longer distances, [facility] for in home. Resides alone and receives OT and PT at [facility]. Pt with multiple falls 5-6 within last 6 months. Able to dress mod I and bathe himself but has staff member present for safety during bathing. Walks to the dining room for meals. HOME SUPPORT: [facility] staff. Physical Therapy Goals  Initiated 3/7/2022  1. Patient will move from supine to sit and sit to supine  in bed with modified independence within 7 day(s). 2.  Patient will transfer from bed to chair and chair to bed with modified independence using the least restrictive device within 7 day(s). 3.  Patient will perform sit to stand with modified independence within 7 day(s). 4.  Patient will ambulate with modified independence for 150 feet with the least restrictive device within 7 day(s). Outcome: Not Met     PHYSICAL THERAPY EVALUATION  Patient: Garrett Khan (80 y.o. male)  Date: 3/7/2022  Primary Diagnosis: Bradycardia [R00.1]        Precautions:   Fall,DNR,Bed Alarm     ASSESSMENT  Based on the objective data described below, the patient presents with HTN, orthostatic hypotension (during OT session), decreased activity tolerance, h/o falls (up to 6 in the past 6 months), impaired balance (relies on rollator or cane), and pain (right shoulder) s/p a fall in his apartment (? Syncope). Received pt resting in w/ BP elevated at rest (as documented below) therefore deferred OOB mobility assessment. RN aware of BP. Received report from OT re: level of assist for functional mobility. Will assess ambulation tomorrow as appropriate.       Vitals:    03/07/22 1530 03/07/22 1556 03/07/22 1558 03/07/22 1603   BP: (!) 178/95 (!) 203/91 (!) 216/102 (!) 210/110   BP 1 Location: Left lower arm Left upper arm Right upper arm Left upper arm   BP Patient Position: At rest At rest;Semi fowlers At rest;Semi fowlers At rest;Semi fowlers   Pulse: 68  70 69   Temp: 97.9 °F (36.6 °C)      Resp: 12      SpO2: 99%        Current Level of Function Impacting Discharge (mobility/balance): Unable to ambulate d/t HTN, otherwise, as documented below    Functional Outcome Measure: The patient scored 50/100 on the Barthel outcome measure. Other factors to consider for discharge: From [facility], orthostatic hypotension, bradycardia, hypertension, fall risk     Patient will benefit from skilled therapy intervention to address the above noted impairments. PLAN :  Recommendations and Planned Interventions: bed mobility training, transfer training, gait training, therapeutic exercises, neuromuscular re-education, patient and family training/education and therapeutic activities      Frequency/Duration: Patient will be followed by physical therapy:  5 times a week to address goals. Recommendation for discharge: (in order for the patient to meet his/her long term goals)  To be determined: Likely return to [facility] w/ increased staff assist and resumption of HHPT & OT. If unable, then SNF. This discharge recommendation:  Has not yet been discussed the attending provider and/or case management    IF patient discharges home will need the following DME: TBD - none anticipated. Owns rollator and cane. SUBJECTIVE:       OBJECTIVE DATA SUMMARY:   HISTORY:    Past Medical History:   Diagnosis Date    Advanced care planning/counseling discussion 10/8/2015    Basal cell carcinoma, face     now sees Dr. Wiley Ho.  Bladder cancer [facility]) 2012    Dr. Calin Orozco. cyto 9/2014, 2/2016. s/p surgery, BCG irrigation [place]. saw Dr. Sherron Sky BPH with obstruction/lower urinary tract symptoms     Chronic lower back pain     injection in [place].   saw Dr. Elodia Sanabria Chronic neck pain     limited motion to side    Depression, major     Dupuytren's contracture of left hand 5th finger    Encephalopathy 8/2021, 11/21/21    admitted     Epistaxis 2013    saw ENT at [facility] history of skin cancer     Fecal incontinence     with loose stools    Hearing loss     has hearing aids    HTN (hypertension)     Insomnia     IVCD (intraventricular conduction defect) 11/30/2015    Dr Lazaro Amezcua    Loose stools 7/18/2014    MRI contraindicated due to metal implant     has scleral buckle    Paroxysmal atrial fibrillation (HCC)     EF 66%, 1-2+ LAE on flecainide;Dr. Lazaro Amezcua. saw Dr. Lubna Camara at [facility] Peripheral neuropathy     severe foot neuropathy    RLS (restless legs syndrome) 7/3/2017    SCC (squamous cell carcinoma), face     prior hx, now sees Dr. Alessandra Lazaro Seasonal allergic reaction     Skin cancer     Urothelial carcinoma of right distal ureter ([address] 5/1/15    excision Dr. Amrit Wilder. low grade, papillary. repeat 3 month     Past Surgical History:   Procedure Laterality Date    HX BLADDER REPAIR  2012    cancer    HX COLONOSCOPY  2012    polyps. repeat 2017?  HX HERNIA REPAIR      periumibical    HX MALIGNANT SKIN LESION EXCISION  06/14/2018    basal cell left lip    HX OTHER SURGICAL  10/2021    Skin cancer removed from nose.      HX RETINAL DETACHMENT REPAIR Right     RI CYSTOURETHROSCOPY  9/2014    Dr. Shelley Man CYSTOURETHROSCOPY  5/1/15    low grade right pap urothelial cancer    RI CYSTOURETHROSCOPY  2/29/16       Personal factors and/or comorbidities impacting plan of care:     Home Situation  Home Environment: Assisted living  One/Two Story Residence: One story  Living Alone: Yes  Support Systems: Child(nick),Assisted Living  Patient Expects to be Discharged to[de-identified] Assisted Living  Current DME Used/Available at Home: Ulic[employer] Spore, rollator,Cane, straight,Shower chair,Grab bars  Tub or Shower Type: Shower    EXAMINATION/PRESENTATION/DECISION MAKING:   Critical Behavior:  Neurologic State: Alert  Orientation Level: Oriented X4 (intermittent memory loss, delayed recall)  Cognition: Follows commands  Safety/Judgement: Awareness of environment  Hearing:  Intact  Skin:  Exposed areas grossly intact  Edema: none noted  Range Of Motion:  AROM: Generally decreased, functional                       Strength:    Strength: Generally decreased, functional                    Tone & Sensation:                  Sensation: Intact (to light touch)               Coordination:  Coordination: Generally decreased, functional (resting tremors UE)  Vision:   Acuity: Within Defined Limits  Functional Mobility:  Bed Mobility:  Supine to Sit: Stand-by assistance;Bed Modified (bed rails)        Transfers:  Sit to Stand: Contact guard assistance; Additional time; Adaptive equipment;Assist x1  Stand to Sit: Contact guard assistance;Assist x1;Additional time  Bed to Chair: Minimum assistance;Assist x1;Adaptive equipment; Additional time (RW use)              Balance:   Sitting: Intact; Without support  Standing: Impaired; With support (Rw)  Standing - Static: Good  Standing - Dynamic : Fair  Ambulation/Gait Training:  Deferred d/t HTN                              Functional Measure:  Barthel Index:    Bathin  Bladder: 5  Bowels: 10  Groomin  Dressin  Feeding: 10  Mobility: 0  Stairs: 0  Toilet Use: 5  Transfer (Bed to Chair and Back): 10  Total: 50/100       The Barthel ADL Index: Guidelines  1. The index should be used as a record of what a patient does, not as a record of what a patient could do. 2. The main aim is to establish degree of independence from any help, physical or verbal, however minor and for whatever reason. 3. The need for supervision renders the patient not independent. 4. A patient's performance should be established using the best available evidence. Asking the patient, friends/relatives and nurses are the usual sources, but direct observation and common sense are also important. However direct testing is not needed.   5. Usually the patient's performance over the preceding 24-48 hours is important, but occasionally longer periods will be relevant. 6. Middle categories imply that the patient supplies over 50 per cent of the effort. 7. Use of aids to be independent is allowed. Score Interpretation (from [facility])    Independent   60-79 Minimally independent   40-59 Partially dependent   20-39 Very dependent   <20 Totally dependent     -Jody Patel., Barthel, D.W. (1965). Functional evaluation: the Barthel Index. [address] ([address] (1997). The Barthel activities of daily living index: self-reporting versus actual performance in the old (> or = 75 years). Journal of [address] [place] [address] 45(7), [facility], CONCHIS, Brandie Ko., Jacqui Stewart. (1999). Measuring the change in disability after inpatient rehabilitation; comparison of the responsiveness of the Barthel Index and Functional [place] Measure. Journal of Neurology, Neurosurgery, and Psychiatry, 66(4), 440-392. Andrei German, [place].RAUDEL, ROSEMARIE Mercer, & Kaitlin Wakefield, M.A. (2004) Assessment of post-stroke quality of life in cost-effectiveness studies: The usefulness of the Barthel Index and the EuroQoL-5D.  Quality of Life Research, 15, 618-30            Physical Therapy Evaluation Charge Determination   History Examination Presentation Decision-Making   MEDIUM  Complexity : 1-2 comorbidities / personal factors will impact the outcome/ POC  MEDIUM Complexity : 3 Standardized tests and measures addressing body structure, function, activity limitation and / or participation in recreation  MEDIUM Complexity : Evolving with changing characteristics  Other outcome measures Barthel 50/100  MEDIUM      Based on the above components, the patient evaluation is determined to be of the following complexity level: LOW     Pain Rating:  Right shoulder    Activity Tolerance:   Unable to participate in OOB activity d/t HTN     After treatment patient left in no apparent distress:   Supine in bed, Call bell within reach and Caregiver / family present    COMMUNICATION/EDUCATION:   The patients plan of care was discussed with: Occupational therapist and Registered nurse. Patient/family have participated as able in goal setting and plan of care. and Patient/family agree to work toward stated goals and plan of care.     Thank you for this referral.  Arnold Worrell, PT   Time Calculation: 18 mins

## 2022-03-07 NOTE — PROGRESS NOTES
1930: Bedside shift change report given to Pedro Nicholson RN (oncoming nurse) by Freddie Gamez RN (offgoing nurse). Report included the following information SBAR, Kardex, Intake/Output, MAR, and Recent Results. 0730: Bedside shift change report given to Christin, [address] (oncoming nurse) by Pedro Nicholson RN (offgoing nurse). Report included the following information SBAR, Kardex, Intake/Output, MAR and Recent Results. Vitals:    03/06/22 2319 03/07/22 0045 03/07/22 0047 03/07/22 0054   BP: (!) 168/91 (!) 182/87 (!) 172/88 (!) 141/75   BP 1 Location:  Left upper arm Left upper arm Left upper arm   BP Patient Position: At rest Lying Sitting Standing   Pulse: 60      Temp: 97.3 °F (36.3 °C)      Resp: 17      Height:       Weight:       SpO2: 95%          Problem: Falls - Risk of  Goal: *Absence of Falls  Description: Document Jony Fall Risk and appropriate interventions in the flowsheet.   Outcome: Progressing Towards Goal  Note: Fall Risk Interventions:  Mobility Interventions: Patient to call before getting OOB         Medication Interventions: Patient to call before getting OOB,Teach patient to arise slowly    Elimination Interventions: Call light in reach    History of Falls Interventions: Bed/chair exit alarm

## 2022-03-07 NOTE — PROGRESS NOTES
Problem: Falls - Risk of  Goal: *Absence of Falls  Description: Document Shakeel Jimenez Fall Risk and appropriate interventions in the flowsheet. Outcome: Progressing Towards Goal  Note: Fall Risk Interventions:  Mobility Interventions: Bed/chair exit alarm         Medication Interventions: Bed/chair exit alarm,Patient to call before getting OOB    Elimination Interventions: Bed/chair exit alarm,Call light in reach,Patient to call for help with toileting needs    History of Falls Interventions: Bed/chair exit alarm,Room close to nurse's station         Problem: Patient Education: Go to Patient Education Activity  Goal: Patient/Family Education  Outcome: Progressing Towards Goal     Problem: Pressure Injury - Risk of  Goal: *Prevention of pressure injury  Description: Document Gene Scale and appropriate interventions in the flowsheet.   Outcome: Progressing Towards Goal  Note: Pressure Injury Interventions:  Sensory Interventions: Minimize linen layers    Moisture Interventions: Absorbent underpads    Activity Interventions: PT/OT evaluation    Mobility Interventions: PT/OT evaluation    Nutrition Interventions: Document food/fluid/supplement intake    Friction and Shear Interventions: Minimize layers                Problem: Patient Education: Go to Patient Education Activity  Goal: Patient/Family Education  Outcome: Progressing Towards Goal

## 2022-03-07 NOTE — PROGRESS NOTES
Occupational Therapy  3/7/2022    Orders received, chart reviewed and patient evaluated by occupational therapy. Pending progression with skilled acute occupational therapy, recommend:  Occupational therapy at least 2 days/week in the home      Recommend with nursing ADLs with supervision/setup, OOB to chair 3x/day and toileting via beside commode and walker 1 assist. Thank you for completing as able in order to maintain patient strength, endurance and independence. Full evaluation to follow.        Vitals:    03/07/22 0931 03/07/22 0936 03/07/22 0943 03/07/22 0946   BP: (!) 176/95 (!) 166/96 (!) 148/88 (!) (P) 162/97   BP 1 Location: Left upper arm Left upper arm Left upper arm (P) Left upper arm   BP Patient Position: At rest Sitting Standing (P) Sitting   Pulse: (!) 57 63  (P) 60   Temp:       Resp:       Height:       Weight:       SpO2: 97%

## 2022-03-07 NOTE — PROGRESS NOTES
0730: Bedside and Verbal shift change report given to [address] [place] [address] (oncoming nurse) by Shivam Amaya (offgoing nurse). Report included the following information SBAR, Kardex, Intake/Output, MAR, Accordion, Recent Results and Cardiac Rhythm sinus kennedi. 1700: BP in the 700I systolically, PRN hydralazine given    1800: spoke with Dr. Tarsha Kaur, updated on current patient condition. Plan to monitor BP overnight and discharge in AM    1859: patient having difficultly finding words, Bg 96,  unequal L greater than R, unable to identify objects and tremorous. Code stroke called. 1904: stroke team at bedside    1907: patient off unit to CT with this RN, ICU RN x 2    1930: tele neuro assessing patient    2000: TPA bolus verified and given by ICU RNs and pharmacist    2001: TPA infusion initiated by ICU RN and pharmacist    2019: TRANSFER - OUT REPORT:    Verbal report given to [facility] (name) on Maikel Post.  being transferred to ICU(unit) for urgent transfer       Report consisted of patients Situation, Background, Assessment and   Recommendations(SBAR). Information from the following report(s) SBAR, Kardex, Intake/Output, MAR, Accordion, Recent Results and Cardiac Rhythm NSR was reviewed with the receiving nurse. Opportunity for questions and clarification was provided.       Patient transported with:   Monitor  Registered Nurse

## 2022-03-07 NOTE — PROGRESS NOTES
[facility]                                                                                          Hospitalist Progress Note  Tova Marcial MD  Answering service: [phone] from in house phone        Date of Service:  3/7/2022  NAME:  Raegan Mac :  7/15/1931  MRN:  [id number]    Admission Summary:   Raegan Mac is a 80 y.o. male with h/o hypertension, paroxysmal A. 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 He states that he was getting ready to go downstairs from his senior facility apartment and suddenly starting going down and couldn't catch his fall. He denies feeling lightheaded or dizzy, denies cp or palpitations. Once he was on the ground he felt some sob. Notes he's had several falls just like this over the last few weeks. C/o left back pain where he landed. Otherwise has felt well.     Interval history / Subjective:   No acute overnight events  HR improving, stable in 50-70s  No cp or sob  Ambulates without symptoms     Assessment & Plan:     Bradycardia and with recurrent falls, improved s/p metoprolol d/c'd  TSH wnl  orthostats neg  cont HH PT OT  Cardiology consult: no significant pauses, No need for PPM at this time, HR currently improved, Remain off toprol XL     History of PAF              -no afib currently noted              -Remain off toprol XL              -Warfarin changed to ASA in 2021 due to fall risk              -TSH 3.12              -Last echo 21: EF 55-60%, NWMA, mild concentric hypertrophy     HTN urgency, HTN above goal  Started norvasc, increase  add hydralazine  Cont home ARB  F/u OP     B12 borderline, confirm with MMA     CKD3 at baseline Cr ~1.5    Code status: DNR  Prophylaxis: [facility] discussed with: Patient/Family and Nurse  Anticipated Disposition: HH PT, OT, RN  Anticipated Discharge: Less than 24 hours     Hospital Problems  Date Reviewed: 2/7/2022          Codes Class Noted POA    SSS (sick sinus syndrome) ([facility] [address] ICD-10-CM: I49.5  ICD-9-CM: 427.81  3/6/2022 Unknown        Bradycardia ICD-10-CM: R00.1  ICD-9-CM: 427.89  3/6/2022 Unknown                Review of Systems:   Pertinent items are noted in HPI. No fever/chills, poor appetite, cough, sputum, SOB, N/V, D/C, CP, or abd pain. Tolerating PO    Vital Signs:    Last 24hrs VS reviewed since prior progress note. Most recent are:  Visit Vitals  BP (!) 109/58   Pulse 87   Temp 97.9 °F (36.6 °C)   Resp 16   Ht 5' 11\" (1.803 m)   Wt 90.5 kg (199 lb 8.3 oz)   SpO2 98%   BMI 27.83 kg/m²         Intake/Output Summary (Last 24 hours) at 3/7/2022 1814  Last data filed at 3/7/2022 1514  Gross per 24 hour   Intake 480 ml   Output 525 ml   Net -45 ml      Physical Examination:     I had a face to face encounter with this patient and independently examined them on 3/7/2022 as outlined below:    General: Alert, cooperative, no acute distress    EENT:  EOMI. Anicteric sclerae. MMM  Resp:  CTA bilaterally, no wheezing or rales. No accessory muscle use  CV:  RRR  GI:  Soft, Non distended, Non tender  Neurologic:  Alert and oriented x3, normal speech, MONTANO  Extremities: No edema or cyanosis  Psych:   Not anxious or agitated  Skin:  No rashes. No jaundice    Data Review:    I personally reviewed labs and imaging results    Labs:     Recent Labs     03/06/22  0956   WBC 6.3   HGB 13.6   HCT 42.7        Recent Labs     03/07/22  0405 03/06/22  0956    137   K 3.4* 4.4    109*   CO2 27 25   BUN 22* 21*   CREA 1.41* 1.53*   * 101*   CA 9.0 8.9   MG 2.4 2.4   PHOS 2.8  --      Recent Labs     03/06/22  0956   ALT 18      TBILI 0.7   TP 7.6   ALB 3.3*   GLOB 4.3*   LPSE 147     No results for input(s): INR, PTP, APTT, INREXT in the last 72 hours. No results for input(s): FE, TIBC, PSAT, FERR in the last 72 hours.    No results found for: FOL, RBCF   No results for input(s): PH, PCO2, PO2 in the last 72 hours. No results for input(s): CPK, CKNDX, TROIQ in the last 72 hours.     No lab exists for component: CPKMB  Lab Results   Component Value Date/Time    Cholesterol, total 210 (H) 07/17/2020 10:31 AM    HDL Cholesterol 65 07/17/2020 10:31 AM    LDL, calculated 119.8 (H) 07/17/2020 10:31 AM    Triglyceride 126 07/17/2020 10:31 AM    CHOL/HDL Ratio 3.2 07/17/2020 10:31 AM     Lab Results   Component Value Date/Time    Glucose (POC) 104 11/21/2021 10:12 PM    Glucose (POC) 117 08/28/2021 08:01 PM     Lab Results   Component Value Date/Time    Color YELLOW/STRAW 03/06/2022 10:13 AM    Appearance CLEAR 03/06/2022 10:13 AM    Specific gravity 1.012 03/06/2022 10:13 AM    pH (UA) 6.5 03/06/2022 10:13 AM    Protein 30 (A) 03/06/2022 10:13 AM    Glucose Negative 03/06/2022 10:13 AM    Ketone Negative 03/06/2022 10:13 AM    Bilirubin Negative 03/06/2022 10:13 AM    Urobilinogen 0.2 03/06/2022 10:13 AM    Nitrites Negative 03/06/2022 10:13 AM    Leukocyte Esterase Negative 03/06/2022 10:13 AM    Epithelial cells FEW 03/06/2022 10:13 AM    Bacteria Negative 03/06/2022 10:13 AM    WBC 0-4 03/06/2022 10:13 AM    RBC 0-5 03/06/2022 10:13 AM         Medications Reviewed:     Current Facility-Administered Medications   Medication Dose Route Frequency    [START ON 3/8/2022] amLODIPine (NORVASC) tablet 10 mg  10 mg Oral DAILY    hydrALAZINE (APRESOLINE) tablet 25 mg  25 mg Oral TID    sodium chloride (NS) flush 5-40 mL  5-40 mL IntraVENous Q8H    sodium chloride (NS) flush 5-40 mL  5-40 mL IntraVENous PRN    acetaminophen (TYLENOL) tablet 650 mg  650 mg Oral Q6H PRN    Or    acetaminophen (TYLENOL) suppository 650 mg  650 mg Rectal Q6H PRN    polyethylene glycol (MIRALAX) packet 17 g  17 g Oral DAILY PRN    ondansetron (ZOFRAN ODT) tablet 4 mg  4 mg Oral Q6H PRN    Or    ondansetron (ZOFRAN) injection 4 mg  4 mg IntraVENous Q6H PRN    hydrALAZINE (APRESOLINE) 20 mg/mL injection 20 mg  20 mg IntraVENous Q2H PRN    losartan (COZAAR) tablet 100 mg  100 mg Oral DAILY    cyanocobalamin (VITAMIN B12) tablet 1,000 mcg  1,000 mcg Oral DAILY     ______________________________________________________________________  EXPECTED LENGTH OF STAY: - - -  ACTUAL LENGTH OF STAY:          0                 Hector Joseph MD

## 2022-03-08 ENCOUNTER — APPOINTMENT (OUTPATIENT)
Dept: NON INVASIVE DIAGNOSTICS | Age: 87
DRG: 308 | End: 2022-03-08
Attending: NURSE PRACTITIONER
Payer: MEDICARE

## 2022-03-08 ENCOUNTER — APPOINTMENT (OUTPATIENT)
Dept: CT IMAGING | Age: 87
DRG: 308 | End: 2022-03-08
Attending: NURSE PRACTITIONER
Payer: MEDICARE

## 2022-03-08 LAB
ANION GAP SERPL CALC-SCNC: 6 MMOL/L (ref 5–15)
BUN SERPL-MCNC: 20 MG/DL (ref 6–20)
BUN/CREAT SERPL: 16 (ref 12–20)
CALCIUM SERPL-MCNC: 8.7 MG/DL (ref 8.5–10.1)
CHLORIDE SERPL-SCNC: 108 MMOL/L (ref 97–108)
CHOLEST SERPL-MCNC: 172 MG/DL
CO2 SERPL-SCNC: 24 MMOL/L (ref 21–32)
CREAT SERPL-MCNC: 1.27 MG/DL (ref 0.7–1.3)
ECHO AO ROOT DIAM: 3.9 CM
ECHO AO ROOT INDEX: 1.87 CM/M2
ECHO AV PEAK GRADIENT: 11 MMHG
ECHO AV PEAK VELOCITY: 1.7 M/S
ECHO AV VELOCITY RATIO: 0.47
ECHO LA DIAMETER INDEX: 1.82 CM/M2
ECHO LA DIAMETER: 3.8 CM
ECHO LA TO AORTIC ROOT RATIO: 0.97
ECHO LV E' LATERAL VELOCITY: 3 CM/S
ECHO LV E' SEPTAL VELOCITY: 5 CM/S
ECHO LV EDV A2C: 53 ML
ECHO LV EDV A4C: 56 ML
ECHO LV EDV BP: 56 ML (ref 67–155)
ECHO LV EDV INDEX A4C: 27 ML/M2
ECHO LV EDV INDEX BP: 27 ML/M2
ECHO LV EDV NDEX A2C: 25 ML/M2
ECHO LV EJECTION FRACTION A2C: 68 %
ECHO LV EJECTION FRACTION A4C: 48 %
ECHO LV EJECTION FRACTION BIPLANE: 59 % (ref 55–100)
ECHO LV EJECTION FRACTION BIPLANE: 59 % (ref 55–100)
ECHO LV ESV A2C: 17 ML
ECHO LV ESV A4C: 29 ML
ECHO LV ESV BP: 23 ML (ref 22–58)
ECHO LV ESV INDEX A2C: 8 ML/M2
ECHO LV ESV INDEX A4C: 14 ML/M2
ECHO LV ESV INDEX BP: 11 ML/M2
ECHO LV FRACTIONAL SHORTENING: 33 % (ref 28–44)
ECHO LV INTERNAL DIMENSION DIASTOLE INDEX: 2.2 CM/M2
ECHO LV INTERNAL DIMENSION DIASTOLIC: 4.6 CM (ref 4.2–5.9)
ECHO LV INTERNAL DIMENSION SYSTOLIC INDEX: 1.48 CM/M2
ECHO LV INTERNAL DIMENSION SYSTOLIC: 3.1 CM
ECHO LV IVSD: 1.3 CM (ref 0.6–1)
ECHO LV MASS 2D: 217.4 G (ref 88–224)
ECHO LV MASS INDEX 2D: 104 G/M2 (ref 49–115)
ECHO LV POSTERIOR WALL DIASTOLIC: 1.2 CM (ref 0.6–1)
ECHO LV RELATIVE WALL THICKNESS RATIO: 0.52
ECHO LVOT PEAK GRADIENT: 3 MMHG
ECHO LVOT PEAK VELOCITY: 0.8 M/S
ECHO MV A VELOCITY: 0.61 M/S
ECHO MV AREA PHT: 2.6 CM2
ECHO MV E DECELERATION TIME (DT): 293.6 MS
ECHO MV E VELOCITY: 0.5 M/S
ECHO MV E/A RATIO: 0.82
ECHO MV E/E' LATERAL: 16.67
ECHO MV E/E' RATIO (AVERAGED): 13.33
ECHO MV E/E' SEPTAL: 10
ECHO MV PRESSURE HALF TIME (PHT): 85.1 MS
ECHO PV MAX VELOCITY: 1.3 M/S
ECHO PV PEAK GRADIENT: 6 MMHG
ECHO RV TAPSE: 1.5 CM (ref 1.5–2)
ECHO TV REGURGITANT MAX VELOCITY: 2.49 M/S
ECHO TV REGURGITANT PEAK GRADIENT: 25 MMHG
EST. AVERAGE GLUCOSE BLD GHB EST-MCNC: 117 MG/DL
GLUCOSE BLD STRIP.AUTO-MCNC: 96 MG/DL (ref 65–117)
GLUCOSE SERPL-MCNC: 125 MG/DL (ref 65–100)
HBA1C MFR BLD: 5.7 % (ref 4–5.6)
HDLC SERPL-MCNC: 53 MG/DL
HDLC SERPL: 3.2 {RATIO} (ref 0–5)
LDLC SERPL CALC-MCNC: 105.6 MG/DL (ref 0–100)
MAGNESIUM SERPL-MCNC: 2.1 MG/DL (ref 1.6–2.4)
PHOSPHATE SERPL-MCNC: 3.2 MG/DL (ref 2.6–4.7)
POTASSIUM SERPL-SCNC: 3.4 MMOL/L (ref 3.5–5.1)
SERVICE CMNT-IMP: NORMAL
SODIUM SERPL-SCNC: 138 MMOL/L (ref 136–145)
TRIGL SERPL-MCNC: 67 MG/DL (ref ?–150)
VLDLC SERPL CALC-MCNC: 13.4 MG/DL

## 2022-03-08 PROCEDURE — 70450 CT HEAD/BRAIN W/O DYE: CPT

## 2022-03-08 PROCEDURE — 93306 TTE W/DOPPLER COMPLETE: CPT

## 2022-03-08 PROCEDURE — 80061 LIPID PANEL: CPT

## 2022-03-08 PROCEDURE — 97530 THERAPEUTIC ACTIVITIES: CPT

## 2022-03-08 PROCEDURE — 97164 PT RE-EVAL EST PLAN CARE: CPT

## 2022-03-08 PROCEDURE — 74011250637 HC RX REV CODE- 250/637: Performed by: NURSE PRACTITIONER

## 2022-03-08 PROCEDURE — 84100 ASSAY OF PHOSPHORUS: CPT

## 2022-03-08 PROCEDURE — 74011250636 HC RX REV CODE- 250/636: Performed by: NURSE PRACTITIONER

## 2022-03-08 PROCEDURE — 74011000250 HC RX REV CODE- 250: Performed by: NURSE PRACTITIONER

## 2022-03-08 PROCEDURE — 36415 COLL VENOUS BLD VENIPUNCTURE: CPT

## 2022-03-08 PROCEDURE — 92610 EVALUATE SWALLOWING FUNCTION: CPT

## 2022-03-08 PROCEDURE — 74011250636 HC RX REV CODE- 250/636: Performed by: INTERNAL MEDICINE

## 2022-03-08 PROCEDURE — 74011000250 HC RX REV CODE- 250: Performed by: INTERNAL MEDICINE

## 2022-03-08 PROCEDURE — 99223 1ST HOSP IP/OBS HIGH 75: CPT | Performed by: PSYCHIATRY & NEUROLOGY

## 2022-03-08 PROCEDURE — 83735 ASSAY OF MAGNESIUM: CPT

## 2022-03-08 PROCEDURE — 97168 OT RE-EVAL EST PLAN CARE: CPT

## 2022-03-08 PROCEDURE — 80048 BASIC METABOLIC PNL TOTAL CA: CPT

## 2022-03-08 PROCEDURE — 74011250637 HC RX REV CODE- 250/637: Performed by: INTERNAL MEDICINE

## 2022-03-08 PROCEDURE — 4A03X5D MEASUREMENT OF ARTERIAL FLOW, INTRACRANIAL, EXTERNAL APPROACH: ICD-10-PCS | Performed by: STUDENT IN AN ORGANIZED HEALTH CARE EDUCATION/TRAINING PROGRAM

## 2022-03-08 PROCEDURE — 97535 SELF CARE MNGMENT TRAINING: CPT

## 2022-03-08 PROCEDURE — 65610000006 HC RM INTENSIVE CARE

## 2022-03-08 PROCEDURE — 83036 HEMOGLOBIN GLYCOSYLATED A1C: CPT

## 2022-03-08 RX ORDER — POTASSIUM CHLORIDE 7.45 MG/ML
10 INJECTION INTRAVENOUS
Status: COMPLETED | OUTPATIENT
Start: 2022-03-08 | End: 2022-03-08

## 2022-03-08 RX ORDER — AMLODIPINE BESYLATE 5 MG/1
10 TABLET ORAL DAILY
Status: DISCONTINUED | OUTPATIENT
Start: 2022-03-09 | End: 2022-03-14 | Stop reason: HOSPADM

## 2022-03-08 RX ORDER — LOSARTAN POTASSIUM 50 MG/1
100 TABLET ORAL DAILY
Status: DISCONTINUED | OUTPATIENT
Start: 2022-03-08 | End: 2022-03-08

## 2022-03-08 RX ADMIN — SODIUM CHLORIDE, PRESERVATIVE FREE 10 ML: 5 INJECTION INTRAVENOUS at 14:00

## 2022-03-08 RX ADMIN — SODIUM CHLORIDE, PRESERVATIVE FREE 10 ML: 5 INJECTION INTRAVENOUS at 21:33

## 2022-03-08 RX ADMIN — SODIUM CHLORIDE, PRESERVATIVE FREE 20 ML: 5 INJECTION INTRAVENOUS at 04:50

## 2022-03-08 RX ADMIN — POTASSIUM CHLORIDE 10 MEQ: 7.46 INJECTION, SOLUTION INTRAVENOUS at 10:05

## 2022-03-08 RX ADMIN — HYDRALAZINE HYDROCHLORIDE 10 MG: 20 INJECTION, SOLUTION INTRAMUSCULAR; INTRAVENOUS at 21:29

## 2022-03-08 RX ADMIN — POTASSIUM CHLORIDE 10 MEQ: 7.46 INJECTION, SOLUTION INTRAVENOUS at 08:09

## 2022-03-08 RX ADMIN — ATORVASTATIN CALCIUM 80 MG: 40 TABLET, FILM COATED ORAL at 21:29

## 2022-03-08 RX ADMIN — ACETAMINOPHEN 650 MG: 325 TABLET ORAL at 17:06

## 2022-03-08 RX ADMIN — POTASSIUM CHLORIDE 10 MEQ: 7.46 INJECTION, SOLUTION INTRAVENOUS at 05:22

## 2022-03-08 RX ADMIN — POTASSIUM CHLORIDE 10 MEQ: 7.46 INJECTION, SOLUTION INTRAVENOUS at 06:42

## 2022-03-08 RX ADMIN — CYANOCOBALAMIN TAB 500 MCG 1000 MCG: 500 TAB at 08:08

## 2022-03-08 RX ADMIN — ACETAMINOPHEN 650 MG: 325 TABLET ORAL at 02:46

## 2022-03-08 NOTE — PROGRESS NOTES
Neurocritical Care Code Stroke Documentation    Responded to code stroke which was called for acute onset of speech difficulty, patient noted to have generalized tremors in extremities. Symptoms started about 10 minutes ago. Last known well time 1830 pm per RN. SBP 170s per RN and blood glucose 96. Patient also noted to have unequal  with left being stronger than right per RN. Per review of notes, he was admitted 3/6/2022 after having a fall at home. Reportedly he has had several falls. He was also noted to be bradycardic. Cardiology saw patient and stopped his metoprolol and HR has improved. Heparin subcutaneous 5,000 units has been scheduled to start this evening, however, RN reports he has not received it yet. No other blood thinners have been given after discussion with RN. Patient noted to have expressive aphasia. CT/CTA/CTP ordered given aphasia. No drift present in arms or legs. VAN negative. Patient taken quickly down for CT scans. Jeanne Haider, neurocritical care NP coming on for night shift is at bedside and will take over patient's care. However, I did assist with paging Constanza Claudio for evaluation. Full NIHSS to be completed by nightshift oncoming Neurocritical care NP with teleneuro. Patient was taken down for CT scans. CT showed no acute findings. CTA showing no large vessel occlusion. There is some perfusion match noted in the left cerebral hemisphere. However, it is possible this may be compromised by motion per radiology preliminary report. I spoke with Dr. Gloria Braden on call regarding imaging and there is no large vessel occlusion or indication for endovascular intervention. Patient is a candidate for IV tPA. Teleneuro spoke with patient's family and the plan is to proceed with tPA. Arrival time: 0703 AM  Time spent: 50  minutes.      Raulito Gongora, FABIANO  Neurocritical Care Nurse Practitioner

## 2022-03-08 NOTE — CONSULTS
NEUROLOGY CONSULT  Naty Bernardo NP      Date Time: 03/08/22 12:31 AM  Patient Name:NATANAEL SOL JR. 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 CTH with no acute abnormalities. CT spine with no fracture or dislocation. X-rays of ribs/shoulders with no fractures. During patient's hospital stay, he was evaluated by cardiology for possible PPM due to bradycardia. Was noted to be on low dose toprol which was discontinued which seems to improve HR therefore PPM was deferred. On 03/07/22 patient was exhibiting hypertension with sbp of 200's around 5 PM with which patient received as needed hydralazine. Around 7 PM, primary nurse noted patient exhibiting aphasia and confusion therefore Code S was activated. CTH showed no acute intracranial normalities. CTA H/N showed no LVO or significant stenosis. CTP showed no perfusion mismatch. Patient was evaluated by telemetry neurologist and patient was deemed a tPA candidate. Patient received tPA at 20:00 PM after risks, benefits, and alternative was discussed with patient's son and spouse who consented to proceed with tPA administration. The patient was then admitted to ICU for close monitoring post tPA. Neurology was consulted for stroke work-up and management. Past Medical History:     Past Medical History:   Diagnosis Date    Advanced care planning/counseling discussion 10/8/2015    Basal cell carcinoma, face     now sees Dr. Wiley Ho.  Bladder cancer [facility]) 2012    Dr. Calin Orozco. cyto 9/2014, 2/2016. s/p surgery, BCG irrigation Fish.   saw Dr. Lavelle Dang  BPH with obstruction/lower urinary tract symptoms     Chronic lower back pain     injection in [place]. saw Dr. Brooklyn Anthony Chronic neck pain     limited motion to side    Depression, major     Dupuytren's contracture of left hand     5th finger    Encephalopathy 8/2021, 11/21/21    admitted     Epistaxis 2013    saw ENT at [facility] [place] [facility]\"" history of skin cancer     Fecal incontinence     with loose stools    Hearing loss     has hearing aids    HTN (hypertension)     Insomnia     IVCD (intraventricular conduction defect) 11/30/2015    Dr Moose Ken    Loose stools 7/18/2014    MRI contraindicated due to metal implant     has scleral buckle    Paroxysmal atrial fibrillation (HCC)     EF 66%, 1-2+ LAE on flecainide;Dr. Moose Ken. saw Dr. Matthieu Ni [facility] Peripheral neuropathy     severe foot neuropathy    RLS (restless legs syndrome) 7/3/2017    SCC (squamous cell carcinoma), face     prior hx, now sees Dr. Adan Marrow Seasonal allergic reaction     Skin cancer     Urothelial carcinoma of right distal ureter ([address] 5/1/15    excision Dr. Lucero Garber. low grade, papillary.  repeat 3 month       Allergies   Allergen Reactions    Trazodone Palpitations       Social & Family History:     Social History     Socioeconomic History    Marital status:      Spouse name: Not on file    Number of children: Not on file    Years of education: Not on file    Highest education level: Not on file   Occupational History    Not on file   Tobacco Use    Smoking status: Former Smoker    Smokeless tobacco: Never Used    Tobacco comment: socially   Vaping Use    Vaping Use: Never used   Substance and Sexual Activity    Alcohol use: Not Currently     Comment: occ    Drug use: Never    Sexual activity: Not Currently   Other Topics Concern     Service Not Asked    Blood Transfusions Not Asked    Caffeine Concern Not Asked    Occupational Exposure Not Asked    Hobby Hazards Not Asked    Sleep Concern Not Asked    Stress Concern Not Asked    Weight Concern Not Asked    Special Diet Not Asked    Back Care Not Asked    Exercise Not Asked    Bike Helmet Not Asked   [address] Not Asked    Self-Exams Not Asked   Social History Narrative    Not on file     Social Determinants of Health     Financial Resource Strain:     Difficulty of Paying Living Expenses: Not on file   Food Insecurity:     Worried About Running Out of Food in the Last Year: Not on file    Hazel of Food in the Last Year: Not on file   Transportation Needs:     Lack of Transportation (Medical): Not on file    Lack of Transportation (Non-Medical): Not on file   Physical Activity:     Days of Exercise per Week: Not on file    Minutes of Exercise per Session: Not on file   Stress:     Feeling of Stress : Not on file   Social Connections:     Frequency of Communication with Friends and Family: Not on file    Frequency of Social Gatherings with Friends and Family: Not on file    Attends Druze Services: Not on file    Active Member of [address] [place] [address] or Organizations: Not on file    Attends Club or Organization Meetings: Not on file    Marital Status: Not on file   Intimate Partner Violence:     Fear of Current or Ex-Partner: Not on file    Emotionally Abused: Not on file    Physically Abused: Not on file    Sexually Abused: Not on file   Housing Stability:     Unable to Pay for Housing in the Last Year: Not on file    Number of [place] in the Last Year: Not on file    Unstable Housing in the Last Year: Not on file     History reviewed. No pertinent family history.       Meds:     Current Facility-Administered Medications   Medication Dose Route Frequency    sodium chloride (NS) flush 5-40 mL  5-40 mL IntraVENous Q8H    sodium chloride (NS) flush 5-40 mL  5-40 mL IntraVENous PRN    ondansetron (ZOFRAN) injection 4 mg  4 mg IntraVENous Q6H PRN    atorvastatin (LIPITOR) tablet 80 mg  80 mg Oral QHS    niCARdipine (CARDENE) 25 mg in 0.9% sodium chloride 250 mL infusion  0-15 mg/hr IntraVENous TITRATE    lidocaine 4 % patch 1 Patch  1 Patch TransDERmal Q24H    0.9% sodium chloride infusion 1,000 mL  1,000 mL IntraVENous CONTINUOUS    sodium chloride (NS) flush 5-40 mL  5-40 mL IntraVENous Q8H    sodium chloride (NS) flush 5-40 mL  5-40 mL IntraVENous PRN    acetaminophen (TYLENOL) tablet 650 mg  650 mg Oral Q6H PRN    Or    acetaminophen (TYLENOL) suppository 650 mg  650 mg Rectal Q6H PRN    polyethylene glycol (MIRALAX) packet 17 g  17 g Oral DAILY PRN    ondansetron (ZOFRAN ODT) tablet 4 mg  4 mg Oral Q6H PRN    Or    ondansetron (ZOFRAN) injection 4 mg  4 mg IntraVENous Q6H PRN    hydrALAZINE (APRESOLINE) 20 mg/mL injection 20 mg  20 mg IntraVENous Q2H PRN    cyanocobalamin (VITAMIN B12) tablet 1,000 mcg  1,000 mcg Oral DAILY     I personally reviewed all of the medications    Review of Systems:   Patient admitted generalized pain and upper back pain. Noted to have aphasia and confusion. Denied any headache, visual changes, nose, throat problems; no coughing or wheezing or shortness of breath, No chest pain or orthopnea, no abdominal pain, nausea or vomiting, no psychiatric, neurological, endocrine, hematological or cardiac complaints except as noted above. Physical Exam:   Blood pressure (!) 148/76, pulse 84, temperature 98.3 °F (36.8 °C), resp. rate 15, height 5' 11\" (1.803 m), weight 89.7 kg (197 lb 12 oz), SpO2 93 %. GEN: NAD, Cooperative, Calm  HEENT: Normocephalic. Non-icter, no congestion  Lungs:  CTA bilaterally Ant  Cardiac: S1,S2, normal rate and rhythm, no carotid bruits, no gallops  Abdomen: Normal bowel sounds, no distention, non-tender  Extremities: no clubbing, cyanosis, or edema  Skin: no rashes or lesions noted      NEURO:  Mental status: A & O to self only. Able to follow commands but slow to response  Cranial Nerves: EOMI, PERRL 3mm, No dysarthria noted. Noted to have aphasia.  Full facial strength, no asymmetry. Hard of Hearing bilaterally. Tongue protrudes to midline, palate elevates symmetrically. Shrug Shoulders B/L  Motor: Normal bulk and tone, 5/5 strength x 4 extremities proximally and distally; Noted to have action-induced tremors otherwise no involuntary movements. Coordination:  Intact FTN; HTS testing not perform  Reflexes: +1 throughout, down going toes bilaterally   Sensation: Intact x 4 extremities to Light Touch  Gait:  Deferred     NIHSS  1a  Level of consciousness: 0=alert; keenly responsive   1b. LOC questions:  2=Answers neither question correctly   1c. LOC commands: 0=Performs both tasks correctly   2. Best Gaze: 0=normal   3. Visual: 0=No visual loss   4. Facial Palsy: 0=Normal symmetric movement   5a. Motor left arm: 0=No drift, arm holds 90 (or 45) degrees for full 10 seconds   5b. Motor right arm: 0=No drift, arm holds 90 (or 45) degrees for full 10 seconds   6a. Motor left le=No drift; leg holds 30-degree position for full 5 seconds. 6b  Motor right le=No drift; leg holds 30-degree position for full 5 seconds. 7. Limb Ataxia: 0=Absent   8. Sensory: 0=Normal; no sensory loss   9. Best Language:  2=Severe aphasia; all communication is through fragmentary expression;great need for inference,questioning and guessing by the listener. Range of information that can be exchanged is limited;listerner carries burden of communication. Examiner cannot identify materials provided from patient response. 10. Dysarthria: 0=Normal   11.  Extinction and Inattention: 0=No abnormality    Total:    4       Labs/images:     Lab Results   Component Value Date/Time    WBC 6.3 2022 09:56 AM    HGB 13.6 2022 09:56 AM    HCT 42.7 2022 09:56 AM    PLATELET 697  09:56 AM    MCV 93.8 2022 09:56 AM      Lab Results   Component Value Date/Time    Sodium 138 2022 04:05 AM    Potassium 3.4 (L) 2022 04:05 AM    Chloride 107 2022 04:05 AM    CO2 27 03/07/2022 04:05 AM    Anion gap 4 (L) 03/07/2022 04:05 AM    Glucose 107 (H) 03/07/2022 04:05 AM    BUN 22 (H) 03/07/2022 04:05 AM    Creatinine 1.41 (H) 03/07/2022 04:05 AM    BUN/Creatinine ratio 16 03/07/2022 04:05 AM    GFR est AA 57 (L) 03/07/2022 04:05 AM    GFR est non-AA 47 (L) 03/07/2022 04:05 AM    Calcium 9.0 03/07/2022 04:05 AM    Bilirubin, total 0.7 03/06/2022 09:56 AM    Alk. phosphatase 105 03/06/2022 09:56 AM    Protein, total 7.6 03/06/2022 09:56 AM    Albumin 3.3 (L) 03/06/2022 09:56 AM    Globulin 4.3 (H) 03/06/2022 09:56 AM    A-G Ratio 0.8 (L) 03/06/2022 09:56 AM    ALT (SGPT) 18 03/06/2022 09:56 AM    AST (SGOT) 22 03/06/2022 09:56 AM     CT Results (most recent):  Results from Hospital Encounter encounter on 03/06/22    CT HEAD WO CONT    Narrative  EXAM:  CT HEAD WO CONT    INDICATION: Neuro changes    COMPARISON: CT 3/7/2022    TECHNIQUE: Axial noncontrast head CT from foramen magnum to vertex. Coronal and  sagittal reformatted images were obtained. CT dose reduction was achieved  through use of a standardized protocol tailored for this examination and  automatic exposure control for dose modulation. Adaptive statistical iterative  reconstruction (ASIR) was utilized. FINDINGS:  There is diffuse age-related parenchymal volume loss. The ventricles  and sulci are age-appropriate without hydrocephalus. There is no mass effect or  midline shift. Intravascular contrast is present from recent CTA. There is no  acute parenchymal. Scattered foci of low attenuation in the periventricular  white matter represent stable chronic microvascular ischemic changes. The  gray-white matter differentiation is maintained. The basal cisterns are patent. A metallic foreign body in the right orbit is again noted. The osseous  structures are intact. The visualized paranasal sinuses and mastoid air cells  are clear. Impression  No acute intracranial abnormality.      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 Was on Toprol and noted to be bradycardia with plan for possible PPM however, this was deferred after HR improved when Toprol was discontinued. The patient is now s/p tPA for aphasia and disorientation on 03/07/22 requiring neurology evaluation and ICU monitoring post tPA. Stroke Recommendation  · Presumed Etiology is likely cardio-embolic phenomena given PAF with no AC  · CTA H/N and CTP showed no abnormalities requiring endovascular intervention. · Pending repeat CT head in 24 hrs post tPA  · MRI contraindicated due to metal implant  · Q1hr Neuro checks per post tPA protocol  · PRN Cardene gtt to treat BP if >180/105  · Hold DVT prophylaxsis and any anticoagulant or antiplatlet medications until repeat scan completed in 24 hrs and if negative for bleed may start at that time  · NPO until swallow eval  · Obtain TTE. Place on continuous telemetry  · Obtain Lipid panel, A1c for tight glycemic control, Trop, and TSH  · Started on high intensity statin to keep LDL goal <70 pending Lipid panel; adjust dose based on LDL  · DVT prophy  · Stroke education  · PT/OT/ST evals  · Low threshold to repeat CT head if any worsening change in neurologic condition    Further neurology recommendation to follow by Dr. Akshat Hinojosa    Discussed with: Intensivist, tele-neurologist Dr. Stevo Jones, Dr. Mathew Cook, NP Anna Conti, ICU CN, patient's son Victorina Seen, and cardiac unit nurses. >55% time spent in counseling or coordination of care of the above in the assessment and plan     Signed by: Jered Meneses NP      Please note that this dictation was completed with [employer] the [employer] voice recognition software.   Quite often unanticipated grammatical, syntax, homophones, and other interpretive errors are inadvertently transcribed by the computer software. Please disregard these errors. Please excuse any errors that have escaped final proofreading.

## 2022-03-08 NOTE — PROGRESS NOTES
Problem: Mobility Impaired (Adult and Pediatric)  Goal: *Acute Goals and Plan of Care (Insert Text)  Description: FUNCTIONAL STATUS PRIOR TO ADMISSION: Pt lived in [facility], used rollator for longer distances, [facility] for in home. Resides alone and receives OT and PT at [facility]. Pt with multiple falls 5-6 within last 6 months. Able to dress mod I and bathe himself but has staff member present for safety during bathing. Walks to the dining room for meals. HOME SUPPORT: [facility] staff. Physical Therapy Goals    Initiated 3/8/2022  1. Patient will move from supine to sit and sit to supine  in bed with minimal assistance within 7 day(s). 2.  Patient will transfer from bed to chair and chair to bed with minimal assistance using the least restrictive device within 7 day(s). 3.  Patient will perform sit to stand with minimal assistance within 7 day(s). 4.  Patient will ambulate with minimal assistance for 50 feet with the least restrictive device within 7 day(s). 5.  Patient will improve Aquino Balance score by 7 points within 7 days. Initiated 3/7/2022  1. Patient will move from supine to sit and sit to supine  in bed with modified independence within 7 day(s). 2.  Patient will transfer from bed to chair and chair to bed with modified independence using the least restrictive device within 7 day(s). 3.  Patient will perform sit to stand with modified independence within 7 day(s). 4.  Patient will ambulate with modified independence for 150 feet with the least restrictive device within 7 day(s). Outcome: Progressing Towards Goal   PHYSICAL THERAPY REEVALUATION  Patient: Rachel Santana  (80 y.o. male)  Date: 3/8/2022  Primary Diagnosis: Bradycardia [R00.1]  Hypertensive urgency [I16.0]        Precautions:   Fall,DNR,Bed Alarm      ASSESSMENT  Based on the objective data described below, the patient presents with confusion, decreased functional mobility, impaired balance and gait, increased back and R flank/scapular pain, increased risk for recurrent falls s/p code S called overnight and received TPA. Pt received supine in bed and agreeable to therapy. Pt oriented to self only. Pt required max A x 2 supine to sit EOB as well as increased time and effort to assume sitting EOB. Pt tolerated sit<>stands with mod A x 2 and able to take small steps for bed to [facility]. Pt required max verbal cueing for proper hand placement on the [facility]. Pt tolerated short gait training x 5 ft with mod A x 2 demonstrating short, shuffle steps and increased pain. Pt assisted back to supine position with all needs met. At this time, pt will benefit from SNF placement upon discharge to continue therapy efforts. Current Level of Function Impacting Discharge (mobility/balance): max A x 2 supine<>sit, mod A x 2 sit<>stands, mod A x 2 short gait x 5 ft    Functional Outcome Measure: The patient scored 3/56 on the tillman balance outcome measure which is indicative of high risk for falls. Other factors to consider for discharge: history of falls, confusion, lives alone at [facility]     Patient will benefit from skilled therapy intervention to address the above noted impairments. PLAN :  Recommendations and Planned Interventions: bed mobility training, transfer training, gait training, therapeutic exercises, patient and family training/education, and therapeutic activities      Frequency/Duration: Patient will be followed by physical therapy:  5 times a week to address goals. Recommendation for discharge: (in order for the patient to meet his/her long term goals)  Therapy up to 5 days/week in SNF setting    This discharge recommendation:  Has been made in collaboration with the attending provider and/or case management    Equipment recommendations for successful discharge (if) home: TBD         SUBJECTIVE:   Patient stated That hurts. Don't pull.     OBJECTIVE DATA SUMMARY:   HISTORY:    Past Medical History:   Diagnosis Date Advanced care planning/counseling discussion 10/8/2015    Basal cell carcinoma, face     now sees Dr. Tyler Rivas. Bladder cancer [facility]) 2012    Dr. Nora Nguyen. cyto 9/2014, 2/2016. s/p surgery, BCG irrigation Abe. saw Dr. America Duong    BPH with obstruction/lower urinary tract symptoms     Chronic lower back pain     injection in [place]. saw Dr. Christopher Donohue    Chronic neck pain     limited motion to side    Depression, major     Dupuytren's contracture of left hand     5th finger    Encephalopathy 8/2021, 11/21/21    admitted     Epistaxis 2013    saw ENT at [facility] history of skin cancer     Fecal incontinence     with loose stools    Hearing loss     has hearing aids    HTN (hypertension)     Insomnia     IVCD (intraventricular conduction defect) 11/30/2015    Dr Mario Mims    Loose stools 7/18/2014    MRI contraindicated due to metal implant     has scleral buckle    Paroxysmal atrial fibrillation (HCC)     EF 66%, 1-2+ LAE on flecainide;Dr. Mario Mims. saw Dr. Alfonzo Bateman at [facility]    Peripheral neuropathy     severe foot neuropathy    RLS (restless legs syndrome) 7/3/2017    SCC (squamous cell carcinoma), face     prior hx, now sees Dr. Kay Pineda allergic reaction     Skin cancer     Urothelial carcinoma of right distal ureter ([address] 5/1/15    excision Dr. Darwin Scott. low grade, papillary. repeat 3 month     Past Surgical History:   Procedure Laterality Date    HX BLADDER REPAIR  2012    cancer    HX COLONOSCOPY  2012    polyps. repeat 2017? HX HERNIA REPAIR      periumibical    HX MALIGNANT SKIN LESION EXCISION  06/14/2018    basal cell left lip    HX OTHER SURGICAL  10/2021    Skin cancer removed from nose.      HX RETINAL DETACHMENT REPAIR Right     [place] CYSTOURETHROSCOPY  9/2014    Dr. Karyle Duke CYSTOURETHROSCOPY  5/1/15    low grade right pap urothelial cancer    [place] CYSTOURETHROSCOPY  2/29/16     Hospital course since last seen and reason for reevaluation: s/p code S evening on 3/7 and received TPA    Personal factors and/or comorbidities impacting plan of care:     Home Situation  Home Environment: Assisted living  One/Two Story Residence: One story  Living Alone: Yes  Support Systems: Child(nick),Assisted Living  Patient Expects to be Discharged to[de-identified] Assisted Living  Current DME Used/Available at Home: Lorren Barer, rollator,Cane, straight,Shower chair,Grab bars  Tub or Shower Type: Shower    EXAMINATION/PRESENTATION/DECISION MAKING:   Critical Behavior:  Neurologic State: Eyes open to voice  Orientation Level: Oriented X4 (memory issues at baseline per famiyl)  Cognition: Follows commands  Safety/Judgement: Awareness of environment  Hearing:    Range Of Motion:  AROM: Generally decreased, functional                       Strength:    Strength: Generally decreased, functional                    Tone & Sensation:                  Sensation: Intact               Coordination:  Coordination: Generally decreased, functional  Vision:      Functional Mobility:  Bed Mobility:     Supine to Sit: Maximum assistance;Assist x2  Sit to Supine: Assist x2;Maximum assistance     Transfers:  Sit to Stand: Moderate assistance;Assist x2  Stand to Sit: Moderate assistance;Assist x2        Bed to Chair: Moderate assistance;Assist x2              Balance:   Sitting: Impaired  Sitting - Static: Good (unsupported)  Sitting - Dynamic: Fair (occasional)  Standing: Impaired; With support  Standing - Static: Constant support;Fair;Poor  Standing - Dynamic : Constant support;Poor  Ambulation/Gait Training:  Distance (ft): 5 Feet (ft)  Assistive Device: Gait belt (B HHA)  Ambulation - Level of Assistance: Moderate assistance;Assist x2        Gait Abnormalities: Decreased step clearance;Shuffling gait;Trunk sway increased; Step to gait        Base of Support: Narrowed; Center of gravity altered     Speed/Gricelda: Pace decreased (<100 feet/min); Shuffled  Step Length: Right shortened;Left shortened          Functional Measure:  Aquino Balance Test:    Sitting to Standin  Standing Unsupported: 0  Sitting with Back Unsupported: 3  Standing to Sittin  Transfers: 0  Standing Unsupported with Eyes Closed: 0  Standing Unsupported with Feet Together: 0  Reach Forward with Outstretched Arm: 0   Object: 0  Turn to Look Over Shoulders: 0  Turn 360 Degrees: 0  Alternate Foot on Step/Stool: 0  Standing Unsupported One Foot in Front: 0  Stand on One Le  Total: 3/56         56=Maximum possible score;   0-20=High fall risk  21-40=Moderate fall risk   41-56=Low fall risk                  Pain Rating:  Pt reported increased R sided scapular and back pain    Activity Tolerance:   Fair    After treatment patient left in no apparent distress:   Supine in bed, Call bell within reach, and Side rails x 3    COMMUNICATION/EDUCATION:   The patients plan of care was discussed with: Occupational therapist and Registered nurse. Patient was educated regarding His deficit(s) of impaired balance/gait  as this relates to His diagnosis of CVA s/p TPA. He demonstrated Fair understanding. Patient and/or family was verbally educated on the BE FAST acronym for signs/symptoms of CVA and TIA. BE FAST was written on patient's communication board  for visual education and reinforcement. All questions answered with patient indicating poor understanding. Patient is unable to participate in goal setting and plan of care.     Thank you for this referral.  Wang Christianson, PT, DPT   Time Calculation: 25 mins

## 2022-03-08 NOTE — PROGRESS NOTES
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 On  he presented to ER after a fall that was preceded by lightheadedness. During the fall he struck his right chest and shoulder with presenting complaints of pleuritic CP over the right anterior and lateral chest. He was admitted to the tele unit for cardiac workup for bradycardia, followed by Dr. Kiara Rowland. In the evening of  a CODE STROKE was called as he was noted to have aphasia. CT imaging showed no hemorrhage, no LVO and no flow limiting stenosis. He was given tPA @  and he was transferred to ICU for ongoing care and close monitoring. He is DNR. Complete symptom resolution upon transfer to ICU. Reason for ICU Admission: post-Tpa monitoring after Code Stroke    Subjective:   Overnight Events:   3/8/2022  Admitted to ICU as above     ICU Course  3/7: admitted overnight to ICU after code stroke. HCT unremarkable x 2.  3/8: neuro checks. Repeat head CT     POD:  * No surgery found *    S/P:            ICU Assessment and Plan:     Possible ischemic CVA  - s/p tPA 3/7  - neuro following, appreciate recs  - neuro checks per post-tPA orderset  - CTH negative, unable to obtain MRI  scleral metal buckle  - CTH scheduled  this evening  - PT/OT/SLP  - echo pending  - lipids, A1c pending  - SBP < 180  Hx of HTN, HLD, pAF (not on AC), bradycardia  - cards following, appreciate recs  - holding toprol  - continue hydralazine, amlodipine, losartan  - continue atorvastatin  CKD  - monitor BMP, UOP  - monitor/replete lytes  Recent falls  - lidocaine patch for pleuritic chest pain  - PT/OT as above           ICU Comprehensive Plan of Care:     1.  Neurological System    Spontaneous Awakening Trial: N/A  Sedation: N/A  Analgesia: Acetaminophen and Lidoderm Patches    2. Cardiovascular System    SBP Goal of: < 180 mmHg  MAP Goal of: > 65 mmHg  None - For above SBP/MAP goals  Transfusion Trigger (Hgb): <7 g/dL  Keep K > 4; Mg > 2     3. Respiratory System  Incentive spirometry  Spontaneous Breathing Trial: N/A  Pulmonary toilet: Incentive Spirometry   SpO2 Goal: > 92%  DVT Prophylaxis: SCD's or Sequential Compression Device     4. Renal/GI/Endocrine System  IVFs:   Ulcer Prophylaxis: Not at this time   Bowel Regimen: MiraLax  Feeding:  Yes diet  Blood Sugar Goal 120-180 - Glycemic Control: Insulin    5. Infectious Disease    Indwelling Catheter:  Tubes: None  Lines: Peripheral IV  Drains: Dykes Catheter  D - De-escalation of Antibiotics:   None    6. PT/OT: PT consulted and on board, OT consulted and on board and Speech therapy consulted and on board     7. Goals of Care Discussion with family Yes     8. Plan of Care/Code Status: Full Code    9. Discussed Care Plan with Bedside RN    10.  Documentation of Current Medications      Active Problem List:     Problem List  Date Reviewed: 2/7/2022          Codes Class    Hypertensive urgency ICD-10-CM: I16.0  ICD-9-CM: 401.9         SSS (sick sinus syndrome) ([facility] ICD-10-CM: I49.5  ICD-9-CM: 427.81         Bradycardia ICD-10-CM: R00.1  ICD-9-CM: 427.89         Seasonal allergic reaction ICD-10-CM: J30.2  ICD-9-CM: 477.9         Urinary incontinence, unspecified type ICD-10-CM: R32  ICD-9-CM: 788.30         Hearing aid worn ICD-10-CM: Z97.4  ICD-9-CM: V45.89         Encephalopathy ICD-10-CM: G93.40  ICD-9-CM: 348.30         MRI contraindicated due to metal implant ICD-10-CM: Z53.09  ICD-9-CM: V64.1     Overview Signed 9/21/2021 12:31 PM by Javire Barrett MD     has scleral buckle             RLS (restless legs syndrome) ICD-10-CM: G25.81  ICD-9-CM: 333.94         Dupuytren's contracture of left hand ICD-10-CM: M72.0  ICD-9-CM: 728.6     Overview Signed 3/23/2016  3:09 PM by Angel Ham MD     5th finger             Chronic neck pain ICD-10-CM: M54.2, G89.29  ICD-9-CM: 723.1, 338.29     Overview Signed 1/7/2016 10:27 AM by Angel Ham MD     limited motion to side             IVCD (intraventricular conduction defect) ICD-10-CM: I45.4  ICD-9-CM: 426.6         Advanced care planning/counseling discussion ICD-10-CM: Z71.89  ICD-9-CM: V65.49     Overview Addendum 7/3/2017 10:44 AM by Tona Ellsworth RN     Has Living Will. His wife Gerry Metcalf is POA. MDM. Next is Son Haley White, daughter Rm Friedman. Has documents, will bring in  7/3/2017 Patient states that a completed Advanced Medical Directive is at home. NN encouraged patient to bring a copy of the completed Advanced Medical Directive to the office for scanning into the medical record. Patient verbalized understanding & agreement. 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 Louis Arroyo. low grade, papillary.  repeat 3 month             Fecal incontinence ICD-10-CM: R15.9  ICD-9-CM: 787.60     Overview Signed 9/18/2014 10:20 AM by Angel Ham MD     with loose stools             Loose stools ICD-10-CM: R19.5  ICD-9-CM: 787.7         Paroxysmal atrial fibrillation (HCC) ICD-10-CM: I48.0  ICD-9-CM: 427.31         BPH with obstruction/lower urinary tract symptoms ICD-10-CM: N40.1, N13.8  ICD-9-CM: 600.01, 599.69         Depression, major ICD-10-CM: F32.9  ICD-9-CM: 296.20         Hearing loss ICD-10-CM: H91.90  ICD-9-CM: 389.9     Overview Signed 6/18/2014  6:18 PM by Angel Ham MD     has hearing aids             Insomnia ICD-10-CM: G47.00  ICD-9-CM: 780.52         [facility] ([facility] ICD-10-CM: C67.9  ICD-9-CM: 188.9     Overview Signed 6/18/2014  6:18 PM by Eliud Sanchez MD     s/p surgery, BCG irrigation [place]. saw Dr. Pau Mejias                   Past Medical History:      has a past medical history of Advanced care planning/counseling discussion (10/8/2015), Basal cell carcinoma, face, Bladder cancer ([facility] (2012), BPH with obstruction/lower urinary tract symptoms, Chronic lower back pain, Chronic neck pain, Depression, major, Dupuytren's contracture of left hand, Encephalopathy (8/2021, 11/21/21), Epistaxis (2013), Family history of skin cancer, Fecal incontinence, Hearing loss, HTN (hypertension), Insomnia, IVCD (intraventricular conduction defect) (11/30/2015), Loose stools (7/18/2014), MRI contraindicated due to metal implant, Paroxysmal atrial fibrillation ([address] Peripheral neuropathy, RLS (restless legs syndrome) (7/3/2017), SCC (squamous cell carcinoma), face, Seasonal allergic reaction, Skin cancer, and Urothelial carcinoma of right distal ureter ([address] (5/1/15). He has no past medical history of Sun-damaged skin, Sunburn, blistering, or Tanning bed exposure. Past Surgical History:      has a past surgical history that includes hx bladder repair (2012); hx retinal detachment repair (Right); hx hernia repair; hx colonoscopy (2012); pr cystourethroscopy (9/2014); pr cystourethroscopy (5/1/15); pr cystourethroscopy (2/29/16); hx malignant skin lesion excision (06/14/2018); and hx other surgical (10/2021). Home Medications:     Prior to Admission medications    Medication Sig Start Date End Date Taking? Authorizing Provider   amLODIPine (NORVASC) 10 mg tablet Take 1 Tablet by mouth daily. 3/8/22  Yes Kasandra Johnson MD   cyanocobalamin 1,000 mcg tablet Take 1 Tablet by mouth daily. 3/8/22  Yes Kasandra Johnson MD   cholecalciferol (VITAMIN D3) (50,000 UNITS /1250 MCG) capsule Take 1 Capsule by mouth every seven (7) days.  Indications: low vitamin D levels 1/21/22  Yes Jim Falcon MD   sertraline (ZOLOFT) 100 mg tablet Take 1 Tablet by mouth daily. 22  Yes Wang Falcon MD   irbesartan (AVAPRO) 150 mg tablet Take 1 Tablet by mouth two (2) times a day. Increased dose 22  Yes Wang Falcon MD   colestipoL (COLESTID) 1 gram tablet Take 1 Tablet by mouth two (2) times a day. 21  Yes Wang Falcon MD   metoprolol succinate (TOPROL-XL) 25 mg XL tablet Take 1 Tablet by mouth nightly. 21  Yes Santos Pena MD   finasteride (PROSCAR) 5 mg tablet Take 1 Tablet by mouth daily. 21  Yes Wang Falcon MD   acetaminophen (TYLENOL) 325 mg tablet Take 2 Tablets by mouth every six (6) hours as needed for Pain or Fever. 9/15/21  Yes Wang Falcon MD       Allergies/Social/Family History: Allergies   Allergen Reactions    Trazodone Palpitations      Social History     Tobacco Use    Smoking status: Former Smoker    Smokeless tobacco: Never Used    Tobacco comment: socially   Substance Use Topics    Alcohol use: Not Currently     Comment: occ      History reviewed. No pertinent family history. Review of Systems:     Review of Systems   Neurological:        Delayed responses at times (baseline)   All other systems reviewed and are negative. Objective:   Vital Signs:  Visit Vitals  BP (!) 147/84 (BP 1 Location: Left upper arm, BP Patient Position: At rest)   Pulse 82   Temp 97.8 °F (36.6 °C)   Resp 24   Ht 5' 11\" (1.803 m)   Wt 89.7 kg (197 lb 12 oz)   SpO2 94%   BMI 27.58 kg/m²      O2 Device: None (Room air) Temp (24hrs), Av °F (36.7 °C), Min:97.8 °F (36.6 °C), Max:98.3 °F (36.8 °C)           Intake/Output:     Intake/Output Summary (Last 24 hours) at 3/8/2022 1355  Last data filed at 3/8/2022 1200  Gross per 24 hour   Intake 1642.66 ml   Output 625 ml   Net 1017.66 ml       Physical Exam:    Physical Exam  Vitals and nursing note reviewed. Constitutional:       General: He is not in acute distress. HENT:      Mouth/Throat:      Mouth: Mucous membranes are dry. Eyes:      Pupils: Pupils are equal, round, and reactive to light. Cardiovascular:      Rate and Rhythm: Regular rhythm. Bradycardia present. Pulmonary:      Effort: Pulmonary effort is normal.      Breath sounds: Decreased breath sounds present. Abdominal:      General: Abdomen is flat. Bowel sounds are normal.      Palpations: Abdomen is soft. Skin:     General: Skin is warm and dry. Capillary Refill: Capillary refill takes less than 2 seconds. Neurological:      General: No focal deficit present. Mental Status: He is alert and oriented to person, place, and time. Mental status is at baseline. Psychiatric:         Mood and Affect: Mood normal.         Judgment: Judgment normal.           LABS AND  DATA: Personally reviewed  Recent Labs     03/06/22  0956   WBC 6.3   HGB 13.6   HCT 42.7        Recent Labs     03/08/22  0238 03/07/22  0405    138   K 3.4* 3.4*    107   CO2 24 27   BUN 20 22*   CREA 1.27 1.41*   * 107*   CA 8.7 9.0   MG 2.1 2.4   PHOS 3.2 2.8     Recent Labs     03/06/22  0956      TP 7.6   ALB 3.3*   GLOB 4.3*   LPSE 147     No results for input(s): INR, PTP, APTT, INREXT in the last 72 hours. No results for input(s): PHI, PCO2I, PO2I, FIO2I in the last 72 hours. No results for input(s): CPK, CKMB, TROIQ, BNPP in the last 72 hours. Imaging:  3/8/2022       Chest X-Ray:  CXR Results  (Last 48 hours)    None            ECHO:  pending    Ventilator Settings:  Mode Rate Tidal Volume Pressure FiO2 PEEP                    Peak airway pressure:      Minute ventilation:          MEDS: Reviewed    Multidisciplinary Rounds Completed: Yes    ABCDEF Bundle/Checklist Completed:  Yes    SPECIAL EQUIPMENT  None    DISPOSITION  Stay in ICU    CRITICAL CARE CONSULTANT NOTE  I had a face to face encounter with the patient, reviewed and interpreted patient data including clinical events, labs, images, vital signs, I/O's, and examined patient.   I have discussed the case and the plan and management of the patient's care with the consulting services, the bedside nurses and the respiratory therapist.      NOTE OF PERSONAL INVOLVEMENT IN CARE   This patient has a high probability of imminent, clinically significant deterioration, which requires the highest level of preparedness to intervene urgently. I participated in the decision-making and personally managed or directed the management of the following life and organ supporting interventions that required my frequent assessment to treat or prevent imminent deterioration. I personally spent 40 minutes of critical care time. This is time spent at this critically ill patient's bedside actively involved in patient care as well as the coordination of care. This does not include any procedural time which has been billed separately.     Bethany Espinosa AGAP-BC  Nurse Practitioner  [facility]  3/8/2022

## 2022-03-08 NOTE — PROGRESS NOTES
2000-received pt into ICU 20  TPA infusing  2134-TPA infusion and NS bolus completed  2025-Passed STAND/pt has memory issues at baseline per son  2027-10mg hydralazine given for elevated BP  2029- pain to anterior/ lateral chest wall right side with inspiration/ mvmt  D/t freq falls PTA  tylenol given for generalized pain   2050-lido patch placed on rt post shoulder  2155-pt having difficulty word finding/oriented to self/place unable to state month or year/slurring of some words  NIH 3  Taken for stat head CT-FABIANO Hatch/ FABIANO Issa  updated  2215-NS bolus started as pt neuro status appears to be  pressure dependent  2319-bolus completed  Orientation waxes and wanes  0246-tylenol given for chronic lower back pain  0330-comfortable at rest/pain level jumps to 8-10 with movement  0522-K+ replacement started per order  0530-K+ rate dec to 50cc/hr pt could not tolerate ordered rate of 100cc/hr  Provider aware      0730-Bedside and Verbal shift change report given to RN (oncoming nurse) by Justice Evasn RN (offgoing nurse). Report included the following information SBAR, Kardex, Procedure Summary, MAR, Cardiac Rhythm afib, Alarm Parameters  and Dual Neuro Assessment.

## 2022-03-08 NOTE — PROGRESS NOTES
[address] EVALUATION/DISCHARGE  Patient: Carrie Brewer. (80 y.o. male)  Date: 3/8/2022  Primary Diagnosis: Bradycardia [R00.1]  Hypertensive urgency [I16.0]       Precautions:  [facility] Course:  3/6: Admitted for a fall; reported to have aphasia and disorientation. 3/7: PT/OT seen; recommended therapy 2x a week in [facility] vs short-term SNF rehab. CT showed no acute findings. CTA showing no large vessel occlusion. NIHSS 4 for severe aphasia and not answering LOC questions. History of SLP services per chart review:  None. Pertinent PMHx:  Advanced age (risk for presbyphagia/presbyesophagus)  Recurrent falls    ASSESSMENT :  Pt seen for swallow evaluation. No overt difficulty nor overt s/s of aspiration present on this date. Recommend regular diet/thin liquids. Do not appreciate aphasia- wonder if there is a baseline cognitive deficit, particularly in light in many falls. Skilled acute therapy provided by a speech-language pathologist is not indicated at this time. PLAN :  Recommendations:  -- regular diet/ thin liquids  -- general aspiration precautions including completely upright for all PO   -- SLP will sign off     Discharge Recommendations: None     SUBJECTIVE:   Patient stated, \"It's too cold. Ugh! That's too hot! after RN Jonas Gregg microwaved his breakfast.    OBJECTIVE:     Past Medical History:   Diagnosis Date    Advanced care planning/counseling discussion 10/8/2015    Basal cell carcinoma, face     now sees Dr. Yoli Harris.  Bladder cancer [facility]) 2012    Dr. Yajaira Kerr. cyto 9/2014, 2/2016. s/p surgery, BCG irrigation [place]. saw Dr. Pallavi Clay BPH with obstruction/lower urinary tract symptoms     Chronic lower back pain     injection in [place].   saw Dr. Maddy Hare Chronic neck pain     limited motion to side    Depression, major     Dupuytren's contracture of left hand     5th finger    Encephalopathy 8/2021, 11/21/21    admitted     Epistaxis 2013    saw ENT [facility] history of skin cancer     Fecal incontinence     with loose stools    Hearing loss     has hearing aids    HTN (hypertension)     Insomnia     IVCD (intraventricular conduction defect) 11/30/2015    Dr Sherry Navarro    Loose stools 7/18/2014    MRI contraindicated due to metal implant     has scleral buckle    Paroxysmal atrial fibrillation (HCC)     EF 66%, 1-2+ LAE on flecainide;Dr. Sherry Navarro. saw Dr. Vahe Sharma [facility] Peripheral neuropathy     severe foot neuropathy    RLS (restless legs syndrome) 7/3/2017    SCC (squamous cell carcinoma), face     prior hx, now sees Dr. Shiela Hill Seasonal allergic reaction     Skin cancer     Urothelial carcinoma of right distal ureter ([address] 5/1/15    excision Dr. Edmundo Rollins. low grade, papillary. repeat 3 month     Past Surgical History:   Procedure Laterality Date    HX BLADDER REPAIR  2012    cancer    HX COLONOSCOPY  2012    polyps. repeat 2017?  HX HERNIA REPAIR      periumibical    HX MALIGNANT SKIN LESION EXCISION  06/14/2018    basal cell left lip    HX OTHER SURGICAL  10/2021    Skin cancer removed from nose.      HX RETINAL DETACHMENT REPAIR Right     KS CYSTOURETHROSCOPY  9/2014    Dr. Ronni Monet CYSTOURETHROSCOPY  5/1/15    low grade right pap urothelial cancer    [place] CYSTOURETHROSCOPY  2/29/16     Prior Level of Function/Home Situation:   Home Situation  Home Environment: Assisted living  One/Two Story Residence: One story  Living Alone: Yes  Support Systems: Child(nick),Assisted Living  Patient Expects to be Discharged to[de-identified] Assisted Living  Current DME Used/Available at Home: Walker, rollator,Cane, straight,Shower chair,Grab bars  Tub or Shower Type: Shower  Diet prior to admission: regular diet/thin liquids  Current Diet:  Regular diet/thin liquids   Cognitive and Communication Status:  Neurologic State: Alert  Orientation Level: Oriented to person,Disoriented to time,Disoriented to situation,Disoriented to place  Cognition: Decreased command following,Decreased attention/concentration  Perception: Appears intact  Perseveration: No perseveration noted  Safety/Judgement: Decreased awareness of need for safety,Decreased awareness of need for assistance,Decreased awareness of environment,Decreased insight into deficits  Oral Assessment:  Oral Assessment  Labial: No impairment  Dentition: Natural  Oral Hygiene: oral mucosa moist and clear of secretions  Lingual: No impairment  Velum: No impairment  Mandible: No impairment  P.O. Trials:  Patient Position: upright in bed  Vocal quality prior to P.O.: No impairment  Consistency Presented: Thin liquid; Solid  How Presented: Self-fed/presented;Cup/sip;Spoon;Straw;Successive swallows     Bolus Acceptance: No impairment  Bolus Formation/Control: No impairment     Propulsion: No impairment  Oral Residue: None  Initiation of Swallow: No impairment                      Oral Phase Severity: No impairment  Pharyngeal Phase Severity : Mild  NOMS:   The NOMS functional outcome measure was used to quantify this patient's level of swallowing impairment. Based on the NOMS, the patient was determined to be at level 7 for swallow function     NOMS Swallowing Levels:  Level 1 (CN): NPO  Level 2 (CM): NPO but takes consistency in therapy  Level 3 (CL): Takes less than 50% of nutrition p.o. and continues with nonoral feedings; and/or safe with mod cues; and/or max diet restriction  Level 4 (CK): Safe swallow but needs mod cues; and/or mod diet restriction; and/or still requires some nonoral feeding/supplements  Level 5 (CJ): Safe swallow with min diet restriction; and/or needs min cues  Level 6 (CI): Independent with p.o.; rare cues; usually self cues; may need to avoid some foods or needs extra time  Level 7 ([address] [place] [address]): Independent for all p.o.  CIERRA. (2003). National Outcomes Measurement System (NOMS): Adult Speech-Language Pathology User's Guide.        Pain:  Pain Scale 1: Numeric (0 - 10)  Pain Intensity 1: 2  Pain Location 1: Back; Chest  After treatment:   Call bell within reach and Nursing notified    COMMUNICATION/EDUCATION:     The patient's plan of care including recommendations, planned interventions, and recommended diet changes were discussed with: Registered nurse.      Thank you for this referral.  Phyllis Jackson SLP  Time Calculation: 15 mins

## 2022-03-08 NOTE — CONSULTS
[facility]    ICU Team Consult Note    Name: Hailey Westfall. : 7/15/1931   MRN: [id number]   Date: 3/7/2022      Subjective:   Progress Note: 3/7/2022      Patient is asked to be seen by Adriana Gonzales s/p Maryellen for concerns of ischemic CVA, necessitating the need for possible ICU care. 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 On  he presented to ER after a fall that was preceded by lightheadedness. During the fall he struck his right chest and shoulder with presenting complaints of pleuritic CP over the right anterior and lateral chest. He was admitted to the tele unit for cardiac workup for bradycardia, followed by Dr. Maria E Henson. In the evening of  a CODE STROKE was called as the patient was noted to have aphasia. CT imaging showed no hemorrhage, no LVO and no flow limiting stenosis. He was given tPA @ 2000 and he was transferred to ICU for ongoing care and close monitoring. He is DNR. Complete symptom resolution upon transfer to ICU.     Active Problem List:     Problem List  Date Reviewed: 2022          Codes Class    Hypertensive urgency ICD-10-CM: I16.0  ICD-9-CM: 401.9         SSS [facility] ICD-10-CM: I49.5  ICD-9-CM: 427.81         Bradycardia ICD-10-CM: R00.1  ICD-9-CM: 427.89         Seasonal allergic reaction ICD-10-CM: J30.2  ICD-9-CM: 477.9         Urinary incontinence, unspecified type ICD-10-CM: R32  ICD-9-CM: 788.30         Hearing aid worn ICD-10-CM: Z97.4  ICD-9-CM: V45.89         Encephalopathy ICD-10-CM: G93.40  ICD-9-CM: 348.30         MRI contraindicated due to metal implant ICD-10-CM: Z53.09  ICD-9-CM: V64.1     Overview Signed 2021 12:31 PM by Sohan Ramirez MD     has scleral buckle             RLS (restless legs syndrome) ICD-10-CM: G25.81  ICD-9-CM: 333.94         Dupuytren's contracture of left hand ICD-10-CM: M72.0  ICD-9-CM: 728.6     Overview Signed 3/23/2016  3:09 PM by Sohan Ramirez MD     5th finger             Chronic neck pain ICD-10-CM: M54.2, G89.29  ICD-9-CM: 723.1, 338.29     Overview Signed 1/7/2016 10:27 AM by Sohan Ramirez MD     limited motion to side             IVCD (intraventricular conduction defect) ICD-10-CM: I45.4  ICD-9-CM: 426.6         Advanced care planning/counseling discussion ICD-10-CM: Z71.89  ICD-9-CM: V65.49     Overview Addendum 7/3/2017 10:44 AM by Sangita Garibay RN     Has Living Will. His wife Renetta Mcneill is POA. MDM. Next is Son Corrina Dowell, daughter Margaret Matthews. Has documents, will bring in  7/3/2017 Patient states that a completed Advanced Medical Directive is at home. NN encouraged patient to bring a copy of the completed Advanced Medical Directive to the office for scanning into the medical record. Patient verbalized understanding & agreement. 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 Manan Collins. low grade, papillary.  repeat 3 month             Fecal incontinence ICD-10-CM: R15.9  ICD-9-CM: 787.60     Overview Signed 9/18/2014 10:20 AM by Sohan Ramirez MD     with loose stools             Loose stools ICD-10-CM: R19.5  ICD-9-CM: 787.7         Paroxysmal atrial fibrillation (HCC) ICD-10-CM: I48.0  ICD-9-CM: 427.31         BPH with obstruction/lower urinary tract symptoms ICD-10-CM: N40.1, N13.8  ICD-9-CM: 600.01, 599.69         Depression, major ICD-10-CM: F32.9  ICD-9-CM: 296.20         Hearing loss ICD-10-CM: H91.90  ICD-9-CM: 389.9     Overview Signed 6/18/2014  6:18 PM by Sohan Ramirez MD     has hearing aids             Insomnia ICD-10-CM: G47.00  ICD-9-CM: 780.52         Bladder cancer [facility]) ICD-10-CM: C67.9  ICD-9-CM: 188.9     Overview Signed 6/18/2014  6:18 PM by Riut Alexis MD     s/p surgery, BCG irrigation Fish. saw Dr. Sonja Anthony                   Past Medical History:      has a past medical history of Advanced care planning/counseling discussion (10/8/2015), Basal cell carcinoma, face, Bladder cancer ([address] (2012), BPH with obstruction/lower urinary tract symptoms, Chronic lower back pain, Chronic neck pain, Depression, major, Dupuytren's contracture of left hand, Encephalopathy (8/2021, 11/21/21), Epistaxis (2013), Family history of skin cancer, Fecal incontinence, Hearing loss, HTN (hypertension), Insomnia, IVCD (intraventricular conduction defect) (11/30/2015), Loose stools (7/18/2014), MRI contraindicated due to metal implant, Paroxysmal atrial fibrillation ([address] Peripheral neuropathy, RLS (restless legs syndrome) (7/3/2017), SCC (squamous cell carcinoma), face, Seasonal allergic reaction, Skin cancer, and Urothelial carcinoma of right distal ureter ([address] (5/1/15). He has no past medical history of Sun-damaged skin, Sunburn, blistering, or Tanning bed exposure. Past Surgical History:      has a past surgical history that includes hx bladder repair (2012); hx retinal detachment repair (Right); hx hernia repair; hx colonoscopy (2012); pr cystourethroscopy (9/2014); pr cystourethroscopy (5/1/15); pr cystourethroscopy (2/29/16); hx malignant skin lesion excision (06/14/2018); and hx other surgical (10/2021). Home Medications:     Prior to Admission medications    Medication Sig Start Date End Date Taking? Authorizing Provider   amLODIPine (NORVASC) 10 mg tablet Take 1 Tablet by mouth daily. 3/8/22  Yes Earnestine Lefort, MD   cyanocobalamin 1,000 mcg tablet Take 1 Tablet by mouth daily. 3/8/22  Yes Earnestine Lefort, MD   cholecalciferol (VITAMIN D3) (50,000 UNITS /1250 MCG) capsule Take 1 Capsule by mouth every seven (7) days. Indications: low vitamin D levels 22  Yes Wang Falcon MD   sertraline (ZOLOFT) 100 mg tablet Take 1 Tablet by mouth daily. 22  Yes Wang Falcon MD   irbesartan (AVAPRO) 150 mg tablet Take 1 Tablet by mouth two (2) times a day. Increased dose 22  Yes Wang Falcon MD   colestipoL (COLESTID) 1 gram tablet Take 1 Tablet by mouth two (2) times a day. 21  Yes Wang Falcon MD   metoprolol succinate (TOPROL-XL) 25 mg XL tablet Take 1 Tablet by mouth nightly. 21  Yes Santos Pena MD   finasteride (PROSCAR) 5 mg tablet Take 1 Tablet by mouth daily. 21  Yes Wang Falcon MD   acetaminophen (TYLENOL) 325 mg tablet Take 2 Tablets by mouth every six (6) hours as needed for Pain or Fever. 9/15/21  Yes Wang Falcon MD       Allergies/Social/Family History: Allergies   Allergen Reactions    Trazodone Palpitations      Social History     Tobacco Use    Smoking status: Former Smoker    Smokeless tobacco: Never Used    Tobacco comment: socially   Substance Use Topics    Alcohol use: Not Currently     Comment: occ      History reviewed. No pertinent family history.     Review of Systems:     ENT: + [place] (has hearing aids)  Eyes: +metal in eyes  Cardiac: Denies CP, palpitations  Respiratory: Denies SOB, cough  : Denies diarrhea/ constipation  MSK: + pain to anterior/ lateral chest wall right side with inspiration/ mvmt  Psych: + memory issues    Objective:   Vital Signs:  Visit Vitals  BP (!) 158/88   Pulse 97   Temp 98.3 °F (36.8 °C)   Resp 18   Ht 5' 11\" (1.803 m)   Wt 89.7 kg (197 lb 12 oz)   SpO2 94%   BMI 27.58 kg/m²      O2 Device: None (Room air) Temp (24hrs), Av.9 °F (36.6 °C), Min:97.3 °F (36.3 °C), Max:98.3 °F (36.8 °C)         Intake/Output:     Intake/Output Summary (Last 24 hours) at 3/7/2022 2005  Last data filed at 3/7/2022 1514  Gross per 24 hour   Intake 480 ml   Output 525 ml   Net -45 ml     Physical Exam:    General:  Alert, cooperative, well noursished, well developed, appears younger than stated age   Eyes:  Sclera anicteric. Pupils equally round and reactive to light. Mouth/Throat: Mucous membranes normal, oral pharynx clear/ dry   Neck: Supple   Lungs:   Clear to auscultation bilaterally, good effort, room air   CV:  Irregular rate and rhythm,no murmur, click, rub or gallop   Abdomen:   Soft, non-tender. bowel sounds normal. non-distended, + abdominal adiposity   Extremities: No cyanosis or edema   Skin: Skin color, texture, turgor normal. no acute rash or lesions   Lymph nodes: Cervical and supraclavicular normal   Musculoskeletal: No swelling or deformity, strength equal 5/5 x 4 ext. Lines/Devices:  Intact, no erythema, drainage or tenderness   Psych/Neuro: Alert and oriented, slow to answer questions, appears forgetful/ unsure, speech clear, normal mood affect given the setting. Sensation intact BUE/BLE       LABS AND  DATA: Personally reviewed  Recent Labs     03/06/22  0956   WBC 6.3   HGB 13.6   HCT 42.7        Recent Labs     03/07/22  0405 03/06/22  0956    137   K 3.4* 4.4    109*   CO2 27 25   BUN 22* 21*   CREA 1.41* 1.53*   * 101*   CA 9.0 8.9   MG 2.4 2.4   PHOS 2.8  --      Recent Labs     03/06/22  0956      TP 7.6   ALB 3.3*   GLOB 4.3*   LPSE 147     No results for input(s): INR, PTP, APTT, INREXT in the last 72 hours. No results for input(s): PHI, PCO2I, PO2I, FIO2I in the last 72 hours. No results for input(s): CPK, CKMB, TROIQ, BNPP in the last 72 hours. MEDS: Reviewed    Chest X-Ray: personally reviewed and report checked    ECHO 08/30/21  Result status: Final result  · LV: Estimated LVEF is 55 - 60%. Normal cavity size and systolic function (ejection fraction normal). Mild concentric hypertrophy. Wall motion: normal.       Assessment:       NEURO  1. Possible ischemic CVA, s/p tPA 2000 3/7  2. S/p fall with multiple falls over last few weeks  3.  Hx of RLS, chronic neck pain  -Neurology consultation  -CTH, CTA and CTP completed. No hemorhage, no LVO and no flow limiting stenosis seen  -Unable to obtain MRI 2/2 scleral metal buckle. -Check Head CT 24 hrs after tpa or if neuro changes  -Check Lipid panel, HgbA1c  -PT/OT/ST  -Check ECHO  -SBP goal < 180  -Nicardipine if needed for BP control    CARDIAC  1. Bardycardia, episodic, resolved  2. Current and Hx of pAF, not on AC  3. HTN  4. HLD  -Cardiology following, Dr. Edda Castañeda  -Hold Toprol XL  -TSH 3.12  -Continue hydralazine, amlodipine, losartan  -Continue atorvastatin 80mg daily    RESPIRATORY  1. No acute concerns  2. Pleuritic chest discomfort 2/2 fall  -Supplemental oxygen for sats > 92%  -Lidocaine patch, acetaminophen for pain control    RENAL  1. No acute concerns, Hx of CKD3  2. Hypokalemia  3. Hx of BPH  -Trend BMP  -Trend urine output  -Replace lytes to Keep K > 4, Mag > 2    GASTROINTESTINAL  1. No acute concerns  -NPO until ST eval  -Zofran PRN nausea    HEMATOLOGIC/ONC  1. No acute concerns  2. Hx of urothelial carcinoma of right distal ureter, Bladder CA, skin cancer  -Trend CBC  -Monitor for bleeding s/p tPA  -Hold SQ Heparin  -SCDs    ID  1. No acute infectious concerns, no leukocytosis  -Trend fevers, CBC  -No indication for antibiotics    ENDOCRINE  1.  No acute concerns, no Hx of DM  -Trend glucose on daily labs  -TSH 3.12    F - Feeding:  NPO until ST eval  A - Analgesia: acetaminophen, lidocaine patch  S - Sedation: GOAL RASS 0  T - DVT Prophylaxis: SCD's or Sequential Compression Device   H - Head of Bed: > 30 Degrees  U - Ulcer Prophylaxis: NA  G - Glycemic Control: Insulin  S - Spontaneous Breathing Trial: NA  B - Bowel Regimen: miralax PRN  I - Indwelling Catheter:              T/L/D  Tubes: None  Lines: Peripheral IV  Drains: None  D - De-escalation of Antibiotics: NA    DISPOSITION/COMMUNICATION  Discussed Plan of Care/Code Status: Do Not Resuscitate  Stay in ICU    CRITICAL CARE CONSULTANT NOTE  I had a face to face encounter with the patient, reviewed and interpreted patient data including clinical events, labs, images, vital signs, I/O's, and examined patient. I have discussed the case and the plan and management of the patient's care with the consulting services, the bedside nurses and the respiratory therapist.      NOTE OF PERSONAL INVOLVEMENT IN CARE   This patient has a high probability of imminent, clinically significant deterioration, which requires the highest level of preparedness to intervene urgently. I participated in the decision-making and personally managed or directed the management of the following life and organ supporting interventions that required my frequent assessment to treat or prevent imminent deterioration. I personally spent 55 minutes of critical care time. This is time spent at this critically ill patient's bedside actively involved in patient care as well as the coordination of care and discussions with the patient's family. This does not include any procedural time which has been billed separately.     BETSY DelgadoCNP-BC  Intensivist Nurse Practitioner  [facility]  3/7/2022

## 2022-03-08 NOTE — PROGRESS NOTES
Problem: Self Care Deficits Care Plan (Adult)  Goal: *Acute Goals and Plan of Care (Insert Text)  Description: FUNCTIONAL STATUS PRIOR TO ADMISSION: Pt lived in [facility], used rollator for longer distances, [facility] for in home. Resides alone and receives OT and PT at [facility]. Pt with multiple falls 5-6 within last 6 months. Able to dress mod I and bathe himself but has staff member present for safety during bathing. HOME SUPPORT: lives in [facility]  Updated upon re-evaluation 3/8/2022  1. Patient will perform seated grooming task with set-up assist within 7 day(s). 2.  Patient will perform lower body dressing using AE PRN with minimal assistance within 7 day(s). 3.  Patient will perform upper body dressing with set-up assist within 7 day(s). 4.  Patient will perform toilet transfers in bathroom with least restrictive DME with minimal assistance within 7 day(s). 5.  Patient will perform all aspects of toileting with minimal assistance within 7 day(s). Initiated 3/7/2022  1. Patient will perform grooming task at sink with least restrictive DME with modified independence within 7 day(s). 2.  Patient will perform lower body dressing using AE PRN with modified independence within 7 day(s). 3.  Patient will perform upper body dressing with modified independence within 7 day(s). 4.  Patient will perform toilet transfers in bathroom with least restrictive DME with modified independence within 7 day(s). 5.  Patient will perform all aspects of toileting with modified independence within 7 day(s). Outcome: Not Met   OCCUPATIONAL THERAPY RE-EVALUATION NEURO POPULATION  Patient: Conchis Alcala (80 y.o. male)  Date: 3/8/2022  Diagnosis: Bradycardia [R00.1]  Hypertensive urgency [I16.0] <principal problem not specified>       Precautions: Fall,DNR,Bed Alarm  Chart, occupational therapy assessment, plan of care, and goals were reviewed.     ASSESSMENT  Based on the objective data described below, pt presents with impaired balance, impaired cognition (A&Ox1, processing speed, safety awareness, command following), decreased activity tolerance, and generalized weakness s/p code S called 3/7/2022 with receipt of TPA. Pt received supine and amenable to participation in therapy session and reporting need to use restroom. Pt required mod to max A x2 for all bed mobility/functional transfers and up to total A for bowel hygiene. Pt  Intermittently indicating significant R-sided back pain - unable to articulate exacerbating factors. Functional deficits appear global - however participation in formal neuro assessment limited at this time by impaired cognition. Current Level of Function Impacting Discharge (ADLs): mod to max A x2 functional transfers, total A toileting     Functional Outcome Measure: The patient scored Total A-D  Total A-D (Motor Function): 35/66 on the Fugl-Thomson Assessment which is indicative of moderate impairment in upper extremity functional status. (however noted per chart, pt with baseline dupuytren's and resting tremors)    Other factors to consider for discharge: PLOF - baseline cognitive deficits? , from [facility]         PLAN :  Recommendations and Planned Interventions: self care training, functional mobility training, therapeutic exercise, balance training, therapeutic activities, endurance activities, patient education and home safety training    Frequency/Duration: Patient will be followed by occupational therapy 5 times a week to address goals. Recommendation for discharge: (in order for the patient to meet his/her long term goals)  Therapy up to 5 days/week in SNF setting    This discharge recommendation:  Has been made in collaboration with the attending provider and/or case management    Equipment recommendations for successful discharge (if) home: TBD       SUBJECTIVE:   Patient stated I fell last night.     OBJECTIVE DATA SUMMARY:   Hospital course since last seen and reason for reevaluation: code S called 3/7/2022, received TPA, transfer to ICU    Cognitive/Behavioral Status:  Neurologic State: Alert  Orientation Level: Oriented to person;Disoriented to time;Disoriented to situation;Disoriented to place  Cognition: Decreased command following;Decreased attention/concentration  Perception: Appears intact  Perseveration: No perseveration noted  Safety/Judgement: Decreased awareness of need for safety;Decreased awareness of need for assistance;Decreased awareness of environment;Decreased insight into deficits    Skin: visually intact     Edema: none noted     Hearing: Auditory  Auditory Impairment: Hard of hearing, bilateral    Vision/Perceptual:                           Acuity:  (unable to formally assess)         Range of Motion:    AROM: Generally decreased, functional                         Strength:    Strength: Generally decreased, functional                Coordination:  Coordination: Generally decreased, functional  Fine Motor Skills-Upper: Left Impaired;Right Impaired (mild resting tremors, dupuytren's)    Gross Motor Skills-Upper: Left Intact; Right Intact    Tone & Sensation:     tone: normal - baseline resting tremors   Sensation: Intact                      Balance:  Sitting: Impaired  Sitting - Static: Good (unsupported)  Sitting - Dynamic: Fair (occasional)  Standing: Impaired; With support  Standing - Static: Constant support;Fair;Poor  Standing - Dynamic : Constant support;Poor    Functional Mobility and Transfers for ADLs:  Bed Mobility:  Supine to Sit: Maximum assistance;Assist x2  Sit to Supine: Assist x2;Maximum assistance    Transfers:  Sit to Stand: Moderate assistance;Assist x2  Stand to Sit: Moderate assistance;Assist x2  Bed to Chair: Moderate assistance;Assist x2  Toilet Transfer :  Moderate assistance;Assist x2 (to [facility])    ADL Assessment:  Feeding: Independent (inferred)    Oral Facial Hygiene/Grooming: Setup (inferred, seated)    Bathing: Maximum assistance; Total assistance (inferred, up to max x2 for transfer and LB reach)    Upper Body Dressing: Minimum assistance (inferred)    Lower Body Dressing: Maximum assistance; Total assistance (inferred)    Toileting: Total assistance (male purewick, up to total for bowel hygiene )                ADL Intervention and task modifications: Toileting  Bladder Hygiene: Total assistance (dependent)  Bowel Hygiene:  Total assistance (dependent)  Clothing Management: Total assistance (dependent)    Cognitive Retraining  Safety/Judgement: Decreased awareness of need for safety;Decreased awareness of need for assistance;Decreased awareness of environment;Decreased insight into deficits      Functional Measure:  Fugl-Thomson Assessment of Motor Recovery after Stroke:   Reflex Activity  Flexors/Biceps/Fingers: Can be elicited  Extensors/Triceps: Can be elicited  Reflex Subtotal: 4    Volitional Movement Within Synergies  Shoulder Retraction: Partial  Shoulder Elevation: Partial  Shoulder Abduction (90 degrees): Partial  Shoulder External Rotation: Partial  Elbow Flexion: Partial  Forearm Supination: Partial  Shoulder Adduction/Internal Rotation: Partial  Elbow Extension: Partial  Forearm Pronation: Partial  Subtotal: 9    Volitional Movement Mixing Synergies  Hand to Lumbar Spine: Partial  Shoulder Flexion (0-90 degrees): Partial  Pronation-Supination: Partial  Subtotal: 3    Volitional Movement With Little or No Synergy  Shoulder Abduction (0-90 degrees): Partial  Shoulder Flexion ( degrees): Partial  Pronation/Supination: Partial  Subtotal : 3    Normal Reflex Activity  Biceps, Triceps, Finger Flexors: Partial  Subtotal : 1    Upper Extremity Total   Upper Extremity Total: 20    Wrist  Stability at 15 Degree Dorsiflexion: Partial  Repeated Dorsiflexion/ Volar Flexion: Partial  Stability at 15 Degree Dorsiflexion: Partial  Repeated Dorsiflexion/ Volar Flexion: Partial  Circumduction: Partial  Wrist Total: 5    Hand  Mass Flexion: Partial  Mass Extension: Partial  Grasp A: Partial  Grasp B: Partial  Grasp C: Partial  Grasp D: Partial  Grasp E: Partial  Hand Total: 7    Coordination/Speed  Tremor: Marked  Dysmetria: Slight  Time: <1s  Coordination/Speed Total : 3    Total A-D  Total A-D (Motor Function): 35/66     This is a reliable/valid measure of arm function after a neurological event. It has established value to characterize functional status and for measuring spontaneous and therapy-induced recovery; tests proximal and distal motor functions. Fugl-Thomson Assessment - UE scores recorded between five and 30 days post neurologic event can be used to predict UE recovery at six months post neurologic event. Severe = 0-21 points   Moderately Severe = 22-33 points   Moderate = 34-47 points   Mild = 48-66 points  DARLEEN Osorio, MAURI Avendaño, & ERVIN Medina (1992). Measurement of motor recovery after stroke: Outcome assessment and sample size requirements.  Stroke, 23, pp. 7695-5316.   ------------------------------------------------------------------------------------------------------------------------------------------------------------------  MCID:  Stroke:   Kierra Osborn et al, 2001; n = 171; mean age 79 (5) years; assessed within 16 (12) days of stroke, Acute Stroke)  FMA Motor Scores from Admission to Discharge    10 point increase in FMA Upper Extremity = 1.5 change in discharge FIM    10 point increase in FMA Lower Extremity = 1.9 change in discharge FIM  MDC:   Stroke:   Argentina Maloney et al, 2008, n = 14, mean age = 59.9 (14.6) years, assessed on average 14 (6.5) months post stroke, Chronic Stroke)    FMA = 5.2 points for the Upper Extremity portion of the assessment       Pain:  Pt reports R sided back pain     Activity Tolerance:   Fair    After treatment patient left in no apparent distress:   Supine in bed, Call bell within reach and Side rails x 3, RN notified of session     COMMUNICATION/COLLABORATION:   The patients plan of care was discussed with: Physical therapist and Registered nurse. Patient is not appropriate for stroke education at this time. Will follow up and address in future sessions.      Marcel Taylor OT  Time Calculation: 26 mins

## 2022-03-08 NOTE — PROGRESS NOTES
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 Patient was noted to be hypertensive prior to code S with which he did received hydralazine. Patient's SBP was around 170's during code S. Was evaluated by tele-neurologist and was deemed tPA candidate. CTH was negative for head bleed. CTA H/N and CTP showed no LVO or perfusion mismatch. Patient received tPA after tele-neurologist Dr. Kelsie Baugh discussed risk, benefits, and alternatives with patient's son Raisa Castillo and spouse who consented to proceed with tPA. Patient received tPA at 20:00PM and was admitted to ICU for close monitoring. NIHSS  1a  Level of consciousness: 0=alert; keenly responsive   1b. LOC questions:  2=Answers neither question correctly   1c. LOC commands: 0=Performs both tasks correctly   2. Best Gaze: 0=normal   3. Visual: 0=No visual loss   4. Facial Palsy: 0=Normal symmetric movement   5a. Motor left arm: 0=No drift, arm holds 90 (or 45) degrees for full 10 seconds   5b. Motor right arm: 0=No drift, arm holds 90 (or 45) degrees for full 10 seconds   6a. Motor left le=No drift; leg holds 30-degree position for full 5 seconds. 6b  Motor right le=No drift; leg holds 30-degree position for full 5 seconds. 7. Limb Ataxia: 0=Absent   8. Sensory: 0=Normal; no sensory loss   9. Best Language:  2=Severe aphasia; all communication is through fragmentary expression;great need for inference,questioning and guessing by the listener. Range of information that can be exchanged is limited;listerner carries burden of communication. Examiner cannot identify materials provided from patient response   10. Dysarthria: 0=Normal   11.  Extinction and Inattention: 0=No abnormality    Total:    4     VAN: NEGATIVE    tPA Candidate: YES at 0800 PM    Mechanical Thrombectomy Candidate: NO    ANTIPLT/AC/ANTITHROMB: YES (Aspirin 81 mg)    CT Results (most recent):  Results from Hospital Encounter encounter on 03/06/22    CT HEAD WO CONT    Narrative  EXAM:  CT HEAD WO CONT    INDICATION: Neuro changes    COMPARISON: CT 3/7/2022    TECHNIQUE: Axial noncontrast head CT from foramen magnum to vertex. Coronal and  sagittal reformatted images were obtained. CT dose reduction was achieved  through use of a standardized protocol tailored for this examination and  automatic exposure control for dose modulation. Adaptive statistical iterative  reconstruction (ASIR) was utilized. FINDINGS:  There is diffuse age-related parenchymal volume loss. The ventricles  and sulci are age-appropriate without hydrocephalus. There is no mass effect or  midline shift. Intravascular contrast is present from recent CTA. There is no  acute parenchymal. Scattered foci of low attenuation in the periventricular  white matter represent stable chronic microvascular ischemic changes. The  gray-white matter differentiation is maintained. The basal cisterns are patent. A metallic foreign body in the right orbit is again noted. The osseous  structures are intact. The visualized paranasal sinuses and mastoid air cells  are clear. Impression  No acute intracranial abnormality. Discussed with: tele-neurologist Dr. Faye Soares, FABIANO Kennedy, ICU CN, patient's son Gasper Gregg, and cardiac unit nurses. Time spent: 45 minutes. Jeanne Haider NP  Neurocritical Care Nurse Practitioner  [phone]    Please note that this dictation was completed with [email or web address], the [employer] voice recognition software. Quite often unanticipated grammatical, syntax, homophones, and other interpretive errors are inadvertently transcribed by the computer software.  Please excuse any errors that have escaped final proofreading.

## 2022-03-09 ENCOUNTER — TELEPHONE (OUTPATIENT)
Dept: INTERNAL MEDICINE CLINIC | Age: 87
End: 2022-03-09

## 2022-03-09 LAB
ANION GAP SERPL CALC-SCNC: 7 MMOL/L (ref 5–15)
BUN SERPL-MCNC: 20 MG/DL (ref 6–20)
BUN/CREAT SERPL: 15 (ref 12–20)
CALCIUM SERPL-MCNC: 8.6 MG/DL (ref 8.5–10.1)
CHLORIDE SERPL-SCNC: 109 MMOL/L (ref 97–108)
CO2 SERPL-SCNC: 22 MMOL/L (ref 21–32)
CREAT SERPL-MCNC: 1.36 MG/DL (ref 0.7–1.3)
ERYTHROCYTE [DISTWIDTH] IN BLOOD BY AUTOMATED COUNT: 14.1 % (ref 11.5–14.5)
GLUCOSE SERPL-MCNC: 111 MG/DL (ref 65–100)
HCT VFR BLD AUTO: 42.5 % (ref 36.6–50.3)
HGB BLD-MCNC: 14.1 G/DL (ref 12.1–17)
MAGNESIUM SERPL-MCNC: 2.2 MG/DL (ref 1.6–2.4)
MCH RBC QN AUTO: 30.8 PG (ref 26–34)
MCHC RBC AUTO-ENTMCNC: 33.2 G/DL (ref 30–36.5)
MCV RBC AUTO: 92.8 FL (ref 80–99)
NRBC # BLD: 0 K/UL (ref 0–0.01)
NRBC BLD-RTO: 0 PER 100 WBC
PHOSPHATE SERPL-MCNC: 2.8 MG/DL (ref 2.6–4.7)
PLATELET # BLD AUTO: 196 K/UL (ref 150–400)
PMV BLD AUTO: 10.6 FL (ref 8.9–12.9)
POTASSIUM SERPL-SCNC: 3.7 MMOL/L (ref 3.5–5.1)
RBC # BLD AUTO: 4.58 M/UL (ref 4.1–5.7)
SODIUM SERPL-SCNC: 138 MMOL/L (ref 136–145)
WBC # BLD AUTO: 8.2 K/UL (ref 4.1–11.1)

## 2022-03-09 PROCEDURE — 74011250637 HC RX REV CODE- 250/637: Performed by: NURSE PRACTITIONER

## 2022-03-09 PROCEDURE — 74011000250 HC RX REV CODE- 250: Performed by: INTERNAL MEDICINE

## 2022-03-09 PROCEDURE — 74011250637 HC RX REV CODE- 250/637: Performed by: PSYCHIATRY & NEUROLOGY

## 2022-03-09 PROCEDURE — 74011250636 HC RX REV CODE- 250/636: Performed by: NURSE PRACTITIONER

## 2022-03-09 PROCEDURE — 74011000250 HC RX REV CODE- 250: Performed by: NURSE PRACTITIONER

## 2022-03-09 PROCEDURE — 84100 ASSAY OF PHOSPHORUS: CPT

## 2022-03-09 PROCEDURE — 97535 SELF CARE MNGMENT TRAINING: CPT

## 2022-03-09 PROCEDURE — 99232 SBSQ HOSP IP/OBS MODERATE 35: CPT | Performed by: PSYCHIATRY & NEUROLOGY

## 2022-03-09 PROCEDURE — 97116 GAIT TRAINING THERAPY: CPT

## 2022-03-09 PROCEDURE — 74011250636 HC RX REV CODE- 250/636: Performed by: INTERNAL MEDICINE

## 2022-03-09 PROCEDURE — 36415 COLL VENOUS BLD VENIPUNCTURE: CPT

## 2022-03-09 PROCEDURE — 80048 BASIC METABOLIC PNL TOTAL CA: CPT

## 2022-03-09 PROCEDURE — 74011250637 HC RX REV CODE- 250/637: Performed by: INTERNAL MEDICINE

## 2022-03-09 PROCEDURE — 85027 COMPLETE CBC AUTOMATED: CPT

## 2022-03-09 PROCEDURE — 65660000000 HC RM CCU STEPDOWN

## 2022-03-09 PROCEDURE — 83735 ASSAY OF MAGNESIUM: CPT

## 2022-03-09 RX ORDER — CARBIDOPA AND LEVODOPA 25; 100 MG/1; MG/1
1 TABLET ORAL 3 TIMES DAILY
Status: DISCONTINUED | OUTPATIENT
Start: 2022-03-09 | End: 2022-03-14 | Stop reason: HOSPADM

## 2022-03-09 RX ORDER — HEPARIN SODIUM 5000 [USP'U]/ML
5000 INJECTION, SOLUTION INTRAVENOUS; SUBCUTANEOUS EVERY 8 HOURS
Status: DISCONTINUED | OUTPATIENT
Start: 2022-03-10 | End: 2022-03-14 | Stop reason: HOSPADM

## 2022-03-09 RX ORDER — POLYETHYLENE GLYCOL 3350 17 G/17G
17 POWDER, FOR SOLUTION ORAL DAILY
Status: DISCONTINUED | OUTPATIENT
Start: 2022-03-09 | End: 2022-03-14 | Stop reason: HOSPADM

## 2022-03-09 RX ORDER — KETOROLAC TROMETHAMINE 30 MG/ML
15 INJECTION, SOLUTION INTRAMUSCULAR; INTRAVENOUS
Status: COMPLETED | OUTPATIENT
Start: 2022-03-09 | End: 2022-03-09

## 2022-03-09 RX ORDER — TRAMADOL HYDROCHLORIDE 50 MG/1
50 TABLET ORAL
Status: DISCONTINUED | OUTPATIENT
Start: 2022-03-09 | End: 2022-03-14 | Stop reason: HOSPADM

## 2022-03-09 RX ORDER — FINASTERIDE 5 MG/1
5 TABLET, FILM COATED ORAL DAILY
Status: DISCONTINUED | OUTPATIENT
Start: 2022-03-09 | End: 2022-03-14 | Stop reason: HOSPADM

## 2022-03-09 RX ORDER — LOSARTAN POTASSIUM 50 MG/1
100 TABLET ORAL DAILY
Status: DISCONTINUED | OUTPATIENT
Start: 2022-03-10 | End: 2022-03-14 | Stop reason: HOSPADM

## 2022-03-09 RX ORDER — GUAIFENESIN 100 MG/5ML
81 LIQUID (ML) ORAL DAILY
Status: DISCONTINUED | OUTPATIENT
Start: 2022-03-09 | End: 2022-03-14 | Stop reason: HOSPADM

## 2022-03-09 RX ORDER — SERTRALINE HYDROCHLORIDE 50 MG/1
100 TABLET, FILM COATED ORAL EVERY EVENING
Status: DISCONTINUED | OUTPATIENT
Start: 2022-03-09 | End: 2022-03-14 | Stop reason: HOSPADM

## 2022-03-09 RX ORDER — HYDRALAZINE HYDROCHLORIDE 10 MG/1
10 TABLET, FILM COATED ORAL 3 TIMES DAILY
Status: DISCONTINUED | OUTPATIENT
Start: 2022-03-09 | End: 2022-03-14 | Stop reason: HOSPADM

## 2022-03-09 RX ADMIN — KETOROLAC TROMETHAMINE 15 MG: 30 INJECTION, SOLUTION INTRAMUSCULAR; INTRAVENOUS at 02:27

## 2022-03-09 RX ADMIN — SODIUM CHLORIDE, PRESERVATIVE FREE 10 ML: 5 INJECTION INTRAVENOUS at 06:55

## 2022-03-09 RX ADMIN — ATORVASTATIN CALCIUM 80 MG: 40 TABLET, FILM COATED ORAL at 21:00

## 2022-03-09 RX ADMIN — CARBIDOPA AND LEVODOPA 1 TABLET: 25; 100 TABLET ORAL at 11:31

## 2022-03-09 RX ADMIN — CARBIDOPA AND LEVODOPA 1 TABLET: 25; 100 TABLET ORAL at 21:00

## 2022-03-09 RX ADMIN — SODIUM CHLORIDE, PRESERVATIVE FREE 5 ML: 5 INJECTION INTRAVENOUS at 22:00

## 2022-03-09 RX ADMIN — CARBIDOPA AND LEVODOPA 1 TABLET: 25; 100 TABLET ORAL at 16:31

## 2022-03-09 RX ADMIN — SODIUM CHLORIDE, PRESERVATIVE FREE 10 ML: 5 INJECTION INTRAVENOUS at 13:29

## 2022-03-09 RX ADMIN — ASPIRIN 81 MG CHEWABLE TABLET 81 MG: 81 TABLET CHEWABLE at 09:32

## 2022-03-09 RX ADMIN — HYDRALAZINE HYDROCHLORIDE 20 MG: 20 INJECTION, SOLUTION INTRAMUSCULAR; INTRAVENOUS at 07:08

## 2022-03-09 RX ADMIN — SERTRALINE 100 MG: 50 TABLET, FILM COATED ORAL at 18:29

## 2022-03-09 RX ADMIN — TRAMADOL HYDROCHLORIDE 50 MG: 50 TABLET, COATED ORAL at 00:15

## 2022-03-09 RX ADMIN — AMLODIPINE BESYLATE 10 MG: 5 TABLET ORAL at 09:32

## 2022-03-09 RX ADMIN — FINASTERIDE 5 MG: 5 TABLET, FILM COATED ORAL at 13:29

## 2022-03-09 RX ADMIN — POLYETHYLENE GLYCOL 3350 17 G: 17 POWDER, FOR SOLUTION ORAL at 18:29

## 2022-03-09 RX ADMIN — CYANOCOBALAMIN TAB 500 MCG 1000 MCG: 500 TAB at 09:32

## 2022-03-09 RX ADMIN — HYDRALAZINE HYDROCHLORIDE 10 MG: 10 TABLET, FILM COATED ORAL at 21:00

## 2022-03-09 NOTE — TELEPHONE ENCOUNTER
Spoke with Jalen Elliott at [facility] ICU and updated that patient has been on Zoloft since 2014. She is grateful for the rtc and she states she will have him restarted on it so as not to incur withdraw. She reports that the Neurologist is going to trial some sinemet to see if that helps reduce some of the falls occurring due to his unsteady gait. Dr. Julia Ojeda 45 notified.

## 2022-03-09 NOTE — PROGRESS NOTES
[facility]    ICU TEAM Progress Note    Name: Frank Ferraro. : 7/15/1931   MRN: [id number]   Date: 3/9/2022      HPI  Catarino Pastrana is a 79 y/o male with a PMHX of HTN, Afib (not on AC), RLS, HCC carcinoma of the right ureter post excision and multiple falls over the last few weeks. On  he presented to ER after a fall that was preceded by lightheadedness. During the fall he struck his right chest and shoulder with presenting complaints of pleuritic CP over the right anterior and lateral chest. He was admitted to the tele unit for cardiac workup for bradycardia, followed by Dr. Saravanan Louis. In the evening of  a CODE STROKE was called as he was noted to have aphasia. CT imaging showed no hemorrhage, no LVO and no flow limiting stenosis. He was given tPA @  and he was transferred to ICU for ongoing care and close monitoring. He is DNR. Complete symptom resolution upon transfer to ICU. Reason for ICU Admission: post-Tpa monitoring after Code Stroke    Subjective:   Overnight Events:   3/9/2022  Admitted to ICU as above     ICU Course  3/7: admitted overnight to ICU after code stroke. HCT unremarkable x 2.  3/8: neuro checks.  Repeat head CT 2000  3/9: repeat HCT stable, transfer out of ICU    POD:  * No surgery found *    S/P:            ICU Assessment and Plan:     Possible ischemic CVA  - s/p tPA 3/7  - neuro following, appreciate recs  - neuro checks per post-tPA orderset  - CTH negative, unable to obtain MRI  scleral metal buckle  - CTH stable overnight  - PT/OT/SLP  - TTE 3/8: EF 55-60%, mild LV thickening  - A1c 5.7  - SBP < 180  Hx of HTN, HLD, pAF (not on AC), bradycardia  - cards following, appreciate recs  - holding toprol  - continue hydralazine, amlodipine, losartan  - continue atorvastatin  CKD  - monitor BMP, UOP  - monitor/replete lytes  Recent falls  - lidocaine patch for pleuritic chest pain  - PT/OT as above           ICU Comprehensive Plan of Care: 1. Neurological System    Spontaneous Awakening Trial: N/A  Sedation: N/A  Analgesia: Acetaminophen and Lidoderm Patches    2. Cardiovascular System    SBP Goal of: < 180 mmHg  MAP Goal of: > 65 mmHg  None - For above SBP/MAP goals  Transfusion Trigger (Hgb): <7 g/dL  Keep K > 4; Mg > 2     3. Respiratory System  Incentive spirometry  Spontaneous Breathing Trial: N/A  Pulmonary toilet: Incentive Spirometry   SpO2 Goal: > 92%  DVT Prophylaxis: SCD's or Sequential Compression Device     4. Renal/GI/Endocrine System  IVFs:   Ulcer Prophylaxis: Not at this time   Bowel Regimen: MiraLax  Feeding:  Yes diet  Blood Sugar Goal 120-180 - Glycemic Control: Insulin    5. Infectious Disease    Indwelling Catheter:  Tubes: None  Lines: Peripheral IV  Drains: Dykes Catheter  D - De-escalation of Antibiotics:   None    6. PT/OT: PT consulted and on board, OT consulted and on board and Speech therapy consulted and on board     7. Goals of Care Discussion with family Yes     8. Plan of Care/Code Status: Full Code    9. Discussed Care Plan with Bedside RN    10.  Documentation of Current Medications      Active Problem List:     Problem List  Date Reviewed: 2/7/2022          Codes Class    Hypertensive urgency ICD-10-CM: I16.0  ICD-9-CM: 401.9         SSS (s[facility] syndrome) ([facility] ICD-10-CM: I49.5  ICD-9-CM: 427.81         Bradycardia ICD-10-CM: R00.1  ICD-9-CM: 427.89         Seasonal allergic reaction ICD-10-CM: J30.2  ICD-9-CM: 477.9         Urinary incontinence, unspecified type ICD-10-CM: R32  ICD-9-CM: 788.30         Hearing aid worn ICD-10-CM: Z97.4  ICD-9-CM: V45.89         Encephalopathy ICD-10-CM: G93.40  ICD-9-CM: 348.30         MRI contraindicated due to metal implant ICD-10-CM: Z53.09  ICD-9-CM: V64.1     Overview Signed 9/21/2021 12:31 PM by Ritu Alexis MD     has scleral buckle             RLS (restless legs syndrome) ICD-10-CM: G25.81  ICD-9-CM: 333.94         Dupuytren's contracture of left hand ICD-10-CM: M72.0  ICD-9-CM: 728.6     Overview Signed 3/23/2016  3:09 PM by Yolanda Zhang MD     5th finger             Chronic neck pain ICD-10-CM: M54.2, G89.29  ICD-9-CM: 723.1, 338.29     Overview Signed 1/7/2016 10:27 AM by Yolanda Zhang MD     limited motion to side             IVCD (intraventricular conduction defect) ICD-10-CM: I45.4  ICD-9-CM: 426.6         Advanced care planning/counseling discussion ICD-10-CM: Z71.89  ICD-9-CM: V65.49     Overview Addendum 7/3/2017 10:44 AM by Kvng Albrecht RN     Has Living Will. His wife Duane Booth is POA. MDM. Next is Son Marva Carter, daughter Stone Amaya. Has documents, will bring in  7/3/2017 Patient states that a completed Advanced Medical Directive is at home. NN encouraged patient to bring a copy of the completed Advanced Medical Directive to the office for scanning into the medical record. Patient verbalized understanding & agreement. Essential hypertension ICD-10-CM: I10  ICD-9-CM: 401.9         Chronic lower back pain ICD-10-CM: M54.50, G89.29  ICD-9-CM: 724.2, 338.29         Peripheral neuropathy ICD-10-CM: G62.9  ICD-9-CM: 356.9         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) ICD-10-CM: C66.1  ICD-9-CM: 189.2     Overview Signed 5/14/2015 11:47 AM by Yolanda Zhang MD     excision Dr. Tessy Alvares. low grade, papillary.  repeat 3 month             Fecal incontinence ICD-10-CM: R15.9  ICD-9-CM: 787.60     Overview Signed 9/18/2014 10:20 AM by Yolanda Zhang MD     with loose stools             Loose stools ICD-10-CM: R19.5  ICD-9-CM: 787.7         Paroxysmal atrial fibrillation (HCC) ICD-10-CM: I48.0  ICD-9-CM: 427.31         BPH with obstruction/lower urinary tract symptoms ICD-10-CM: N40.1, N13.8  ICD-9-CM: 600.01, 599.69         Depression, major ICD-10-CM: F32.9  ICD-9-CM: 296.20         Hearing loss ICD-10-CM: H91.90  ICD-9-CM: 389.9     Overview Signed 6/18/2014  6:18 PM by Yolanda Zhang MD     has hearing aids             Insomnia ICD-10-CM: G47.00  ICD-9-CM: 780.52         Bladder cancer [facility]) ICD-10-CM: C67.9  ICD-9-CM: 188.9     Overview Signed 6/18/2014  6:18 PM by Katie Arndt MD     s/p surgery, BCG irrigation Fish. saw Dr. America Duong                   Past Medical History:      has a past medical history of Advanced care planning/counseling discussion (10/8/2015), Basal cell carcinoma, face, Bladder cancer ([facility] (2012), BPH with obstruction/lower urinary tract symptoms, Chronic lower back pain, Chronic neck pain, Depression, major, Dupuytren's contracture of left hand, Encephalopathy (8/2021, 11/21/21), Epistaxis (2013), Family history of skin cancer, Fecal incontinence, Hearing loss, HTN (hypertension), Insomnia, IVCD (intraventricular conduction defect) (11/30/2015), Loose stools (7/18/2014), MRI contraindicated due to metal implant, Paroxysmal atrial fibrillation ([address] Peripheral neuropathy, RLS (restless legs syndrome) (7/3/2017), SCC (squamous cell carcinoma), face, Seasonal allergic reaction, Skin cancer, and Urothelial carcinoma of right distal ureter ([address] (5/1/15). He has no past medical history of Sun-damaged skin, Sunburn, blistering, or Tanning bed exposure. Past Surgical History:      has a past surgical history that includes hx bladder repair (2012); hx retinal detachment repair (Right); hx hernia repair; hx colonoscopy (2012); pr cystourethroscopy (9/2014); pr cystourethroscopy (5/1/15); pr cystourethroscopy (2/29/16); hx malignant skin lesion excision (06/14/2018); and hx other surgical (10/2021). Home Medications:     Prior to Admission medications    Medication Sig Start Date End Date Taking? Authorizing Provider   amLODIPine (NORVASC) 10 mg tablet Take 1 Tablet by mouth daily. 3/8/22  Yes Reva Aldana MD   cyanocobalamin 1,000 mcg tablet Take 1 Tablet by mouth daily. 3/8/22  Yes Reva Aldana MD   cholecalciferol (VITAMIN D3) (50,000 UNITS /1250 MCG) capsule Take 1 Capsule by mouth every seven (7) days.  Indications: low vitamin D levels 22  Yes Angel Ham MD   sertraline (ZOLOFT) 100 mg tablet Take 1 Tablet by mouth daily. 22  Yes Luis Falcon MD   irbesartan (AVAPRO) 150 mg tablet Take 1 Tablet by mouth two (2) times a day. Increased dose 22  Yes Luis Falcon MD   colestipoL (COLESTID) 1 gram tablet Take 1 Tablet by mouth two (2) times a day. 21  Yes Luis Falcon MD   metoprolol succinate (TOPROL-XL) 25 mg XL tablet Take 1 Tablet by mouth nightly. 21  Yes Doine Pate MD   finasteride (PROSCAR) 5 mg tablet Take 1 Tablet by mouth daily. 21  Yes Luis Falcon MD   acetaminophen (TYLENOL) 325 mg tablet Take 2 Tablets by mouth every six (6) hours as needed for Pain or Fever. 9/15/21  Yes Luis Falcon MD       Allergies/Social/Family History: Allergies   Allergen Reactions    Trazodone Palpitations      Social History     Tobacco Use    Smoking status: Former Smoker    Smokeless tobacco: Never Used    Tobacco comment: socially   Substance Use Topics    Alcohol use: Not Currently     Comment: occ      History reviewed. No pertinent family history. Review of Systems:     Review of Systems   Neurological:        Delayed responses at times (baseline)   All other systems reviewed and are negative. Objective:   Vital Signs:  Visit Vitals  BP (!) 161/91   Pulse 66   Temp 97.5 °F (36.4 °C)   Resp 16   Ht 5' 11\" (1.803 m)   Wt 89 kg (196 lb 3.4 oz)   SpO2 94%   BMI 27.37 kg/m²      O2 Device: None (Room air) Temp (24hrs), Av.9 °F (36.6 °C), Min:97.5 °F (36.4 °C), Max:98.4 °F (36.9 °C)           Intake/Output:     Intake/Output Summary (Last 24 hours) at 3/9/2022 0820  Last data filed at 3/9/2022 0600  Gross per 24 hour   Intake 300 ml   Output 825 ml   Net -525 ml       Physical Exam:    Physical Exam  Vitals and nursing note reviewed. Constitutional:       General: He is not in acute distress. HENT:      Mouth/Throat:      Mouth: Mucous membranes are dry.    Eyes: Pupils: Pupils are equal, round, and reactive to light. Cardiovascular:      Rate and Rhythm: Regular rhythm. Bradycardia present. Pulmonary:      Effort: Pulmonary effort is normal.      Breath sounds: Decreased breath sounds present. Abdominal:      General: Abdomen is flat. Bowel sounds are normal.      Palpations: Abdomen is soft. Skin:     General: Skin is warm and dry. Capillary Refill: Capillary refill takes less than 2 seconds. Neurological:      General: No focal deficit present. Mental Status: He is alert and oriented to person, place, and time. Mental status is at baseline. Psychiatric:         Mood and Affect: Mood normal.         Judgment: Judgment normal.           LABS AND  DATA: Personally reviewed  Recent Labs     03/09/22  0524 03/06/22  0956   WBC 8.2 6.3   HGB 14.1 13.6   HCT 42.5 42.7    174     Recent Labs     03/09/22  0526 03/09/22  0524 03/08/22  0238     --  138   K 3.7  --  3.4*   *  --  108   CO2 22  --  24   BUN 20  --  20   CREA 1.36*  --  1.27   *  --  125*   CA 8.6  --  8.7   MG  --  2.2 2.1   PHOS  --  2.8 3.2     Recent Labs     03/06/22  0956      TP 7.6   ALB 3.3*   GLOB 4.3*   LPSE 147     No results for input(s): INR, PTP, APTT, INREXT, INREXT in the last 72 hours. No results for input(s): PHI, PCO2I, PO2I, FIO2I in the last 72 hours. No results for input(s): CPK, CKMB, TROIQ, BNPP in the last 72 hours. Imaging:  3/9/2022       Chest X-Ray:  CXR Results  (Last 48 hours)    None            ECHO:  pending    Ventilator Settings:  Mode Rate Tidal Volume Pressure FiO2 PEEP                    Peak airway pressure:      Minute ventilation:          MEDS: Reviewed    Multidisciplinary Rounds Completed:   Yes    ABCDEF Bundle/Checklist Completed:  Yes    SPECIAL EQUIPMENT  None    DISPOSITION  Transfer to non-ICU bed    CRITICAL CARE CONSULTANT NOTE  I had a face to face encounter with the patient, reviewed and interpreted patient data including clinical events, labs, images, vital signs, I/O's, and examined patient. I have discussed the case and the plan and management of the patient's care with the consulting services, the bedside nurses and the respiratory therapist.      NOTE OF PERSONAL INVOLVEMENT IN CARE   This patient has a high probability of imminent, clinically significant deterioration, which requires the highest level of preparedness to intervene urgently. I participated in the decision-making and personally managed or directed the management of the following life and organ supporting interventions that required my frequent assessment to treat or prevent imminent deterioration. I personally spent 40 minutes of critical care time. This is time spent at this critically ill patient's bedside actively involved in patient care as well as the coordination of care. This does not include any procedural time which has been billed separately.     Chay Ortega [facility]  Nurse Practitioner  [facility]  3/9/2022

## 2022-03-09 NOTE — PROGRESS NOTES
Neurocritical Care Progress Note  Sonny Busby NP    Admit Date: 3/6/2022   LOS: 2 days      Daily Progress Note: 3/9/2022    S/P: tPA on 03/08/22    HPI: Wendy Doss. is a 80 y.o. 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 CTH with no acute abnormalities. CT spine with no fracture or dislocation. X-rays of ribs/shoulders with no fractures. During patient's hospital stay, he was evaluated by cardiology for possible PPM due to bradycardia. Was noted to be on low dose toprol which was discontinued which seems to improve HR therefore PPM was deferred. On 03/07/22 patient was exhibiting hypertension with sbp of 200's around 5 PM with which patient received as needed hydralazine. Around 7 PM, primary nurse noted patient exhibiting aphasia and confusion therefore Code S was activated. CTH showed no acute intracranial normalities. CTA H/N showed no LVO or significant stenosis. CTP showed no perfusion mismatch. Patient was evaluated by telemetry neurologist and patient was deemed a tPA candidate. Patient received tPA at 20:00 PM after risks, benefits, and alternative was discussed with patient's son and spouse who consented to proceed with tPA administration. The patient was then admitted to ICU for close monitoring post tPA. Neurology was consulted for stroke work-up and management. Subjective:   No acute events noted overnight. The patient neurological status has improved. Currently alert and oriented x4. Denied any headache, visual changes, vision abnormalities. Patient complaining of sever upper back pain likely from the fall also contributing to high blood pressure.      Allergies   Allergen Reactions    Trazodone Palpitations       Past Medical History:   Diagnosis Date    Advanced care planning/counseling discussion 10/8/2015    Basal cell carcinoma, face     now sees Dr. Margarita Rivera.  Bladder cancer [facility]) 2012    Dr. Gabrielle Urbina. cyto 9/2014, 2/2016. s/p surgery, BCG irrigation Fish. saw Dr. Tung Aquino BPH with obstruction/lower urinary tract symptoms     Chronic lower back pain     injection in [place]. saw Dr. Omid Celaya Chronic neck pain     limited motion to side    Depression, major     Dupuytren's contracture of left hand     5th finger    Encephalopathy 8/2021, 11/21/21    admitted     Epistaxis 2013    saw ENT at [address] [place] [address] history of skin cancer     Fecal incontinence     with loose stools    Hearing loss     has hearing aids    HTN (hypertension)     Insomnia     IVCD (intraventricular conduction defect) 11/30/2015    Dr Sujatha Barrett    Loose stools 7/18/2014    MRI contraindicated due to metal implant     has scleral buckle    Paroxysmal atrial fibrillation (HCC)     EF 66%, 1-2+ LAE on flecainide;Dr. Sujatha Barrett. saw [facility]. Kim Davison at [facility] Peripheral neuropathy     severe foot neuropathy    RLS (restless legs syndrome) 7/3/2017    SCC (squamous cell carcinoma), face     prior hx, now sees Dr. Diallo Beal Seasonal allergic reaction     Skin cancer     Urothelial carcinoma of right distal ureter ([address] 5/1/15    excision Dr. Vito Butts. low grade, papillary. repeat 3 month     History reviewed. No pertinent family history. Social History     Tobacco Use    Smoking status: Former Smoker    Smokeless tobacco: Never Used    Tobacco comment: socially   Substance Use Topics    Alcohol use: Not Currently     Comment: occ      Prior to Admission Medications   Prescriptions Last Dose Informant Patient Reported? Taking?   acetaminophen (TYLENOL) 325 mg tablet   No Yes   Sig: Take 2 Tablets by mouth every six (6) hours as needed for Pain or Fever.    cholecalciferol (VITAMIN D3) (50,000 UNITS /1250 MCG) capsule   No Yes   Sig: Take 1 Capsule by mouth every seven (7) days. Indications: low vitamin D levels   colestipoL (COLESTID) 1 gram tablet   No Yes   Sig: Take 1 Tablet by mouth two (2) times a day. finasteride (PROSCAR) 5 mg tablet   No Yes   Sig: Take 1 Tablet by mouth daily. irbesartan (AVAPRO) 150 mg tablet   No Yes   Sig: Take 1 Tablet by mouth two (2) times a day. Increased dose 22   metoprolol succinate (TOPROL-XL) 25 mg XL tablet   No Yes   Sig: Take 1 Tablet by mouth nightly. sertraline (ZOLOFT) 100 mg tablet   No Yes   Sig: Take 1 Tablet by mouth daily. Facility-Administered Medications: None         Objective:   Vital signs  Temp (24hrs), Av °F (36.7 °C), Min:97.6 °F (36.4 °C), Max:98.4 °F (36.9 °C)   1901 -  0700  In: -   Out: 150 [Urine:150]   07 -  190  In: 2122.7 [P.O.:600;  I.V.:1522.7]  Out: 825 [Urine:825]  Visit Vitals  BP (!) 169/116   Pulse 87   Temp 97.6 °F (36.4 °C)   Resp 21   Ht 5' 11\" (1.803 m)   Wt 89 kg (196 lb 3.4 oz)   SpO2 94%   BMI 27.37 kg/m²      O2 Device: None (Room air)   Vitals:    22 2200 22 2300 22 0000 22 0100   BP: (!) 179/85 (!) 182/111 (!) 203/110 (!) 169/116   Pulse: 88 93 90 87   Resp: 23 25 25 21   Temp:   97.6 °F (36.4 °C)    SpO2: 94% 94% 94% 94%   Weight:       Height:            Meds:     Current Facility-Administered Medications   Medication Dose Route Frequency    traMADoL (ULTRAM) tablet 50 mg  50 mg Oral Q6H PRN    amLODIPine (NORVASC) tablet 10 mg  10 mg Oral DAILY    sodium chloride (NS) flush 5-40 mL  5-40 mL IntraVENous Q8H    sodium chloride (NS) flush 5-40 mL  5-40 mL IntraVENous PRN    atorvastatin (LIPITOR) tablet 80 mg  80 mg Oral QHS    niCARdipine (CARDENE) 25 mg in 0.9% sodium chloride 250 mL infusion  0-15 mg/hr IntraVENous TITRATE    lidocaine 4 % patch 1 Patch  1 Patch TransDERmal Q24H    sodium chloride (NS) flush 5-40 mL  5-40 mL IntraVENous Q8H    sodium chloride (NS) flush 5-40 mL  5-40 mL IntraVENous PRN    acetaminophen (TYLENOL) tablet 650 mg  650 mg Oral Q6H PRN    Or    acetaminophen (TYLENOL) suppository 650 mg  650 mg Rectal Q6H PRN    polyethylene glycol (MIRALAX) packet 17 g  17 g Oral DAILY PRN    ondansetron (ZOFRAN ODT) tablet 4 mg  4 mg Oral Q6H PRN    Or    ondansetron (ZOFRAN) injection 4 mg  4 mg IntraVENous Q6H PRN    hydrALAZINE (APRESOLINE) 20 mg/mL injection 20 mg  20 mg IntraVENous Q2H PRN    cyanocobalamin (VITAMIN B12) tablet 1,000 mcg  1,000 mcg Oral DAILY       I personally reviewed all of the medications      Review of Systems:   Admits upper back pain. No headache, eye, ear nose, throat problems; no coughing or wheezing or shortness of breath, No chest pain or orthopnea, no abdominal pain, nausea or vomiting, No pain in the body or extremities, no psychiatric, neurological, endocrine, hematological or cardiac complaints except as noted above. Physical Exam:   Blood pressure (!) 169/116, pulse 87, temperature 97.6 °F (36.4 °C), resp. rate 21, height 5' 11\" (1.803 m), weight 89 kg (196 lb 3.4 oz), SpO2 94 %. GEN: NAD, Cooperative, Calm  HEENT: Normocephalic. Non-icter, no congestion  Lungs:  CTA bilaterally Ant  Cardiac: S1,S2, normal rate and rhythm, no carotid bruits, no gallops  Abdomen: Normal bowel sounds, no distention, non-tender  Extremities: no clubbing, cyanosis, or edema  Skin: no rashes or lesions noted        NEURO:  Mental status: A & Ox4. Able to follow commands but slow to response  Cranial Nerves: EOMI, PERRL 3mm, No dysarthria or aphasia. Full facial strength, no asymmetry. Hard of Hearing bilaterally. Tongue protrudes to midline, palate elevates symmetrically. Shrug Shoulders B/L  Motor: Normal bulk and tone, 5/5 strength x 4 extremities proximally and distally; Noted to have action-induced tremors otherwise no involuntary movements.   Coordination: Intact FTN and HTS testing but patient noted to have increased tone in his knees and ankles  Reflexes: +1 throughout, down going toes bilaterally   Sensation: Intact x 4 extremities to Light Touch  Gait:  Deferred     Labs/images:     Lab Results   Component Value Date/Time    WBC 6.3 03/06/2022 09:56 AM    HGB 13.6 03/06/2022 09:56 AM    HCT 42.7 03/06/2022 09:56 AM    PLATELET 124 79/00/7982 09:56 AM    MCV 93.8 03/06/2022 09:56 AM      Lab Results   Component Value Date/Time    Sodium 138 03/08/2022 02:38 AM    Potassium 3.4 (L) 03/08/2022 02:38 AM    Chloride 108 03/08/2022 02:38 AM    CO2 24 03/08/2022 02:38 AM    Anion gap 6 03/08/2022 02:38 AM    Glucose 125 (H) 03/08/2022 02:38 AM    BUN 20 03/08/2022 02:38 AM    Creatinine 1.27 03/08/2022 02:38 AM    BUN/Creatinine ratio 16 03/08/2022 02:38 AM    GFR est AA >60 03/08/2022 02:38 AM    GFR est non-AA 53 (L) 03/08/2022 02:38 AM    Calcium 8.7 03/08/2022 02:38 AM    Bilirubin, total 0.7 03/06/2022 09:56 AM    Alk. phosphatase 105 03/06/2022 09:56 AM    Protein, total 7.6 03/06/2022 09:56 AM    Albumin 3.3 (L) 03/06/2022 09:56 AM    Globulin 4.3 (H) 03/06/2022 09:56 AM    A-G Ratio 0.8 (L) 03/06/2022 09:56 AM    ALT (SGPT) 18 03/06/2022 09:56 AM    AST (SGOT) 22 03/06/2022 09:56 AM         CT Results (most recent):  Results from Hospital Encounter encounter on 03/06/22    CT HEAD WO CONT    Narrative  EXAM:  CT HEAD WO CONT    INDICATION: Neuro changes    COMPARISON: CT 3/7/2022    TECHNIQUE: Axial noncontrast head CT from foramen magnum to vertex. Coronal and  sagittal reformatted images were obtained. CT dose reduction was achieved  through use of a standardized protocol tailored for this examination and  automatic exposure control for dose modulation. Adaptive statistical iterative  reconstruction (ASIR) was utilized. FINDINGS:  There is diffuse age-related parenchymal volume loss. The ventricles  and sulci are age-appropriate without hydrocephalus. There is no mass effect or  midline shift.  Intravascular contrast is present from recent CTA. There is no  acute parenchymal. Scattered foci of low attenuation in the periventricular  white matter represent stable chronic microvascular ischemic changes. The  gray-white matter differentiation is maintained. The basal cisterns are patent. A metallic foreign body in the right orbit is again noted. The osseous  structures are intact. The visualized paranasal sinuses and mastoid air cells  are clear. Impression  No acute intracranial abnormality. Assessment:     Active Problems:    SSS (sick sinus syndrome) (HCC) (3/6/2022)      Bradycardia (3/6/2022)      Hypertensive urgency (3/7/2022)      Plan:     Stroke vs tPA aborted stroke vs hypertensive encephalopathy   · Repeat CT head without contrast showed no acute intracranial abnormalities  · Presumed Etiology is likely cardio-embolic phenomena given PAF with no AC  · CTA H/N and CTP showed no abnormalities requiring endovascular intervention. · MRI contraindicated due to metal implant  · Will start patient on 81 mg aspirin, high intensity statin Lipitor 80 mg daily to keep LDL goal less than 70 (patient's LDL not at goal 105.6), and keep SBP <150  · Recommend starting home antihypertensive per intensivist  · Echocardiogram shows EF of 60% with normal wall motion  · DVT prophy  · Stroke education  · PT/OT/ST evals  · Patient is neurologically intact with no new neuro symptoms therefore can transition to nstu  · Low threshold to repeat CT head if any worsening change in neurologic condition    Multiple Recurrent falls  · Patient has had 6 falls in the last 6 weeks per report  · Also noted patient with slow speech, tremors with increased tone in knees/ankle that could be probable underlying parkinsonism. especially given labile or fluctuating BP which are common in Parkinson's disease.    · Patient will likely need outpatient follow-up with neurology for further evaluation.     Further neurology recommendation to follow by Dr. Valencia Reid reviewed    Case discussed with: Intensivist and ICU RN    >35% time spent in counseling or coordination of care of the above in the assessment and plan     Signed By: Randall Hughes NP                    March 9, 2022    Please note that this dictation was completed with Linus Turner, the computer voice recognition software. Quite often unanticipated grammatical, syntax, homophones, and other interpretive errors are inadvertently transcribed by the computer software. Please disregard these errors. Please excuse any errors that have escaped final proofreading.

## 2022-03-09 NOTE — TELEPHONE ENCOUNTER
Tatyana Cha a pharmacist in the ICU at [facility] would like a call back to discuss the patient's Zoloft medication. She states she was looking at his last office visit notes with Dr. ALEISHA NAYAK and Dr. [facility] does not list a reason why he has started this medication. With all the patient's falls she would just like to discuss whether this medication is necessary. Please call her back on her cell phone to discuss this. Her number is [phone].

## 2022-03-09 NOTE — PROGRESS NOTES
Problem: Self Care Deficits Care Plan (Adult)  Goal: *Acute Goals and Plan of Care (Insert Text)  Description: FUNCTIONAL STATUS PRIOR TO ADMISSION: Pt lived in [facility], used rollator for longer distances, [facility] for in home. Resides alone and receives OT and PT at [facility]. Pt with multiple falls 5-6 within last 6 months. Able to dress mod I and bathe himself but has staff member present for safety during bathing. HOME SUPPORT: lives in [facility]  Updated upon re-evaluation 3/8/2022  1. Patient will perform seated grooming task with set-up assist within 7 day(s). 2.  Patient will perform lower body dressing using AE PRN with minimal assistance within 7 day(s). 3.  Patient will perform upper body dressing with set-up assist within 7 day(s). 4.  Patient will perform toilet transfers in bathroom with least restrictive DME with minimal assistance within 7 day(s). 5.  Patient will perform all aspects of toileting with minimal assistance within 7 day(s). Initiated 3/7/2022  1. Patient will perform grooming task at sink with least restrictive DME with modified independence within 7 day(s). 2.  Patient will perform lower body dressing using AE PRN with modified independence within 7 day(s). 3.  Patient will perform upper body dressing with modified independence within 7 day(s). 4.  Patient will perform toilet transfers in bathroom with least restrictive DME with modified independence within 7 day(s). 5.  Patient will perform all aspects of toileting with modified independence within 7 day(s). Outcome: Progressing Towards Goal    OCCUPATIONAL THERAPY TREATMENT  Patient: Franny Rincon (80 y.o. male)  Date: 3/9/2022  Diagnosis: Bradycardia [R00.1]  Hypertensive urgency [I16.0] <principal problem not specified>       Precautions: Fall,DNR,Bed Alarm  Chart, occupational therapy assessment, plan of care, and goals were reviewed.     ASSESSMENT  Patient continues with skilled OT services and is progressing towards goals. Pt's functional independence and performance of ADL/IADL tasks continues to be limited at this time by impaired balance, decreased insight into deficits/intermittent confusion, impaired attention to task, generalized weakness, impaired balance, and decreased activity tolerance/endurance. Pt received semi-supine and agreeable to participation in therapy session. Pt completed bed mobility with mod A and functional transfers with min A x2. He required up to max A to complete seated LB dressing with compensatory technique trailed but pt unable to complete. He continues to report significant back pain with all movement and ADL participation. RN aware. Pt progressed to bedside chair and left set up with bedside tray. RN notified of session. Continue to recommend SNF at d/c. Current Level of Function Impacting Discharge (ADLs): up to max A LB dressing, mod A bed mobility, min A x2 bed to chair    Other factors to consider for discharge: PLOF mod I, from custodial, history of falls          PLAN :  Patient continues to benefit from skilled intervention to address the above impairments. Continue treatment per established plan of care to address goals. Recommendation for discharge: (in order for the patient to meet his/her long term goals)  Therapy up to 5 days/week in SNF setting    This discharge recommendation:  Has been made in collaboration with the attending provider and/or case management    IF patient discharges home will need the following DME: TBD       SUBJECTIVE:   Patient stated When do I get to go downstairs?     OBJECTIVE DATA SUMMARY:   Cognitive/Behavioral Status:  Neurologic State: Alert  Orientation Level: Oriented to person;Oriented to place (partially oriented to situation and time)  Cognition: Follows commands;Decreased attention/concentration             Functional Mobility and Transfers for ADLs:  Bed Mobility:  Supine to Sit: Moderate assistance;Assist x1  Scooting: Moderate assistance    Transfers:  Sit to Stand: Minimum assistance;Assist x2     Bed to Chair: Minimum assistance;Assist x2    Balance:  Sitting: Impaired  Sitting - Static: Good (unsupported)  Sitting - Dynamic: Fair (occasional)  Standing: Impaired; With support  Standing - Static: Fair  Standing - Dynamic : Fair;Poor    ADL Intervention:                           Lower Body Dressing Assistance  Socks: Moderate assistance;Maximum assistance  Leg Crossed Method Used:  (attempted but pt unable)  Position Performed: Seated edge of bed  Cues: Verbal cues provided;Physical assistance; Tactile cues provided              Pain:  Back pain - RN aware     Activity Tolerance:   Fair    After treatment patient left in no apparent distress:   Sitting in chair, Call bell within reach and Bed / chair alarm activated, RN notified     COMMUNICATION/COLLABORATION:   The patients plan of care was discussed with: Physical therapist and Registered nurse.      Sidney Sharma OT  Time Calculation: 19 mins

## 2022-03-09 NOTE — PROGRESS NOTES
[facility]  Hospitalist Group     ICU Transfer/Accept Summary     [place] [address] [place]  DATE OF TRANSFER: 3/9/2022       PATIENT ID: Carrie Brewer. MRN: [id number]   YOB: 1931    PRIMARY CARE PROVIDER: Duyen Acosta MD   DATE OF ADMISSION: 3/6/2022  9:13 AM    ATTENDING PHYSICIAN: Ambika Dewitt MD  CONSULTATIONS:   IP CONSULT TO CARDIOLOGY  IP CONSULT TO NEUROLOGY    PROCEDURES/SURGERIES:   * No surgery found *    REASON FOR ADMISSION: <principal problem not specified>     HOSPITAL PROBLEM LIST:  Patient Active Problem List   Diagnosis Code    Paroxysmal atrial fibrillation (HCC) I48.0    BPH with obstruction/lower urinary tract symptoms N40.1, N13.8    Depression, major F32.9    Hearing loss H91.90    Insomnia G47.00    Bladder cancer (HCC) C67.9    Loose stools R19.5    Fecal incontinence R15.9    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) C66.1    Chronic lower back pain M54.50, G89.29    Peripheral neuropathy G62.9    Essential hypertension I10    Advanced care planning/counseling discussion Z71.89    IVCD (intraventricular conduction defect) I45.4    Chronic neck pain M54.2, G89.29    Dupuytren's contracture of left hand M72.0    RLS (restless legs syndrome) G25.81    MRI contraindicated due to metal implant Z53.09    Encephalopathy G93.40    Urinary incontinence, unspecified type R32    Hearing aid worn Z97.4    Seasonal allergic reaction J30.2    SSS (sick sinus syndrome) ([facility]) I49.5    Bradycardia R00.1    Hypertensive urgency I16.0         Brief HPI and Hospital Course:      Deuce Molina is a 81 y/o male with a PMHX of HTN, Afib (not on AC), RLS, HCC carcinoma of the right ureter post excision and multiple falls over the last few weeks. On 03/06 he presented to ER after a fall that was preceded by lightheadedness.  During the fall he struck his right chest and shoulder with presenting complaints of pleuritic CP over the right anterior and lateral chest. He was admitted to the tele unit for cardiac workup for bradycardia, followed by Dr. Kayleigh Contreras. In the evening of 03/07 a CODE STROKE was called as he was noted to have aphasia. CT imaging showed no hemorrhage, no LVO and no flow limiting stenosis. He was given tPA @ 2000 and he was transferred to ICU for ongoing care and close monitoring. He is DNR. Complete symptom resolution upon transfer to ICU for post-Tpa monitoring after Code Stroke  3/7: admitted overnight to ICU after code stroke. HCT unremarkable x 2.  3/8: neuro checks.  Repeat head CT 2000  3/9: repeat HCT stable, transfer out of ICU  Assessment and Plan:  Possible ischemic CVA  Developed difficulty expressing himself/mild confusion 3/7 along with hypertensive urgency  Symptoms may be related to hypertensive encephalopathy versus ischemic/embolic stroke  - s/p tPA 3/7  - neuro following, appreciate recs  - neuro checks  - CTH negative, unable to obtain MRI 2/2 scleral metal buckle  - CTH nothing acute post tpa  - PT/OT/SLP  - TTE 3/8: EF 55-60%, mild LV thickening  - A1c 5.7  - SBP < 180  Continue aspirin, cont statin for   Resume DVT ppx tomorrow    Tremor and frequent falls, possible underlying parkinsonism  Neurology empirically started low-dose Sinemet    HTN urgency  Adjust meds prn    H/o pAF (not on [facility]), bradycardia  - cards following  - holding toprol  -Warfarin changed to ASA in 11/2021 due to fall risk  -TSH 3.12      B12 borderline, confirm with MMA 3/6 in process    CKD3 at baseline Cr ~1.5         PHYSICAL EXAMINATION:  Visit Vitals  BP (!) 154/131   Pulse 96   Temp 97.5 °F (36.4 °C)   Resp 19   Ht 5' 11\" (1.803 m)   Wt 89 kg (196 lb 3.4 oz)   SpO2 92%   BMI 27.37 kg/m²       Labs:     Recent Labs     03/09/22  0524   WBC 8.2   HGB 14.1   HCT 42.5        Recent Labs     03/09/22  0526 03/09/22  0524 03/08/22  0238 03/07/22  0405     --  138 138   K 3.7  --  3.4* 3.4*   *  -- 108 107   CO2 22  --  24 27   BUN 20  --  20 22*   CREA 1.36*  --  1.27 1.41*   *  --  125* 107*   CA 8.6  --  8.7 9.0   MG  --  2.2 2.1 2.4   PHOS  --  2.8 3.2 2.8     No results for input(s): ALT, AP, TBIL, TBILI, TP, ALB, GLOB, GGT, AML, LPSE in the last 72 hours. No lab exists for component: SGOT, GPT, AMYP, HLPSE  No results for input(s): INR, PTP, APTT, INREXT in the last 72 hours. No results for input(s): FE, TIBC, PSAT, FERR in the last 72 hours. No results found for: FOL, RBCF   No results for input(s): PH, PCO2, PO2 in the last 72 hours. No results for input(s): CPK, CKNDX, TROIQ in the last 72 hours.     No lab exists for component: CPKMB  Lab Results   Component Value Date/Time    Cholesterol, total 172 03/08/2022 02:38 AM    HDL Cholesterol 53 03/08/2022 02:38 AM    LDL, calculated 105.6 (H) 03/08/2022 02:38 AM    Triglyceride 67 03/08/2022 02:38 AM    CHOL/HDL Ratio 3.2 03/08/2022 02:38 AM     Lab Results   Component Value Date/Time    Glucose (POC) 96 03/07/2022 07:00 PM    Glucose (POC) 104 11/21/2021 10:12 PM    Glucose (POC) 117 08/28/2021 08:01 PM     Lab Results   Component Value Date/Time    Color YELLOW/STRAW 03/06/2022 10:13 AM    Appearance CLEAR 03/06/2022 10:13 AM    Specific gravity 1.012 03/06/2022 10:13 AM    pH (UA) 6.5 03/06/2022 10:13 AM    Protein 30 (A) 03/06/2022 10:13 AM    Glucose Negative 03/06/2022 10:13 AM    Ketone Negative 03/06/2022 10:13 AM    Bilirubin Negative 03/06/2022 10:13 AM    Urobilinogen 0.2 03/06/2022 10:13 AM    Nitrites Negative 03/06/2022 10:13 AM    Leukocyte Esterase Negative 03/06/2022 10:13 AM    Epithelial cells FEW 03/06/2022 10:13 AM    Bacteria Negative 03/06/2022 10:13 AM    WBC 0-4 03/06/2022 10:13 AM    RBC 0-5 03/06/2022 10:13 AM         CODE STATUS:   Full Code   x DNR    Partial    Comfort Care       Signed:   Jem Pizarro MD  Date of Service:  3/9/2022  4:58 PM

## 2022-03-09 NOTE — PROGRESS NOTES
[address] visit. Mr. Gerardo Raphael was just finishing eating his lunch. He declined communion today. We prayed together. He reports that he is being moved to another room later today.     SINTIA Vasquez, RN, ACSW, LCSW   Page:  [phone]

## 2022-03-09 NOTE — PROGRESS NOTES
1930: Bedside and Verbal shift change report given to [address] [place] [address] (oncoming nurse) by Yordan Mccarthy RN (offgoing nurse). Report included the following information SBAR, Kardex, ED Summary, OR Summary, Intake/Output, Recent Results, Med Rec Status, Cardiac Rhythm NSR and Alarm Parameters . 2000: pt AOx4, follows commands in all extremeties; Q1 neuro check; SBP goal 105-180    2129: , gave 10mg of hydralazine     0010: 50mg of tramadol given for severe back pain 9/10    0227: 15 mg ketorolac given for aching right back pain 9/10     0700: SBP goal <150 per Dorothey Scotty NP     0730: Bedside and Verbal shift change report given to [facility] (oncoming nurse) by [address] [place] [address] (offgoing nurse). Report included the following information SBAR, Kardex, ED Summary, OR Summary, Intake/Output, MAR, Recent Results, Med Rec Status, Cardiac Rhythm AFIB, NSR and Alarm Parameters .

## 2022-03-09 NOTE — PROGRESS NOTES
Problem: Mobility Impaired (Adult and Pediatric)  Goal: *Acute Goals and Plan of Care (Insert Text)  Description: FUNCTIONAL STATUS PRIOR TO ADMISSION: Pt lived in [facility], used rollator for longer distances, [facility] for in home. Resides alone and receives OT and PT at [facility]. Pt with multiple falls 5-6 within last 6 months. Able to dress mod I and bathe himself but has staff member present for safety during bathing. Walks to the dining room for meals. HOME SUPPORT: [facility] staff. Physical Therapy Goals    Initiated 3/8/2022  1. Patient will move from supine to sit and sit to supine  in bed with minimal assistance within 7 day(s). 2.  Patient will transfer from bed to chair and chair to bed with minimal assistance using the least restrictive device within 7 day(s). 3.  Patient will perform sit to stand with minimal assistance within 7 day(s). 4.  Patient will ambulate with minimal assistance for 50 feet with the least restrictive device within 7 day(s). 5.  Patient will improve Aquino Balance score by 7 points within 7 days. Initiated 3/7/2022  1. Patient will move from supine to sit and sit to supine  in bed with modified independence within 7 day(s). 2.  Patient will transfer from bed to chair and chair to bed with modified independence using the least restrictive device within 7 day(s). 3.  Patient will perform sit to stand with modified independence within 7 day(s). 4.  Patient will ambulate with modified independence for 150 feet with the least restrictive device within 7 day(s). Outcome: Progressing Towards Goal   PHYSICAL THERAPY TREATMENT  Patient: [address]. (80 y.o. male)  Date: 3/9/2022  Diagnosis: Bradycardia [R00.1]  Hypertensive urgency [I16.0] <principal problem not specified>       Precautions: Fall,DNR,Bed Alarm  Chart, physical therapy assessment, plan of care and goals were reviewed.     ASSESSMENT  Patient continues with skilled PT services and is progressing towards goals. Pt received supine in bed and agreeable to therapy. Pt tolerated session fairly well. Pt continues to be limited by generalized weakness, decreased functional mobility, impaired balance and gait, intermittent confusion, increased risk for recurrent falls and dependency from baseline. Pt completed supine to sit with mod A x 1 and additional time and effort due to increased R trunk/back pain. Pt performed sit<>stand with min A x 1-2 and able to tolerate short gait training x 5 ft with min A x 2 to the bedside chair. Pt with improvement in lower self into the chair. Pt continues to be below baseline mobility status and will benefit from SNF Placement upon discharge. Current Level of Function Impacting Discharge (mobility/balance): mod A x 1 supine to sit, min A x 2 sit<>stand and bed to chair x 5 ft    Other factors to consider for discharge: previously mod I with a SPC in the home, rollator for community mobility, history of multiple falls         PLAN :  Patient continues to benefit from skilled intervention to address the above impairments. Continue treatment per established plan of care. to address goals. Recommendation for discharge: (in order for the patient to meet his/her long term goals)  Therapy up to 5 days/week in SNF setting    This discharge recommendation:  Has been made in collaboration with the attending provider and/or case management    IF patient discharges home will need the following DME: to be determined (TBD)       SUBJECTIVE:   Patient stated Did Freddie Dove get traded? Alma Rosa Guzman    OBJECTIVE DATA SUMMARY:   Critical Behavior:  Neurologic State: Alert  Orientation Level: Oriented X4  Cognition: Follows commands  Safety/Judgement: Decreased awareness of need for safety,Decreased awareness of need for assistance,Decreased awareness of environment,Decreased insight into deficits  Functional Mobility Training:  Bed Mobility:     Supine to Sit: Moderate assistance;Assist x1 Scooting: Moderate assistance        Transfers:  Sit to Stand: Minimum assistance;Assist x2  Stand to Sit: Minimum assistance;Assist x2        Bed to Chair: Minimum assistance;Assist x2                    Balance:  Sitting: Impaired  Sitting - Static: Good (unsupported)  Sitting - Dynamic: Fair (occasional)  Standing: Impaired; With support  Standing - Static: Fair  Standing - Dynamic : Fair;Poor  Ambulation/Gait Training:  Distance (ft): 5 Feet (ft)  Assistive Device: Gait belt (B HHA)  Ambulation - Level of Assistance: Minimal assistance;Assist x2        Gait Abnormalities: Decreased step clearance;Shuffling gait;Trunk sway increased        Base of Support: Narrowed; Center of gravity altered     Speed/Gricelda: Shuffled;Pace decreased (<100 feet/min)  Step Length: Right shortened;Left shortened       Pain Rating:  Pt reported R back pain    Activity Tolerance:   Good and Fair    After treatment patient left in no apparent distress:   Sitting in chair, Call bell within reach, and Bed / chair alarm activated    COMMUNICATION/COLLABORATION:   The patients plan of care was discussed with: Occupational therapist and Registered nurse.      Edouard Thomson PT, DPT   Time Calculation: 19 mins

## 2022-03-10 PROBLEM — R25.1 TREMOR: Status: ACTIVE | Noted: 2022-03-10

## 2022-03-10 PROBLEM — R25.9 PARKINSONIAN FEATURES: Status: ACTIVE | Noted: 2022-03-10

## 2022-03-10 LAB
ANION GAP SERPL CALC-SCNC: 8 MMOL/L (ref 5–15)
BUN SERPL-MCNC: 27 MG/DL (ref 6–20)
BUN/CREAT SERPL: 18 (ref 12–20)
CALCIUM SERPL-MCNC: 8.6 MG/DL (ref 8.5–10.1)
CHLORIDE SERPL-SCNC: 111 MMOL/L (ref 97–108)
CO2 SERPL-SCNC: 19 MMOL/L (ref 21–32)
CREAT SERPL-MCNC: 1.52 MG/DL (ref 0.7–1.3)
ERYTHROCYTE [DISTWIDTH] IN BLOOD BY AUTOMATED COUNT: 14.3 % (ref 11.5–14.5)
GLUCOSE SERPL-MCNC: 109 MG/DL (ref 65–100)
HCT VFR BLD AUTO: 45.7 % (ref 36.6–50.3)
HGB BLD-MCNC: 14.2 G/DL (ref 12.1–17)
MAGNESIUM SERPL-MCNC: 2.1 MG/DL (ref 1.6–2.4)
MCH RBC QN AUTO: 30.5 PG (ref 26–34)
MCHC RBC AUTO-ENTMCNC: 31.1 G/DL (ref 30–36.5)
MCV RBC AUTO: 98.3 FL (ref 80–99)
NRBC # BLD: 0 K/UL (ref 0–0.01)
NRBC BLD-RTO: 0 PER 100 WBC
PHOSPHATE SERPL-MCNC: 3.3 MG/DL (ref 2.6–4.7)
PLATELET # BLD AUTO: 89 K/UL (ref 150–400)
PMV BLD AUTO: 11.9 FL (ref 8.9–12.9)
POTASSIUM SERPL-SCNC: 4.1 MMOL/L (ref 3.5–5.1)
RBC # BLD AUTO: 4.65 M/UL (ref 4.1–5.7)
SODIUM SERPL-SCNC: 138 MMOL/L (ref 136–145)
WBC # BLD AUTO: 7 K/UL (ref 4.1–11.1)

## 2022-03-10 PROCEDURE — 74011000250 HC RX REV CODE- 250: Performed by: INTERNAL MEDICINE

## 2022-03-10 PROCEDURE — 74011250637 HC RX REV CODE- 250/637: Performed by: PSYCHIATRY & NEUROLOGY

## 2022-03-10 PROCEDURE — 99232 SBSQ HOSP IP/OBS MODERATE 35: CPT | Performed by: PSYCHIATRY & NEUROLOGY

## 2022-03-10 PROCEDURE — 74011250636 HC RX REV CODE- 250/636: Performed by: INTERNAL MEDICINE

## 2022-03-10 PROCEDURE — 84100 ASSAY OF PHOSPHORUS: CPT

## 2022-03-10 PROCEDURE — 65660000000 HC RM CCU STEPDOWN

## 2022-03-10 PROCEDURE — 97535 SELF CARE MNGMENT TRAINING: CPT

## 2022-03-10 PROCEDURE — 74011000250 HC RX REV CODE- 250: Performed by: NURSE PRACTITIONER

## 2022-03-10 PROCEDURE — 97530 THERAPEUTIC ACTIVITIES: CPT

## 2022-03-10 PROCEDURE — 85027 COMPLETE CBC AUTOMATED: CPT

## 2022-03-10 PROCEDURE — 83735 ASSAY OF MAGNESIUM: CPT

## 2022-03-10 PROCEDURE — 36415 COLL VENOUS BLD VENIPUNCTURE: CPT

## 2022-03-10 PROCEDURE — 80048 BASIC METABOLIC PNL TOTAL CA: CPT

## 2022-03-10 PROCEDURE — 97116 GAIT TRAINING THERAPY: CPT

## 2022-03-10 PROCEDURE — 74011250637 HC RX REV CODE- 250/637: Performed by: NURSE PRACTITIONER

## 2022-03-10 PROCEDURE — 74011250637 HC RX REV CODE- 250/637: Performed by: INTERNAL MEDICINE

## 2022-03-10 RX ADMIN — SODIUM CHLORIDE, PRESERVATIVE FREE 5 ML: 5 INJECTION INTRAVENOUS at 06:00

## 2022-03-10 RX ADMIN — CARBIDOPA AND LEVODOPA 1 TABLET: 25; 100 TABLET ORAL at 08:45

## 2022-03-10 RX ADMIN — SERTRALINE 100 MG: 50 TABLET, FILM COATED ORAL at 18:15

## 2022-03-10 RX ADMIN — POLYETHYLENE GLYCOL 3350 17 G: 17 POWDER, FOR SOLUTION ORAL at 08:45

## 2022-03-10 RX ADMIN — HYDRALAZINE HYDROCHLORIDE 10 MG: 10 TABLET, FILM COATED ORAL at 16:41

## 2022-03-10 RX ADMIN — HEPARIN SODIUM 5000 UNITS: 5000 INJECTION, SOLUTION INTRAVENOUS; SUBCUTANEOUS at 05:32

## 2022-03-10 RX ADMIN — HYDRALAZINE HYDROCHLORIDE 10 MG: 10 TABLET, FILM COATED ORAL at 08:45

## 2022-03-10 RX ADMIN — LOSARTAN POTASSIUM 100 MG: 50 TABLET, FILM COATED ORAL at 08:45

## 2022-03-10 RX ADMIN — HYDRALAZINE HYDROCHLORIDE 10 MG: 10 TABLET, FILM COATED ORAL at 21:05

## 2022-03-10 RX ADMIN — CARBIDOPA AND LEVODOPA 1 TABLET: 25; 100 TABLET ORAL at 21:05

## 2022-03-10 RX ADMIN — CARBIDOPA AND LEVODOPA 1 TABLET: 25; 100 TABLET ORAL at 16:41

## 2022-03-10 RX ADMIN — AMLODIPINE BESYLATE 10 MG: 5 TABLET ORAL at 08:45

## 2022-03-10 RX ADMIN — ACETAMINOPHEN 650 MG: 325 TABLET ORAL at 21:04

## 2022-03-10 RX ADMIN — CYANOCOBALAMIN TAB 500 MCG 1000 MCG: 500 TAB at 08:45

## 2022-03-10 RX ADMIN — SODIUM CHLORIDE, PRESERVATIVE FREE 10 ML: 5 INJECTION INTRAVENOUS at 14:42

## 2022-03-10 RX ADMIN — ATORVASTATIN CALCIUM 80 MG: 40 TABLET, FILM COATED ORAL at 21:05

## 2022-03-10 RX ADMIN — FINASTERIDE 5 MG: 5 TABLET, FILM COATED ORAL at 08:45

## 2022-03-10 RX ADMIN — HEPARIN SODIUM 5000 UNITS: 5000 INJECTION, SOLUTION INTRAVENOUS; SUBCUTANEOUS at 21:06

## 2022-03-10 RX ADMIN — HYDRALAZINE HYDROCHLORIDE 20 MG: 20 INJECTION, SOLUTION INTRAMUSCULAR; INTRAVENOUS at 05:32

## 2022-03-10 RX ADMIN — ASPIRIN 81 MG CHEWABLE TABLET 81 MG: 81 TABLET CHEWABLE at 08:45

## 2022-03-10 RX ADMIN — HEPARIN SODIUM 5000 UNITS: 5000 INJECTION, SOLUTION INTRAVENOUS; SUBCUTANEOUS at 14:42

## 2022-03-10 NOTE — PROGRESS NOTES
Transition of Care Plan   RUR-  Medium   DISPOSITION: SNF for Rehab   Will need insurance auth   F/U with PCP/Specialist     Transport: [facility]  Care manager spoke with patient's son Ramona Ferrer [phone] to discuss transitions of care. Therapy recommending SNF. Per son he would like referral to [facility]. Referral made through [facility]. Devere Ganser RN,Care Management     3:30 pm [facility] has accepted patient, will need insurance auth. Spoke with Miryam Jon liaison and she will begin insurance authorization process.   Devere Ganser RN,Care Management

## 2022-03-10 NOTE — PROGRESS NOTES
1930: Bedside and Verbal shift change report given to Destiny RN (oncoming nurse) by Sivakumar Segal RN (offgoing nurse). Report included the following information SBAR, Kardex, ED Summary, Intake/Output, MAR, Recent Results, Cardiac Rhythm Afib and Alarm Parameters . 0532: , 20 mg hydralazine given. 0730: Bedside and Verbal shift change report given to [address] (oncoming nurse) by Pennie Mazariegos RN (offgoing nurse). Report included the following information SBAR, Kardex, ED Summary, Intake/Output, MAR, Recent Results, Cardiac Rhythm AFib and Alarm Parameters .

## 2022-03-10 NOTE — PROGRESS NOTES
Problem: Self Care Deficits Care Plan (Adult)  Goal: *Acute Goals and Plan of Care (Insert Text)  Description: FUNCTIONAL STATUS PRIOR TO ADMISSION: Pt lived in [facility], used rollator for longer distances, [address] for in home. Resides alone and receives OT and PT at [facility]. Pt with multiple falls 5-6 within last 6 months. Able to dress mod I and bathe himself but has staff member present for safety during bathing. HOME SUPPORT: lives in [facility]  Updated upon re-evaluation 3/8/2022  1. Patient will perform seated grooming task with set-up assist within 7 day(s). 2.  Patient will perform lower body dressing using AE PRN with minimal assistance within 7 day(s). 3.  Patient will perform upper body dressing with set-up assist within 7 day(s). 4.  Patient will perform toilet transfers in bathroom with least restrictive DME with minimal assistance within 7 day(s). 5.  Patient will perform all aspects of toileting with minimal assistance within 7 day(s). Initiated 3/7/2022  1. Patient will perform grooming task at sink with least restrictive DME with modified independence within 7 day(s). 2.  Patient will perform lower body dressing using AE PRN with modified independence within 7 day(s). 3.  Patient will perform upper body dressing with modified independence within 7 day(s). 4.  Patient will perform toilet transfers in bathroom with least restrictive DME with modified independence within 7 day(s). 5.  Patient will perform all aspects of toileting with modified independence within 7 day(s). Outcome: Progressing Towards Goal    OCCUPATIONAL THERAPY TREATMENT  Patient: Mayo Molina (80 y.o. male)  Date: 3/10/2022  Diagnosis: Bradycardia [R00.1]  Hypertensive urgency [I16.0] <principal problem not specified>       Precautions: Fall,DNR,Bed Alarm  Chart, occupational therapy assessment, plan of care, and goals were reviewed.     ASSESSMENT  Patient continues with skilled OT services and is progressing towards goals. Pt's functional independence and performance of ADL/IADL tasks continues to be limited at this time by impaired cognition (intermittent confusion, safety awareness, attention to task), impaired balance, generalized weakness, UE tremors (intention tremors ?), significant back pain, and decreased activity tolerance/endurance. VSS throughout session. Pt received semi-supine and agreeable to participation in therapy session. He required min A to perform supine<>sit with additional time and HOB elevated. Pt with difficulty coming to sitting upright d/t back pain with physical assist required. Pain subsiding with improved posture. Original plan to ambulate to sink to perform grooming aborted d/t pt's poor balance and safety awareness (pt may benefit from use of RW - defer to PT assessment). Therefore, pt ambulated to bedside chair where he performed seated grooming tasks with set-up and additional time. Pt left seated in bedside chair, set-up with tray and all needs met. Current Level of Function Impacting Discharge (ADLs): min A bed to chair, seated grooming set-up    Other factors to consider for discharge: PLOF          PLAN :  Patient continues to benefit from skilled intervention to address the above impairments. Continue treatment per established plan of care to address goals. Recommendation for discharge: (in order for the patient to meet his/her long term goals)  Therapy up to 5 days/week in SNF setting    This discharge recommendation:  Has been made in collaboration with the attending provider and/or case management    IF patient discharges home will need the following DME: TBD       SUBJECTIVE:   Patient stated If I put pressure on my back it feels better.     OBJECTIVE DATA SUMMARY:   Cognitive/Behavioral Status:  Neurologic State: Alert  Orientation Level: Oriented to person;Oriented to place; Disoriented to time;Disoriented to situation  Cognition: Follows commands;Poor safety awareness  Perception: Appears intact  Perseveration: No perseveration noted  Safety/Judgement: Decreased awareness of need for safety;Decreased awareness of need for assistance    Functional Mobility and Transfers for ADLs:  Bed Mobility:  Supine to Sit: Minimum assistance; Additional time;Bed Modified  Sit to Supine:  (pt remained in bedside chair)  Scooting: Moderate assistance;Maximum assistance (to EOB)    Transfers:  Sit to Stand: Contact guard assistance;Minimum assistance; Additional time     Bed to Chair: Minimum assistance; Additional time    Balance:  Sitting: Impaired  Sitting - Static: Good (unsupported)  Sitting - Dynamic: Good (unsupported); Fair (occasional)  Standing: Impaired; With support  Standing - Static: Fair;Constant support  Standing - Dynamic : Fair;Poor;Constant support    ADL Intervention:  Feeding  Drink to Mouth: Modified independent    Grooming  Position Performed: Seated in chair  Brushing Teeth: Set-up  Cues: Verbal cues provided                             Cognitive Retraining  Orientation Retraining: Reorienting;Situation;Time  Safety/Judgement: Decreased awareness of need for safety;Decreased awareness of need for assistance    Pain:  Pt reporting 7/10 pain in back, RN aware     Activity Tolerance:   Fair    After treatment patient left in no apparent distress:   Sitting in chair, Call bell within reach and Bed / chair alarm activated, RN notified     COMMUNICATION/COLLABORATION:   The patients plan of care was discussed with: Registered nurse. Patient was educated regarding His deficit(s) of impaired balance/weakness as this relates to His diagnosis of possible ischemic CVA. He demonstrated Fair understanding as evidenced by verbal conversation. Patient and/or family was verbally educated on the BE FAST acronym for signs/symptoms of CVA and TIA. BE FAST was written on patient's communication board  for visual education and reinforcement.   All questions answered with patient indicating fair understanding.      Savage Joaquin OT  Time Calculation: 33 mins

## 2022-03-10 NOTE — PROGRESS NOTES
Kevin Scott is a 80 y.o. 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  Thereafter he developed hypotension with SBP in 200s.  Later he was exhibiting difficulty expressing himself and appeared somewhat confused.     CT of the head and neck x2 was unremarkable.  CT perfusion was negative. A stroke in evolution/a TIA was suspected and that he received intravenous alteplase     Clinically he is awake, alert.  Speech appears improved today Intention tremor still persists.           EXAM  Exam:  Visit Vitals  BP (!) 156/73 (BP 1 Location: Left arm, BP Patient Position: At rest)   Pulse 90   Temp 98.2 °F (36.8 °C)   Resp 17   Ht 5' 11\" (1.803 m)   Wt 196 lb 3.4 oz (89 kg)   SpO2 94%   BMI 27.37 kg/m²     Gen:  CV: RRR  Lungs: non labored breathing  Abd: non distending  Neuro: A&O x 3, no dysarthria or aphasia  CN II-XII: PERRL, EOMI, face symmetric, tongue/palate midline  Motor: strength 5/5 all four ext  Sensory: intact to LT  Gait: normal    LABS/ IMAGING  CT Results (most recent):  Results from Hospital Encounter encounter on 03/06/22    CT HEAD WO CONT    Narrative  EXAM:  CT HEAD WO CONT    INDICATION: Neuro changes    COMPARISON: CT 3/7/2022    TECHNIQUE: Axial noncontrast head CT from foramen magnum to vertex. Coronal and  sagittal reformatted images were obtained. CT dose reduction was achieved  through use of a standardized protocol tailored for this examination and  automatic exposure control for dose modulation. Adaptive statistical iterative  reconstruction (ASIR) was utilized. FINDINGS:  There is diffuse age-related parenchymal volume loss. The ventricles  and sulci are age-appropriate without hydrocephalus.  There is no mass effect or  midline shift. Intravascular contrast is present from recent CTA. There is no  acute parenchymal. Scattered foci of low attenuation in the periventricular  white matter represent stable chronic microvascular ischemic changes. The  gray-white matter differentiation is maintained. The basal cisterns are patent. A metallic foreign body in the right orbit is again noted. The osseous  structures are intact. The visualized paranasal sinuses and mastoid air cells  are clear. Impression  No acute intracranial abnormality. Lab Results   Component Value Date/Time    WBC 7.0 03/10/2022 03:47 AM    HGB 14.2 03/10/2022 03:47 AM    HCT 45.7 03/10/2022 03:47 AM    PLATELET 89 (L) 45/97/1657 03:47 AM    MCV 98.3 03/10/2022 03:47 AM     Lab Results   Component Value Date/Time    Sodium 138 03/10/2022 03:47 AM    Potassium 4.1 03/10/2022 03:47 AM    Chloride 111 (H) 03/10/2022 03:47 AM    CO2 19 (L) 03/10/2022 03:47 AM    Anion gap 8 03/10/2022 03:47 AM    Glucose 109 (H) 03/10/2022 03:47 AM    BUN 27 (H) 03/10/2022 03:47 AM    Creatinine 1.52 (H) 03/10/2022 03:47 AM    BUN/Creatinine ratio 18 03/10/2022 03:47 AM    GFR est AA 52 (L) 03/10/2022 03:47 AM    GFR est non-AA 43 (L) 03/10/2022 03:47 AM    Calcium 8.6 03/10/2022 03:47 AM    Bilirubin, total 0.7 03/06/2022 09:56 AM    Alk.  phosphatase 105 03/06/2022 09:56 AM    Protein, total 7.6 03/06/2022 09:56 AM    Albumin 3.3 (L) 03/06/2022 09:56 AM    Globulin 4.3 (H) 03/06/2022 09:56 AM    A-G Ratio 0.8 (L) 03/06/2022 09:56 AM    ALT (SGPT) 18 03/06/2022 09:56 AM    AST (SGOT) 22 03/06/2022 09:56 AM       ASSESSMENT  Hospital Problems  Date Reviewed: 2/7/2022          Codes Class Noted POA    Tremor ICD-10-CM: R25.1  ICD-9-CM: 781.0  3/10/2022 Unknown        Parkinsonian features ICD-10-CM: R25.9  ICD-9-CM: 781.0  3/10/2022 Unknown        Hypertensive urgency ICD-10-CM: I16.0  ICD-9-CM: 401.9  3/7/2022 Unknown        SSS (sick sinus syndrome) ([facility] [address] ICD-10-CM: I49.5  ICD-9-CM: 427.81  3/6/2022 Unknown               PLAN  51-year-old male who developed  difficulty expressing himself/mild confusion along with hypertensive urgency.    He was noted to have some parkinsonian features with slow hesitant speech, mild rigidity, resting and intention tremor   Started on Sinemet yesterday which appears to have improved his speech and resting tremor  It is possible that he has underlying parkinsonism  We will maintain carbidopa-levodopa, 25/100 mg, 3 times daily for now  Continue PT/OT and see if his balance/mobility improves    Stroke work-up is negative.   He cannot get MRI   Continue aspirin and statin  Awaiting transfer out of ICU    Louise Encarnacion MD

## 2022-03-10 NOTE — PROGRESS NOTES
Problem: Mobility Impaired (Adult and Pediatric)  Goal: *Acute Goals and Plan of Care (Insert Text)  Description: FUNCTIONAL STATUS PRIOR TO ADMISSION: Pt lived in [facility], used rollator for longer distances, [facility] for in home. Resides alone and receives OT and PT at [facility]. Pt with multiple falls 5-6 within last 6 months. Able to dress mod I and bathe himself but has staff member present for safety during bathing. Walks to the dining room for meals. HOME SUPPORT: [facility] staff. Physical Therapy Goals    Initiated 3/8/2022  1. Patient will move from supine to sit and sit to supine  in bed with minimal assistance within 7 day(s). 2.  Patient will transfer from bed to chair and chair to bed with minimal assistance using the least restrictive device within 7 day(s). 3.  Patient will perform sit to stand with minimal assistance within 7 day(s). 4.  Patient will ambulate with minimal assistance for 50 feet with the least restrictive device within 7 day(s). 5.  Patient will improve Aquino Balance score by 7 points within 7 days. Initiated 3/7/2022  1. Patient will move from supine to sit and sit to supine  in bed with modified independence within 7 day(s). 2.  Patient will transfer from bed to chair and chair to bed with modified independence using the least restrictive device within 7 day(s). 3.  Patient will perform sit to stand with modified independence within 7 day(s). 4.  Patient will ambulate with modified independence for 150 feet with the least restrictive device within 7 day(s). Outcome: Progressing Towards Goal     PHYSICAL THERAPY TREATMENT  Patient: Lisa Gonzales (80 y.o. male)  Date: 3/10/2022  Diagnosis: Bradycardia [R00.1]  Hypertensive urgency [I16.0] <principal problem not specified>       Precautions: Fall,DNR,Bed Alarm  Chart, physical therapy assessment, plan of care and goals were reviewed.     ASSESSMENT  Patient continues with skilled PT services and is progressing towards goals however remains most limited by generalized weakness, impaired balance and gait, intermittent confusion, and c/o R flank pain leading to increased falls risk and dependency from baseline level. Received pt up in chair, motivated to participate in session. Worked on sit<>stands from chair and lower BSC with up to min A for initial lift off and cues for hand placement sequencing. Gait training progressed with RW for approx 75ft with min A plus assist for RW management while turning. Max cues at times to redirect attention to task - very easily distracted by surroundings, nearly running into objects while looking elsewhere. Remained up in chair at end of session, NAD. Continue to recommend discharge to SNF once medically cleared. Current Level of Function Impacting Discharge (mobility/balance): min A with RW    Other factors to consider for discharge: below baseline          PLAN :  Patient continues to benefit from skilled intervention to address the above impairments. Continue treatment per established plan of care. to address goals. Recommendation for discharge: (in order for the patient to meet his/her long term goals)  Therapy up to 5 days/week in SNF setting    This discharge recommendation:  A follow-up discussion with the attending provider and/or case management is planned    IF patient discharges home will need the following DME: to be determined (TBD)       SUBJECTIVE:   Patient stated I want to go see what everyone is looking at.     OBJECTIVE DATA SUMMARY:   Critical Behavior:  Neurologic State: Alert  Orientation Level: Oriented to person,Oriented to place,Disoriented to time,Disoriented to situation  Cognition: Follows commands,Poor safety awareness  Safety/Judgement: Decreased awareness of need for safety,Decreased awareness of need for assistance  Functional Mobility Training:  Bed Mobility:     Supine to Sit: Minimum assistance; Additional time;Bed Modified  Sit to Supine:  (pt remained in bedside chair)  Scooting: Moderate assistance;Maximum assistance (to EOB)    Transfers:  Sit to Stand: Minimum assistance  Stand to Sit: Contact guard assistance  Bed to Chair: Minimum assistance; Additional time     Balance:  Sitting: Impaired  Sitting - Static: Good (unsupported)  Sitting - Dynamic: Good (unsupported); Fair (occasional)  Standing: Impaired; With support  Standing - Static: Fair;Constant support  Standing - Dynamic : Fair;Constant support    Ambulation/Gait Training:  Distance (ft): 75 Feet (ft)  Assistive Device: Gait belt;Walker, rolling  Ambulation - Level of Assistance: Minimal assistance  Gait Abnormalities: Decreased step clearance;Trunk sway increased  Base of Support: Narrowed  Speed/Gricelda: Shuffled; Slow  Step Length: Right shortened;Left shortened    Activity Tolerance:   Good    After treatment patient left in no apparent distress:   Sitting in chair, Call bell within reach, and Bed / chair alarm activated    COMMUNICATION/COLLABORATION:   The patients plan of care was discussed with: Registered nurse.      Jaelyn Gastelum PT, DPT   Time Calculation: 28 mins

## 2022-03-10 NOTE — PROGRESS NOTES
[facility]  Hospitalist Group                                                                                          Hospitalist Progress Note  Ck Lagunas MD  Answering service: [phone] from in house phone        Date of Service:  3/10/2022  NAME:  Denny Rios. :  7/15/1931  MRN:  [id number]      Admission Summary:      80 y.o. 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  Thereafter he developed hypotension with SBP in 200s.  Later he was exhibiting difficulty expressing himself and appeared somewhat confused. CT of the head and neck x2 was unremarkable.  CT perfusion was negative. A stroke in evolution/a TIA was suspected and that he received intravenous alteplase.       Interval history / Subjective:   Awake and alert  Doing well   Speech better       Assessment & Plan:      Possible ischemic CVA  Developed difficulty expressing himself/mild confusion 3/7 along with hypertensive urgency  Symptoms may be related to hypertensive encephalopathy versus ischemic/embolic stroke  - s/p tPA 3/7, CTs wnl   - neuro checks  - CTH negative, unable to obtain MRI  scleral metal buckle  - CTH nothing acute post tpa  - PT/OT/SLP  - TTE 3/8: EF 55-60%, mild LV thickening  - A1c 5.7  - SBP < 180  Continue aspirin, cont statin for     Thrombocytopenia  - no hep given prior to today   - doubt HIT   - monitor  - recheck in Am     Tremor and frequent falls, possible underlying parkinsonism  Neurology empirically started low-dose Sinemet  Improving      HTN urgency  C/w Amlodipine, Cozaar, Hydralzine      H/o pAF (not on [facility]), bradycardia  - cards following  - holding toprol  -Warfarin changed to ASA in 2021 due to fall risk  -TSH 3.12      B12 borderline, confirm with MMA 3/6 in process     CKD3 at baseline Cr ~1.5     Code status: DNR  Prophylaxis: [employer] [place] discussed with: RN   Anticipated Disposition: dispo pending      Hospital Problems  Date Reviewed: 2/7/2022          Codes Class Noted POA    Tremor ICD-10-CM: R25.1  ICD-9-CM: 781.0  3/10/2022 Unknown        Parkinsonian features ICD-10-CM: R25.9  ICD-9-CM: 781.0  3/10/2022 Unknown        Hypertensive urgency ICD-10-CM: I16.0  ICD-9-CM: 401.9  3/7/2022 Unknown        SSS (sick sinus syndrome) ([facility] [address] ICD-10-CM: I49.5  ICD-9-CM: 427.81  3/6/2022 Unknown                Review of Systems:   A comprehensive review of systems was negative except for that written in the HPI. Vital Signs:    Last 24hrs VS reviewed since prior progress note. Most recent are:  Visit Vitals  BP (!) 156/73 (BP 1 Location: Left arm, BP Patient Position: At rest)   Pulse 90   Temp 98.2 °F (36.8 °C)   Resp 17   Ht 5' 11\" (1.803 m)   Wt 89 kg (196 lb 3.4 oz)   SpO2 94%   BMI 27.37 kg/m²         Intake/Output Summary (Last 24 hours) at 3/10/2022 1227  Last data filed at 3/10/2022 0700  Gross per 24 hour   Intake 440 ml   Output 450 ml   Net -10 ml        Physical Examination:     I had a face to face encounter with this patient and independently examined them on 3/10/2022 as outlined below:          Constitutional:  No acute distress, cooperative, pleasant    ENT:  Oral mucosa moist, oropharynx benign. Resp:  CTA bilaterally. CV:  Irregularly irregular     GI:  Soft, non distended, non tender.  normoactive bowel sounds,     Musculoskeletal:  No edema, warm, 2+ pulses throughout    Neurologic:  mild tremors, speech clear, motor 5/6, sensory intact             Data Review:    Review and/or order of clinical lab test  Review and/or order of tests in the radiology section of CPT  Review and/or order of tests in the medicine section of CPT      Labs:     Recent Labs     03/10/22  3255 03/09/22  0524   WBC 7.0 8.2   HGB 14.2 14.1   HCT 45.7 42.5   PLT 89* 196     Recent Labs     03/10/22  0347 03/09/22  0526 03/09/22  0524 03/08/22  0238    138  --  138   K 4.1 3.7  --  3.4*   * 109*  --  108   CO2 19* 22  --  24   BUN 27* 20  --  20   CREA 1.52* 1.36*  --  1.27   * 111*  --  125*   CA 8.6 8.6  --  8.7   MG 2.1  --  2.2 2.1   PHOS 3.3  --  2.8 3.2     No results for input(s): ALT, AP, TBIL, TBILI, TP, ALB, GLOB, GGT, AML, LPSE in the last 72 hours. No lab exists for component: SGOT, GPT, AMYP, HLPSE  No results for input(s): INR, PTP, APTT, INREXT in the last 72 hours. No results for input(s): FE, TIBC, PSAT, FERR in the last 72 hours. No results found for: FOL, RBCF   No results for input(s): PH, PCO2, PO2 in the last 72 hours. No results for input(s): CPK, CKNDX, TROIQ in the last 72 hours.     No lab exists for component: CPKMB  Lab Results   Component Value Date/Time    Cholesterol, total 172 03/08/2022 02:38 AM    HDL Cholesterol 53 03/08/2022 02:38 AM    LDL, calculated 105.6 (H) 03/08/2022 02:38 AM    Triglyceride 67 03/08/2022 02:38 AM    CHOL/HDL Ratio 3.2 03/08/2022 02:38 AM     Lab Results   Component Value Date/Time    Glucose (POC) 96 03/07/2022 07:00 PM    Glucose (POC) 104 11/21/2021 10:12 PM    Glucose (POC) 117 08/28/2021 08:01 PM     Lab Results   Component Value Date/Time    Color YELLOW/STRAW 03/06/2022 10:13 AM    Appearance CLEAR 03/06/2022 10:13 AM    Specific gravity 1.012 03/06/2022 10:13 AM    pH (UA) 6.5 03/06/2022 10:13 AM    Protein 30 (A) 03/06/2022 10:13 AM    Glucose Negative 03/06/2022 10:13 AM    Ketone Negative 03/06/2022 10:13 AM    Bilirubin Negative 03/06/2022 10:13 AM    Urobilinogen 0.2 03/06/2022 10:13 AM    Nitrites Negative 03/06/2022 10:13 AM    Leukocyte Esterase Negative 03/06/2022 10:13 AM    Epithelial cells FEW 03/06/2022 10:13 AM    Bacteria Negative 03/06/2022 10:13 AM    WBC 0-4 03/06/2022 10:13 AM    RBC 0-5 03/06/2022 10:13 AM         Medications Reviewed:     Current Facility-Administered Medications   Medication Dose Route Frequency    traMADoL (ULTRAM) tablet 50 mg  50 mg Oral Q6H PRN    aspirin chewable tablet 81 mg  81 mg Oral DAILY    carbidopa-levodopa (SINEMET)  mg per tablet 1 Tablet  1 Tablet Oral TID    finasteride (PROSCAR) tablet 5 mg  5 mg Oral DAILY    sertraline (ZOLOFT) tablet 100 mg  100 mg Oral QPM    losartan (COZAAR) tablet 100 mg  100 mg Oral DAILY    hydrALAZINE (APRESOLINE) tablet 10 mg  10 mg Oral TID    heparin (porcine) injection 5,000 Units  5,000 Units SubCUTAneous Q8H    polyethylene glycol (MIRALAX) packet 17 g  17 g Oral DAILY    amLODIPine (NORVASC) tablet 10 mg  10 mg Oral DAILY    sodium chloride (NS) flush 5-40 mL  5-40 mL IntraVENous Q8H    sodium chloride (NS) flush 5-40 mL  5-40 mL IntraVENous PRN    atorvastatin (LIPITOR) tablet 80 mg  80 mg Oral QHS    lidocaine 4 % patch 1 Patch  1 Patch TransDERmal Q24H    sodium chloride (NS) flush 5-40 mL  5-40 mL IntraVENous Q8H    sodium chloride (NS) flush 5-40 mL  5-40 mL IntraVENous PRN    acetaminophen (TYLENOL) tablet 650 mg  650 mg Oral Q6H PRN    Or    acetaminophen (TYLENOL) suppository 650 mg  650 mg Rectal Q6H PRN    ondansetron (ZOFRAN ODT) tablet 4 mg  4 mg Oral Q6H PRN    Or    ondansetron (ZOFRAN) injection 4 mg  4 mg IntraVENous Q6H PRN    hydrALAZINE (APRESOLINE) 20 mg/mL injection 20 mg  20 mg IntraVENous Q2H PRN    cyanocobalamin (VITAMIN B12) tablet 1,000 mcg  1,000 mcg Oral DAILY     ______________________________________________________________________  EXPECTED LENGTH OF STAY: 3d 12h  ACTUAL LENGTH OF STAY:          3                 Evelyne Mendoza MD

## 2022-03-11 LAB — PHOSPHATE SERPL-MCNC: 3.8 MG/DL (ref 2.6–4.7)

## 2022-03-11 PROCEDURE — 36415 COLL VENOUS BLD VENIPUNCTURE: CPT

## 2022-03-11 PROCEDURE — 99232 SBSQ HOSP IP/OBS MODERATE 35: CPT | Performed by: PSYCHIATRY & NEUROLOGY

## 2022-03-11 PROCEDURE — 74011000250 HC RX REV CODE- 250: Performed by: INTERNAL MEDICINE

## 2022-03-11 PROCEDURE — 97530 THERAPEUTIC ACTIVITIES: CPT

## 2022-03-11 PROCEDURE — 74011000250 HC RX REV CODE- 250: Performed by: NURSE PRACTITIONER

## 2022-03-11 PROCEDURE — 97116 GAIT TRAINING THERAPY: CPT

## 2022-03-11 PROCEDURE — 65660000000 HC RM CCU STEPDOWN

## 2022-03-11 PROCEDURE — 74011250637 HC RX REV CODE- 250/637: Performed by: NURSE PRACTITIONER

## 2022-03-11 PROCEDURE — 74011250637 HC RX REV CODE- 250/637: Performed by: INTERNAL MEDICINE

## 2022-03-11 PROCEDURE — 97535 SELF CARE MNGMENT TRAINING: CPT

## 2022-03-11 PROCEDURE — 74011250637 HC RX REV CODE- 250/637: Performed by: PSYCHIATRY & NEUROLOGY

## 2022-03-11 PROCEDURE — 84100 ASSAY OF PHOSPHORUS: CPT

## 2022-03-11 PROCEDURE — 74011250636 HC RX REV CODE- 250/636: Performed by: INTERNAL MEDICINE

## 2022-03-11 RX ADMIN — HYDRALAZINE HYDROCHLORIDE 20 MG: 20 INJECTION, SOLUTION INTRAMUSCULAR; INTRAVENOUS at 05:47

## 2022-03-11 RX ADMIN — ASPIRIN 81 MG CHEWABLE TABLET 81 MG: 81 TABLET CHEWABLE at 08:32

## 2022-03-11 RX ADMIN — ATORVASTATIN CALCIUM 80 MG: 40 TABLET, FILM COATED ORAL at 21:16

## 2022-03-11 RX ADMIN — HEPARIN SODIUM 5000 UNITS: 5000 INJECTION, SOLUTION INTRAVENOUS; SUBCUTANEOUS at 14:10

## 2022-03-11 RX ADMIN — SODIUM CHLORIDE, PRESERVATIVE FREE 10 ML: 5 INJECTION INTRAVENOUS at 21:16

## 2022-03-11 RX ADMIN — CARBIDOPA AND LEVODOPA 1 TABLET: 25; 100 TABLET ORAL at 16:45

## 2022-03-11 RX ADMIN — HYDRALAZINE HYDROCHLORIDE 10 MG: 10 TABLET, FILM COATED ORAL at 21:16

## 2022-03-11 RX ADMIN — CYANOCOBALAMIN TAB 500 MCG 1000 MCG: 500 TAB at 08:32

## 2022-03-11 RX ADMIN — FINASTERIDE 5 MG: 5 TABLET, FILM COATED ORAL at 08:31

## 2022-03-11 RX ADMIN — AMLODIPINE BESYLATE 10 MG: 5 TABLET ORAL at 08:32

## 2022-03-11 RX ADMIN — SERTRALINE 100 MG: 50 TABLET, FILM COATED ORAL at 18:15

## 2022-03-11 RX ADMIN — SODIUM CHLORIDE, PRESERVATIVE FREE 10 ML: 5 INJECTION INTRAVENOUS at 21:17

## 2022-03-11 RX ADMIN — LOSARTAN POTASSIUM 100 MG: 50 TABLET, FILM COATED ORAL at 08:32

## 2022-03-11 RX ADMIN — ACETAMINOPHEN 650 MG: 325 TABLET ORAL at 05:44

## 2022-03-11 RX ADMIN — CARBIDOPA AND LEVODOPA 1 TABLET: 25; 100 TABLET ORAL at 08:32

## 2022-03-11 RX ADMIN — SODIUM CHLORIDE, PRESERVATIVE FREE 10 ML: 5 INJECTION INTRAVENOUS at 14:12

## 2022-03-11 RX ADMIN — HYDRALAZINE HYDROCHLORIDE 10 MG: 10 TABLET, FILM COATED ORAL at 08:32

## 2022-03-11 RX ADMIN — POLYETHYLENE GLYCOL 3350 17 G: 17 POWDER, FOR SOLUTION ORAL at 08:33

## 2022-03-11 RX ADMIN — HEPARIN SODIUM 5000 UNITS: 5000 INJECTION, SOLUTION INTRAVENOUS; SUBCUTANEOUS at 05:47

## 2022-03-11 RX ADMIN — HYDRALAZINE HYDROCHLORIDE 10 MG: 10 TABLET, FILM COATED ORAL at 16:45

## 2022-03-11 RX ADMIN — CARBIDOPA AND LEVODOPA 1 TABLET: 25; 100 TABLET ORAL at 21:16

## 2022-03-11 RX ADMIN — HEPARIN SODIUM 5000 UNITS: 5000 INJECTION, SOLUTION INTRAVENOUS; SUBCUTANEOUS at 21:16

## 2022-03-11 RX ADMIN — ACETAMINOPHEN 650 MG: 325 TABLET ORAL at 21:34

## 2022-03-11 NOTE — PROGRESS NOTES
Transition of Care Plan: [facility] (pending auth)    RUR: 16% medium    PCP F/U: Angel Ham MD    Disposition: [facility]    Transportation: S    Main Contact: Suzanna Zaman - [phone]: Received telephone call from patient's son. States he wants the [address] [place] [address] [facility] States that he made a mistake and realized that the facility he gave to previous  was the wrong facility as patient did not like it there. Has requested SNF list. Provided via email. Notified MD.     24-20-52-61: Yinka Kim from [facility] states that she spoke with son and patient and are now agreeable to go back to [facility]. States she will have auth restarted.      Kylee Pastrana RN, CRM

## 2022-03-11 NOTE — PROGRESS NOTES
[facility] Adult  Hospitalist Group                                                                                          Hospitalist Progress Note  Stephany Wang MD  Answering service: [phone] from in house phone        Date of Service:  3/11/2022  NAME:  Anton Olivares. :  7/15/1931  MRN:  [id number]      Admission Summary:      80 y.o. 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  Thereafter he developed hypotension with SBP in 200s.  Later he was exhibiting difficulty expressing himself and appeared somewhat confused. CT of the head and neck x2 was unremarkable.  CT perfusion was negative. A stroke in evolution/a TIA was suspected and that he received intravenous alteplase.       Interval history / Subjective:   Awake and alert  Sitting up in chair   No new issues        Assessment & Plan:      Possible ischemic CVA  Developed difficulty expressing himself/mild confusion 3/7 along with hypertensive urgency  Symptoms may be related to hypertensive encephalopathy versus ischemic/embolic stroke  - s/p tPA 3/7, CTs wnl   - neuro checks  - CTH negative, unable to obtain MRI  scleral metal buckle  - CTH nothing acute post tpa  - PT/OT/SLP  - TTE 3/8: EF 55-60%, mild LV thickening  - A1c 5.7  - SBP < 180  Continue aspirin, cont statin for     Thrombocytopenia  - no hep given prior to today   - doubt HIT   - labs in AM      Tremor and frequent falls, possible underlying parkinsonism  Neurology empirically started low-dose Sinemet  Improving      HTN urgency  C/w Amlodipine, Cozaar, Hydralzine      H/o pAF (not on [facility]), bradycardia  - cards following  - holding toprol  -Warfarin changed to ASA in 2021 due to fall risk  -TSH 3.12      B12 borderline, confirm with MMA 3/6 in process     CKD3 at baseline Cr ~1.5     Code status: DNR  Prophylaxis: Hep SQ  Care Plan discussed with: RNFELIX  Anticipated Disposition: dispo to [facility], CM working on this     Hospital Problems  Date Reviewed: 2/7/2022          Codes Class Noted POA    Tremor ICD-10-CM: R25.1  ICD-9-CM: 781.0  3/10/2022 Unknown        Parkinsonian features ICD-10-CM: R25.9  ICD-9-CM: 781.0  3/10/2022 Unknown        Hypertensive urgency ICD-10-CM: I16.0  ICD-9-CM: 401.9  3/7/2022 Unknown        SSS (sick sinus syndrome) ([facility] [address] ICD-10-CM: I49.5  ICD-9-CM: 427.81  3/6/2022 Unknown                Review of Systems:   A comprehensive review of systems was negative except for that written in the HPI. Vital Signs:    Last 24hrs VS reviewed since prior progress note. Most recent are:  Visit Vitals  BP (!) 95/57   Pulse 96   Temp 97.8 °F (36.6 °C)   Resp 20   Ht 5' 11\" (1.803 m)   Wt 89 kg (196 lb 3.4 oz)   SpO2 98%   BMI 27.37 kg/m²         Intake/Output Summary (Last 24 hours) at 3/11/2022 1232  Last data filed at 3/11/2022 0200  Gross per 24 hour   Intake 240 ml   Output 350 ml   Net -110 ml        Physical Examination:     I had a face to face encounter with this patient and independently examined them on 3/11/2022 as outlined below:          Constitutional:  No acute distress, cooperative, pleasant    ENT:  Oral mucosa moist, oropharynx benign. Resp:  CTA bilaterally. CV:  Irregularly irregular     GI:  Soft, non distended, non tender.  normoactive bowel sounds,     Musculoskeletal:  No edema, warm, 2+ pulses throughout    Neurologic:  mild tremors, speech clear, motor 5/6, sensory intact             Data Review:    Review and/or order of clinical lab test  Review and/or order of tests in the radiology section of CPT  Review and/or order of tests in the medicine section of CPT      Labs:     Recent Labs     03/10/22  0347 03/09/22  0524   WBC 7.0 8.2   HGB 14.2 14.1   HCT 45.7 42.5   PLT 89* 196     Recent Labs     03/11/22  0029 03/10/22  0347 03/09/22  0526 03/09/22  0524   NA  --  138 138  --    K  --  4.1 3.7  --    CL  --  111* 109*  --    CO2  --  19* 22  --    BUN  --  27* 20  --    CREA  --  1.52* 1.36*  --    GLU  --  109* 111*  --    CA  --  8.6 8.6  --    MG  --  2.1  --  2.2   PHOS 3.8 3.3  --  2.8     No results for input(s): ALT, AP, TBIL, TBILI, TP, ALB, GLOB, GGT, AML, LPSE in the last 72 hours. No lab exists for component: SGOT, GPT, AMYP, HLPSE  No results for input(s): INR, PTP, APTT, INREXT, INREXT in the last 72 hours. No results for input(s): FE, TIBC, PSAT, FERR in the last 72 hours. No results found for: FOL, RBCF   No results for input(s): PH, PCO2, PO2 in the last 72 hours. No results for input(s): CPK, CKNDX, TROIQ in the last 72 hours.     No lab exists for component: CPKMB  Lab Results   Component Value Date/Time    Cholesterol, total 172 03/08/2022 02:38 AM    HDL Cholesterol 53 03/08/2022 02:38 AM    LDL, calculated 105.6 (H) 03/08/2022 02:38 AM    Triglyceride 67 03/08/2022 02:38 AM    CHOL/HDL Ratio 3.2 03/08/2022 02:38 AM     Lab Results   Component Value Date/Time    Glucose (POC) 96 03/07/2022 07:00 PM    Glucose (POC) 104 11/21/2021 10:12 PM    Glucose (POC) 117 08/28/2021 08:01 PM     Lab Results   Component Value Date/Time    Color YELLOW/STRAW 03/06/2022 10:13 AM    Appearance CLEAR 03/06/2022 10:13 AM    Specific gravity 1.012 03/06/2022 10:13 AM    pH (UA) 6.5 03/06/2022 10:13 AM    Protein 30 (A) 03/06/2022 10:13 AM    Glucose Negative 03/06/2022 10:13 AM    Ketone Negative 03/06/2022 10:13 AM    Bilirubin Negative 03/06/2022 10:13 AM    Urobilinogen 0.2 03/06/2022 10:13 AM    Nitrites Negative 03/06/2022 10:13 AM    Leukocyte Esterase Negative 03/06/2022 10:13 AM    Epithelial cells FEW 03/06/2022 10:13 AM    Bacteria Negative 03/06/2022 10:13 AM    WBC 0-4 03/06/2022 10:13 AM    RBC 0-5 03/06/2022 10:13 AM Medications Reviewed:     Current Facility-Administered Medications   Medication Dose Route Frequency    traMADoL (ULTRAM) tablet 50 mg  50 mg Oral Q6H PRN    aspirin chewable tablet 81 mg  81 mg Oral DAILY    carbidopa-levodopa (SINEMET)  mg per tablet 1 Tablet  1 Tablet Oral TID    finasteride (PROSCAR) tablet 5 mg  5 mg Oral DAILY    sertraline (ZOLOFT) tablet 100 mg  100 mg Oral QPM    losartan (COZAAR) tablet 100 mg  100 mg Oral DAILY    hydrALAZINE (APRESOLINE) tablet 10 mg  10 mg Oral TID    heparin (porcine) injection 5,000 Units  5,000 Units SubCUTAneous Q8H    polyethylene glycol (MIRALAX) packet 17 g  17 g Oral DAILY    amLODIPine (NORVASC) tablet 10 mg  10 mg Oral DAILY    sodium chloride (NS) flush 5-40 mL  5-40 mL IntraVENous Q8H    sodium chloride (NS) flush 5-40 mL  5-40 mL IntraVENous PRN    atorvastatin (LIPITOR) tablet 80 mg  80 mg Oral QHS    lidocaine 4 % patch 1 Patch  1 Patch TransDERmal Q24H    sodium chloride (NS) flush 5-40 mL  5-40 mL IntraVENous Q8H    sodium chloride (NS) flush 5-40 mL  5-40 mL IntraVENous PRN    acetaminophen (TYLENOL) tablet 650 mg  650 mg Oral Q6H PRN    Or    acetaminophen (TYLENOL) suppository 650 mg  650 mg Rectal Q6H PRN    ondansetron (ZOFRAN ODT) tablet 4 mg  4 mg Oral Q6H PRN    Or    ondansetron (ZOFRAN) injection 4 mg  4 mg IntraVENous Q6H PRN    hydrALAZINE (APRESOLINE) 20 mg/mL injection 20 mg  20 mg IntraVENous Q2H PRN    cyanocobalamin (VITAMIN B12) tablet 1,000 mcg  1,000 mcg Oral DAILY     ______________________________________________________________________  EXPECTED LENGTH OF STAY: 3d 12h  ACTUAL LENGTH OF STAY:          4                 Vladislav Mccall MD

## 2022-03-11 NOTE — PROGRESS NOTES
Problem: Mobility Impaired (Adult and Pediatric)  Goal: *Acute Goals and Plan of Care (Insert Text)  Description: FUNCTIONAL STATUS PRIOR TO ADMISSION: Pt lived in [facility], used rollator for longer distances, [facility] for in home. Resides alone and receives OT and PT at [facility]. Pt with multiple falls 5-6 within last 6 months. Able to dress mod I and bathe himself but has staff member present for safety during bathing. Walks to the dining room for meals. HOME SUPPORT: [facility] staff. Physical Therapy Goals    Initiated 3/8/2022  1. Patient will move from supine to sit and sit to supine  in bed with minimal assistance within 7 day(s). 2.  Patient will transfer from bed to chair and chair to bed with minimal assistance using the least restrictive device within 7 day(s). 3.  Patient will perform sit to stand with minimal assistance within 7 day(s). 4.  Patient will ambulate with minimal assistance for 50 feet with the least restrictive device within 7 day(s). 5.  Patient will improve Aquino Balance score by 7 points within 7 days. Initiated 3/7/2022  1. Patient will move from supine to sit and sit to supine  in bed with modified independence within 7 day(s). 2.  Patient will transfer from bed to chair and chair to bed with modified independence using the least restrictive device within 7 day(s). 3.  Patient will perform sit to stand with modified independence within 7 day(s). 4.  Patient will ambulate with modified independence for 150 feet with the least restrictive device within 7 day(s). Outcome: Progressing Towards Goal    PHYSICAL THERAPY TREATMENT  Patient: Red Ceron (80 y.o. male)  Date: 3/11/2022  Diagnosis: Bradycardia [R00.1]  Hypertensive urgency [I16.0] <principal problem not specified>       Precautions: Fall,DNR,Bed Alarm  Chart, physical therapy assessment, plan of care and goals were reviewed.     ASSESSMENT  Patient continues with skilled PT services and is progressing towards goals. Pt received sitting in chair and willing to work with therapy. Pt reports R sided thoracic back pain (from fall). Pt presents with decreased strength and slight LUE resting tremor, unsteady gait, slight confusion with questionable safety awareness. Pt able tolerate STS to RW, noted  Additional time needed to stand due to pt reporting he needed time to \"get it together\". Pt continued with gait training in hallway and back to room ~400' with no LOB, presenting with decreased gait speed with shuffling gait. Pt reports that he has been living at the [facility] for a month and that they have assistance with mobility if needed, and in house rehab 2-4 times a week. Pt completed session seated back in chair with call bell within reach and all needs met at the time. RN notified of session. Recommending pt is fairly close to baseline and able to return to [facility], with supervision with moblity due to current pain and history of falls. Current Level of Function Impacting Discharge (mobility/balance): CGA for all mobility, STS, amb up to 400' with RW. Other factors to consider for discharge: lives in [facility], as assistance with mobility and therapy on site?, fall risk         PLAN :  Patient continues to benefit from skilled intervention to address the above impairments. Continue treatment per established plan of care. to address goals. Recommendation for discharge: (in order for the patient to meet his/her long term goals)  Physical therapy at least 2 days/week in the home AND ensure assist and/or supervision for safety with mobility and safety awareness. This discharge recommendation:  Has not yet been discussed the attending provider and/or case management    IF patient discharges home will need the following DME: patient owns DME required for discharge       SUBJECTIVE:   Patient stated my back hurts when they dont ask me before pulling on me.     OBJECTIVE DATA SUMMARY:   Critical Behavior:  Neurologic State: Alert  Orientation Level: Oriented to person,Oriented to place,Oriented to time,Disoriented to situation  Cognition: Decreased attention/concentration,Follows commands  Safety/Judgement: Decreased awareness of environment  Functional Mobility Training:  Transfers:  Sit to Stand: Contact guard assistance; Additional time  Stand to Sit: Contact guard assistance     Balance:  Sitting: Intact  Sitting - Static: Good (unsupported)  Sitting - Dynamic: Good (unsupported)  Standing: Impaired; Without support  Standing - Static: Constant support;Good  Standing - Dynamic : Constant support;Good;Fair  Ambulation/Gait Training:  Distance (ft): 400 Feet (ft)  Assistive Device: Gait belt;Walker, rolling  Ambulation - Level of Assistance: Contact guard assistance; Additional time  Gait Abnormalities: Decreased step clearance;Shuffling gait  Base of Support: Narrowed  Speed/Gricelda: Pace decreased (<100 feet/min); Shuffled  Step Length: Left shortened;Right shortened      Pain Rating:  Reported R thoracic back pain (from fall)    Activity Tolerance:   Good    After treatment patient left in no apparent distress:   Sitting in chair, Call bell within reach, and Bed / chair alarm activated    COMMUNICATION/COLLABORATION:   The patients plan of care was discussed with: Registered nurse.      Vibha Garcia PTA   Time Calculation: 46 mins

## 2022-03-11 NOTE — PROGRESS NOTES
Matheus Morin. is a 80 y.o. 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  Thereafter he developed hypotension with SBP in 200s.  Later he was exhibiting difficulty expressing himself and appeared somewhat confused.   CT of the head and neck x2 was unremarkable.  CT perfusion was negative. A stroke in evolution/a TIA was suspected and that he received intravenous alteplase    His clinical exam showed slow speech, resting and intention tremor, slow unsteady gait and parkinsonism was suspected. He was started on Sinemet 25/100 mg, 1 tablet 3 times a day  Seems to be tolerating it well. He feels that his tremor is better and he walked a longer distance with the therapist today. Speech is significantly improved        EXAM  Exam:  Visit Vitals  BP (!) 95/57   Pulse 87   Temp 97.8 °F (36.6 °C)   Resp 20   Ht 5' 11\" (1.803 m)   Wt 196 lb 3.4 oz (89 kg)   SpO2 98%   BMI 27.37 kg/m²     Gen:  CV: RRR  Lungs: non labored breathing  Abd: non distending  Neuro: A&O x 3, no dysarthria or aphasia  CN II-XII: PERRL, EOMI, face symmetric, tongue/palate midline  Motor: strength 5/5 all four ext  Sensory: intact to LT  Gait: normal    LABS/ IMAGING  CT Results (most recent):  Results from Hospital Encounter encounter on 03/06/22    CT HEAD WO CONT    Narrative  EXAM:  CT HEAD WO CONT    INDICATION: Neuro changes    COMPARISON: CT 3/7/2022    TECHNIQUE: Axial noncontrast head CT from foramen magnum to vertex. Coronal and  sagittal reformatted images were obtained. CT dose reduction was achieved  through use of a standardized protocol tailored for this examination and  automatic exposure control for dose modulation.  Adaptive statistical iterative  reconstruction (ASIR) was utilized. FINDINGS:  There is diffuse age-related parenchymal volume loss. The ventricles  and sulci are age-appropriate without hydrocephalus. There is no mass effect or  midline shift. Intravascular contrast is present from recent CTA. There is no  acute parenchymal. Scattered foci of low attenuation in the periventricular  white matter represent stable chronic microvascular ischemic changes. The  gray-white matter differentiation is maintained. The basal cisterns are patent. A metallic foreign body in the right orbit is again noted. The osseous  structures are intact. The visualized paranasal sinuses and mastoid air cells  are clear. Impression  No acute intracranial abnormality. Lab Results   Component Value Date/Time    WBC 7.0 03/10/2022 03:47 AM    HGB 14.2 03/10/2022 03:47 AM    HCT 45.7 03/10/2022 03:47 AM    PLATELET 89 (L) 64/31/9311 03:47 AM    MCV 98.3 03/10/2022 03:47 AM     Lab Results   Component Value Date/Time    Sodium 138 03/10/2022 03:47 AM    Potassium 4.1 03/10/2022 03:47 AM    Chloride 111 (H) 03/10/2022 03:47 AM    CO2 19 (L) 03/10/2022 03:47 AM    Anion gap 8 03/10/2022 03:47 AM    Glucose 109 (H) 03/10/2022 03:47 AM    BUN 27 (H) 03/10/2022 03:47 AM    Creatinine 1.52 (H) 03/10/2022 03:47 AM    BUN/Creatinine ratio 18 03/10/2022 03:47 AM    GFR est AA 52 (L) 03/10/2022 03:47 AM    GFR est non-AA 43 (L) 03/10/2022 03:47 AM    Calcium 8.6 03/10/2022 03:47 AM    Bilirubin, total 0.7 03/06/2022 09:56 AM    Alk.  phosphatase 105 03/06/2022 09:56 AM    Protein, total 7.6 03/06/2022 09:56 AM    Albumin 3.3 (L) 03/06/2022 09:56 AM    Globulin 4.3 (H) 03/06/2022 09:56 AM    A-G Ratio 0.8 (L) 03/06/2022 09:56 AM    ALT (SGPT) 18 03/06/2022 09:56 AM    AST (SGOT) 22 03/06/2022 09:56 AM       ASSESSMENT  Hospital Problems  Date Reviewed: 2/7/2022          Codes Class Noted POA    Tremor ICD-10-CM: R25.1  ICD-9-CM: 781.0  3/10/2022 Unknown        Parkinsonian features ICD-10-CM: R25.9  ICD-9-CM: 781.0  3/10/2022 Unknown        Hypertensive urgency ICD-10-CM: I16.0  ICD-9-CM: 401.9  3/7/2022 Unknown        SSS (sick sinus syndrome) (HCC) ICD-10-CM: I49.5  ICD-9-CM: 427.81  3/6/2022 Unknown               PLAN  68-year-old male who developed  difficulty expressing himself/mild confusion along with hypertensive urgency.    He was noted to have parkinsonian features with slow hesitant speech, mild rigidity, resting and intention tremor   Started on Sinemet which appears to have improved his speech and resting tremor  There have been some fluctuation in blood pressure but without any symptoms  Recommend maintaining carbidopa-levodopa 25/100 mg, 3 times daily  Continue PT/OT  Continue aspirin and statin    Okay to discharge    Sarkis Zambrano MD

## 2022-03-11 NOTE — PROGRESS NOTES
Problem: Self Care Deficits Care Plan (Adult)  Goal: *Acute Goals and Plan of Care (Insert Text)  Description: FUNCTIONAL STATUS PRIOR TO ADMISSION: Pt lived in [facility], used rollator for longer distances, [facility] for in home. Resides alone and receives OT and PT at [facility]. Pt with multiple falls 5-6 within last 6 months. Able to dress mod I and bathe himself but has staff member present for safety during bathing. HOME SUPPORT: lives in [facility]  Updated upon re-evaluation 3/8/2022  1. Patient will perform seated grooming task with set-up assist within 7 day(s). 2.  Patient will perform lower body dressing using AE PRN with minimal assistance within 7 day(s). 3.  Patient will perform upper body dressing with set-up assist within 7 day(s). 4.  Patient will perform toilet transfers in bathroom with least restrictive DME with minimal assistance within 7 day(s). 5.  Patient will perform all aspects of toileting with minimal assistance within 7 day(s). Initiated 3/7/2022  1. Patient will perform grooming task at sink with least restrictive DME with modified independence within 7 day(s). 2.  Patient will perform lower body dressing using AE PRN with modified independence within 7 day(s). 3.  Patient will perform upper body dressing with modified independence within 7 day(s). 4.  Patient will perform toilet transfers in bathroom with least restrictive DME with modified independence within 7 day(s). 5.  Patient will perform all aspects of toileting with modified independence within 7 day(s). Outcome: Progressing Towards Goal   OCCUPATIONAL THERAPY TREATMENT  Patient: Lisa Gonzales (80 y.o. male)  Date: 3/11/2022  Diagnosis: Bradycardia [R00.1]  Hypertensive urgency [I16.0] <principal problem not specified>       Precautions: Fall,DNR,Bed Alarm  Chart, occupational therapy assessment, plan of care, and goals were reviewed.     ASSESSMENT  Patient continues with skilled OT services and is progressing towards goals. His functional independence continues to be limited at this time by impaired balance, impaired cognition (insight into deficits, command following, safety awareness), decreased activity tolerance/endurance, and generalized weakness. Pt received supine and amenable to participation in therapy session. Pt continues to express R flank pain with all movement and at rest. Pt reports experiencing some relief with sitting in chair vs long-sitting in bed. He required up to max VC for safe use of RW in environment. Once seated in bedside chair, pt amenable to UB dressing only as he was very perseverative on eating breakfast. Continue to recommend d/c to SNF. Current Level of Function Impacting Discharge (ADLs): mod I self-feeding, min A seated UB dressing     Other factors to consider for discharge: PLOF         PLAN :  Patient continues to benefit from skilled intervention to address the above impairments. Continue treatment per established plan of care to address goals. Recommendation for discharge: (in order for the patient to meet his/her long term goals)  Therapy up to 5 days/week in SNF setting    This discharge recommendation:  Has been made in collaboration with the attending provider and/or case management    IF patient discharges home will need the following DME: TBD       SUBJECTIVE:   Patient stated Being here isn't solving anything, I want to go hugo.     OBJECTIVE DATA SUMMARY:   Cognitive/Behavioral Status:  Neurologic State: Alert  Orientation Level: Oriented to person;Oriented to place;Oriented to time;Disoriented to situation  Cognition: Decreased attention/concentration; Follows commands     Perseveration: Perseverates during conversation;Perseverates during mobility;Perseverates during ADLS  Safety/Judgement: Decreased awareness of environment    Functional Mobility and Transfers for ADLs:  Bed Mobility:  Supine to Sit: Contact guard assistance; Additional time;Bed Modified (HOB elevated and w/cues for motor planning)  Sit to Supine:  (pt remained in bedside chair)  Scooting: Contact guard assistance; Additional time (to EOB)    Transfers:  Sit to Stand: Contact guard assistance; Additional time; Adaptive equipment (RW)     Bed to Chair: Contact guard assistance;Minimum assistance; Additional time; Adaptive equipment (RW)    Balance:  Sitting: Intact  Sitting - Static: Good (unsupported)  Sitting - Dynamic: Good (unsupported)  Standing: Impaired; With support (RW)  Standing - Static: Good;Constant support  Standing - Dynamic : Fair;Constant support    ADL Intervention:  Feeding  Feeding Assistance: Modified independent  Container Management: Modified independent  Utensil Management: Modified independent  Food to Mouth: Modified independent  Drink to Mouth: Modified independent  Cues:  (intention tremors)                   Upper Body [address]: Minimum  assistance (line mgt)              Cognitive Retraining  Safety/Judgement: Decreased awareness of environment      Pain:  R flank pain, RN aware     Activity Tolerance:   Good, Fair and requires rest breaks    After treatment patient left in no apparent distress:   Sitting in chair, Call bell within reach and Bed / chair alarm activated    COMMUNICATION/COLLABORATION:   The patients plan of care was discussed with: Registered nurse.      Ayanna Morin OT  Time Calculation: 17 mins

## 2022-03-11 NOTE — PROGRESS NOTES
Problem: Falls - Risk of  Goal: *Absence of Falls  Description: Document Sheridan Dixon Fall Risk and appropriate interventions in the flowsheet. Outcome: Progressing Towards Goal  Note: Fall Risk Interventions:  Mobility Interventions: Bed/chair exit alarm,Patient to call before getting OOB    Mentation Interventions: Bed/chair exit alarm,Door open when patient unattended    Medication Interventions: Bed/chair exit alarm,Patient to call before getting OOB    Elimination Interventions: Bed/chair exit alarm,Call light in reach,Patient to call for help with toileting needs    History of Falls Interventions: Bed/chair exit alarm,Door open when patient unattended         Problem: Patient Education: Go to Patient Education Activity  Goal: Patient/Family Education  Outcome: Progressing Towards Goal     Problem: Pressure Injury - Risk of  Goal: *Prevention of pressure injury  Description: Document Gene Scale and appropriate interventions in the flowsheet.   Outcome: Progressing Towards Goal  Note: Pressure Injury Interventions:  Sensory Interventions: Assess changes in LOC,Discuss PT/OT consult with provider    Moisture Interventions: Absorbent underpads,Check for incontinence Q2 hours and as needed    Activity Interventions: Pressure redistribution bed/mattress(bed type),PT/OT evaluation,Increase time out of bed    Mobility Interventions: HOB 30 degrees or less,Pressure redistribution bed/mattress (bed type),PT/OT evaluation    Nutrition Interventions: Document food/fluid/supplement intake    Friction and Shear Interventions: HOB 30 degrees or less                Problem: Patient Education: Go to Patient Education Activity  Goal: Patient/Family Education  Outcome: Progressing Towards Goal     Problem: Patient Education: Go to Patient Education Activity  Goal: Patient/Family Education  Outcome: Progressing Towards Goal     Problem: Patient Education: Go to Patient Education Activity  Goal: Patient/Family Education  Outcome: Progressing Towards Goal

## 2022-03-12 LAB
ERYTHROCYTE [DISTWIDTH] IN BLOOD BY AUTOMATED COUNT: 14.1 % (ref 11.5–14.5)
HCT VFR BLD AUTO: 42.5 % (ref 36.6–50.3)
HGB BLD-MCNC: 13.7 G/DL (ref 12.1–17)
MCH RBC QN AUTO: 30.1 PG (ref 26–34)
MCHC RBC AUTO-ENTMCNC: 32.2 G/DL (ref 30–36.5)
MCV RBC AUTO: 93.4 FL (ref 80–99)
METHYLMALONATE SERPL-SCNC: 261 NMOL/L (ref 0–378)
NRBC # BLD: 0 K/UL (ref 0–0.01)
NRBC BLD-RTO: 0 PER 100 WBC
PHOSPHATE SERPL-MCNC: 3.6 MG/DL (ref 2.6–4.7)
PLATELET # BLD AUTO: 227 K/UL (ref 150–400)
PMV BLD AUTO: 10.4 FL (ref 8.9–12.9)
RBC # BLD AUTO: 4.55 M/UL (ref 4.1–5.7)
WBC # BLD AUTO: 7.5 K/UL (ref 4.1–11.1)

## 2022-03-12 PROCEDURE — 36415 COLL VENOUS BLD VENIPUNCTURE: CPT

## 2022-03-12 PROCEDURE — 74011000250 HC RX REV CODE- 250: Performed by: INTERNAL MEDICINE

## 2022-03-12 PROCEDURE — 74011250637 HC RX REV CODE- 250/637: Performed by: NURSE PRACTITIONER

## 2022-03-12 PROCEDURE — 85027 COMPLETE CBC AUTOMATED: CPT

## 2022-03-12 PROCEDURE — 74011000250 HC RX REV CODE- 250: Performed by: NURSE PRACTITIONER

## 2022-03-12 PROCEDURE — 65660000000 HC RM CCU STEPDOWN

## 2022-03-12 PROCEDURE — 74011250636 HC RX REV CODE- 250/636: Performed by: INTERNAL MEDICINE

## 2022-03-12 PROCEDURE — 84100 ASSAY OF PHOSPHORUS: CPT

## 2022-03-12 PROCEDURE — 74011250637 HC RX REV CODE- 250/637: Performed by: PSYCHIATRY & NEUROLOGY

## 2022-03-12 PROCEDURE — 74011250637 HC RX REV CODE- 250/637: Performed by: INTERNAL MEDICINE

## 2022-03-12 RX ADMIN — CARBIDOPA AND LEVODOPA 1 TABLET: 25; 100 TABLET ORAL at 21:15

## 2022-03-12 RX ADMIN — ASPIRIN 81 MG CHEWABLE TABLET 81 MG: 81 TABLET CHEWABLE at 09:35

## 2022-03-12 RX ADMIN — SODIUM CHLORIDE, PRESERVATIVE FREE 10 ML: 5 INJECTION INTRAVENOUS at 14:09

## 2022-03-12 RX ADMIN — LOSARTAN POTASSIUM 100 MG: 50 TABLET, FILM COATED ORAL at 09:35

## 2022-03-12 RX ADMIN — HEPARIN SODIUM 5000 UNITS: 5000 INJECTION, SOLUTION INTRAVENOUS; SUBCUTANEOUS at 21:14

## 2022-03-12 RX ADMIN — HEPARIN SODIUM 5000 UNITS: 5000 INJECTION, SOLUTION INTRAVENOUS; SUBCUTANEOUS at 06:08

## 2022-03-12 RX ADMIN — AMLODIPINE BESYLATE 10 MG: 5 TABLET ORAL at 09:35

## 2022-03-12 RX ADMIN — HEPARIN SODIUM 5000 UNITS: 5000 INJECTION, SOLUTION INTRAVENOUS; SUBCUTANEOUS at 14:10

## 2022-03-12 RX ADMIN — SERTRALINE 100 MG: 50 TABLET, FILM COATED ORAL at 18:05

## 2022-03-12 RX ADMIN — CARBIDOPA AND LEVODOPA 1 TABLET: 25; 100 TABLET ORAL at 09:35

## 2022-03-12 RX ADMIN — HYDRALAZINE HYDROCHLORIDE 10 MG: 10 TABLET, FILM COATED ORAL at 21:28

## 2022-03-12 RX ADMIN — HYDRALAZINE HYDROCHLORIDE 10 MG: 10 TABLET, FILM COATED ORAL at 09:35

## 2022-03-12 RX ADMIN — HYDRALAZINE HYDROCHLORIDE 10 MG: 10 TABLET, FILM COATED ORAL at 16:24

## 2022-03-12 RX ADMIN — SODIUM CHLORIDE, PRESERVATIVE FREE 10 ML: 5 INJECTION INTRAVENOUS at 09:38

## 2022-03-12 RX ADMIN — ACETAMINOPHEN 650 MG: 325 TABLET ORAL at 06:15

## 2022-03-12 RX ADMIN — CYANOCOBALAMIN TAB 500 MCG 1000 MCG: 500 TAB at 09:35

## 2022-03-12 RX ADMIN — FINASTERIDE 5 MG: 5 TABLET, FILM COATED ORAL at 09:35

## 2022-03-12 RX ADMIN — ATORVASTATIN CALCIUM 80 MG: 40 TABLET, FILM COATED ORAL at 21:14

## 2022-03-12 RX ADMIN — SODIUM CHLORIDE, PRESERVATIVE FREE 10 ML: 5 INJECTION INTRAVENOUS at 21:15

## 2022-03-12 RX ADMIN — CARBIDOPA AND LEVODOPA 1 TABLET: 25; 100 TABLET ORAL at 16:24

## 2022-03-12 NOTE — PROGRESS NOTES
Problem: Falls - Risk of  Goal: *Absence of Falls  Description: Document Justino Donaldson Fall Risk and appropriate interventions in the flowsheet. Outcome: Progressing Towards Goal  Note: Fall Risk Interventions:  Mobility Interventions: Bed/chair exit alarm,Patient to call before getting OOB    Mentation Interventions: Bed/chair exit alarm,Door open when patient unattended    Medication Interventions: Bed/chair exit alarm,Patient to call before getting OOB    Elimination Interventions: Call light in reach,Bed/chair exit alarm,Patient to call for help with toileting needs    History of Falls Interventions: Bed/chair exit alarm,Door open when patient unattended         Problem: Patient Education: Go to Patient Education Activity  Goal: Patient/Family Education  Outcome: Progressing Towards Goal     Problem: Pressure Injury - Risk of  Goal: *Prevention of pressure injury  Description: Document Gene Scale and appropriate interventions in the flowsheet.   Outcome: Progressing Towards Goal  Note: Pressure Injury Interventions:  Sensory Interventions: Assess changes in LOC,Discuss PT/OT consult with provider    Moisture Interventions: Absorbent underpads,Check for incontinence Q2 hours and as needed    Activity Interventions: PT/OT evaluation,Pressure redistribution bed/mattress(bed type)    Mobility Interventions: HOB 30 degrees or less,Pressure redistribution bed/mattress (bed type),PT/OT evaluation    Nutrition Interventions: Document food/fluid/supplement intake    Friction and Shear Interventions: HOB 30 degrees or less                Problem: Patient Education: Go to Patient Education Activity  Goal: Patient/Family Education  Outcome: Progressing Towards Goal     Problem: Patient Education: Go to Patient Education Activity  Goal: Patient/Family Education  Outcome: Progressing Towards Goal     Problem: Patient Education: Go to Patient Education Activity  Goal: Patient/Family Education  Outcome: Progressing Towards Goal Problem: Hypertension  Goal: *Blood pressure within specified parameters  Outcome: Progressing Towards Goal  Goal: *Fluid volume balance  Outcome: Progressing Towards Goal  Goal: *Labs within defined limits  Outcome: Progressing Towards Goal     Problem: Patient Education: Go to Patient Education Activity  Goal: Patient/Family Education  Outcome: Progressing Towards Goal

## 2022-03-12 NOTE — PROGRESS NOTES
Problem: Falls - Risk of  Goal: *Absence of Falls  Description: Document Darnico Brookpark Fall Risk and appropriate interventions in the flowsheet. Outcome: Progressing Towards Goal  Note: Fall Risk Interventions:  Mobility Interventions: Communicate number of staff needed for ambulation/transfer,Patient to call before getting OOB,PT Consult for mobility concerns,PT Consult for assist device competence,Utilize walker, cane, or other assistive device    Mentation Interventions: Bed/chair exit alarm,Adequate sleep, hydration, pain control,Evaluate medications/consider consulting pharmacy,Increase mobility,More frequent rounding,Room close to nurse's station,Toileting rounds,Update white board    Medication Interventions: Evaluate medications/consider consulting pharmacy,Patient to call before getting OOB,Teach patient to arise slowly    Elimination Interventions: Bed/chair exit alarm,Call light in reach,Patient to call for help with toileting needs,Urinal in reach,Toileting schedule/hourly rounds    History of Falls Interventions: Bed/chair exit alarm,Consult care management for discharge planning,Door open when patient unattended,Evaluate medications/consider consulting pharmacy         Problem: Pressure Injury - Risk of  Goal: *Prevention of pressure injury  Description: Document Gene Scale and appropriate interventions in the flowsheet.   Outcome: Progressing Towards Goal  Note: Pressure Injury Interventions:  Sensory Interventions: Assess changes in LOC,Check visual cues for pain,Keep linens dry and wrinkle-free,Minimize linen layers,Pressure redistribution bed/mattress (bed type)    Moisture Interventions: Absorbent underpads,Minimize layers    Activity Interventions: Increase time out of bed,Pressure redistribution bed/mattress(bed type),PT/OT evaluation    Mobility Interventions: HOB 30 degrees or less,Pressure redistribution bed/mattress (bed type),PT/OT evaluation    Nutrition Interventions: Document food/fluid/supplement intake    Friction and Shear Interventions: Minimize layers                Problem: Hypertension  Goal: *Blood pressure within specified parameters  Outcome: Progressing Towards Goal  Goal: *Fluid volume balance  Outcome: Progressing Towards Goal  Goal: *Labs within defined limits  Outcome: Progressing Towards Goal

## 2022-03-12 NOTE — PROGRESS NOTES
Bedside shift change report given to [address] [facility] [address] (oncoming nurse) by Nino Maloney RN (offgoing nurse). Report included the following information SBAR, Kardex, ED Summary, OR Summary, Procedure Summary, Intake/Output, MAR, Cardiac Rhythm A Fib, Alarm Parameters  and Dual Neuro Assessment.

## 2022-03-12 NOTE — PROGRESS NOTES
[address] [facility] Adult  Hospitalist Group                                                                                          Hospitalist Progress Note  [address],   Answering service: [phone] from in house phone        Date of Service:  3/12/2022  NAME:  Tim Roberts. :  7/15/1931  MRN:  [id number]      Admission Summary:      80 y.o. 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  Thereafter he developed hypotension with SBP in 200s.  Later he was exhibiting difficulty expressing himself and appeared somewhat confused. CT of the head and neck x2 was unremarkable.  CT perfusion was negative. A stroke in evolution/a TIA was suspected and that he received intravenous alteplase. Interval history / Subjective: Follow-up CVA. Patient seen and examined. Patient familiar to me from prior hospitalization. No acute events. Discussed SNF placement pending insurance authorization.        Assessment & Plan:      Possible ischemic CVA  Developed difficulty expressing himself/mild confusion 3/7 along with hypertensive urgency  Symptoms may be related to hypertensive encephalopathy versus ischemic/embolic stroke  - s/p tPA 3/7, CTs wnl   - neuro checks  - CTH negative, unable to obtain MRI  scleral metal buckle  - CTH nothing acute post tpa  - PT/OT/SLP  - TTE 3/8: EF 55-60%, mild LV thickening  - A1c 5.7  - SBP < 180  Continue aspirin, cont statin for     Thrombocytopenia  - no hep given prior to today   - doubt HIT   - labs in AM      Tremor and frequent falls, possible underlying parkinsonism  Neurology empirically started low-dose Sinemet  Improving      HTN urgency  C/w Amlodipine, Cozaar, Hydralzine      H/o pAF (not on [facility]), bradycardia  - cards following  - holding toprol  -Warfarin changed to ASA in 11/2021 due to fall risk  -TSH 3.12      B12 borderline, confirm with MMA 3/6 in process     CKD3 at baseline Cr ~1.5     Code status: DNR  Prophylaxis: [address] [place] discussed with: RNFELIX  Anticipated Disposition: [facility] pending insurance authorization     Hospital Problems  Date Reviewed: 2/7/2022          Codes Class Noted POA    Tremor ICD-10-CM: R25.1  ICD-9-CM: 781.0  3/10/2022 Unknown        Parkinsonian features ICD-10-CM: R25.9  ICD-9-CM: 781.0  3/10/2022 Unknown        Hypertensive urgency ICD-10-CM: I16.0  ICD-9-CM: 401.9  3/7/2022 Unknown        SSS (sick sinus syndrome) ([facility] [address] ICD-10-CM: I49.5  ICD-9-CM: 427.81  3/6/2022 Unknown                Review of Systems:   Negative unless stated above      Vital Signs:    Last 24hrs VS reviewed since prior progress note. Most recent are:  Visit Vitals  /80   Pulse 79   Temp 98.2 °F (36.8 °C)   Resp 20   Ht 5' 11\" (1.803 m)   Wt 89.9 kg (198 lb 3.1 oz)   SpO2 97%   BMI 27.64 kg/m²       No intake or output data in the 24 hours ending 03/12/22 1306     Physical Examination:     I had a face to face encounter with this patient and independently examined them on 3/12/2022 as outlined below:          Constitutional:  No acute distress, cooperative, pleasant    ENT:  Oral mucosa moist, oropharynx benign. Resp:  CTA bilaterally. CV:  Irregularly irregular     GI:  Soft, non distended, non tender.  normoactive bowel sounds,     Musculoskeletal:  No edema, warm, 2+ pulses throughout    Neurologic:  mild tremors, speech clear, motor 5/6, sensory intact             Data Review:    Review and/or order of clinical lab test  Review and/or order of tests in the radiology section of CPT  Review and/or order of tests in the medicine section of CPT      Labs:     Recent Labs     03/12/22  0621 03/10/22  0347   WBC 7.5 7.0   HGB 13.7 14.2   HCT 42.5 45.7    89* Recent Labs     03/12/22  0621 03/11/22  0029 03/10/22  0347   NA  --   --  138   K  --   --  4.1   CL  --   --  111*   CO2  --   --  19*   BUN  --   --  27*   CREA  --   --  1.52*   GLU  --   --  109*   CA  --   --  8.6   MG  --   --  2.1   PHOS 3.6 3.8 3.3     No results for input(s): ALT, AP, TBIL, TBILI, TP, ALB, GLOB, GGT, AML, LPSE in the last 72 hours. No lab exists for component: SGOT, GPT, AMYP, HLPSE  No results for input(s): INR, PTP, APTT, INREXT, INREXT in the last 72 hours. No results for input(s): FE, TIBC, PSAT, FERR in the last 72 hours. No results found for: FOL, RBCF   No results for input(s): PH, PCO2, PO2 in the last 72 hours. No results for input(s): CPK, CKNDX, TROIQ in the last 72 hours.     No lab exists for component: CPKMB  Lab Results   Component Value Date/Time    Cholesterol, total 172 03/08/2022 02:38 AM    HDL Cholesterol 53 03/08/2022 02:38 AM    LDL, calculated 105.6 (H) 03/08/2022 02:38 AM    Triglyceride 67 03/08/2022 02:38 AM    CHOL/HDL Ratio 3.2 03/08/2022 02:38 AM     Lab Results   Component Value Date/Time    Glucose (POC) 96 03/07/2022 07:00 PM    Glucose (POC) 104 11/21/2021 10:12 PM    Glucose (POC) 117 08/28/2021 08:01 PM     Lab Results   Component Value Date/Time    Color YELLOW/STRAW 03/06/2022 10:13 AM    Appearance CLEAR 03/06/2022 10:13 AM    Specific gravity 1.012 03/06/2022 10:13 AM    pH (UA) 6.5 03/06/2022 10:13 AM    Protein 30 (A) 03/06/2022 10:13 AM    Glucose Negative 03/06/2022 10:13 AM    Ketone Negative 03/06/2022 10:13 AM    Bilirubin Negative 03/06/2022 10:13 AM    Urobilinogen 0.2 03/06/2022 10:13 AM    Nitrites Negative 03/06/2022 10:13 AM    Leukocyte Esterase Negative 03/06/2022 10:13 AM    Epithelial cells FEW 03/06/2022 10:13 AM    Bacteria Negative 03/06/2022 10:13 AM    WBC 0-4 03/06/2022 10:13 AM    RBC 0-5 03/06/2022 10:13 AM         Medications Reviewed:     Current Facility-Administered Medications   Medication Dose Route Frequency    traMADoL (ULTRAM) tablet 50 mg  50 mg Oral Q6H PRN    aspirin chewable tablet 81 mg  81 mg Oral DAILY    carbidopa-levodopa (SINEMET)  mg per tablet 1 Tablet  1 Tablet Oral TID    finasteride (PROSCAR) tablet 5 mg  5 mg Oral DAILY    sertraline (ZOLOFT) tablet 100 mg  100 mg Oral QPM    losartan (COZAAR) tablet 100 mg  100 mg Oral DAILY    hydrALAZINE (APRESOLINE) tablet 10 mg  10 mg Oral TID    heparin (porcine) injection 5,000 Units  5,000 Units SubCUTAneous Q8H    polyethylene glycol (MIRALAX) packet 17 g  17 g Oral DAILY    amLODIPine (NORVASC) tablet 10 mg  10 mg Oral DAILY    sodium chloride (NS) flush 5-40 mL  5-40 mL IntraVENous Q8H    sodium chloride (NS) flush 5-40 mL  5-40 mL IntraVENous PRN    atorvastatin (LIPITOR) tablet 80 mg  80 mg Oral QHS    lidocaine 4 % patch 1 Patch  1 Patch TransDERmal Q24H    sodium chloride (NS) flush 5-40 mL  5-40 mL IntraVENous Q8H    sodium chloride (NS) flush 5-40 mL  5-40 mL IntraVENous PRN    acetaminophen (TYLENOL) tablet 650 mg  650 mg Oral Q6H PRN    Or    acetaminophen (TYLENOL) suppository 650 mg  650 mg Rectal Q6H PRN    ondansetron (ZOFRAN ODT) tablet 4 mg  4 mg Oral Q6H PRN    Or    ondansetron (ZOFRAN) injection 4 mg  4 mg IntraVENous Q6H PRN    hydrALAZINE (APRESOLINE) 20 mg/mL injection 20 mg  20 mg IntraVENous Q2H PRN    cyanocobalamin (VITAMIN B12) tablet 1,000 mcg  1,000 mcg Oral DAILY     ______________________________________________________________________  EXPECTED LENGTH OF STAY: 3d 12h  ACTUAL LENGTH OF STAY:          [address] [facility],

## 2022-03-13 LAB
ANION GAP SERPL CALC-SCNC: 6 MMOL/L (ref 5–15)
BUN SERPL-MCNC: 38 MG/DL (ref 6–20)
BUN/CREAT SERPL: 23 (ref 12–20)
CALCIUM SERPL-MCNC: 8.8 MG/DL (ref 8.5–10.1)
CHLORIDE SERPL-SCNC: 112 MMOL/L (ref 97–108)
CO2 SERPL-SCNC: 22 MMOL/L (ref 21–32)
COMMENT, HOLDF: NORMAL
CREAT SERPL-MCNC: 1.65 MG/DL (ref 0.7–1.3)
GLUCOSE SERPL-MCNC: 107 MG/DL (ref 65–100)
POTASSIUM SERPL-SCNC: 3.8 MMOL/L (ref 3.5–5.1)
SAMPLES BEING HELD,HOLD: NORMAL
SODIUM SERPL-SCNC: 140 MMOL/L (ref 136–145)

## 2022-03-13 PROCEDURE — 74011250637 HC RX REV CODE- 250/637: Performed by: PSYCHIATRY & NEUROLOGY

## 2022-03-13 PROCEDURE — 74011000250 HC RX REV CODE- 250: Performed by: NURSE PRACTITIONER

## 2022-03-13 PROCEDURE — 74011000250 HC RX REV CODE- 250: Performed by: INTERNAL MEDICINE

## 2022-03-13 PROCEDURE — 74011250637 HC RX REV CODE- 250/637: Performed by: NURSE PRACTITIONER

## 2022-03-13 PROCEDURE — 74011250636 HC RX REV CODE- 250/636: Performed by: INTERNAL MEDICINE

## 2022-03-13 PROCEDURE — 36415 COLL VENOUS BLD VENIPUNCTURE: CPT

## 2022-03-13 PROCEDURE — 65660000000 HC RM CCU STEPDOWN

## 2022-03-13 PROCEDURE — 80048 BASIC METABOLIC PNL TOTAL CA: CPT

## 2022-03-13 PROCEDURE — 74011250637 HC RX REV CODE- 250/637: Performed by: INTERNAL MEDICINE

## 2022-03-13 RX ADMIN — ATORVASTATIN CALCIUM 80 MG: 40 TABLET, FILM COATED ORAL at 21:24

## 2022-03-13 RX ADMIN — CYANOCOBALAMIN TAB 500 MCG 1000 MCG: 500 TAB at 09:11

## 2022-03-13 RX ADMIN — ACETAMINOPHEN 650 MG: 325 TABLET ORAL at 21:41

## 2022-03-13 RX ADMIN — ASPIRIN 81 MG CHEWABLE TABLET 81 MG: 81 TABLET CHEWABLE at 09:11

## 2022-03-13 RX ADMIN — SODIUM CHLORIDE, PRESERVATIVE FREE 10 ML: 5 INJECTION INTRAVENOUS at 21:41

## 2022-03-13 RX ADMIN — FINASTERIDE 5 MG: 5 TABLET, FILM COATED ORAL at 09:11

## 2022-03-13 RX ADMIN — HYDRALAZINE HYDROCHLORIDE 10 MG: 10 TABLET, FILM COATED ORAL at 09:11

## 2022-03-13 RX ADMIN — HYDRALAZINE HYDROCHLORIDE 10 MG: 10 TABLET, FILM COATED ORAL at 15:37

## 2022-03-13 RX ADMIN — LOSARTAN POTASSIUM 100 MG: 50 TABLET, FILM COATED ORAL at 09:11

## 2022-03-13 RX ADMIN — HEPARIN SODIUM 5000 UNITS: 5000 INJECTION, SOLUTION INTRAVENOUS; SUBCUTANEOUS at 13:47

## 2022-03-13 RX ADMIN — SODIUM CHLORIDE, PRESERVATIVE FREE 10 ML: 5 INJECTION INTRAVENOUS at 13:47

## 2022-03-13 RX ADMIN — SERTRALINE 100 MG: 50 TABLET, FILM COATED ORAL at 18:05

## 2022-03-13 RX ADMIN — HEPARIN SODIUM 5000 UNITS: 5000 INJECTION, SOLUTION INTRAVENOUS; SUBCUTANEOUS at 21:23

## 2022-03-13 RX ADMIN — HYDRALAZINE HYDROCHLORIDE 10 MG: 10 TABLET, FILM COATED ORAL at 21:24

## 2022-03-13 RX ADMIN — ACETAMINOPHEN 650 MG: 325 TABLET ORAL at 05:25

## 2022-03-13 RX ADMIN — CARBIDOPA AND LEVODOPA 1 TABLET: 25; 100 TABLET ORAL at 09:11

## 2022-03-13 RX ADMIN — CARBIDOPA AND LEVODOPA 1 TABLET: 25; 100 TABLET ORAL at 21:24

## 2022-03-13 RX ADMIN — HEPARIN SODIUM 5000 UNITS: 5000 INJECTION, SOLUTION INTRAVENOUS; SUBCUTANEOUS at 05:25

## 2022-03-13 RX ADMIN — CARBIDOPA AND LEVODOPA 1 TABLET: 25; 100 TABLET ORAL at 15:37

## 2022-03-13 RX ADMIN — SODIUM CHLORIDE, PRESERVATIVE FREE 10 ML: 5 INJECTION INTRAVENOUS at 05:26

## 2022-03-13 RX ADMIN — AMLODIPINE BESYLATE 10 MG: 5 TABLET ORAL at 09:11

## 2022-03-13 NOTE — PROGRESS NOTES
Progress Note      Pt Name  Vesta Pineda. Date of Birth 7/15/1931   Medical Record Number  [id number]      Age  [address]. PCP Ector Cano MD   Admit date:  3/6/2022    Room Number  674/01  @ [facility]   Date of Service  3/13/2022     Admission Diagnoses:  Acute CVA   Recurrent falls at home      Admission Summary:  \" Vesta Anne is a [address]. male with h/o hypertension, paroxysmal A. 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 He states that he was getting ready to go downstairs from his senior facility apartment and suddenly starting going down and couldn't catch his fall. He denies feeling lightheaded or dizzy, denies cp or palpitations. Once he was on the ground he felt some sob. Notes he's had several falls just like this over the last few weeks. C/o left back pain where he landed. Otherwise has felt well.     The patient denies any headache, blurry vision, sore throat, trouble swallowing, trouble with speech, chest pain, SOB, cough, fever, chills, N/V/D, abd pain, urinary symptoms, constipation, recent travels, sick contacts, focal or generalized neurological symptoms, falls, injuries, rashes, contact with COVID-19 diagnosed patients, hematemesis, melena, hemoptysis, hematuria, rashes, denies starting any new medications and denies any other concerns or problems besides as mentioned above.  \"     Assessment and plan:     Possible ischemic CVA  Developed difficulty expressing himself/mild confusion 3/7 along with hypertensive urgency  Symptoms may be related to hypertensive encephalopathy versus ischemic/embolic stroke  - s/p tPA 3/7, CTs wnl   - neuro checks  - CTH negative, unable to obtain MRI 2/2 scleral metal buckle  - CTH nothing acute post tpa  - PT/OT/SLP  - TTE 3/8: EF 55-60%, mild LV thickening  - A1c 5.7  - SBP < 180  Continue aspirin, cont statin for LDL 106     Thrombocytopenia  - no hep given prior to today      Tremor and frequent falls, possible underlying parkinsonism  Neurology empirically started low-dose Sinemet  Improving      HTN urgency  C/w Amlodipine, Cozaar, Hydralzine      H/o pAF (not on AC), bradycardia  - cards following  - holding toprol  -Warfarin changed to ASA in 11/2021 due to fall risk  -TSH 3.12      B12 borderline, confirm with MMA 3/6 in process     CKD3 at baseline Cr ~1.5             CODE STATUS   DNR     Functional Status  Pt resides in an snf     Surrogate decision maker:  Pt's son      Prophylaxis   Lovenox   Discharge Plan:   PT, OT, RN,      I would like to have OT PT re-evaluate 03/14/22 for possible discharge to home with Anthony Lopez.    If that is not possible at all, we will pursue SNF discharge       Misc   Benefit:  Payor: [employer] Card / Plan: Colletta Conners / Product Type: [employer] /    Isolation :  There are currently no Active Isolations   ADT status:  INPATIENT      Query   None noted today    Prognosis      Social issues  Date  Comment     3/13/22  5:54 PM No family or visitor here with the patient today                     Subjective Data     \"I am fine \"  Review of Systems - History obtained from the patient  Respiratory ROS: no cough, shortness of breath, or wheezing  Cardiovascular ROS: no chest pain or dyspnea on exertion    Objective Data       Comments  Pleasant intelligent elderly gentleman sitting on bedside chair in no distress      Patient Vitals for the past 24 hrs:   BP   03/13/22 1537 (!) 142/79   03/13/22 1348 (!) 147/89   03/13/22 1000 131/68   03/13/22 0911 (!) 147/84   03/13/22 0526 (!) 147/84   03/13/22 0152 132/76   03/12/22 2128 (!) 166/80   03/12/22 1803 133/78      Patient Vitals for the past 24 hrs:   Pulse   03/13/22 1537 70   03/13/22 1400 74   03/13/22 1348 79   03/13/22 1200 92   03/13/22 1040 85   03/13/22 1000 84   03/13/22 0911 64   03/13/22 0554 66   03/13/22 0526 67 03/13/22 0359 86   03/13/22 0152 74   03/12/22 2155 67   03/12/22 2128 64   03/12/22 2000 70   03/12/22 1803 84   03/12/22 1800 82      Patient Vitals for the past 24 hrs:   Resp   03/13/22 1348 20   03/13/22 1000 20   03/13/22 0526 21   03/13/22 0152 20   03/12/22 2128 18   03/12/22 1803 16      Patient Vitals for the past 24 hrs:   Temp   03/13/22 1348 97.4 °F (36.3 °C)   03/13/22 1000 98 °F (36.7 °C)   03/13/22 0526 98.3 °F (36.8 °C)   03/13/22 0152 98.2 °F (36.8 °C)   03/12/22 2128 97.6 °F (36.4 °C)   03/12/22 1803 98.1 °F (36.7 °C)        SpO2 Readings from Last 6 Encounters:   03/13/22 98%   02/07/22 96%   01/29/22 93%   01/25/22 96%   01/13/22 94%   01/06/22 97%          O2 Device: None (Room air) Body mass index is 28.13 kg/m². -  Wt Readings from Last 10 Encounters:   03/13/22 91.5 kg (201 lb 11.5 oz)   02/07/22 95.4 kg (210 lb 6.4 oz)   01/25/22 96.3 kg (212 lb 4.8 oz)   01/13/22 97.5 kg (215 lb)   01/06/22 97.5 kg (215 lb)   12/13/21 97.6 kg (215 lb 3.2 oz)   11/21/21 96.2 kg (212 lb 1.3 oz)   11/11/21 99.3 kg (219 lb)   11/01/21 98.6 kg (217 lb 6.4 oz)   09/21/21 100.2 kg (220 lb 12.8 oz)      No intake or output data in the 24 hours ending 03/13/22 1630      Physical Exam:             General:  Alert, cooperative,   well noursished,   well developed,   appears stated age    Ears/Eyes:  Hearing intact  Sclera anicteric.    Pupils equal   Mouth/Throat:  Mucous membranes normal pink and moist     Neck:     Lungs:        CVS:     Abdomen:     Extremities:     Skin:     Lymph nodes:     Musculoskeletal Muscle bulk B/L symmetrical   Neuro Cranial nerves are intact,   motor movement b/l symmetrical,      Psych:  Alert and oriented,   normal mood & affect          Medications reviewed     Current Facility-Administered Medications   Medication Dose Route Frequency    traMADoL (ULTRAM) tablet 50 mg  50 mg Oral Q6H PRN    aspirin chewable tablet 81 mg  81 mg Oral DAILY    carbidopa-levodopa (SINEMET)  mg per tablet 1 Tablet  1 Tablet Oral TID    finasteride (PROSCAR) tablet 5 mg  5 mg Oral DAILY    sertraline (ZOLOFT) tablet 100 mg  100 mg Oral QPM    losartan (COZAAR) tablet 100 mg  100 mg Oral DAILY    hydrALAZINE (APRESOLINE) tablet 10 mg  10 mg Oral TID    heparin (porcine) injection 5,000 Units  5,000 Units SubCUTAneous Q8H    polyethylene glycol (MIRALAX) packet 17 g  17 g Oral DAILY    amLODIPine (NORVASC) tablet 10 mg  10 mg Oral DAILY    sodium chloride (NS) flush 5-40 mL  5-40 mL IntraVENous Q8H    sodium chloride (NS) flush 5-40 mL  5-40 mL IntraVENous PRN    atorvastatin (LIPITOR) tablet 80 mg  80 mg Oral QHS    lidocaine 4 % patch 1 Patch  1 Patch TransDERmal Q24H    sodium chloride (NS) flush 5-40 mL  5-40 mL IntraVENous Q8H    sodium chloride (NS) flush 5-40 mL  5-40 mL IntraVENous PRN    acetaminophen (TYLENOL) tablet 650 mg  650 mg Oral Q6H PRN    Or    acetaminophen (TYLENOL) suppository 650 mg  650 mg Rectal Q6H PRN    ondansetron (ZOFRAN ODT) tablet 4 mg  4 mg Oral Q6H PRN    Or    ondansetron (ZOFRAN) injection 4 mg  4 mg IntraVENous Q6H PRN    hydrALAZINE (APRESOLINE) 20 mg/mL injection 20 mg  20 mg IntraVENous Q2H PRN    cyanocobalamin (VITAMIN B12) tablet 1,000 mcg  1,000 mcg Oral DAILY       Relevant other informations: Other medical conditions listed in [facility] problem list section; all of these and other pertinent data were taken into consideration when treatment plan is developed and customized to this patient's unique overall circumstances and needs. We have reviewed available old medical records within the constraints of this admission process.         Data Review:   Recent Days:  All Micro Results     Procedure Component Value Units Date/Time    URINE CULTURE HOLD SAMPLE [[id number]] Collected: 03/06/22 1013    Order Status: Completed Specimen: Urine from Serum Updated: 03/06/22 1016     Urine culture hold       Urine on hold in Microbiology dept for 2 days.  If unpreserved urine is submitted, it cannot be used for addtional testing after 24 hours, recollection will be required. Recent Labs     03/12/22  0621   WBC 7.5   HGB 13.7   HCT 42.5        Recent Labs     03/13/22  0410 03/12/22  0621 03/11/22  0029     --   --    K 3.8  --   --    *  --   --    CO2 22  --   --    *  --   --    BUN 38*  --   --    CREA 1.65*  --   --    CA 8.8  --   --    PHOS  --  3.6 3.8      Lab Results   Component Value Date/Time    TSH 3.12 03/06/2022 09:57 AM            Care Plan discussed with:Patient/Family and Nurse   Other medical conditions are listed in the active hospital problem list section; these and other pertinent data were taken into consideration when the treatment plan was developed and customized to this patient's unique overall circumstances and needs. High complexity decision making was performed for this patient who is at high risk for decompensation with multiple organ involvement. Today total floor/unit time was 35 minutes while caring for this patient and greater than 50% of that time was spent with patient (and/or family) coordinating patients clinical issues; this includes time spent during multidisciplinary rounds.         Marcus Cardenas MD MPH FACP    3/13/2022

## 2022-03-13 NOTE — PROGRESS NOTES
Problem: Falls - Risk of  Goal: *Absence of Falls  Description: Document [place] Fall Risk and appropriate interventions in the flowsheet. Outcome: Progressing Towards Goal  Note: Fall Risk Interventions:  Mobility Interventions: Bed/chair exit alarm,Communicate number of staff needed for ambulation/transfer,Patient to call before getting OOB,Utilize walker, cane, or other assistive device    Mentation Interventions: Adequate sleep, hydration, pain control,Bed/chair exit alarm,Door open when patient unattended,Increase mobility,Reorient patient    Medication Interventions: Patient to call before getting OOB,Teach patient to arise slowly,Bed/chair exit alarm    Elimination Interventions: Call light in reach,Patient to call for help with toileting needs,Stay With Me (per policy),Bed/chair exit alarm    History of Falls Interventions: Bed/chair exit alarm,Consult care management for discharge planning,Door open when patient unattended,Room close to nurse's station,Investigate reason for fall         Problem: Pressure Injury - Risk of  Goal: *Prevention of pressure injury  Description: Document Gene Scale and appropriate interventions in the flowsheet.   Outcome: Progressing Towards Goal  Note: Pressure Injury Interventions:  Sensory Interventions: Assess need for specialty bed,Chair cushion,Check visual cues for pain,Discuss PT/OT consult with provider,Float heels    Moisture Interventions: Absorbent underpads,Apply protective barrier, creams and emollients,Assess need for specialty bed,Limit adult briefs,Maintain skin hydration (lotion/cream)    Activity Interventions: Assess need for specialty bed,Pressure redistribution bed/mattress(bed type),PT/OT evaluation    Mobility Interventions: Assess need for specialty bed,Pressure redistribution bed/mattress (bed type),PT/OT evaluation    Nutrition Interventions: Document food/fluid/supplement intake    Friction and Shear Interventions: HOB 30 degrees or less Problem: Hypertension  Goal: *Blood pressure within specified parameters  Outcome: Progressing Towards Goal  Goal: *Fluid volume balance  Outcome: Progressing Towards Goal

## 2022-03-13 NOTE — PROGRESS NOTES
Bedside shift change report given to [address] (oncoming nurse) by Tommy Santamaria RN (offgoing nurse). Report included the following information SBAR, Kardex, ED Summary, OR Summary, Procedure Summary, Intake/Output, MAR, Cardiac Rhythm A fib, Alarm Parameters  and Dual Neuro Assessment.

## 2022-03-13 NOTE — PROGRESS NOTES
Transition of Care Plan: SNF[facility] (pending auth)     RUR: 15% medium     PCP F/U: Evelina Washburn MD     Disposition: [facility]     Transportation: [place] [address] - [phone]     3439: Marjan Jennings is still pending with [facility].  Will continue to follow     Sean Ly RN, CRM

## 2022-03-13 NOTE — PROGRESS NOTES
Problem: Falls - Risk of  Goal: *Absence of Falls  Description: Document Margarita Nap Fall Risk and appropriate interventions in the flowsheet. Outcome: Progressing Towards Goal  Note: Fall Risk Interventions:  Mobility Interventions: Bed/chair exit alarm,Patient to call before getting OOB    Mentation Interventions: Bed/chair exit alarm,Door open when patient unattended    Medication Interventions: Bed/chair exit alarm,Patient to call before getting OOB    Elimination Interventions: Bed/chair exit alarm,Call light in reach,Patient to call for help with toileting needs    History of Falls Interventions: Bed/chair exit alarm,Door open when patient unattended         Problem: Patient Education: Go to Patient Education Activity  Goal: Patient/Family Education  Outcome: Progressing Towards Goal     Problem: Pressure Injury - Risk of  Goal: *Prevention of pressure injury  Description: Document Gene Scale and appropriate interventions in the flowsheet.   Outcome: Progressing Towards Goal  Note: Pressure Injury Interventions:  Sensory Interventions: Assess changes in LOC,Discuss PT/OT consult with provider    Moisture Interventions: Absorbent underpads,Check for incontinence Q2 hours and as needed    Activity Interventions: Pressure redistribution bed/mattress(bed type),PT/OT evaluation    Mobility Interventions: HOB 30 degrees or less,Pressure redistribution bed/mattress (bed type),PT/OT evaluation    Nutrition Interventions: Document food/fluid/supplement intake    Friction and Shear Interventions: HOB 30 degrees or less                Problem: Patient Education: Go to Patient Education Activity  Goal: Patient/Family Education  Outcome: Progressing Towards Goal     Problem: Patient Education: Go to Patient Education Activity  Goal: Patient/Family Education  Outcome: Progressing Towards Goal     Problem: Patient Education: Go to Patient Education Activity  Goal: Patient/Family Education  Outcome: Progressing Towards Goal Problem: Hypertension  Goal: *Blood pressure within specified parameters  Outcome: Progressing Towards Goal  Goal: *Fluid volume balance  Outcome: Progressing Towards Goal  Goal: *Labs within defined limits  Outcome: Progressing Towards Goal     Problem: Patient Education: Go to Patient Education Activity  Goal: Patient/Family Education  Outcome: Progressing Towards Goal

## 2022-03-13 NOTE — PROGRESS NOTES
Bedside shift change report given to Benjy Diaz (oncoming nurse) by Sneha Urbina RN (offgoing nurse). Report included the following information SBAR, Kardex, ED Summary, OR Summary, Intake/Output, MAR, Cardiac Rhythm NSR, Alarm Parameters  and Dual Neuro Assessment.

## 2022-03-14 VITALS
WEIGHT: 201.72 LBS | SYSTOLIC BLOOD PRESSURE: 121 MMHG | BODY MASS INDEX: 28.24 KG/M2 | OXYGEN SATURATION: 95 % | HEIGHT: 71 IN | RESPIRATION RATE: 17 BRPM | DIASTOLIC BLOOD PRESSURE: 90 MMHG | HEART RATE: 75 BPM | TEMPERATURE: 97.6 F

## 2022-03-14 LAB
COVID-19 RAPID TEST, COVR: NOT DETECTED
SOURCE, COVRS: NORMAL

## 2022-03-14 PROCEDURE — 74011250637 HC RX REV CODE- 250/637: Performed by: PSYCHIATRY & NEUROLOGY

## 2022-03-14 PROCEDURE — 74011250637 HC RX REV CODE- 250/637: Performed by: INTERNAL MEDICINE

## 2022-03-14 PROCEDURE — 97116 GAIT TRAINING THERAPY: CPT

## 2022-03-14 PROCEDURE — 97535 SELF CARE MNGMENT TRAINING: CPT

## 2022-03-14 PROCEDURE — 74011250637 HC RX REV CODE- 250/637: Performed by: NURSE PRACTITIONER

## 2022-03-14 PROCEDURE — 74011250636 HC RX REV CODE- 250/636: Performed by: INTERNAL MEDICINE

## 2022-03-14 PROCEDURE — 87635 SARS-COV-2 COVID-19 AMP PRB: CPT

## 2022-03-14 PROCEDURE — 74011000250 HC RX REV CODE- 250: Performed by: INTERNAL MEDICINE

## 2022-03-14 PROCEDURE — 74011000250 HC RX REV CODE- 250: Performed by: NURSE PRACTITIONER

## 2022-03-14 RX ORDER — ATORVASTATIN CALCIUM 80 MG/1
80 TABLET, FILM COATED ORAL
Qty: 30 TABLET | Refills: 0 | Status: SHIPPED
Start: 2022-03-14 | End: 2022-03-31 | Stop reason: SDUPTHER

## 2022-03-14 RX ORDER — GUAIFENESIN 100 MG/5ML
81 LIQUID (ML) ORAL DAILY
Qty: 30 TABLET | Refills: 0 | Status: SHIPPED
Start: 2022-03-15 | End: 2022-03-31 | Stop reason: SDUPTHER

## 2022-03-14 RX ORDER — CARBIDOPA AND LEVODOPA 25; 100 MG/1; MG/1
1 TABLET ORAL 3 TIMES DAILY
Qty: 120 TABLET | Refills: 0 | Status: SHIPPED
Start: 2022-03-14 | End: 2022-03-31

## 2022-03-14 RX ADMIN — HEPARIN SODIUM 5000 UNITS: 5000 INJECTION, SOLUTION INTRAVENOUS; SUBCUTANEOUS at 05:56

## 2022-03-14 RX ADMIN — ASPIRIN 81 MG CHEWABLE TABLET 81 MG: 81 TABLET CHEWABLE at 08:57

## 2022-03-14 RX ADMIN — HYDRALAZINE HYDROCHLORIDE 10 MG: 10 TABLET, FILM COATED ORAL at 08:57

## 2022-03-14 RX ADMIN — HEPARIN SODIUM 5000 UNITS: 5000 INJECTION, SOLUTION INTRAVENOUS; SUBCUTANEOUS at 13:49

## 2022-03-14 RX ADMIN — SODIUM CHLORIDE, PRESERVATIVE FREE 10 ML: 5 INJECTION INTRAVENOUS at 13:32

## 2022-03-14 RX ADMIN — CARBIDOPA AND LEVODOPA 1 TABLET: 25; 100 TABLET ORAL at 08:57

## 2022-03-14 RX ADMIN — HYDRALAZINE HYDROCHLORIDE 10 MG: 10 TABLET, FILM COATED ORAL at 15:10

## 2022-03-14 RX ADMIN — ACETAMINOPHEN 650 MG: 325 TABLET ORAL at 05:58

## 2022-03-14 RX ADMIN — CYANOCOBALAMIN TAB 500 MCG 1000 MCG: 500 TAB at 08:57

## 2022-03-14 RX ADMIN — SODIUM CHLORIDE, PRESERVATIVE FREE 10 ML: 5 INJECTION INTRAVENOUS at 05:56

## 2022-03-14 RX ADMIN — AMLODIPINE BESYLATE 10 MG: 5 TABLET ORAL at 08:57

## 2022-03-14 RX ADMIN — CARBIDOPA AND LEVODOPA 1 TABLET: 25; 100 TABLET ORAL at 15:10

## 2022-03-14 RX ADMIN — FINASTERIDE 5 MG: 5 TABLET, FILM COATED ORAL at 08:56

## 2022-03-14 RX ADMIN — LOSARTAN POTASSIUM 100 MG: 50 TABLET, FILM COATED ORAL at 08:57

## 2022-03-14 NOTE — PROGRESS NOTES
Problem: Mobility Impaired (Adult and Pediatric)  Goal: *Acute Goals and Plan of Care (Insert Text)  Description: FUNCTIONAL STATUS PRIOR TO ADMISSION: Pt lived in [facility], used rollator for longer distances, [facility] for in home. Resides alone and receives OT and PT at [facility]. Pt with multiple falls 5-6 within last 6 months. Able to dress mod I and bathe himself but has staff member present for safety during bathing. Walks to the dining room for meals. HOME SUPPORT: [facility] staff. Physical Therapy Goals    Initiated 3/8/2022  1. Patient will move from supine to sit and sit to supine  in bed with minimal assistance within 7 day(s). 2.  Patient will transfer from bed to chair and chair to bed with minimal assistance using the least restrictive device within 7 day(s). 3.  Patient will perform sit to stand with minimal assistance within 7 day(s). 4.  Patient will ambulate with minimal assistance for 50 feet with the least restrictive device within 7 day(s). 5.  Patient will improve Aquino Balance score by 7 points within 7 days. Initiated 3/7/2022  1. Patient will move from supine to sit and sit to supine  in bed with modified independence within 7 day(s). 2.  Patient will transfer from bed to chair and chair to bed with modified independence using the least restrictive device within 7 day(s). 3.  Patient will perform sit to stand with modified independence within 7 day(s). 4.  Patient will ambulate with modified independence for 150 feet with the least restrictive device within 7 day(s). 3/14/2022 1045 by Gage Vital PT  Outcome: Progressing Towards Goal   PHYSICAL THERAPY TREATMENT  Patient: Amador Sears (80 y.o. male)  Date: 3/14/2022  Diagnosis: Bradycardia [R00.1]  Hypertensive urgency [I16.0] <principal problem not specified>       Precautions: Fall,DNR,Bed Alarm  Chart, physical therapy assessment, plan of care and goals were reviewed.     ASSESSMENT  Patient continues with skilled PT services and is progressing towards goals. Pt received seated in the chair and agreeable to therapy. Pt tolerated session fairly well, but continues to be limited by generalized weakness, decreased functional mobility, impaired balance and gait, confusion, at increased risk for recurrent falls. Pt tolerated gait training x 300 ft with RW with CGA and noted generalized instability and bilateral knees flexed position as patient's gait distance increased. Pt able to complete head turns during gait training without overt LOB. Pt remained seated in the chair with all needs met. Continue to recommend SNF placement upon discharge to continue therapy efforts. .     Current Level of Function Impacting Discharge (mobility/balance): CGA gait training x 300 ft with RW    Other factors to consider for discharge: previously mod I with rollator; history of multiple falls         PLAN :  Patient continues to benefit from skilled intervention to address the above impairments. Continue treatment per established plan of care. to address goals. Recommendation for discharge: (in order for the patient to meet his/her long term goals)  Therapy up to 5 days/week in SNF setting    This discharge recommendation:  Has been made in collaboration with the attending provider and/or case management    IF patient discharges home will need the following DME: to be determined (TBD)       SUBJECTIVE:   Patient stated I like to watch the golf.     OBJECTIVE DATA SUMMARY:   Critical Behavior:  Neurologic State: Alert,Eyes open spontaneously  Orientation Level: Oriented to person,Oriented to place,Oriented to time,Disoriented to situation  Cognition: Follows commands,Decreased attention/concentration,Poor safety awareness  Safety/Judgement: Decreased awareness of environment  Functional Mobility Training:  Bed Mobility:                    Transfers:  Sit to Stand: Stand-by assistance;Contact guard assistance  Stand to Sit: Stand-by assistance                             Balance:  Sitting: Intact  Standing: Impaired; With support  Standing - Static: Good  Standing - Dynamic : Fair  Ambulation/Gait Training:  Distance (ft): 300 Feet (ft)  Assistive Device: Gait belt;Walker, rolling  Ambulation - Level of Assistance: Contact guard assistance        Gait Abnormalities: Decreased step clearance;Shuffling gait;Trunk sway increased        Base of Support: Narrowed     Speed/Gricelda: Pace decreased (<100 feet/min)  Step Length: Right shortened;Left shortened            Pain Rating:  Pt denied pain    Activity Tolerance:   Fair    After treatment patient left in no apparent distress:   Sitting in chair, Call bell within reach, Bed / chair alarm activated, and Side rails x 3    COMMUNICATION/COLLABORATION:   The patients plan of care was discussed with: Occupational therapist and Registered nurse.      Lauren Johnson PT, DPT   Time Calculation: 11 mins

## 2022-03-14 NOTE — PROGRESS NOTES
1528: I have reviewed discharge instructions with the patient. The patient verbalized understanding. PIV discontinued. 1550: Attempted to call report x3 to [facility] at  [phone].    [phone]: Attempted to call report two more times at [phone]; unsuccessful. 1625: [facility] arrival on unit. Attempted report to [facility] again unsuccessful x2.    0820: NSTU unit # given to  for receiving nurse at [facility] to call back if they are looking to receive report. Patient discharged with belongings via [facility] to [facility]. 1655: Report given to Park Vega at [facility].       Stroke Education documented in Patient Education: YES  Core Measures Documented in Connect Care: YES  Risk Factors: YES  Warning signs of stroke: YES  When to Activate 911: YES  Medication Education for Risk Factors: YES  Smoking cessation if applicable: N/A  Written Education Given:  YES    Discharge NIH Completed: YES  Score: 0    BRAINS: YES    Follow Up Appointment Made: NO  Date/Time if applicable: N/A

## 2022-03-14 NOTE — DISCHARGE SUMMARY
Discharge Summary       PATIENT ID: Vesta Pineda. MRN: [id number]   YOB: 1931    DATE OF ADMISSION: 3/6/2022  9:13 AM    DATE OF DISCHARGE: 3/14/2022   PRIMARY CARE PROVIDER: Ector Cano MD     ATTENDING PHYSICIAN: Garrel Goltz, DO   DISCHARGING PROVIDER: Garrel Goltz, DO    To contact this individual call [phone] and ask the  to page. If unavailable ask to be transferred the Adult Hospitalist Department. CONSULTATIONS: IP CONSULT TO CARDIOLOGY  IP CONSULT TO NEUROLOGY    PROCEDURES/SURGERIES: * No surgery found *    ADMISSION SUMMARY AND HOSPITAL COURSE:   Admission History:  \" 80 y. o. 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  Thereafter he developed hypotension with SBP in 200s.  Later he was exhibiting difficulty expressing himself and appeared somewhat confused. CT of the head and neck x2 was unremarkable.  CT perfusion was negative. A stroke in evolution/a TIA was suspected and that he received intravenous alteplase. \"       DISCHARGE DIAGNOSES / PLAN:      Suspected ischemic CVA  Developed difficulty expressing himself/mild confusion 3/7 along with hypertensive urgency  Symptoms may be related to hypertensive encephalopathy versus ischemic/embolic stroke  - s/p tPA 3/7, CTs wnl   - neuro checks  - CTH negative, unable to obtain MRI 2/2 scleral metal buckle  - CTH nothing acute post tpa  - PT/OT/SLP  - TTE 3/8: EF 55-60%, mild LV thickening  - A1c 5.7  - SBP < 180  - Continue aspirin, cont statin for     Tremor and frequent falls, possible underlying parkinsonism  -Neurology empirically started low-dose Sinemet    HTN urgency  C/w Amlodipine, losartan     H/o pAF (not on AC), bradycardia  -cards following  -holding toprol  -Warfarin changed to ASA in 11/2021 due to fall risk  -TSH 3.12      B12 borderline, confirm with MMA with normal limits     Thrombocytopenia- thrombocytopenia     CKD3 at baseline Cr ~1.5    BMI: Body mass index is 28.13 kg/m². . This patient: Meets criteria for overweight given BMI >/= 25 and < 30 due to excess calories/nutritional. Weight loss and lifestyle modifications should be encouraged as an outpatient. PENDING TEST RESULTS:   At the time of discharge the following test results are still pending: none     ADDITIONAL CARE RECOMMENDATIONS:   Follow new medication list     NOTIFY YOUR PHYSICIAN FOR ANY OF THE FOLLOWING:   Fever over 101 degrees for 24 hours. Chest pain, shortness of breath, fever, chills, nausea, vomiting, diarrhea, change in mentation, falling, weakness, bleeding. Severe pain or pain not relieved by medications, as well as any other signs or symptoms that you may have questions about. FOLLOW UP APPOINTMENTS:    Follow-up Information     Follow up With Specialties Details Why Contact Info    Evan Gilbert MD Internal Medicine   [address] [place] [address] 250  [address] [place] [address]  [phone]             DISCHARGE MEDICATIONS:  Current Discharge Medication List      START taking these medications    Details   aspirin 81 mg chewable tablet Take 1 Tablet by mouth daily. Qty: 30 Tablet, Refills: 0  Start date: 3/15/2022      atorvastatin (LIPITOR) 80 mg tablet Take 1 Tablet by mouth nightly. Qty: 30 Tablet, Refills: 0  Start date: 3/14/2022      carbidopa-levodopa (SINEMET)  mg per tablet Take 1 Tablet by mouth three (3) times daily. Qty: 120 Tablet, Refills: 0  Start date: 3/14/2022      amLODIPine (NORVASC) 10 mg tablet Take 1 Tablet by mouth daily. Qty: 30 Tablet, Refills: 1  Start date: 3/8/2022      cyanocobalamin 1,000 mcg tablet Take 1 Tablet by mouth daily.   Qty: 30 Tablet, Refills: 1  Start date: 3/8/2022         CONTINUE these medications which have NOT CHANGED    Details   cholecalciferol (VITAMIN D3) (50,000 UNITS /1250 MCG) capsule Take 1 Capsule by mouth every seven (7) days. Indications: low vitamin D levels  Qty: 12 Capsule, Refills: 3    Associated Diagnoses: Vitamin D insufficiency      sertraline (ZOLOFT) 100 mg tablet Take 1 Tablet by mouth daily. Qty: 90 Tablet, Refills: 3    Associated Diagnoses: Major depressive disorder, single episode, moderate (HCC)      irbesartan (AVAPRO) 150 mg tablet Take 1 Tablet by mouth two (2) times a day. Increased dose 1/9/22  Qty: 60 Tablet, Refills: 2      colestipoL (COLESTID) 1 gram tablet Take 1 Tablet by mouth two (2) times a day. Qty: 60 Tablet, Refills: 5    Associated Diagnoses: Chronic diarrhea      finasteride (PROSCAR) 5 mg tablet Take 1 Tablet by mouth daily. Qty: 90 Tablet, Refills: 3    Comments: REQUESTING REFILLS  Associated Diagnoses: BPH with obstruction/lower urinary tract symptoms      acetaminophen (TYLENOL) 325 mg tablet Take 2 Tablets by mouth every six (6) hours as needed for Pain or Fever. Qty: 120 Tablet, Refills: 5         STOP taking these medications       metoprolol succinate (TOPROL-XL) 25 mg XL tablet Comments:   Reason for Stopping:               DISPOSITION:    Home With:   OT  PT  HH  RN      x Long term SNF/Inpatient Rehab    Independent/assisted living    Hospice    Other:       PATIENT CONDITION AT DISCHARGE:     Functional status    Poor    x Deconditioned     Independent      Cognition     Lucid     Forgetful    x Dementia      Catheters/lines (plus indication)    Dykes     PICC     PEG    x None      Code status     Full code    x DNR      PHYSICAL EXAMINATION AT DISCHARGE:  Constitutional:  No acute distress, cooperative, pleasant    ENT:  Oral mucosa moist, oropharynx benign. Resp:  CTA bilaterally. CV:  Irregularly irregular     GI:  Soft, non distended, non tender.  normoactive bowel sounds,     Musculoskeletal:  No edema, warm, 2+ pulses throughout    Neurologic:  mild tremors, speech clear, motor 5/6, sensory intact, AAOx3                                  CHRONIC MEDICAL DIAGNOSES:  Problem List as of 3/14/2022 Date Reviewed: 2/7/2022          Codes Class Noted - Resolved    Tremor ICD-10-CM: R25.1  ICD-9-CM: 781.0  3/10/2022 - Present        Parkinsonian features ICD-10-CM: R25.9  ICD-9-CM: 781.0  3/10/2022 - Present        Hypertensive urgency ICD-10-CM: I16.0  ICD-9-CM: 401.9  3/7/2022 - Present        SSS (sick sinus syndrome) ([facility] ICD-10-CM: I49.5  ICD-9-CM: 427.81  3/6/2022 - Present        Bradycardia ICD-10-CM: R00.1  ICD-9-CM: 427.89  3/6/2022 - Present        Seasonal allergic reaction ICD-10-CM: J30.2  ICD-9-CM: 477.9  Unknown - Present        Urinary incontinence, unspecified type ICD-10-CM: R32  ICD-9-CM: 788.30  1/6/2022 - Present        Hearing aid worn ICD-10-CM: Z97.4  ICD-9-CM: V45.89  1/6/2022 - Present        Encephalopathy ICD-10-CM: G93.40  ICD-9-CM: 348.30  11/21/2021 - Present        MRI contraindicated due to metal implant ICD-10-CM: Z53.09  ICD-9-CM: V64.1  Unknown - Present    Overview Signed 9/21/2021 12:31 PM by Angel Ham MD     has scleral buckle             RLS (restless legs syndrome) ICD-10-CM: G25.81  ICD-9-CM: 333.94  7/3/2017 - Present        Dupuytren's contracture of left hand ICD-10-CM: M72.0  ICD-9-CM: 728.6  Unknown - Present    Overview Signed 3/23/2016  3:09 PM by Angel Ham MD     5th finger             Chronic neck pain ICD-10-CM: M54.2, G89.29  ICD-9-CM: 723.1, 338.29  Unknown - Present    Overview Signed 1/7/2016 10:27 AM by Angel Ham MD     limited motion to side             IVCD (intraventricular conduction defect) ICD-10-CM: I45.4  ICD-9-CM: 426.6  11/30/2015 - Present        Advanced care planning/counseling discussion ICD-10-CM: Z71.89  ICD-9-CM: V65.49  10/8/2015 - Present    Overview Addendum 7/3/2017 10:44 AM by Tona Ellsworth RN     Has Living Will. His wife Gerry Metcalf is POA. NITIN.   Next is Robert Mo, daughter Aye Phillip. Has documents, will bring in  7/3/2017 Patient states that a completed Advanced Medical Directive is at home. NN encouraged patient to bring a copy of the completed Advanced Medical Directive to the office for scanning into the medical record. Patient verbalized understanding & agreement. 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 Miriam Grajeda. low grade, papillary. repeat 3 month             Fecal incontinence ICD-10-CM: R15.9  ICD-9-CM: 787.60  Unknown - Present    Overview Signed 9/18/2014 10:20 AM by Aguilar Porter MD     with loose stools             Loose stools ICD-10-CM: R19.5  ICD-9-CM: 787.7  7/18/2014 - Present        Paroxysmal atrial fibrillation (HCC) ICD-10-CM: I48.0  ICD-9-CM: 427.31  Unknown - Present        BPH with obstruction/lower urinary tract symptoms ICD-10-CM: N40.1, N13.8  ICD-9-CM: 600.01, 599.69  Unknown - Present        Depression, major ICD-10-CM: F32.9  ICD-9-CM: 296.20  Unknown - Present        Hearing loss ICD-10-CM: H91.90  ICD-9-CM: 389. 9  Unknown - Present    Overview Signed 6/18/2014  6:18 PM by Aguilar Porter MD     has hearing aids             Insomnia ICD-10-CM: G47.00  ICD-9-CM: 780.52  Unknown - Present        Bladder cancer [facility]) ICD-10-CM: C67.9  ICD-9-CM: 188.9  Unknown - Present    Overview Signed 6/18/2014  6:18 PM by Aguilar Porter MD     s/p surgery, BCG irrigation Fish.   saw Dr. Angelito Carty: Chronic back pain ICD-10-CM: M54.9, G89.29  ICD-9-CM: 724.5, 338.29  Unknown - 5/14/2015        RESOLVED: HTN (hypertension) ICD-10-CM: I10  ICD-9-CM: 401.9  Unknown - 11/30/2015              Greater than 30 minutes were spent with the patient on counseling and coordination of care    Signed:   Gene Zaman DO  3/14/2022  3:03 PM

## 2022-03-14 NOTE — PROGRESS NOTES
Problem: Falls - Risk of  Goal: *Absence of Falls  Description: Document Autumn Greco Fall Risk and appropriate interventions in the flowsheet.   Outcome: Progressing Towards Goal  Note: Fall Risk Interventions:  Mobility Interventions: Bed/chair exit alarm,Communicate number of staff needed for ambulation/transfer,Patient to call before getting OOB,PT Consult for mobility concerns,PT Consult for assist device competence    Mentation Interventions: Bed/chair exit alarm,Adequate sleep, hydration, pain control,Door open when patient unattended,Increase mobility    Medication Interventions: Patient to call before getting OOB,Teach patient to arise slowly    Elimination Interventions: Call light in reach,Bed/chair exit alarm,Stay With Me (per policy),Toileting schedule/hourly rounds,Urinal in reach    History of Falls Interventions: Bed/chair exit alarm,Door open when patient unattended,Investigate reason for fall         Problem: Hypertension  Goal: *Blood pressure within specified parameters  Outcome: Progressing Towards Goal  Goal: *Fluid volume balance  Outcome: Progressing Towards Goal  Goal: *Labs within defined limits  Outcome: Progressing Towards Goal

## 2022-03-14 NOTE — PROGRESS NOTES
Transition of Care Plan: [facility] (pending auth-started 03/11)  RN to call [phone], ask for wing 1     RUR: 15% medium     PCP F/U: Luis Falcon MD     Disposition: [id number]     Transportation: [facility]-4pm     Main Contact: Darrell Reyna - [phone]     1201: Louis Arroyo is still pending with [facility]. Spoke with Win Moser who states that she believes Jc Becerra will come back today. Did request PT/OT notes in case insurance asks for it. Therapy notified. [facility] transport set up for 4pm. Rapid obtained-not detected. 1418: Auth received. [facility] has a bed. [facility] eta 4pm. RN, MD, and son notified.      Kylee Pastrana RN, CRM    Medicare pt has received, reviewed, and signed 2nd IM letter informing them of their right to appeal the discharge. Signed copy has been placed on pt bedside chart. Transition of Care Plan to SNF/Rehab    SNF/Rehab Transition:  Patient has been accepted to [facility] and meets criteria for admission. Patient will transported by [facility] and expected to leave at 4pm.    Communication to Patient/Family:  Met with patient and son and they are agreeable to the transition plan. Communication to SNF/Rehab:  Bedside RN, Jarod Fraga, has been notified to update the transition plan to the facility and call report (phone number [phone]). Discharge information has been updated on the AVS.     Discharge instructions to be sent to facility. Nursing Please include all hard scripts for controlled substances, med rec and dc summary, and AVS in packet.      Reviewed and confirmed with facility, [facility], can manage the patient care needs for the following:     [employer] with (X) only those applicable:    Medication:  [x]  Medications will be available at the facility  []  IV Antibiotics   []  Controlled Substance - hard copy to be sent with patient   [x]  Weekly Labs   Documents:  [] Hard RX  [x] MAR  [x] Kardex  [x] AVS  []Transfer Summary  [x]Discharge   Equipment:  []  CPAP/BiPAP  []  Wound Vacuum  []  Dykes or Urinary Device  []  PICC/Central Line  []  Nebulizer  []  Ventilator   Treatment:  []Isolation (for MRSA, VRE, etc.)  []Surgical Drain Management  []Tracheostomy Care  []Dressing Changes  []Dialysis with transportation and chair time  []PEG Care  []Oxygen  []Daily Weights for Heart Failure   Dietary:  []Any diet limitations  []Tube Feedings   []Total Parenteral Management (TPN)   Eligible for Medicaid Long Term Services and Supports  Yes:  [] Eligible for medical assistance or will become eligible within 180 days and UAI completed. [] Provider/Patient and/or support system has requested screening. [] UAI copy provided to patient or responsible party  [] UAI unavailable at discharge will send once processed to SNF provider. [] UAI unavailable at discharged mailed to patient  No:   [x] Private pay and is not financially eligible for Medicaid within the next 180 days. [] Reside out-of-state.   [] A residents of a state owned/operated facility that is licensed  by [address] and [employer] [facility] or [facility]  [] Enrollment in [facility] hospice services  [] [address] [place] [address] [facility]  [] Patient /Family declines to have screening completed or provide financial information for screening     Financial Resources:  Medicaid    [] Initiated and application pending   [] Full coverage     Advanced Care Plan:  []Surrogate Decision Maker of Care  []POA  [x]Communicated Code [employer]   Other

## 2022-03-14 NOTE — PROGRESS NOTES
[facility] visit. Mr. Mike Mejia was sitting up in his chair. He had a bag packed on his bed. He declined communion today. He said he was being discharged. Prayer offered for his well-being.     Gaetano Gaucher, SBS, RN, ACSW, LCSW   Page:  [phone]

## 2022-03-14 NOTE — PROGRESS NOTES
Problem: Self Care Deficits Care Plan (Adult)  Goal: *Acute Goals and Plan of Care (Insert Text)  Description: FUNCTIONAL STATUS PRIOR TO ADMISSION: Pt lived in [facility], used rollator for longer distances, [address] for in home. Resides alone and receives OT and PT at [facility]. Pt with multiple falls 5-6 within last 6 months. Able to dress mod I and bathe himself but has staff member present for safety during bathing. HOME SUPPORT: lives in [facility]  Updated upon re-evaluation 3/8/2022  1. Patient will perform seated grooming task with set-up assist within 7 day(s). 2.  Patient will perform lower body dressing using AE PRN with minimal assistance within 7 day(s). 3.  Patient will perform upper body dressing with set-up assist within 7 day(s). 4.  Patient will perform toilet transfers in bathroom with least restrictive DME with minimal assistance within 7 day(s). 5.  Patient will perform all aspects of toileting with minimal assistance within 7 day(s). Initiated 3/7/2022  1. Patient will perform grooming task at sink with least restrictive DME with modified independence within 7 day(s). 2.  Patient will perform lower body dressing using AE PRN with modified independence within 7 day(s). 3.  Patient will perform upper body dressing with modified independence within 7 day(s). 4.  Patient will perform toilet transfers in bathroom with least restrictive DME with modified independence within 7 day(s). 5.  Patient will perform all aspects of toileting with modified independence within 7 day(s). Outcome: Progressing Towards Goal   OCCUPATIONAL THERAPY TREATMENT  Patient: Vesta Pineda. (80 y.o. male)  Date: 3/14/2022  Diagnosis: Bradycardia [R00.1]  Hypertensive urgency [I16.0] <principal problem not specified>       Precautions: Fall,DNR,Bed Alarm  Chart, occupational therapy assessment, plan of care, and goals were reviewed.     ASSESSMENT  Patient continues with skilled OT services and is progressing towards goals. Pt's functional independence and performance of ADL/IADL tasks continues to be limited at this time by impaired balance, generalized weakness, decreased activity tolerance/endurance, and R flank pain. Pt received sitting in bedside chair and agreeable to participation in therapy session. UB dressing performed with set-up. While seated in chair, pt performed LB dressing with CGA. When standing to complete dressing task, pt with multiple minor LOB requiring physical assist and use of RW to recover. Following, pt indicating fatigue/increase in back pain and positioned for comfort in bedside chair. As pt remains below his baseline, continue to recommend SNF at d/c. Current Level of Function Impacting Discharge (ADLs): set up seated UB dressing, CGA LB dressing, mod I self-feeding     Other factors to consider for discharge: PLOF         PLAN :  Patient continues to benefit from skilled intervention to address the above impairments. Continue treatment per established plan of care to address goals. Recommendation for discharge: (in order for the patient to meet his/her long term goals)  Therapy up to 5 days/week in SNF setting    This discharge recommendation:  Has been made in collaboration with the attending provider and/or case management    IF patient discharges home will need the following DME: TBD       SUBJECTIVE:   Patient stated When am I getting out of here? I'm ready to leave.     OBJECTIVE DATA SUMMARY:   Cognitive/Behavioral Status:  Neurologic State: Alert  Orientation Level: Oriented X4  Cognition: Decreased attention/concentration             Functional Mobility and Transfers for ADLs:  Bed Mobility:  Scooting: Contact guard assistance (to edge of chair)    Transfers:  Sit to Stand: Contact guard assistance          Balance:  Sitting: Intact  Sitting - Static: Good (unsupported)  Sitting - Dynamic: Good (unsupported)  Standing: Impaired; With support  Standing - Static: Good  Standing - Dynamic : Fair    ADL Intervention:  Feeding  Feeding Assistance: Modified independent  Container Management: Set-up  Drink to Mouth: Modified independent                   Upper Body [address]: Set-up    Lower Body Dressing Assistance  Underpants: Contact guard assistance  Pants With Elastic Waist: Contact guard assistance  Position Performed: Seated in chair  Cues: Verbal cues provided                Pain:  R flank pain     Activity Tolerance:   Good and Fair    After treatment patient left in no apparent distress:   Sitting in chair, Call bell within reach and Bed / chair alarm activated    COMMUNICATION/COLLABORATION:   The patients plan of care was discussed with: Registered nurseCristiane Poe, OT  Time Calculation: 20 mins

## 2022-03-14 NOTE — PROGRESS NOTES
Problem: Falls - Risk of  Goal: *Absence of Falls  Description: Document Anu Litter Fall Risk and appropriate interventions in the flowsheet. Outcome: Progressing Towards Goal  Note: Fall Risk Interventions:  Mobility Interventions: Bed/chair exit alarm,Communicate number of staff needed for ambulation/transfer,Patient to call before getting OOB,PT Consult for mobility concerns,PT Consult for assist device competence    Mentation Interventions: Bed/chair exit alarm,Adequate sleep, hydration, pain control,Door open when patient unattended,Increase mobility    Medication Interventions: Patient to call before getting OOB,Teach patient to arise slowly    Elimination Interventions: Call light in reach,Bed/chair exit alarm,Stay With Me (per policy),Toileting schedule/hourly rounds,Urinal in reach    History of Falls Interventions: Bed/chair exit alarm,Door open when patient unattended,Investigate reason for fall    Problem: Pressure Injury - Risk of  Goal: *Prevention of pressure injury  Description: Document Gene Scale and appropriate interventions in the flowsheet.   Outcome: Progressing Towards Goal  Note: Pressure Injury Interventions:  Sensory Interventions: Avoid rigorous massage over bony prominences,Assess changes in LOC,Discuss PT/OT consult with provider,Minimize linen layers,Maintain/enhance activity level    Moisture Interventions: Absorbent underpads,Minimize layers,Limit adult briefs    Activity Interventions: PT/OT evaluation,Increase time out of bed,Pressure redistribution bed/mattress(bed type)    Mobility Interventions: HOB 30 degrees or less,Pressure redistribution bed/mattress (bed type),PT/OT evaluation    Nutrition Interventions: Document food/fluid/supplement intake    Friction and Shear Interventions: HOB 30 degrees or less      Problem: Hypertension  Goal: *Blood pressure within specified parameters  Outcome: Progressing Towards Goal  Goal: *Fluid volume balance  Outcome: Progressing Towards Goal  Goal: *Labs within defined limits  Outcome: Progressing Towards Goal

## 2022-03-18 PROBLEM — R00.1 BRADYCARDIA: Status: ACTIVE | Noted: 2022-03-06

## 2022-03-18 PROBLEM — G25.81 RLS (RESTLESS LEGS SYNDROME): Status: ACTIVE | Noted: 2017-07-03

## 2022-03-18 PROBLEM — R25.9 PARKINSONIAN FEATURES: Status: ACTIVE | Noted: 2022-03-10

## 2022-03-18 PROBLEM — R29.818 PARKINSONIAN FEATURES: Status: ACTIVE | Noted: 2022-03-10

## 2022-03-19 PROBLEM — Z97.4 HEARING AID WORN: Status: ACTIVE | Noted: 2022-01-06

## 2022-03-19 PROBLEM — G93.40 ENCEPHALOPATHY: Status: ACTIVE | Noted: 2021-11-21

## 2022-03-19 PROBLEM — I49.5 SSS (SICK SINUS SYNDROME) (HCC): Status: ACTIVE | Noted: 2022-03-06

## 2022-03-19 PROBLEM — R25.1 TREMOR: Status: ACTIVE | Noted: 2022-03-10

## 2022-03-19 PROBLEM — I16.0 HYPERTENSIVE URGENCY: Status: ACTIVE | Noted: 2022-03-07

## 2022-03-19 PROBLEM — R32 URINARY INCONTINENCE, UNSPECIFIED TYPE: Status: ACTIVE | Noted: 2022-01-06

## 2022-03-21 ENCOUNTER — TELEPHONE (OUTPATIENT)
Dept: INTERNAL MEDICINE CLINIC | Age: 87
End: 2022-03-21

## 2022-03-21 NOTE — TELEPHONE ENCOUNTER
Nurse called and spoke with Arron Finn. Nurse faxed Arron Finn with [facility] care a copy of VIIS vaccine record.

## 2022-03-21 NOTE — TELEPHONE ENCOUNTER
Sanjuana Brito from [facility] would like to know if the patient has had their flu or pneumonia shot. Please call back and advise. PSR can see it looks like they are not up to date but wanted the nurse to call as there may be somewhere in their chart with more accurate records that the [facility] does not know how to access.

## 2022-03-31 ENCOUNTER — TELEPHONE (OUTPATIENT)
Dept: INTERNAL MEDICINE CLINIC | Age: 87
End: 2022-03-31

## 2022-03-31 ENCOUNTER — OFFICE VISIT (OUTPATIENT)
Dept: INTERNAL MEDICINE CLINIC | Age: 87
End: 2022-03-31
Payer: MEDICARE

## 2022-03-31 VITALS
TEMPERATURE: 97.6 F | HEART RATE: 70 BPM | DIASTOLIC BLOOD PRESSURE: 74 MMHG | RESPIRATION RATE: 14 BRPM | HEIGHT: 71 IN | SYSTOLIC BLOOD PRESSURE: 154 MMHG | BODY MASS INDEX: 27.89 KG/M2 | OXYGEN SATURATION: 96 % | WEIGHT: 199.2 LBS

## 2022-03-31 DIAGNOSIS — G89.29 CHRONIC BILATERAL LOW BACK PAIN WITHOUT SCIATICA: ICD-10-CM

## 2022-03-31 DIAGNOSIS — N13.8 BPH WITH OBSTRUCTION/LOWER URINARY TRACT SYMPTOMS: ICD-10-CM

## 2022-03-31 DIAGNOSIS — K52.9 CHRONIC DIARRHEA: ICD-10-CM

## 2022-03-31 DIAGNOSIS — I10 ESSENTIAL HYPERTENSION: ICD-10-CM

## 2022-03-31 DIAGNOSIS — F32.1 MAJOR DEPRESSIVE DISORDER, SINGLE EPISODE, MODERATE (HCC): ICD-10-CM

## 2022-03-31 DIAGNOSIS — I49.5 SSS (SICK SINUS SYNDROME) (HCC): ICD-10-CM

## 2022-03-31 DIAGNOSIS — I25.10 CVD (CARDIOVASCULAR DISEASE): ICD-10-CM

## 2022-03-31 DIAGNOSIS — N40.1 BPH WITH OBSTRUCTION/LOWER URINARY TRACT SYMPTOMS: ICD-10-CM

## 2022-03-31 DIAGNOSIS — R29.6 RECURRENT FALLS: Primary | ICD-10-CM

## 2022-03-31 DIAGNOSIS — R25.9 PARKINSONIAN FEATURES: ICD-10-CM

## 2022-03-31 DIAGNOSIS — G60.9 IDIOPATHIC PERIPHERAL NEUROPATHY: ICD-10-CM

## 2022-03-31 DIAGNOSIS — E55.9 VITAMIN D INSUFFICIENCY: ICD-10-CM

## 2022-03-31 DIAGNOSIS — I48.0 PAROXYSMAL ATRIAL FIBRILLATION (HCC): ICD-10-CM

## 2022-03-31 DIAGNOSIS — F51.04 PSYCHOPHYSIOLOGICAL INSOMNIA: ICD-10-CM

## 2022-03-31 DIAGNOSIS — M54.50 CHRONIC BILATERAL LOW BACK PAIN WITHOUT SCIATICA: ICD-10-CM

## 2022-03-31 DIAGNOSIS — G31.84 MILD COGNITIVE IMPAIRMENT: ICD-10-CM

## 2022-03-31 PROCEDURE — 1111F DSCHRG MED/CURRENT MED MERGE: CPT | Performed by: INTERNAL MEDICINE

## 2022-03-31 PROCEDURE — 99215 OFFICE O/P EST HI 40 MIN: CPT | Performed by: INTERNAL MEDICINE

## 2022-03-31 PROCEDURE — 1101F PT FALLS ASSESS-DOCD LE1/YR: CPT | Performed by: INTERNAL MEDICINE

## 2022-03-31 PROCEDURE — G9717 DOC PT DX DEP/BP F/U NT REQ: HCPCS | Performed by: INTERNAL MEDICINE

## 2022-03-31 PROCEDURE — G8427 DOCREV CUR MEDS BY ELIG CLIN: HCPCS | Performed by: INTERNAL MEDICINE

## 2022-03-31 PROCEDURE — G8536 NO DOC ELDER MAL SCRN: HCPCS | Performed by: INTERNAL MEDICINE

## 2022-03-31 PROCEDURE — G8417 CALC BMI ABV UP PARAM F/U: HCPCS | Performed by: INTERNAL MEDICINE

## 2022-03-31 RX ORDER — CARBIDOPA AND LEVODOPA 25; 100 MG/1; MG/1
1 TABLET ORAL 3 TIMES DAILY
Qty: 90 TABLET | Refills: 3 | Status: SHIPPED | OUTPATIENT
Start: 2022-03-31

## 2022-03-31 RX ORDER — ASPIRIN 325 MG
50000 TABLET, DELAYED RELEASE (ENTERIC COATED) ORAL
Qty: 12 CAPSULE | Refills: 3 | Status: SHIPPED | OUTPATIENT
Start: 2022-03-31 | End: 2022-10-11 | Stop reason: ALTCHOICE

## 2022-03-31 RX ORDER — FINASTERIDE 5 MG/1
5 TABLET, FILM COATED ORAL DAILY
Qty: 30 TABLET | Refills: 2 | Status: SHIPPED | OUTPATIENT
Start: 2022-03-31

## 2022-03-31 RX ORDER — AMLODIPINE BESYLATE 10 MG/1
10 TABLET ORAL DAILY
Qty: 30 TABLET | Refills: 2 | Status: SHIPPED | OUTPATIENT
Start: 2022-03-31 | End: 2022-05-19 | Stop reason: SINTOL

## 2022-03-31 RX ORDER — IRBESARTAN 150 MG/1
150 TABLET ORAL 2 TIMES DAILY
Qty: 60 TABLET | Refills: 2 | Status: SHIPPED | OUTPATIENT
Start: 2022-03-31 | End: 2022-04-09

## 2022-03-31 RX ORDER — SERTRALINE HYDROCHLORIDE 50 MG/1
50 TABLET, FILM COATED ORAL DAILY
Qty: 30 TABLET | Refills: 3 | Status: SHIPPED | OUTPATIENT
Start: 2022-03-31

## 2022-03-31 RX ORDER — ZINC GLUCONATE 50 MG
1000 TABLET ORAL DAILY
Qty: 30 TABLET | Refills: 11 | Status: SHIPPED | OUTPATIENT
Start: 2022-03-31 | End: 2022-03-31 | Stop reason: ALTCHOICE

## 2022-03-31 RX ORDER — ATORVASTATIN CALCIUM 40 MG/1
40 TABLET, FILM COATED ORAL
Qty: 30 TABLET | Refills: 2 | Status: SHIPPED | OUTPATIENT
Start: 2022-03-31

## 2022-03-31 RX ORDER — MONTELUKAST SODIUM 4 MG/1
1 TABLET, CHEWABLE ORAL DAILY
Qty: 30 TABLET | Refills: 2 | Status: SHIPPED | OUTPATIENT
Start: 2022-03-31

## 2022-03-31 RX ORDER — ACETAMINOPHEN 500 MG
1000 TABLET ORAL 2 TIMES DAILY
Qty: 60 TABLET | Refills: 2 | Status: SHIPPED | OUTPATIENT
Start: 2022-03-31

## 2022-03-31 RX ORDER — GUAIFENESIN 100 MG/5ML
81 LIQUID (ML) ORAL DAILY
Qty: 30 TABLET | Refills: 5 | Status: SHIPPED | OUTPATIENT
Start: 2022-03-31

## 2022-03-31 NOTE — TELEPHONE ENCOUNTER
Spoke with Nely lainez [facility] and requested that patient resume therapy services for PT, OT for balance and gait. Referral acknowledged and grateful for the call.

## 2022-03-31 NOTE — PROGRESS NOTES
HISTORY OF PRESENT ILLNESS    Chief Complaint   Patient presents with   [facility]        Presents for follow-up. He is brought in by his son, Ayanna Ceron. Patient was admitted to the hospital on March 6 through March 14 for syncopal episode. He has a history of recurrent falls. He was noted to be bradycardic with sick sinus syndrome. Metoprolol was discontinued while in the hospital.  During hospitalization, he developed severe hypertension with systolic blood pressure in the 200s. This was accompanied by mental status change. CT of the head and neck x2 are unremarkable and perfusion study is normal.  He was suspected to have a TIA or stroke in evolution and received IV alteplase. Symptoms improved as blood pressure also improved. Unable to obtain MRI secondary to spiral metal buckle. Echocardiogram showed EF 55 to 60% with no other abnormalities. He was given aspirin and high-dose atorvastatin. Was seen by neurology for frequent falls and increased tremor while he was in the hospital.  Was diagnosed with possible underlying parkinsonism and started on low-dose Sinemet. Was asked to remain off of warfarin because of fall risk. Vitamin B12 levels were borderline but MMA normal.  Stable mild thrombocytopenia noted. Stable chronic kidney disease. He was discharged to rehab at [facility] 3/14/22. I have his discharge notes. He was discharged from [facility] back to [facility] March 23, 2022. Medication list includes the following:  Amlodipine 10 mg daily, atorvastatin 80 mg daily, carbidopa/levodopa  mg 3 times daily, colestipol 1 g twice daily, donepezil 5 mg at bedtime, finasteride 5 mg daily, irbesartan 150 mg twice daily, sertraline 125 mg daily, acetaminophen 650 mg every 6 hours as needed and acetaminophen 500 mg 2 tabs twice daily, aspirin 81 mg daily, vitamin B12 1000 mcg daily, vitamin D3 50,000 units weekly.     On review of records, changes from prior visit with me on February 7 included: increasing sertraline to 150 mg, stopping hydralazine, stopping metoprolol, adding carbidopa levodopa, adding donepezil, adding vitamin B12. Today, he has been living in his independent living apartment at [facility]. He was receiving physical therapy and occupational therapy with [facility] but they need in order to resume care. No blood pressures are being done in his current living environment. Patient has a history of chronic diarrhea. This has been well controlled with colestipol twice daily, but he says that now he is becoming a bit more constipated. Has some degree of back pain from his falls. Says that acetaminophen has been helping. He remains frustrated that he is unable to drive. Both son and patient state that his tremor seems to be improved. Complains of significant insomnia. Has difficulty falling asleep and even more difficulty staying asleep. In addition, he feels tired all the time. Noted medication changes above including addition of Aricept and increase of sertraline as well as adding carbidopa levodopa.     Review of Systems   All other systems reviewed and are negative, except as noted in HPI    Past Medical and Surgical History   has a past medical history of Advanced care planning/counseling discussion (10/8/2015), Basal cell carcinoma, face, Bladder cancer ([address] (2012), BPH with obstruction/lower urinary tract symptoms, Chronic lower back pain, Chronic neck pain, Depression, major, Dupuytren's contracture of left hand, Encephalopathy (8/2021, 11/21/21), Epistaxis (2013), Family history of skin cancer, Fecal incontinence, Hearing loss, HTN (hypertension), Insomnia, IVCD (intraventricular conduction defect) (11/30/2015), Loose stools (7/18/2014), MRI contraindicated due to metal implant, Paroxysmal atrial fibrillation ([address] Peripheral neuropathy, RLS (restless legs syndrome) (7/3/2017), SCC (squamous cell carcinoma), face, Seasonal allergic reaction, Skin cancer, and Urothelial carcinoma of right distal ureter ([facility] (5/1/15). He has no past medical history of Sun-damaged skin, Sunburn, blistering, or Tanning bed exposure. has a past surgical history that includes hx bladder repair (2012); hx retinal detachment repair (Right); hx hernia repair; hx colonoscopy (2012); pr cystourethroscopy (9/2014); pr cystourethroscopy (5/1/15); pr cystourethroscopy (2/29/16); hx malignant skin lesion excision (06/14/2018); and hx other surgical (10/2021). reports that he has quit smoking. He has never used smokeless tobacco. He reports previous alcohol use. He reports that he does not use drugs. family history is not on file. Physical Exam   Nursing note and vitals reviewed. Blood pressure (!) 154/74, pulse 70, temperature 97.6 °F (36.4 °C), temperature source Oral, resp. rate 14, height 5' 11\" (1.803 m), weight 199 lb 3.2 oz (90.4 kg), SpO2 96 %. Constitutional:  No distress. Eyes: Conjunctivae are normal.   Ears:  Hearing grossly intact  Cardiovascular: Normal rate. regular rhythm, no murmurs or gallops  No edema  Pulmonary/Chest: Effort normal.   CTAB  Musculoskeletal: moves all 4 extremities   Walking with a Rollator. Neurological: Alert and oriented to person, place, and time. Skin: No visible rash noted. Psychiatric: He is alert and oriented. Blunted affect. Irritable at times. Assessment and Plan    Diagnoses and all orders for this visit:    1. Recurrent falls  Chronic recurrent falls. He does have occasional encephalopathy and poor insight and judgment. Also caused by peripheral neuropathy, movement disorder possibly, fluctuations in blood pressure. He requires constant ability to call for help by pushing a button and requires frequent check ins. He would greatly benefit from resumption of care at physical therapy occupational therapy and will call Jennifer Presley to set this up.   Perhaps gait will improve somewhat with treatment with Sinemet?  -     REFERRAL TO PHYSICAL THERAPY    2. Parkinsonian features  Appreciate input of neurology and of physical therapy while in the hospital.  His tremor does seem to have improved on Sinemet. It is possible that fatigue may be a side effect of this, but I do think continuing this medication for now is a good idea. It would be a good idea to consult neurology at some point, but it is challenging to get him to appointments and we will defer that for now. -     carbidopa-levodopa (SINEMET)  mg per tablet; Take 1 Tablet by mouth three (3) times daily. Indications: a type of movement disorder called parkinsonism    3. Chronic bilateral low back pain without sciatica  Acute on chronic pain from recent fall. Continue scheduled Tylenol. Physical therapy to address this as well. No evidence of fracture. -     acetaminophen (TYLENOL) 500 mg tablet; Take 2 Tablets by mouth two (2) times a day. Indications: pain associated with arthritis, backache  -     REFERRAL TO PHYSICAL THERAPY    4. Idiopathic peripheral neuropathy  Contributes to his gait impairment.  -     REFERRAL TO PHYSICAL THERAPY    5. Mild cognitive impairment  He is prone to significant encephalopathy. He also does hide his cognitive issues and is in some degree of denial.  That said, I think the benefits of donepezil are relatively low for him and will discontinue. I think it is disturbing his sleep and also causing some fatigue. Patient and son are in agreement to limit polypharmacy. 6. Psychophysiological insomnia  Significant insomnia which I think is exacerbated by sertraline. Will decrease sertraline as below. We will also stop donepezil. Sinemet hopefully should not be contributing. We will continue to monitor. No sedating medications due to fall risk.     7. Major depressive disorder, single episode, moderate ([address]  This is uncontrolled to some degree, but I think high-dose sertraline is causing more issues than improvement. Could consider changing to mirtazapine  -     sertraline (ZOLOFT) 50 mg tablet; Take 1 Tablet by mouth daily. Decreased 3/31/22      8. Chronic diarrhea  Currently reports some constipation. Will decrease colestipol to once daily but certainly can increase it back to twice daily at any time if he begins having increasing episodes of diarrhea. -     colestipoL (COLESTID) 1 gram tablet; Take 1 Tablet by mouth daily. Indications: for diarrhea    9. Vitamin D insufficiency  -     cholecalciferol (VITAMIN D3) (50,000 UNITS /1250 MCG) capsule; Take 1 Capsule by mouth every seven (7) days. Indications: low vitamin D level    10. Essential hypertension  He has variable and labile blood pressure. Currently is on amlodipine and irbesartan only. Avoiding ellis blockers because of sick sinus syndrome. Will accept higher blood pressures because of risk of hypotension and fall risk. -     amLODIPine (NORVASC) 10 mg tablet; Take 1 Tablet by mouth daily. Indications: high blood pressure  -     irbesartan (AVAPRO) 150 mg tablet; Take 1 Tablet by mouth two (2) times a day. Increased dose 1/9/22    11. SSS (sick sinus syndrome) ([facility] [address] [facility]  Appreciate input of cardiology. Avoid ellis blockers for now. 12. Paroxysmal atrial fibrillation (HCC)  Past history. Currently is in sinus rhythm. Not any ellis blockers right now because of sick sinus syndrome. This may become an issue. Continue aspirin and avoid anticoagulation because of fall risk. 13. CVD (cardiovascular disease)  Agree with statin therapy but will decrease to 40 mg. It is unclear if he truly did have a vascular event or just hypertensive urgency. No significant flow stenosis on CT angiogram.  -     atorvastatin (LIPITOR) 40 mg tablet; Take 1 Tablet by mouth nightly. Indications: high cholesterol  -     aspirin 81 mg chewable tablet; Take 1 Tablet by mouth daily. Indications: treatment to prevent a heart attack    14.  BPH with obstruction/lower urinary tract symptoms  Overactive bladder. Relatively stable symptoms. -     finasteride (PROSCAR) 5 mg tablet; Take 1 Tablet by mouth daily. Indications: enlarged prostate with urination problem      Current Outpatient Medications   Medication Sig    sertraline (ZOLOFT) 50 mg tablet Take 1 Tablet by mouth daily. Decreased 3/31/22    carbidopa-levodopa (SINEMET)  mg per tablet Take 1 Tablet by mouth three (3) times daily. Indications: a type of movement disorder called parkinsonism    amLODIPine (NORVASC) 10 mg tablet Take 1 Tablet by mouth daily. Indications: high blood pressure    atorvastatin (LIPITOR) 40 mg tablet Take 1 Tablet by mouth nightly. Indications: high cholesterol    cholecalciferol (VITAMIN D3) (50,000 UNITS /1250 MCG) capsule Take 1 Capsule by mouth every seven (7) days. Indications: low vitamin D levels    colestipoL (COLESTID) 1 gram tablet Take 1 Tablet by mouth daily. Indications: for diarrhea    finasteride (PROSCAR) 5 mg tablet Take 1 Tablet by mouth daily. Indications: enlarged prostate with urination problem    irbesartan (AVAPRO) 150 mg tablet Take 1 Tablet by mouth two (2) times a day. Increased dose 1/9/22    aspirin 81 mg chewable tablet Take 1 Tablet by mouth daily. Indications: treatment to prevent a heart attack    acetaminophen (TYLENOL) 500 mg tablet Take 2 Tablets by mouth two (2) times a day. Indications: pain associated with arthritis, backache    cyanocobalamin 1,000 mcg tablet Take 1 Tablet by mouth daily.  acetaminophen (TYLENOL) 325 mg tablet Take 2 Tablets by mouth every six (6) hours as needed for Pain or Fever.      No current facility-administered medications for this visit.       lab results and schedule of future lab studies reviewed with patient  reviewed medications and side effects in detail    Return to clinic for further evaluation if new symptoms develop    Follow-up in 1 to 2 months

## 2022-03-31 NOTE — LETTER
3/31/2022 12:11 PM    RE:    Carrie Brewer. [address] 3/31/22 (from Dr. Sergio Scherer)  Current Outpatient Medications   Medication Sig    sertraline (ZOLOFT) 50 mg tablet Take 1 Tablet by mouth daily. Decreased 3/31/22    carbidopa-levodopa (SINEMET)  mg per tablet Take 1 Tablet by mouth three (3) times daily. Indications: a type of movement disorder called parkinsonism    amLODIPine (NORVASC) 10 mg tablet Take 1 Tablet by mouth daily. Indications: high blood pressure    atorvastatin (LIPITOR) 40 mg tablet Take 1 Tablet by mouth nightly. Indications: high cholesterol    cholecalciferol (VITAMIN D3) (50,000 UNITS /1250 MCG) capsule Take 1 Capsule by mouth every seven (7) days. Indications: low vitamin D levels    colestipoL (COLESTID) 1 gram tablet Take 1 Tablet by mouth daily. Indications: for diarrhea    finasteride (PROSCAR) 5 mg tablet Take 1 Tablet by mouth daily. Indications: enlarged prostate with urination problem    irbesartan (AVAPRO) 150 mg tablet Take 1 Tablet by mouth two (2) times a day. Increased dose 1/9/22    aspirin 81 mg chewable tablet Take 1 Tablet by mouth daily. Indications: treatment to prevent a heart attack    acetaminophen (TYLENOL) 500 mg tablet Take 2 Tablets by mouth two (2) times a day. Indications: pain associated with arthritis, backache    cyanocobalamin 1,000 mcg tablet Take 1 Tablet by mouth daily.  acetaminophen (TYLENOL) 325 mg tablet Take 2 Tablets by mouth every six (6) hours as needed for Pain or Fever. No current facility-administered medications for this visit. I appreciate your assistance in Mr. Reyna's care  and look forward to your findings and recommendations.         Sincerely,      Volodymyr Don MD

## 2022-05-17 ENCOUNTER — NURSE TRIAGE (OUTPATIENT)
Dept: OTHER | Facility: CLINIC | Age: 87
End: 2022-05-17

## 2022-05-17 ENCOUNTER — TELEPHONE (OUTPATIENT)
Dept: INTERNAL MEDICINE CLINIC | Age: 87
End: 2022-05-17

## 2022-05-17 NOTE — TELEPHONE ENCOUNTER
T/C to Nurse Tomi Abdul at [facility] regarding request for refills on Loratadine, Eliquis, Multivitamin. Dr. Julia Esteban is not the ordering physician. No answer and LVM.

## 2022-05-17 NOTE — TELEPHONE ENCOUNTER
Received call from Jordi Grier at [facility] with [employer]. Subjective: Caller states \"Swelling in the feet\"     Current Symptoms:   Swelling to bilateral feet and ankles  Irregular pulse- patient states his physical therapist told him that he is in A-fib but that it isn't \"severe\". Denies CP, dizziness or SOB    Hx A-fib    Onset: 2 days ago    Pain Severity: 0/10    Temperature: Denies    Recommended disposition: See PCP within 3 Days    Care advice provided, patient verbalizes understanding; denies any other questions or concerns; instructed to call back for any new or worsening symptoms. Patient/Caller agrees with recommended disposition; writer provided warm transfer to [facility] at [facility] for appointment scheduling    Attention Provider: Thank you for allowing me to participate in the care of your patient. The patient was connected to triage in response to information provided to the [facility]. Please do not respond through this encounter as the response is not directed to a shared pool.     Reason for Disposition   [1] MILD swelling of both ankles (i.e., pedal edema) AND [2] new-onset or worsening    Protocols used: LEG SWELLING AND EDEMA-ADULT-

## 2022-05-17 NOTE — TELEPHONE ENCOUNTER
Patient is concerned that he is experiencing issues from atrial fibrillation which is also causing swelling in his feet. PSR cannot find anything for almost a month. Patient would like to know if the nurse could work him in before Dr. Lianna Quijano first opening on June 10th. Please call back and advise.

## 2022-05-18 ENCOUNTER — CLINICAL SUPPORT (OUTPATIENT)
Dept: CARDIOLOGY CLINIC | Age: 87
End: 2022-05-18

## 2022-05-18 ENCOUNTER — OFFICE VISIT (OUTPATIENT)
Dept: CARDIOLOGY CLINIC | Age: 87
End: 2022-05-18
Payer: MEDICARE

## 2022-05-18 VITALS
SYSTOLIC BLOOD PRESSURE: 90 MMHG | HEIGHT: 71 IN | WEIGHT: 210 LBS | OXYGEN SATURATION: 96 % | RESPIRATION RATE: 16 BRPM | BODY MASS INDEX: 29.4 KG/M2 | DIASTOLIC BLOOD PRESSURE: 50 MMHG | HEART RATE: 66 BPM

## 2022-05-18 DIAGNOSIS — I48.0 PAROXYSMAL ATRIAL FIBRILLATION (HCC): Primary | ICD-10-CM

## 2022-05-18 DIAGNOSIS — I45.4 IVCD (INTRAVENTRICULAR CONDUCTION DEFECT): ICD-10-CM

## 2022-05-18 DIAGNOSIS — E78.5 DYSLIPIDEMIA: ICD-10-CM

## 2022-05-18 DIAGNOSIS — R25.9 PARKINSONIAN FEATURES: ICD-10-CM

## 2022-05-18 DIAGNOSIS — I10 ESSENTIAL HYPERTENSION: ICD-10-CM

## 2022-05-18 PROCEDURE — 93225 XTRNL ECG REC<48 HRS REC: CPT | Performed by: SPECIALIST

## 2022-05-18 PROCEDURE — G9717 DOC PT DX DEP/BP F/U NT REQ: HCPCS | Performed by: SPECIALIST

## 2022-05-18 PROCEDURE — 93227 XTRNL ECG REC<48 HR R&I: CPT | Performed by: SPECIALIST

## 2022-05-18 PROCEDURE — G8417 CALC BMI ABV UP PARAM F/U: HCPCS | Performed by: SPECIALIST

## 2022-05-18 PROCEDURE — G8427 DOCREV CUR MEDS BY ELIG CLIN: HCPCS | Performed by: SPECIALIST

## 2022-05-18 PROCEDURE — 93000 ELECTROCARDIOGRAM COMPLETE: CPT | Performed by: SPECIALIST

## 2022-05-18 PROCEDURE — 99215 OFFICE O/P EST HI 40 MIN: CPT | Performed by: SPECIALIST

## 2022-05-18 PROCEDURE — G8536 NO DOC ELDER MAL SCRN: HCPCS | Performed by: SPECIALIST

## 2022-05-18 PROCEDURE — 1101F PT FALLS ASSESS-DOCD LE1/YR: CPT | Performed by: SPECIALIST

## 2022-05-18 NOTE — TELEPHONE ENCOUNTER
Spoke with Edward/Son (HIPPA VERIFIED) in regards to patients experiencing issues from atrial fibrillation which is also causing swelling in his feet. Advised that we recently received a request for refills for Eliquis, Loratadine and MVI from [address] and that Dr. Quang Velasquez did not prescribe these medications and that patient should contact his Cardiologist for any refills or issues cardiac related. The son states understanding and states he will contact his Dad's cardiologist. Grateful for the call.

## 2022-05-18 NOTE — PROGRESS NOTES
Willie Ramon .     7/15/1931       Chandler Gregory MD, [facility]  Date of Visit-5/18/2022   PCP is Laura Amaro MD   [facility]  [facility]  HPI:  Edson Burgess. is a 80 y.o. male   6 month f/u of   · PAF   · HTN. · Frequent falls, dementia,  · bladder cancer. · Pt hospitalized in August 2021 - CR and Rhabdo with dehydration. An echo showed normal EF    Today. .. When seen in November because of falls, I stopped his flecainide and warfarin and continued him on metoprolol and ASA. He was admitted 11/21/21 with confusion and BP of 170. CT head was negative. Had CR, which was all with fluids. Pt went to the ER on 1/13/22  with another fall and head injury, and then again 1/29/22 . On 3/6/22 admitted with further Bradycardia , , and thought to have ischemia, and given TPA on 3/7/22. Due to bradycardia his Toprol was stopped. He's seeing Dr. Julia Ojeda 45 regularly and notes reviewed. EF remained normal.   Has had significant issues with Diarrhea. Now on Sinemet for Parkinson's. Moved with wife to a new facility    BP is low at 90/50. Pt is accompanied by his son and ambulates with a walker. Pt notes that he is not dizzy but is tired and fatigued, and is concerned about falling in the office today. He notes feeling better after leaving from his last admission. He reports feeling weaker and noticed a change this week when with the therapist.   Pt says he feels some dizziness upon standing. Denies chest pain, edema, syncope or shortness of breath at rest  + heart palpitations   + lower extremity edema      EKG: WAP vs.Possible atrial fibrillation  there are multiple areas that might look like different P wave morphologies like wandering atrial pacemaker but not clear to see if there are sinus beats or certainly many PACs with different P wave morphologies or  this is atrial fibrillation. It is certainly irregular with a rate of 66.   The QTC is 396 the QRS is 102 with a narrow complex and the narrow and the ventricular complexes all look the same    Assessment/Plan:     Patient Instructions   Please stop your Amlodipine. We will see you back in 3 weeks. Check holter, consider pacer/ablation  Would admit due to low bp if he would agree but he declines and understands risk    1. Paroxysmal atrial fibrillation (HCC)  He is in undetermined rhythm, possibly atrial fibrillation   He has  felt badly for a week  Due to ataxia he had been taken off flecainide  I think any antiarrhythmic drug difficult for him and amiodarone  will worsen ataxia and complicate his Parkinson's. Will speak to EP about pacemaker and AV ellis ablation. Will get 48 holter to assess rhythm. EF earlier this year normal.   Does not want to go to the hospital.   Will get blood work which probably can be done by home health as transportation is issue and they need to leave office . BP low today, check orthostatics, and dc Amlodipine. Continue ASA, no OAC due to falls    F/u 2 weeks. 03/06/22    ECHO ADULT COMPLETE 03/08/2022 3/8/2022    Interpretation Summary    Left Ventricle: Left ventricle size is normal. Mildly increased wall thickness. Normal wall motion. Normal left ventricular systolic function with a visually estimated EF of 55 - 60%. Diastolic dysfunction present with normal LV EF.   Right Ventricle: Not well visualized.   Mitral Valve: Mildly thickened leaflet. Mild annular calcification of the mitral valve.   Aorta: Mildly dilated sinus of Valsalva. Ao Root diameter is 3.9 cm.       2. IVCD (intraventricular conduction defect)  QRS now normal off flecainide    3.  Essential hypertension  At risk for falls and low bp  bp 120/70 on recheck after initial 90  Did not change on sitting up, did not feel he was safe to stand   BP Readings from Last 6 Encounters:   05/18/22 (!) 90/50   03/31/22 (!) 154/74   03/14/22 (!) 121/90   02/07/22 (!) 152/85   01/29/22 (!) 160/90   01/25/22 131/71      Key CAD CHF Meds             irbesartan (AVAPRO) 150 mg tablet (Taking) TAKE 1 TABLET BY MOUTH TWO (2) TIMES A DAY. INCREASED DOSE 1/9/22    atorvastatin (LIPITOR) 40 mg tablet (Taking) Take 1 Tablet by mouth nightly. Indications: high cholesterol    colestipoL (COLESTID) 1 gram tablet (Taking) Take 1 Tablet by mouth daily. Indications: for diarrhea    aspirin 81 mg chewable tablet (Taking) Take 1 Tablet by mouth daily. Indications: treatment to prevent a heart attack          4. Dyslipidemia  At goal , denies excess muscle aches or new liver issues  Key Antihyperlipidemia Meds             atorvastatin (LIPITOR) 40 mg tablet (Taking) Take 1 Tablet by mouth nightly. Indications: high cholesterol    colestipoL (COLESTID) 1 gram tablet (Taking) Take 1 Tablet by mouth daily. Indications: for diarrhea         Lab Results   Component Value Date/Time    LDL, calculated 105.6 (H) 03/08/2022 02:38 AM       5. Parkinsons features  On Sinemet, has expressive facies today, gait is abnormal, fu Dr Sadaf White      F/u in 2-3 weeks     Impression:   1. Paroxysmal atrial fibrillation (HCC)    2. IVCD (intraventricular conduction defect)    3. Essential hypertension    4. Dyslipidemia       Cardiac History:   ECHO 9-10-14=  Left ventricle: Systolic function was normal by EF (biplane method of  disks). Ejection fraction was estimated to be 66 %. There were no regional  wall motion abnormalities. Doppler parameters were consistent with  abnormal left ventricular relaxation (grade 1 diastolic dysfunction). Left atrium: The atrium was mildly to moderately dilated     Future Appointments   Date Time Provider Ezekiel Munoz   8/4/2022 11:00 AM MD RADHAMES [facility] AMB        Patient Care Team:  Caprice Marquez MD as PCP - General (Internal Medicine Physician)  Caprice Marquez MD as [facility]CP [facility] Provider    ROS-except as noted above. . A complete cardiac and respiratory are reviewed and negative except as above ; Resp-denies wheezing  or productive cough,. Const- No unusual weight loss or fever; Neuro-no recent seizure or CVA ; GI- No BRBPR, abdom pain, bloating ; - no  hematuria   see supplement sheet, initialed and to be scanned by staff  Past Medical History:   Diagnosis Date    Advanced care planning/counseling discussion 10/8/2015    Basal cell carcinoma, face     now sees Dr. Ashley Woods.  Bladder cancer [facility]) 2012    Dr. Shira Cornell. cyto 9/2014, 2/2016. s/p surgery, BCG irrigation [place]. saw Dr. Carpio July BPH with obstruction/lower urinary tract symptoms     Chronic lower back pain     injection in [place]. saw Dr. Gwyn Contreras Chronic neck pain     limited motion to side    Depression, major     taking sertaline since before 2014    Dupuytren's contracture of left hand     5th finger    Encephalopathy 8/2021, 11/21/21    admitted     Epistaxis 2013    saw ENT at [address] [place] [address] history of skin cancer     Fecal incontinence     with loose stools    Hearing loss     has hearing aids    HTN (hypertension)     Insomnia     IVCD (intraventricular conduction defect) 11/30/2015    Dr Jamila Kellogg    Loose stools 7/18/2014    MRI contraindicated due to metal implant     has scleral buckle    Parkinsonian features 3/10/2022    Paroxysmal atrial fibrillation (HCC)     EF 66%, 1-2+ LAE on flecainide;Dr. Jamila Kellogg. saw Dr. Rema Roberts at [facility] P Peripheral neuropathy     severe foot neuropathy    RLS (restless legs syndrome) 7/3/2017    SCC (squamous cell carcinoma), face     prior hx, now sees Dr. Earlene Madrigal Seasonal allergic reaction     Skin cancer     SSS (sick sinus syndrome) ([address] 2022    Urothelial carcinoma of right distal ureter ([address] 5/1/15    excision Dr. Pratibha Chauhan. low grade, papillary. repeat 3 month      Social Hx= reports that he has quit smoking. He has never used smokeless tobacco. He reports previous alcohol use. He reports that he does not use drugs.      Exam and Labs:  BP (!) 90/50 Pulse 66   Resp 16   Ht 5' 11\" (1.803 m)   Wt 210 lb (95.3 kg)   SpO2 96%   BMI 29.29 kg/m² Constitutional:  NAD, comfortable  Head: NC,AT. Eyes: No scleral icterus. Neck:  Neck supple. No JVD present. Throat: moist mucous membranes. Chest: Effort normal & normal respiratory excursion . Neurological: alert, conversant and oriented . Skin: Skin is not cold. No obvious systemic rash noted. Not diaphoretic. No erythema. Psychiatric:  Grossly normal mood and affect. Behavior appears normal. Extremities:  no clubbing or cyanosis. Abdomen: non distended    Lungs:breath sounds normal. No stridor. distress, wheezes or  Rales. Heart: normal rate, regular rhythm, normal S1, S2, no murmurs, rubs, clicks or gallops , PMI non displaced. Edema: Edema is none. Lab Results   Component Value Date/Time    Cholesterol, total 172 03/08/2022 02:38 AM    HDL Cholesterol 53 03/08/2022 02:38 AM    LDL, calculated 105.6 (H) 03/08/2022 02:38 AM    Triglyceride 67 03/08/2022 02:38 AM    CHOL/HDL Ratio 3.2 03/08/2022 02:38 AM     Lab Results   Component Value Date/Time    Sodium 140 03/13/2022 04:10 AM    Potassium 3.8 03/13/2022 04:10 AM    Chloride 112 (H) 03/13/2022 04:10 AM    CO2 22 03/13/2022 04:10 AM    Anion gap 6 03/13/2022 04:10 AM    Glucose 107 (H) 03/13/2022 04:10 AM    BUN 38 (H) 03/13/2022 04:10 AM    Creatinine 1.65 (H) 03/13/2022 04:10 AM    BUN/Creatinine ratio 23 (H) 03/13/2022 04:10 AM    GFR est AA 48 (L) 03/13/2022 04:10 AM    GFR est non-AA 39 (L) 03/13/2022 04:10 AM    Calcium 8.8 03/13/2022 04:10 AM      Wt Readings from Last 3 Encounters:   05/18/22 210 lb (95.3 kg)   03/31/22 199 lb 3.2 oz (90.4 kg)   03/14/22 201 lb 11.5 oz (91.5 kg)      BP Readings from Last 3 Encounters:   05/18/22 (!) 90/50   03/31/22 (!) 154/74   03/14/22 (!) 121/90      Current Outpatient Medications   Medication Sig    irbesartan (AVAPRO) 150 mg tablet TAKE 1 TABLET BY MOUTH TWO (2) TIMES A DAY.  INCREASED DOSE 1/9/22    sertraline (ZOLOFT) 50 mg tablet Take 1 Tablet by mouth daily. Decreased 3/31/22    carbidopa-levodopa (SINEMET)  mg per tablet Take 1 Tablet by mouth three (3) times daily. Indications: a type of movement disorder called parkinsonism    amLODIPine (NORVASC) 10 mg tablet Take 1 Tablet by mouth daily. Indications: high blood pressure    atorvastatin (LIPITOR) 40 mg tablet Take 1 Tablet by mouth nightly. Indications: high cholesterol    cholecalciferol (VITAMIN D3) (50,000 UNITS /1250 MCG) capsule Take 1 Capsule by mouth every seven (7) days. Indications: low vitamin D levels    colestipoL (COLESTID) 1 gram tablet Take 1 Tablet by mouth daily. Indications: for diarrhea    finasteride (PROSCAR) 5 mg tablet Take 1 Tablet by mouth daily. Indications: enlarged prostate with urination problem    aspirin 81 mg chewable tablet Take 1 Tablet by mouth daily. Indications: treatment to prevent a heart attack    acetaminophen (TYLENOL) 500 mg tablet Take 2 Tablets by mouth two (2) times a day. Indications: pain associated with arthritis, backache    cyanocobalamin 1,000 mcg tablet Take 1 Tablet by mouth daily.  acetaminophen (TYLENOL) 325 mg tablet Take 2 Tablets by mouth every six (6) hours as needed for Pain or Fever. No current facility-administered medications for this visit. Impression see above.       Written by Dulce Alberts, as dictated by Loulou Tovar MD.

## 2022-05-18 NOTE — TELEPHONE ENCOUNTER
Spoke with Shari/Nurse at [facility] and updated that Dr. Froilan Melendez did not order the requested refill for Eliquis, Loratadine, and MVI. Advised that they should contact patients Cardiologist in regards to these medications. She suggests that they may have been ordered when he was on the SNF unit (skilled care). Dr. Froilan Melendez notified.

## 2022-05-18 NOTE — TELEPHONE ENCOUNTER
T/C to [employer] Nurse/Shari. Regarding patients medication Eliquis. Dr. Vernon Leger states he should not be taking Eliquis with his high risk for falls and head injuries. No answer and LVM.

## 2022-05-18 NOTE — Clinical Note
5/19/2022    Patient: Bonnie Arshad. YOB: 1931   Date of Visit: 5/18/2022     Stephany Awan, [address] [facility]  Via In Basket    Dear Stephany Awan MD,      Thank you for referring . Mirna Holman to [facility] for evaluation. My notes for this consultation are attached. If you have questions, please do not hesitate to call me. I look forward to following your patient along with you.       Sincerely,    Charlette Patterson MD

## 2022-05-18 NOTE — TELEPHONE ENCOUNTER
These meds were NOT prescribed by SNF since I saw him after that and these meds were not on his med list.      He should NOT taking Eliquis due to falls with injuries and high risk of head injury. So, please make sure he is not taking Eliquis.

## 2022-05-23 ENCOUNTER — TELEPHONE (OUTPATIENT)
Dept: CARDIOLOGY CLINIC | Age: 87
End: 2022-05-23

## 2022-05-23 NOTE — TELEPHONE ENCOUNTER
Patient following up on prev conversation about someone coming to his residence to take a blood sample. He hasn't heard anything in regards to it. Please advise.      [phone]

## 2022-05-24 NOTE — TELEPHONE ENCOUNTER
No return call from director. Called facilty and after being transferred several times spoke to [address] [facility] who states they can draw the labs tomorrow and fax us results. Faxed lab slip to [phone] per her request. Received confirmation.

## 2022-05-24 NOTE — TELEPHONE ENCOUNTER
Left voicemail for patient to call back and schedule their MWV.    Pt calling back about below note.  Pt lives at [facility].     [phone]

## 2022-05-26 LAB — CREATININE, EXTERNAL: 1.3

## 2022-05-26 NOTE — TELEPHONE ENCOUNTER
Verified patient with two types of identifiers. Spoke to patient to check and make sure he had had labs completed. He reports they were indeed drawn yesterday. Will update him with results.

## 2022-06-02 ENCOUNTER — OFFICE VISIT (OUTPATIENT)
Dept: CARDIOLOGY CLINIC | Age: 87
End: 2022-06-02
Payer: MEDICARE

## 2022-06-02 VITALS
BODY MASS INDEX: 29.26 KG/M2 | WEIGHT: 209 LBS | HEIGHT: 71 IN | HEART RATE: 84 BPM | RESPIRATION RATE: 18 BRPM | OXYGEN SATURATION: 96 % | SYSTOLIC BLOOD PRESSURE: 142 MMHG | DIASTOLIC BLOOD PRESSURE: 80 MMHG

## 2022-06-02 DIAGNOSIS — I48.0 PAROXYSMAL ATRIAL FIBRILLATION (HCC): Primary | ICD-10-CM

## 2022-06-02 DIAGNOSIS — N18.30 STAGE 3 CHRONIC KIDNEY DISEASE, UNSPECIFIED WHETHER STAGE 3A OR 3B CKD (HCC): ICD-10-CM

## 2022-06-02 DIAGNOSIS — I45.4 IVCD (INTRAVENTRICULAR CONDUCTION DEFECT): ICD-10-CM

## 2022-06-02 DIAGNOSIS — I10 ESSENTIAL HYPERTENSION: ICD-10-CM

## 2022-06-02 DIAGNOSIS — R53.1 WEAKNESS GENERALIZED: ICD-10-CM

## 2022-06-02 DIAGNOSIS — E78.5 DYSLIPIDEMIA: ICD-10-CM

## 2022-06-02 PROCEDURE — G9717 DOC PT DX DEP/BP F/U NT REQ: HCPCS | Performed by: SPECIALIST

## 2022-06-02 PROCEDURE — G8427 DOCREV CUR MEDS BY ELIG CLIN: HCPCS | Performed by: SPECIALIST

## 2022-06-02 PROCEDURE — 1101F PT FALLS ASSESS-DOCD LE1/YR: CPT | Performed by: SPECIALIST

## 2022-06-02 PROCEDURE — G8536 NO DOC ELDER MAL SCRN: HCPCS | Performed by: SPECIALIST

## 2022-06-02 PROCEDURE — 1123F ACP DISCUSS/DSCN MKR DOCD: CPT | Performed by: SPECIALIST

## 2022-06-02 PROCEDURE — 99214 OFFICE O/P EST MOD 30 MIN: CPT | Performed by: SPECIALIST

## 2022-06-02 PROCEDURE — G8417 CALC BMI ABV UP PARAM F/U: HCPCS | Performed by: SPECIALIST

## 2022-06-02 RX ORDER — ERGOCALCIFEROL 1.25 MG/1
CAPSULE ORAL
COMMUNITY
Start: 2022-05-24 | End: 2022-09-15 | Stop reason: SDUPTHER

## 2022-06-02 NOTE — Clinical Note
6/2/2022    Patient: Bernice Menard. YOB: 1931   Date of Visit: 6/2/2022     Chandra Chance, [address]  Via In [facility] [address]    Dear Chandra Chance MD,      Thank you for referring Mr. Rodger Nogueira to [facility] for evaluation. My notes for this consultation are attached. If you have questions, please do not hesitate to call me. I look forward to following your patient along with you.       Sincerely,    Jemal Sin MD

## 2022-06-07 ENCOUNTER — TELEPHONE (OUTPATIENT)
Dept: INTERNAL MEDICINE CLINIC | Age: 87
End: 2022-06-07

## 2022-06-07 RX ORDER — NIRMATRELVIR AND RITONAVIR 300-100 MG
2 KIT ORAL EVERY 12 HOURS
Qty: 1 BOX | Refills: 0 | Status: SHIPPED | OUTPATIENT
Start: 2022-06-07 | End: 2022-06-08 | Stop reason: SDUPTHER

## 2022-06-07 NOTE — TELEPHONE ENCOUNTER
I spoke with patient to advise of pcp's message, he verbalized understanding and states at this time he only has a hoarse voice. I spoke with pts son to advise him of the below message, he will  rx for pt. Son states wife and child have tested positive as well, they were all together Saturday.

## 2022-06-07 NOTE — TELEPHONE ENCOUNTER
Paxlovid (renal dose) sent to [facility]. The other [facility] he uses does not have it.   No other eval needed unless SOB, high fever, poor oral intake, confusion, hypoxia or worsening s/s

## 2022-06-07 NOTE — TELEPHONE ENCOUNTER
Chirag velasquez [place] called to make our office aware that the patient has just tested positive for Covid. If the doctor has any medication he wanted to send in please fax it to [facility] at [phone]. This pharmacy is not listed in the patient's chart.

## 2022-06-08 RX ORDER — NIRMATRELVIR AND RITONAVIR 300-100 MG
2 KIT ORAL EVERY 12 HOURS
Qty: 1 BOX | Refills: 0 | Status: SHIPPED | OUTPATIENT
Start: 2022-06-08 | End: 2022-06-13

## 2022-06-08 NOTE — TELEPHONE ENCOUNTER
I have personally printed and faxed order to the number provided now. I assume they must have Paxlovid in stock if they are asking.

## 2022-06-08 NOTE — TELEPHONE ENCOUNTER
Patient's daughter called in to ask if the prescription could be sent not to [facility] but to [address] [place] [address] located inside the [facility]. The patient and his immediate family have Covid so no one is able to go to the [facility] to pick it up. The pharmacy's phone number that they want it sent to is [phone].

## 2022-06-09 NOTE — TELEPHONE ENCOUNTER
Spoke with Daughter Shirley Moreau and she requests that the script for Paxlovid for her Dad [facility] -faxed to [phone]. Grateful for the call.

## 2022-06-11 PROBLEM — N18.30 CHRONIC RENAL DISEASE, STAGE III (HCC): Status: ACTIVE | Noted: 2022-06-11

## 2022-08-31 NOTE — TELEPHONE ENCOUNTER
Received call from Manolo cm at [facility], caller not on line.      Complaint:    Extreme fatigue and trouble walking     /113 on Saturday 1/1/21 called ambulance, but pt refused to go to hospital    Family is unable to check BP - cuff is broken - Home care therapist arriving at 11 am will be able to check at that time    Market: Ezekiel Nobles Name: [facility]    Caller's telephone number verified as [phone]    Unsuccessful attempt to re-connect with caller via phone, left message for return call to office    Reason for Disposition   Message left on identified voicemail    Protocols used: NO CONTACT OR DUPLICATE CONTACT CALL-ADULT-[place] 36.6

## 2022-09-06 NOTE — PROGRESS NOTES
Savanna Frye.     7/15/1931         Date of Visit-9/8/2022   PCP is Ming Soto MD   Cardiologist: Lulu Morrison. Art Espino MD, [address] [facility]  [facility]  HPI:  Savanna Frye. is a 80 y.o. male   3 month follow up for    PAF   HTN. Frequent falls, dementia  Bladder cancer. Pt hospitalized in August 2021 - CR and Rhabdo with dehydration. An echo 8/30/21 showed normal EF  Echo 3/8/22 EF 55-60%, WNL  Carotids 8/29/21 minimal MASOUD, no LICA stenosis    When seen in November 2021 because of falls, Dr. Art Espino stopped his flecainide and warfarin and continued him on metoprolol and ASA. He was admitted 11/21/21 with confusion and BP of 170. CT head was negative. Had CR, which improved with fluids. Pt went to the ER on 1/13/22  with another fall and head injury, and then again 1/29/22 . On 3/6/22 admitted with further Bradycardia , SBP 200mmhg, and thought to have ischemia, and given TPA on 3/7/22. Due to bradycardia his Toprol was stopped. EF remained normal.   Had significant issues with Diarrhea. Now on Sinemet for Parkinson's. Wife in SNF, he lives in assisted living now   Tried to send him to hospital for worsening weaknesss and WAP vs AFib last OV but he refused, son was with him, now feeling much better, bp was low last OV   Last visit we stopped his Amlodipine. Had low BP and pt has Afib. We did a 48 hour Holter was applied that showed 100% sinus, from 5/18 to 5/20. HR average 69, kennedi at 16%, lowest at rate of 45. Pt had 18% SVE's and 0.3% PVC's. BP last visit was 90/50       Today. ..   He is feeling well, c/o back pain   BP well controlled, heart rate is WNL  At a previous visit amlodipine was stopped, today it is listed on his medication list  He is not sure whether he is taking it or not, facility gives him his medications  I asked him to check when he gets home and let me know   He denies any palpitations, chest pain or dyspnea with exertion  Denies any LE edema   Ambulates with a cane   His weight is up 12 lbs, says he is eating more and less active and I advised him to try to be a little more active and watch what he eats    Assessment/Plan:         1. Paroxysmal atrial fibrillation (HCC)  Rate controlled, [place] [facility] [place] Bhupinder Spence due to concern for bleeding risks with multiple falls, continue ASA, has not had a fall since the spring time   Prior November we stopped flecainide and warfarin due to falls and his Toprol XL was stopped in March. Rhythm on Holter monitor was sinus with frequent PAC's. Does not need a pacemaker or ablation at this point  He will follow up with Dr. Bhupinder Spence again in December 03/06/22    ECHO ADULT COMPLETE Interpretation Summary    Left Ventricle: Left ventricle size is normal. Mildly increased wall thickness. Normal wall motion. Normal left ventricular systolic function with a visually estimated EF of 55 - 60%. Diastolic dysfunction present with normal LV EF. Right Ventricle: Not well visualized. Mitral Valve: Mildly thickened leaflet. Mild annular calcification of the mitral valve. Aorta: Mildly dilated sinus of Valsalva. Ao Root diameter is 3.9 cm.    2. IVCD (intraventricular conduction defect)  QRS now normal off flecainide. 3. Essential hypertension  BP well controlled, on irbesartan, unsure if he is still taking amlodipine   He will check when he gets home and let me know     4. Dyslipidemia  At goal , denies excess muscle aches or new liver issues  Key Antihyperlipidemia Meds               atorvastatin (LIPITOR) 40 mg tablet (Taking) Take 1 Tablet by mouth nightly. Indications: high cholesterol    colestipoL (COLESTID) 1 gram tablet (Taking) Take 1 Tablet by mouth daily. Indications: for diarrhea           Lab Results   Component Value Date/Time    LDL, calculated 105.6 (H) 03/08/2022 02:38 AM      5. Weakness  Likely due to hypotension previously    6.  CKD 3   prev CR with diarrhea, baseline 1.27-1.65 range, peak was 1.92 in Nov with dehydration, now creatinine 1.6     Lab Results   Component Value Date/Time    Creatinine 1.65 (H) 03/13/2022 04:10 AM        Patient Instructions   Please find out if you are still taking amlodipine or not and let us know    Please call Dr. Michelle Bird office to make an appointment about your back pain        Impression:   1. Paroxysmal atrial fibrillation (HCC)    2. Essential hypertension    3. Stage 3b chronic kidney disease ([address]    4. IVCD (intraventricular conduction defect)    5. Dyslipidemia         Cardiac History:   ECHO 9-10-14=  Left ventricle: Systolic function was normal by EF (biplane method of  disks). Ejection fraction was estimated to be 66 %. There were no regional  wall motion abnormalities. Doppler parameters were consistent with  abnormal left ventricular relaxation (grade 1 diastolic dysfunction). Left atrium: The atrium was mildly to moderately dilated     Future Appointments   Date Time Provider Ezekiel Munoz   12/1/2022  1:00 PM Cedric Chapa MD [facility]        Patient Care Team:  Jero Haas MD as PCP - General (Internal Medicine Physician)  Jero Haas MD as PCP - [facility]  Cedric Chapa MD as Consulting Provider (Cardiovascular Disease Physician)    ROS-except as noted above. . A complete cardiac and respiratory are reviewed and negative except as above ; Resp-denies wheezing  or productive cough,. Const- No unusual weight loss or fever; Neuro-no recent seizure or CVA ; GI- No BRBPR, abdom pain, bloating ; - no  hematuria     Past Medical History:   Diagnosis Date    Advanced care planning/counseling discussion 10/8/2015    Basal cell carcinoma, face     now sees Dr. Bib Thapa. Bladder cancer [facility]) 2012    Dr. Gerry Gillespie. cyto 9/2014, 2/2016. s/p surgery, BCG irrigation [place]. saw Dr. Angela Dominguez    BPH with obstruction/lower urinary tract symptoms     Chronic lower back pain     injection in [place].   saw  [facility]    Chronic neck pain     limited motion to side    Depression, major     taking sertaline since before 2014    Dupuytren's contracture of left hand     5th finger    Encephalopathy 8/2021, 11/21/21    admitted     Epistaxis 2013    saw ENT at [facility] history of skin cancer     Fecal incontinence     with loose stools    Hearing loss     has hearing aids    HTN (hypertension)     Insomnia     IVCD (intraventricular conduction defect) 11/30/2015    Dr Bhupinder Spence    Loose stools 7/18/2014    MRI contraindicated due to metal implant     has scleral buckle    Parkinsonian features 3/10/2022    Paroxysmal atrial fibrillation (HCC)     EF 66%, 1-2+ LAE on flecainide;Dr. Bhupinder Spence. saw Dr. Harpreet Mack at [facility]    Peripheral neuropathy     severe foot neuropathy    RLS (restless legs syndrome) 7/3/2017    SCC (squamous cell carcinoma), face     prior hx, now sees Dr. Emery Burns allergic reaction     Skin cancer     SSS (sick sinus syndrome) ([facility] 2022    Urothelial carcinoma of right distal ureter ([facility] 5/1/15    excision Dr. Pastora Silva. low grade, papillary. repeat 3 month      Social Hx= reports that he has quit smoking. He has never used smokeless tobacco. He reports current alcohol use of about 3.0 standard drinks per week. He reports that he does not use drugs. Exam and Labs:  /80 (BP 1 Location: Left upper arm, BP Patient Position: Sitting)   Pulse 83   Resp 16   Ht 5' 11\" (1.803 m)   Wt 221 lb (100.2 kg)   SpO2 97%   BMI 30.82 kg/m² Constitutional:  NAD, comfortable  Head: [place],AT. Eyes: No scleral icterus. Neck:  Neck supple. No JVD present. Throat: moist mucous membranes. Chest: Effort normal & normal respiratory excursion . Neurological: alert, conversant and oriented . Skin: Skin is not cold. No obvious systemic rash noted. Not diaphoretic. No erythema. Psychiatric:  Grossly normal mood and affect. Behavior appears normal. Extremities:  no clubbing or cyanosis.  Abdomen: + hernia (chronic )    Lungs:breath sounds normal. No stridor. distress, wheezes or   rales   Heart: normal rate, irregular rhythm, normal S1, S2, no murmurs, rubs, clicks or gallops , PMI non displaced. Edema: Edema is trace bilaterally     Lab Results   Component Value Date/Time    Cholesterol, total 172 03/08/2022 02:38 AM    HDL Cholesterol 53 03/08/2022 02:38 AM    LDL, calculated 105.6 (H) 03/08/2022 02:38 AM    Triglyceride 67 03/08/2022 02:38 AM    CHOL/HDL Ratio 3.2 03/08/2022 02:38 AM     Lab Results   Component Value Date/Time    Sodium 140 03/13/2022 04:10 AM    Potassium 3.8 03/13/2022 04:10 AM    Chloride 112 (H) 03/13/2022 04:10 AM    CO2 22 03/13/2022 04:10 AM    Anion gap 6 03/13/2022 04:10 AM    Glucose 107 (H) 03/13/2022 04:10 AM    BUN 38 (H) 03/13/2022 04:10 AM    Creatinine 1.65 (H) 03/13/2022 04:10 AM    BUN/Creatinine ratio 23 (H) 03/13/2022 04:10 AM    GFR est AA 48 (L) 03/13/2022 04:10 AM    GFR est non-AA 39 (L) 03/13/2022 04:10 AM    Calcium 8.8 03/13/2022 04:10 AM      Wt Readings from Last 3 Encounters:   09/08/22 221 lb (100.2 kg)   06/02/22 209 lb (94.8 kg)   05/18/22 210 lb (95.3 kg)      BP Readings from Last 3 Encounters:   09/08/22 136/80   06/02/22 (!) 142/80   05/18/22 (!) 90/50      Current Outpatient Medications   Medication Sig    amLODIPine (NORVASC) 10 mg tablet     ergocalciferol (ERGOCALCIFEROL) 1,250 mcg (50,000 unit) capsule     irbesartan (AVAPRO) 150 mg tablet TAKE 1 TABLET BY MOUTH TWO (2) TIMES A DAY. INCREASED DOSE 1/9/22    sertraline (ZOLOFT) 50 mg tablet Take 1 Tablet by mouth daily. Decreased 3/31/22    carbidopa-levodopa (SINEMET)  mg per tablet Take 1 Tablet by mouth three (3) times daily. Indications: a type of movement disorder called parkinsonism    atorvastatin (LIPITOR) 40 mg tablet Take 1 Tablet by mouth nightly. Indications: high cholesterol    cholecalciferol (VITAMIN D3) (50,000 UNITS /1250 MCG) capsule Take 1 Capsule by mouth every seven (7) days. Indications: low vitamin D levels    colestipoL (COLESTID) 1 gram tablet Take 1 Tablet by mouth daily. Indications: for diarrhea    finasteride (PROSCAR) 5 mg tablet Take 1 Tablet by mouth daily. Indications: enlarged prostate with urination problem    aspirin 81 mg chewable tablet Take 1 Tablet by mouth daily. Indications: treatment to prevent a heart attack    acetaminophen (TYLENOL) 500 mg tablet Take 2 Tablets by mouth two (2) times a day. Indications: pain associated with arthritis, backache     No current facility-administered medications for this visit.      DIANA Reynolds, [address] [facility]  [facility]  [address], [address] [facility] [address]  [facility], [address]  ) [phone] ([place]) [phone]

## 2022-09-08 ENCOUNTER — OFFICE VISIT (OUTPATIENT)
Dept: CARDIOLOGY CLINIC | Age: 87
End: 2022-09-08
Payer: MEDICARE

## 2022-09-08 VITALS
OXYGEN SATURATION: 97 % | HEIGHT: 71 IN | RESPIRATION RATE: 16 BRPM | SYSTOLIC BLOOD PRESSURE: 136 MMHG | DIASTOLIC BLOOD PRESSURE: 80 MMHG | HEART RATE: 83 BPM | WEIGHT: 221 LBS | BODY MASS INDEX: 30.94 KG/M2

## 2022-09-08 DIAGNOSIS — N18.32 STAGE 3B CHRONIC KIDNEY DISEASE (HCC): ICD-10-CM

## 2022-09-08 DIAGNOSIS — I10 ESSENTIAL HYPERTENSION: ICD-10-CM

## 2022-09-08 DIAGNOSIS — I48.0 PAROXYSMAL ATRIAL FIBRILLATION (HCC): Primary | ICD-10-CM

## 2022-09-08 DIAGNOSIS — I45.4 IVCD (INTRAVENTRICULAR CONDUCTION DEFECT): ICD-10-CM

## 2022-09-08 DIAGNOSIS — E78.5 DYSLIPIDEMIA: ICD-10-CM

## 2022-09-08 PROCEDURE — G8536 NO DOC ELDER MAL SCRN: HCPCS | Performed by: NURSE PRACTITIONER

## 2022-09-08 PROCEDURE — 99214 OFFICE O/P EST MOD 30 MIN: CPT | Performed by: NURSE PRACTITIONER

## 2022-09-08 PROCEDURE — 93000 ELECTROCARDIOGRAM COMPLETE: CPT | Performed by: NURSE PRACTITIONER

## 2022-09-08 PROCEDURE — G9717 DOC PT DX DEP/BP F/U NT REQ: HCPCS | Performed by: NURSE PRACTITIONER

## 2022-09-08 PROCEDURE — 1123F ACP DISCUSS/DSCN MKR DOCD: CPT | Performed by: NURSE PRACTITIONER

## 2022-09-08 PROCEDURE — G8417 CALC BMI ABV UP PARAM F/U: HCPCS | Performed by: NURSE PRACTITIONER

## 2022-09-08 PROCEDURE — 1101F PT FALLS ASSESS-DOCD LE1/YR: CPT | Performed by: NURSE PRACTITIONER

## 2022-09-08 PROCEDURE — G8427 DOCREV CUR MEDS BY ELIG CLIN: HCPCS | Performed by: NURSE PRACTITIONER

## 2022-09-08 RX ORDER — AMLODIPINE BESYLATE 10 MG/1
TABLET ORAL
COMMUNITY
Start: 2022-08-24 | End: 2022-10-11 | Stop reason: SDUPTHER

## 2022-09-08 NOTE — PATIENT INSTRUCTIONS
Please find out if you are still taking amlodipine or not and let us know    Please call Dr. Racheal Liang office to make an appointment about your back pain

## 2022-09-20 ENCOUNTER — TELEPHONE (OUTPATIENT)
Dept: INTERNAL MEDICINE CLINIC | Age: 87
End: 2022-09-20

## 2022-09-20 NOTE — TELEPHONE ENCOUNTER
----- Message from Jd Muñoz sent at 9/16/2022  4:16 PM EDT -----  Subject: Appointment Request    Reason for Call: Established Patient Appointment needed: Routine Existing   Condition Follow Up    QUESTIONS    Reason for appointment request? Available appointments did not meet   patient need     Additional Information for Provider? Patient is needing an appointment or   referral to someone pertaining to back pain. He also wants to speak to Dr. Daryle Reno regarding Gabapentin, and peripheral neuropathy in feet and toes. Please contact patient to advise.  Thank you.  ---------------------------------------------------------------------------  --------------  Albertina GOSS  [phone]; OK to leave message on voicemail  ---------------------------------------------------------------------------  --------------  SCRIPT ANSWERS  COVID Screen: Blair Kulkarni

## 2022-09-20 NOTE — TELEPHONE ENCOUNTER
Spoke with Edward/Son (HIPPA VERIFIED) and assisted with scheduling an appt for patient for reports of back pain worsening, neuropathy in feet worsening. Request for referral to be determined by Dr. Carley Garcia on visit. Scheduled for 9/28/22 At 0800 AM. He will notify his father. Grateful for the call.

## 2022-09-28 ENCOUNTER — TELEPHONE (OUTPATIENT)
Dept: INTERNAL MEDICINE CLINIC | Age: 87
End: 2022-09-28

## 2022-09-28 NOTE — TELEPHONE ENCOUNTER
Spoke with Edward/Son (MARIFER HERNANDEZ) and advised that a scheduling error has been made and that I would like to help him reschedule patients appt. Apologies give. He states he will call back later to reschedule.

## 2022-09-28 NOTE — TELEPHONE ENCOUNTER
Spoke with Ed/Son (HIPPA VERIFIED) and he has rescheduled his fathers appt for 10/11/22 at 56 AM. Grateful for the rtc.

## 2022-10-11 ENCOUNTER — OFFICE VISIT (OUTPATIENT)
Dept: INTERNAL MEDICINE CLINIC | Age: 87
End: 2022-10-11
Payer: MEDICARE

## 2022-10-11 ENCOUNTER — TELEPHONE (OUTPATIENT)
Dept: INTERNAL MEDICINE CLINIC | Age: 87
End: 2022-10-11

## 2022-10-11 VITALS
HEIGHT: 71 IN | OXYGEN SATURATION: 95 % | DIASTOLIC BLOOD PRESSURE: 68 MMHG | WEIGHT: 227 LBS | RESPIRATION RATE: 18 BRPM | BODY MASS INDEX: 31.78 KG/M2 | TEMPERATURE: 98 F | SYSTOLIC BLOOD PRESSURE: 130 MMHG | HEART RATE: 85 BPM

## 2022-10-11 DIAGNOSIS — I10 ESSENTIAL HYPERTENSION: ICD-10-CM

## 2022-10-11 DIAGNOSIS — M54.50 CHRONIC RIGHT-SIDED LOW BACK PAIN WITHOUT SCIATICA: Primary | ICD-10-CM

## 2022-10-11 DIAGNOSIS — E55.9 VITAMIN D DEFICIENCY: ICD-10-CM

## 2022-10-11 DIAGNOSIS — R25.9 PARKINSONIAN FEATURES: ICD-10-CM

## 2022-10-11 DIAGNOSIS — Z74.09 IMPAIRED FUNCTIONAL MOBILITY, BALANCE, GAIT, AND ENDURANCE: ICD-10-CM

## 2022-10-11 DIAGNOSIS — M54.2 CHRONIC NECK PAIN: ICD-10-CM

## 2022-10-11 DIAGNOSIS — G89.29 CHRONIC RIGHT-SIDED LOW BACK PAIN WITHOUT SCIATICA: Primary | ICD-10-CM

## 2022-10-11 DIAGNOSIS — G60.9 IDIOPATHIC PERIPHERAL NEUROPATHY: ICD-10-CM

## 2022-10-11 DIAGNOSIS — G89.29 CHRONIC NECK PAIN: ICD-10-CM

## 2022-10-11 DIAGNOSIS — G47.01 INSOMNIA DUE TO MEDICAL CONDITION: ICD-10-CM

## 2022-10-11 PROCEDURE — G9717 DOC PT DX DEP/BP F/U NT REQ: HCPCS | Performed by: INTERNAL MEDICINE

## 2022-10-11 PROCEDURE — G8427 DOCREV CUR MEDS BY ELIG CLIN: HCPCS | Performed by: INTERNAL MEDICINE

## 2022-10-11 PROCEDURE — G8417 CALC BMI ABV UP PARAM F/U: HCPCS | Performed by: INTERNAL MEDICINE

## 2022-10-11 PROCEDURE — 99214 OFFICE O/P EST MOD 30 MIN: CPT | Performed by: INTERNAL MEDICINE

## 2022-10-11 PROCEDURE — 1101F PT FALLS ASSESS-DOCD LE1/YR: CPT | Performed by: INTERNAL MEDICINE

## 2022-10-11 PROCEDURE — G8536 NO DOC ELDER MAL SCRN: HCPCS | Performed by: INTERNAL MEDICINE

## 2022-10-11 RX ORDER — ERGOCALCIFEROL 1.25 MG/1
CAPSULE ORAL
COMMUNITY
Start: 2022-09-25 | End: 2022-10-11

## 2022-10-11 RX ORDER — ERGOCALCIFEROL 1.25 MG/1
50000 CAPSULE ORAL
Qty: 4 CAPSULE | Refills: 5
Start: 2022-10-11

## 2022-10-11 RX ORDER — LANOLIN ALCOHOL/MO/W.PET/CERES
1000 CREAM (GRAM) TOPICAL DAILY
Qty: 90 TABLET | Refills: 1
Start: 2022-10-11

## 2022-10-11 RX ORDER — AMLODIPINE BESYLATE 10 MG/1
10 TABLET ORAL DAILY
Qty: 90 TABLET | Refills: 1 | Status: SHIPPED | OUTPATIENT
Start: 2022-10-11

## 2022-10-11 RX ORDER — LANOLIN ALCOHOL/MO/W.PET/CERES
CREAM (GRAM) TOPICAL
COMMUNITY
Start: 2022-09-25 | End: 2022-10-11

## 2022-10-11 NOTE — PROGRESS NOTES
Room: 12  Identified pt with two pt identifiers(name and ). Reviewed record in preparation for visit and have obtained necessary documentation. Chief Complaint   Patient presents with    Back Pain    Sleep Problem    Fatigue        Vitals:    10/11/22 0909   BP: (!) 169/83   Pulse: 85   Resp: 18   Temp: 98 °F (36.7 °C)   TempSrc: Oral   SpO2: 95%   Weight: 227 lb (103 kg)   Height: 5' 11\" (1.803 m)   PainSc:   8   PainLoc: Back       Health Maintenance Due   Topic    COVID-19 Vaccine (4 - Booster for Pfizer series)    Flu Vaccine (1)       1. \"Have you been to the ER, urgent care clinic since your last visit? Hospitalized since your last visit? \" No    2. \"Have you seen or consulted any other health care providers outside of the [address] [place] [address] since your last visit? \" No     3. For patients over 45: Has the patient had a colonoscopy? Yes - no Care Gap present     If the patient is female:    4. For patients over 36: Has the patient had a mammogram? NA - based on age    11. For patients over 21: Has the patient had a pap smear? NA - based on age    Current Outpatient Medications   Medication Instructions    acetaminophen (TYLENOL) 1,000 mg, Oral, 2 TIMES DAILY    amLODIPine (NORVASC) 10 mg tablet No dose, route, or frequency recorded. aspirin 81 mg, Oral, DAILY    atorvastatin (LIPITOR) 40 mg, Oral, EVERY BEDTIME    carbidopa-levodopa (SINEMET)  mg per tablet 1 Tablet, Oral, 3 TIMES DAILY    cholecalciferol (VITAMIN D3) 50,000 Units, Oral, EVERY 7 DAYS    colestipoL (COLESTID) 1 g, Oral, DAILY    cyanocobalamin 1,000 mcg tablet No dose, route, or frequency recorded. ergocalciferol (ERGOCALCIFEROL) 1,250 mcg (50,000 unit) capsule No dose, route, or frequency recorded.     finasteride (PROSCAR) 5 mg, Oral, DAILY    irbesartan (AVAPRO) 150 mg, Oral, 2 TIMES DAILY, Increased dose 22    sertraline (ZOLOFT) 50 mg, Oral, DAILY, Decreased 3/31/22       Allergies   Allergen Reactions    Trazodone Palpitations       Immunization History   Administered Date(s) Administered    COVID-19, PFIZER PURPLE top, DILUTE for use, (age 15 y+), IM, 30mcg/0.3mL 01/14/2021, 02/05/2021, 12/13/2021    Influenza High Dose Vaccine PF 11/04/2016, 10/11/2017, 10/04/2018, 10/08/2019, 10/02/2020    Influenza Vaccine 10/17/2013, 10/20/2014    Influenza, FLUAD, (age 72 y+), Adjuvanted 01/06/2022    Influenza, FLUARIX, FLULAVAL, FLUZONE (age 10 mo+) AND AFLURIA, (age 1 y+), PF, 0.5mL 10/08/2015    Pneumococcal Conjugate (PCV-13) 10/08/2015    Pneumococcal Polysaccharide (PPSV-23) 01/06/2022    TB Skin Test (PPD) 04/30/2014       Past Medical History:   Diagnosis Date    Advanced care planning/counseling discussion 10/8/2015    Basal cell carcinoma, face     now sees Dr. Elia Hemphill. Bladder cancer [facility]) 2012    Dr. Tereza Benites. cyto 9/2014, 2/2016. s/p surgery, BCG irrigation [place]. saw Dr. Bernice Lynne    BPH with obstruction/lower urinary tract symptoms     Chronic lower back pain     injection in [place].   saw Dr. Kaitlyn Godfrey    Chronic neck pain     limited motion to side    Depression, major     taking sertaline since before 2014    Dupuytren's contracture of left hand     5th finger    Encephalopathy 8/2021, 11/21/21    admitted     Epistaxis 2013    saw ENT at [facility] history of skin cancer     Fecal incontinence     with loose stools    Hearing loss     has hearing aids    HTN (hypertension)     Insomnia     IVCD (intraventricular conduction defect) 11/30/2015    Dr Tory Munoz    Loose stools 7/18/2014    MRI contraindicated due to metal implant     has scleral buckle    Parkinsonian features 3/10/2022    Paroxysmal atrial fibrillation (HCC)     EF 66%, 1-2+ LAE on flecainide;Dr. Tory Munoz. saw Dr. Pacheco Vu at [facility]    Peripheral neuropathy     severe foot neuropathy    RLS (restless legs syndrome) 7/3/2017    SCC (squamous cell carcinoma), face     prior hx, now sees Dr. Elia Hemphill    Seasonal allergic reaction     Skin cancer     SSS (sick sinus syndrome) ([facility] [address] 2022    Urothelial carcinoma of right distal ureter ([facility] [address] 5/1/15    excision Dr. Darwin Scott. low grade, papillary.  repeat 3 month

## 2022-10-11 NOTE — PROGRESS NOTES
HISTORY OF PRESENT ILLNESS    Chief Complaint   Patient presents with    Back Pain    Sleep Problem    Fatigue       Presents for follow-up. He is with his son, Angela Sherman. He is living at [facility]. No recent med changes. No recent falls. Using walker. He is using refocused to make sure that he stands up slowly. He sits up from lying down and then rests and then he rests after standing before proceeding. Taking Sinemet for Parkinsonism. Feels he is not visually focusing well. Saw optho a few months ago. Says no major issues. Uses reading glasses. Eyelid drooping. Appetite is \"too good\". Wt Readings from Last 3 Encounters:   10/11/22 227 lb (103 kg)   09/08/22 221 lb (100.2 kg)   06/02/22 209 lb (94.8 kg)     Insomnia. Wakes up at night and uses walker to urinate 3x per night on average. He uses walker to walk after slowly getting up. Upper right back pain. Moderate. Muscle spasm. Family located to the left of his thoracic spine. No weakness down arms. No recent falls as above. Has PT/OT/memory therapy with great results at relieving back pain and spasm. Sherryle Moder He has had chronic neck pain as well. This has been moderate at times. FROM of neck. Pain keeps him awake. Hypertension  Hypertension ROS: taking medications as instructed, no medication side effects noted, no TIA's, no chest pain on exertion, no dyspnea on exertion, no swelling of ankles     reports that he has quit smoking. He has never used smokeless tobacco.    reports current alcohol use of about 3.0 standard drinks per week.    BP Readings from Last 2 Encounters:   10/11/22 130/68   09/08/22 136/80       Review of Systems   All other systems reviewed and are negative, except as noted in HPI    Past Medical and Surgical History   has a past medical history of Advanced care planning/counseling discussion (10/8/2015), Basal cell carcinoma, face, Bladder cancer ([facility] [address] (2012), BPH with obstruction/lower urinary tract symptoms, Chronic lower back pain, Chronic neck pain, Depression, major, Dupuytren's contracture of left hand, Encephalopathy (8/2021, 11/21/21), Epistaxis (2013), Family history of skin cancer, Fecal incontinence, Hearing loss, HTN (hypertension), Insomnia, IVCD (intraventricular conduction defect) (11/30/2015), Loose stools (7/18/2014), MRI contraindicated due to metal implant, Parkinsonian features (3/10/2022), Paroxysmal atrial fibrillation ([facility] [address] Peripheral neuropathy, RLS (restless legs syndrome) (7/3/2017), SCC (squamous cell carcinoma), face, Seasonal allergic reaction, Skin cancer, SSS (sick sinus syndrome) ([facility] [address] (2022), and Urothelial carcinoma of right distal ureter ([facility] [address] (5/1/15). He has no past medical history of Sun-damaged skin, Sunburn, blistering, or Tanning bed exposure. has a past surgical history that includes hx bladder repair (2012); hx retinal detachment repair (Right); hx hernia repair; hx colonoscopy (2012); pr cystourethroscopy (9/2014); pr cystourethroscopy (5/1/15); pr cystourethroscopy (2/29/16); hx malignant skin lesion excision (06/14/2018); and hx other surgical (10/2021). reports that he has quit smoking. He has never used smokeless tobacco. He reports current alcohol use of about 3.0 standard drinks per week. He reports that he does not use drugs. family history is not on file. Physical Exam   Nursing note and vitals reviewed. Blood pressure 130/68, pulse 85, temperature 98 °F (36.7 °C), temperature source Oral, resp. rate 18, height 5' 11\" (1.803 m), weight 227 lb (103 kg), SpO2 95 %. Constitutional:  No distress. Eyes: Conjunctivae are normal.   Ears:  Hearing grossly intact  Cardiovascular: Normal rate. regular rhythm, no murmurs or gallops  No edema  Pulmonary/Chest: Effort normal.   CTAB  Musculoskeletal: moves all 4 extremities   Right-sided thoracic and lower back pain. Pain with palpation paraspinal muscles.   Neurological: Alert and oriented to person, place, and time. Skin: No visible rash noted. Psychiatric: Normal mood and affect. Behavior is normal.     Assessment and Plan    Diagnoses and all orders for this visit:    1. Chronic right-sided low back pain without sciatica  He would benefit from physical therapy to help loosen muscles, stretch. Denies any radicular symptoms. Massage therapy could be helpful. Physical therapy order given to patient and his son. [facility]  -     REFERRAL TO PHYSICAL THERAPY    2. Chronic neck pain  physical therapy for this as well. This is chronic but does limit quality of life. -     REFERRAL TO PHYSICAL THERAPY    3. Parkinsonian features  He is ambulating relatively well using a Rollator. He is taking Sinemet which does seem to be controlling symptoms relatively well. Recommend continuing it.  -     REFERRAL TO PHYSICAL THERAPY    4. Impaired functional mobility, balance, gait, and endurance  Overall doing much better. Still remains some degree of a fall risk. He would benefit from physical therapy  -     REFERRAL TO PHYSICAL THERAPY    5. Idiopathic peripheral neuropathy  Contributes to fall risk as above. He is using more caution. -     cyanocobalamin 1,000 mcg tablet; Take 1 Tablet by mouth daily. 6. Essential hypertension  Blood pressure is under excellent control on current medications. He has follow-up with cardiology in December as well. Continue. Check labs. -     amLODIPine (NORVASC) 10 mg tablet; Take 1 Tablet by mouth daily.  -     LIPID PANEL; Future  -     METABOLIC PANEL, COMPREHENSIVE; Future  -     CBC W/O DIFF; Future    7. Insomnia due to medical condition  Currently has some degree of insomnia, worsened by recent back and neck spasm. We will proceed with physical therapy first.  Could consider additional medications, but I do have significant concerns about fall risk. Gabapentin increased dizziness and fall risk so should be avoided.   Could consider low-dose tizanidine to see if it helps muscle spasm and sleep. 8. Vitamin D deficiency  Controlled. Continue vitamin D weekly high-dose. -     ergocalciferol (ERGOCALCIFEROL) 1,250 mcg (50,000 unit) capsule; Take 1 Capsule by mouth every seven (7) days. lab results and schedule of future lab studies reviewed with patient  reviewed medications and side effects in detail    Return to clinic for further evaluation if new symptoms develop      Current Outpatient Medications   Medication Sig    amLODIPine (NORVASC) 10 mg tablet Take 1 Tablet by mouth daily. cyanocobalamin 1,000 mcg tablet Take 1 Tablet by mouth daily. ergocalciferol (ERGOCALCIFEROL) 1,250 mcg (50,000 unit) capsule Take 1 Capsule by mouth every seven (7) days. irbesartan (AVAPRO) 150 mg tablet TAKE 1 TABLET BY MOUTH TWO (2) TIMES A DAY. INCREASED DOSE 1/9/22    sertraline (ZOLOFT) 50 mg tablet Take 1 Tablet by mouth daily. Decreased 3/31/22    carbidopa-levodopa (SINEMET)  mg per tablet Take 1 Tablet by mouth three (3) times daily. Indications: a type of movement disorder called parkinsonism    atorvastatin (LIPITOR) 40 mg tablet Take 1 Tablet by mouth nightly. Indications: high cholesterol    colestipoL (COLESTID) 1 gram tablet Take 1 Tablet by mouth daily. Indications: for diarrhea    finasteride (PROSCAR) 5 mg tablet Take 1 Tablet by mouth daily. Indications: enlarged prostate with urination problem    aspirin 81 mg chewable tablet Take 1 Tablet by mouth daily. Indications: treatment to prevent a heart attack    acetaminophen (TYLENOL) 500 mg tablet Take 2 Tablets by mouth two (2) times a day. Indications: pain associated with arthritis, backache     No current facility-administered medications for this visit.

## 2022-10-11 NOTE — TELEPHONE ENCOUNTER
[facility] would like a call back regarding the patient's amLODIPine and cyanocobalamin medications. They would like clarification on how these medications are being changed after the patient's visit today. They see an x through the medications on the patient's AVS but it does not say discontinued. Please call back and advise.

## 2022-10-12 NOTE — TELEPHONE ENCOUNTER
Lou Blair was calling back to speak with nurse about medication changes - I did state nurse left a message yesterday but she does not know anything about it. Rachell can be reached at [phone].

## 2022-10-12 NOTE — TELEPHONE ENCOUNTER
I called Timo back, I was transferred to 3rd floor where pt is located.  I had to leave a message on VM returning their call again

## 2022-11-09 ENCOUNTER — TELEPHONE (OUTPATIENT)
Dept: INTERNAL MEDICINE CLINIC | Age: 87
End: 2022-11-09

## 2022-11-09 NOTE — TELEPHONE ENCOUNTER
----- Message from [facility] sent at 11/8/2022  3:59 PM EST -----  Subject: Referral Request    Reason for referral request? pt is wanting to speak to a nurse or Dr. Xuan Rodriguez   for a referral for his colon. Provider patient wants to be referred to(if known):     Provider Phone Number(if known):     Additional Information for Provider?   ---------------------------------------------------------------------------  --------------  [id number] [employer]    [phone]; OK to leave message on voicemail  ---------------------------------------------------------------------------  --------------

## 2022-11-09 NOTE — TELEPHONE ENCOUNTER
Very likely hemorrhoidal bleeding over a small tear or sore in the rectal area. I am happy to see him for this. He does have orders for blood work which I think he is going to do when he sees cardiology on December 1, but may consider doing it sooner since there includes a blood cell count to make sure he is not anemic. He also may see if one of the nurses can look at the region where he is living.

## 2022-11-09 NOTE — TELEPHONE ENCOUNTER
Spoke with Ed/Son (HIPPA VERIFIED) and updated on patients request for an appt for possible Hemorrhoids-bleeding. Son reports that also patient has been having some Bowel urgency with loose stools that has been increasing to incontinence episodes. He reports that patient has a BM approx 30 minutes after eating anything and has difficulty with control. This also could be contributing to his rectal issues. He questions if the colestipol could be causing this. Scheduled appt for 11/21/22 at 0950 AM. Grateful for the call. Dr. Jose Alfredo Childs notified.

## 2022-11-09 NOTE — TELEPHONE ENCOUNTER
The colestipol is supposed to be helping this issue. I recommend increasing colestipol to twice daily to see if it will help reduce the urgency. Agree that fecal urgency and frequency are the primary issue that is causing irritation in the rectal area. Should I send in an increase of colestipol to twice daily to the facility?

## 2022-11-09 NOTE — TELEPHONE ENCOUNTER
Spoke with patient and he reports that he is noted some bright red blood on the toilet tissue  (small amount) when he has a BM. He reports his stool is soft and sometimes Loose. He reports that his rectum is sore. He is incontinent of Bladder and wears a pull up. He meant to bring this up at his appt on 10/11/22  but forgot. He had seen Dr. Jose Branham (GI) in the past. He would like to know if e needs to make an appt with Dr. Sheela Liu or just a referral to Dr. Jose Branham. Dr. Sheela Liu notified.

## 2022-11-09 NOTE — TELEPHONE ENCOUNTER
----- Message from [facility] sent at 11/8/2022  3:59 PM EST -----  Subject: Referral Request    Reason for referral request? pt is wanting to speak to a nurse or Dr. Danis Mac   for a referral for his colon. Provider patient wants to be referred to(if known):     Provider Phone Number(if known):     Additional Information for Provider?   ---------------------------------------------------------------------------  --------------  [address]    [phone]; OK to leave message on voicemail  ---------------------------------------------------------------------------  --------------

## 2022-11-09 NOTE — TELEPHONE ENCOUNTER
T/C to Ed/Son (HIPPA VERIFIED) to update on his Dad's request for an appt with Dr. Xuan Rodriguez due to some bleeding hemorrhoids. No answer and LVM.

## 2022-11-10 ENCOUNTER — TELEPHONE (OUTPATIENT)
Dept: INTERNAL MEDICINE CLINIC | Age: 87
End: 2022-11-10

## 2022-11-10 DIAGNOSIS — K52.9 CHRONIC DIARRHEA: ICD-10-CM

## 2022-11-10 RX ORDER — MONTELUKAST SODIUM 4 MG/1
1 TABLET, CHEWABLE ORAL 2 TIMES DAILY
Qty: 60 TABLET | Refills: 2 | Status: SHIPPED | OUTPATIENT
Start: 2022-11-10

## 2022-11-10 NOTE — TELEPHONE ENCOUNTER
Rafa AT [facility] was calling to ensure that the medication COLESTIPOL was supposed to be prescribed two times a day - they normally get updates from our office when medication doses change just wanted to double check before giving.

## 2022-11-10 NOTE — TELEPHONE ENCOUNTER
Spoke with Ed/Son (HIPPPA VERIFIED) and updated on Dr. Precious Avendano comments, recommendations and that he will send order to [facility] where patients resides. He states understanding and grateful for the call.

## 2022-11-10 NOTE — TELEPHONE ENCOUNTER
RTC to [facility] regarding patients medication to advise yes Dr. Ilda Cosby did send order to increase twice daily. No answer and LVM.

## 2022-11-21 ENCOUNTER — OFFICE VISIT (OUTPATIENT)
Dept: INTERNAL MEDICINE CLINIC | Age: 87
End: 2022-11-21
Payer: MEDICARE

## 2022-11-21 VITALS
WEIGHT: 227.06 LBS | OXYGEN SATURATION: 96 % | TEMPERATURE: 98.1 F | BODY MASS INDEX: 31.79 KG/M2 | HEIGHT: 71 IN | HEART RATE: 78 BPM | DIASTOLIC BLOOD PRESSURE: 81 MMHG | RESPIRATION RATE: 15 BRPM | SYSTOLIC BLOOD PRESSURE: 145 MMHG

## 2022-11-21 DIAGNOSIS — K92.1 BLOOD IN STOOL: ICD-10-CM

## 2022-11-21 DIAGNOSIS — B37.89 RECTAL CANDIDIASIS: Primary | ICD-10-CM

## 2022-11-21 PROCEDURE — G8417 CALC BMI ABV UP PARAM F/U: HCPCS | Performed by: INTERNAL MEDICINE

## 2022-11-21 PROCEDURE — G8427 DOCREV CUR MEDS BY ELIG CLIN: HCPCS | Performed by: INTERNAL MEDICINE

## 2022-11-21 PROCEDURE — 99213 OFFICE O/P EST LOW 20 MIN: CPT | Performed by: INTERNAL MEDICINE

## 2022-11-21 PROCEDURE — 1101F PT FALLS ASSESS-DOCD LE1/YR: CPT | Performed by: INTERNAL MEDICINE

## 2022-11-21 PROCEDURE — G9717 DOC PT DX DEP/BP F/U NT REQ: HCPCS | Performed by: INTERNAL MEDICINE

## 2022-11-21 PROCEDURE — G8536 NO DOC ELDER MAL SCRN: HCPCS | Performed by: INTERNAL MEDICINE

## 2022-11-21 RX ORDER — FLUCONAZOLE 150 MG/1
150 TABLET ORAL ONCE
Qty: 1 TABLET | Refills: 0 | Status: SHIPPED | OUTPATIENT
Start: 2022-11-21 | End: 2022-11-21

## 2022-11-21 RX ORDER — NYSTATIN 100000 U/G
CREAM TOPICAL
Qty: 15 G | Refills: 0 | Status: SHIPPED | OUTPATIENT
Start: 2022-11-21 | End: 2022-11-23 | Stop reason: SDUPTHER

## 2022-11-22 NOTE — PROGRESS NOTES
HISTORY OF PRESENT ILLNESS    Chief Complaint   Patient presents with    Hemorrhoids     Rectal bleeding. Presents with red blood in toilet paper intermittently for 10-14 days  Associated symptoms include: mild rectal discomfort. Chronic loose stools, improved some with colestipol which was increased last week. Treatments tried include: Prep H  Last colonoscopy 2017 showed possible benign polyps. Review of Systems   All other systems reviewed and are negative, except as noted in HPI    Past Medical and Surgical History   has a past medical history of Advanced care planning/counseling discussion (10/8/2015), Basal cell carcinoma, face, Bladder cancer ([facility] [address] (2012), BPH with obstruction/lower urinary tract symptoms, Chronic lower back pain, Chronic neck pain, Depression, major, Dupuytren's contracture of left hand, Encephalopathy (8/2021, 11/21/21), Epistaxis (2013), Family history of skin cancer, Fecal incontinence, Hearing loss, HTN (hypertension), Insomnia, IVCD (intraventricular conduction defect) (11/30/2015), Loose stools (7/18/2014), MRI contraindicated due to metal implant, Parkinsonian features (3/10/2022), Paroxysmal atrial fibrillation ([address] Peripheral neuropathy, RLS (restless legs syndrome) (7/3/2017), SCC (squamous cell carcinoma), face, Seasonal allergic reaction, Skin cancer, SSS (sick sinus syndrome) ([address] (2022), and Urothelial carcinoma of right distal ureter ([address] (5/1/15). He has no past medical history of Sun-damaged skin, Sunburn, blistering, or Tanning bed exposure. has a past surgical history that includes hx bladder repair (2012); hx retinal detachment repair (Right); hx hernia repair; hx colonoscopy (2012); pr cystourethroscopy (9/2014); pr cystourethroscopy (5/1/15); pr cystourethroscopy (2/29/16); hx malignant skin lesion excision (06/14/2018); and hx other surgical (10/2021). reports that he has quit smoking.  He has never used smokeless tobacco. He reports current alcohol use of about 3.0 standard drinks per week. He reports that he does not use drugs. family history is not on file. Physical Exam   Nursing note and vitals reviewed. Blood pressure (!) 145/81, pulse 78, temperature 98.1 °F (36.7 °C), temperature source Oral, resp. rate 15, height 5' 11\" (1.803 m), weight 227 lb 1 oz (103 kg), SpO2 96 %. Constitutional: In no distress. Eyes: Conjunctivae are normal.  HEENT:  No LAD or thyromegaly   Cardiovascular: Normal rate. regular rhythm. No murmurs  No edema  Pulmonary/Chest: Effort normal. clear to ausculation blaterally  Musculoskeletal:  no edema. Rectal:  significant erythema, inflammation of anus with scant white exudate  Rectal w no visible hemorrhoids. No palpable mass. Stool in vault  Abd: soft, non-tender. Diagnoses and all orders for this visit:    1. Rectal candidiasis  2. Blood in stool  Evidence of inflammation. Likely from yeast infection  -     nystatin (MYCOSTATIN) topical cream; Apply a finger tip around rectum twice daily for 14 days. For yeast infection. -     fluconazole (DIFLUCAN) 150 mg tablet; Take 1 Tablet by mouth once for 1 dose. For yeast infection            lab results and schedule of future lab studies reviewed with patient  reviewed medications and side effects in detail    Return to clinic for further evaluation if new symptoms develop or if current symptoms worsen or fail to resolve. There are no Patient Instructions on file for this visit.

## 2022-11-23 ENCOUNTER — TELEPHONE (OUTPATIENT)
Dept: INTERNAL MEDICINE CLINIC | Age: 87
End: 2022-11-23

## 2022-11-23 DIAGNOSIS — B37.9 YEAST INFECTION: Primary | ICD-10-CM

## 2022-11-23 RX ORDER — FLUCONAZOLE 150 MG/1
150 TABLET ORAL DAILY
Qty: 1 TABLET | Refills: 0 | Status: SHIPPED | OUTPATIENT
Start: 2022-11-23 | End: 2022-11-24

## 2022-11-23 RX ORDER — NYSTATIN 100000 U/G
CREAM TOPICAL
Qty: 15 G | Refills: 0 | Status: SHIPPED | OUTPATIENT
Start: 2022-11-23

## 2022-11-23 NOTE — TELEPHONE ENCOUNTER
Patient was saying he did not get medication DIFLUCAN it may have been called into wrong pharmacy I have changed pharmacy to reflect where this needs to go

## 2022-11-23 NOTE — TELEPHONE ENCOUNTER
Spoke with logan hogan [facility] He states understanding and grateful for the call.

## 2022-12-01 ENCOUNTER — OFFICE VISIT (OUTPATIENT)
Dept: CARDIOLOGY CLINIC | Age: 87
End: 2022-12-01
Payer: MEDICARE

## 2022-12-01 VITALS
DIASTOLIC BLOOD PRESSURE: 62 MMHG | HEART RATE: 82 BPM | SYSTOLIC BLOOD PRESSURE: 116 MMHG | BODY MASS INDEX: 31.5 KG/M2 | WEIGHT: 225 LBS | HEIGHT: 71 IN | OXYGEN SATURATION: 96 % | RESPIRATION RATE: 18 BRPM

## 2022-12-01 DIAGNOSIS — E78.5 DYSLIPIDEMIA: ICD-10-CM

## 2022-12-01 DIAGNOSIS — I45.4 IVCD (INTRAVENTRICULAR CONDUCTION DEFECT): ICD-10-CM

## 2022-12-01 DIAGNOSIS — R53.1 WEAKNESS GENERALIZED: ICD-10-CM

## 2022-12-01 DIAGNOSIS — N18.32 STAGE 3B CHRONIC KIDNEY DISEASE (HCC): ICD-10-CM

## 2022-12-01 DIAGNOSIS — I10 ESSENTIAL HYPERTENSION: ICD-10-CM

## 2022-12-01 DIAGNOSIS — I48.0 PAROXYSMAL ATRIAL FIBRILLATION (HCC): Primary | ICD-10-CM

## 2022-12-01 PROCEDURE — G8427 DOCREV CUR MEDS BY ELIG CLIN: HCPCS | Performed by: SPECIALIST

## 2022-12-01 PROCEDURE — G8536 NO DOC ELDER MAL SCRN: HCPCS | Performed by: SPECIALIST

## 2022-12-01 PROCEDURE — 99213 OFFICE O/P EST LOW 20 MIN: CPT | Performed by: SPECIALIST

## 2022-12-01 PROCEDURE — 1123F ACP DISCUSS/DSCN MKR DOCD: CPT | Performed by: SPECIALIST

## 2022-12-01 PROCEDURE — G8417 CALC BMI ABV UP PARAM F/U: HCPCS | Performed by: SPECIALIST

## 2022-12-01 PROCEDURE — G9717 DOC PT DX DEP/BP F/U NT REQ: HCPCS | Performed by: SPECIALIST

## 2022-12-01 PROCEDURE — 1101F PT FALLS ASSESS-DOCD LE1/YR: CPT | Performed by: SPECIALIST

## 2022-12-01 NOTE — Clinical Note
12/28/2022    Patient: Anton Olivares. YOB: 1931   Date of Visit: 12/1/2022     Lakshmi Camarillo, [id number] [facility]  [address] [place]    Dear Lakshmi Camarillo MD,      Thank you for referring . Santana Bustillo to [facility] for evaluation. My notes for this consultation are attached. If you have questions, please do not hesitate to call me. I look forward to following your patient along with you.       Sincerely,    Michael Suh MD

## 2022-12-01 NOTE — PROGRESS NOTES
[place] Gist Jr.     7/15/1931       Chandler Casiano MD, [facility]  Date of Visit-12/1/2022   PCP is Evelina Washburn MD   [facility] [place]  [facility] [place]  HPI:  Agustina Soto is a 80 y.o. male   3 month follow up  PAF   HTN. Frequent falls, dementia,  bladder cancer. Pt hospitalized in August 2021 - CR and Rhabdo with dehydration. An echo 8/30/21 showed normal EF  Echo 3/8/22 EF 55-60%, WNL  Carotids 8/29/21 minimal MASOUD, no LICA stenosis    When seen in November 2021 because of falls, I stopped his flecainide and warfarin and continued him on metoprolol and ASA. He was admitted 11/21/21 with confusion and BP of 170. CT head was negative. Had CR, which improved with fluids. Pt went to the ER on 1/13/22  with another fall and head injury, and then again 1/29/22 . On 3/6/22 admitted with further Bradycardia , , and thought to have ischemia, and given TPA on 3/7/22. Due to bradycardia his Toprol was stopped. EF remained normal.    had significant issues with Diarrhea. on Sinemet for Parkinson's. Moved with wife to a new facility  Today doing well 3 months since our nurse practitioner stable atrial fibrillation. He has no edema he has an irregular rhythm he had a yeast infection in the area of the anus but has no cardiovascular symptoms  Denies chest pain, edema, syncope or shortness of breath at rest   Has no tachycardia , palpitations or sense of arrythmia      Assessment/Plan:         Patient Instructions   We will see you back in 6 months with Kaylie MOREJON NP.     1. Paroxysmal atrial fibrillation (HCC)  Rate controlled, off [address] [facility]  due to concern for bleeding risks with multiple falls, continue ASA, has not had a fall since the spring time   Holter monitor is sinus with frequent PAC's. He will continue on ASA and will monitor him for rate control.    Does not need at this point pacemaker or ablation  03/06/22    ECHO ADULT COMPLETE Interpretation Summary    Left Ventricle: Left ventricle size is normal. Mildly increased wall thickness. Normal wall motion. Normal left ventricular systolic function with a visually estimated EF of 55 - 60%. Diastolic dysfunction present with normal LV EF. Right Ventricle: Not well visualized. Mitral Valve: Mildly thickened leaflet. Mild annular calcification of the mitral valve. Aorta: Mildly dilated sinus of Valsalva. Ao Root diameter is 3.9 cm.    2. IVCD (intraventricular conduction defect)  QRS now normal off flecainide. 3. Essential hypertension  At goal , meds and possible side effects reviewed and patient denies  Key CAD CHF Meds               colestipoL (COLESTID) 1 gram tablet (Taking) Take 1 Tablet by mouth two (2) times a day. Indications: for diarrhea    amLODIPine (NORVASC) 10 mg tablet (Taking) Take 1 Tablet by mouth daily. irbesartan (AVAPRO) 150 mg tablet (Taking) TAKE 1 TABLET BY MOUTH TWO (2) TIMES A DAY. INCREASED DOSE 1/9/22    atorvastatin (LIPITOR) 40 mg tablet (Taking) Take 1 Tablet by mouth nightly. Indications: high cholesterol    aspirin 81 mg chewable tablet (Taking) Take 1 Tablet by mouth daily. Indications: treatment to prevent a heart attack           BP Readings from Last 6 Encounters:   12/01/22 116/62   11/21/22 (!) 145/81   10/11/22 130/68   09/08/22 136/80   06/02/22 (!) 142/80   05/18/22 (!) 90/50          4. Dyslipidemia  At goal , denies excess muscle aches or new liver issues  Key Antihyperlipidemia Meds               colestipoL (COLESTID) 1 gram tablet (Taking) Take 1 Tablet by mouth two (2) times a day. Indications: for diarrhea    atorvastatin (LIPITOR) 40 mg tablet (Taking) Take 1 Tablet by mouth nightly. Indications: high cholesterol           Lab Results   Component Value Date/Time    LDL, calculated 105.6 (H) 03/08/2022 02:38 AM      5. Weakness  Tried to admit with low bp May 2022 , now stable off CCB with BP 1  6.  CKD 3   Some changes, prev Kaycee with diarrhea now stable-elevated creat 1.27-1.65 range, peak was 1.92 in Nov with dehydration   Lab Results   Component Value Date/Time    Creatinine 1.65 (H) 03/13/2022 04:10 AM               Impression:   1. Paroxysmal atrial fibrillation (HCC)    2. IVCD (intraventricular conduction defect)    3. Essential hypertension    4. Dyslipidemia    5. Weakness generalized    6. Stage 3b chronic kidney disease (HCC)         Cardiac History:   ECHO 9-10-14=  Left ventricle: Systolic function was normal by EF (biplane method of  disks). Ejection fraction was estimated to be 66 %. There were no regional  wall motion abnormalities. Doppler parameters were consistent with  abnormal left ventricular relaxation (grade 1 diastolic dysfunction). Left atrium: The atrium was mildly to moderately dilated     Future Appointments   Date Time Provider Ezekiel Munoz   6/1/2023 11:00 AM FABIANO Alaniz          Patient Care Team:  Romario Alicia MD as PCP - General (Internal Medicine Physician)  Romario Alicia MD as PCP - [address] [place] [address] [facility]  Flako Garcia MD as Consulting Provider (Cardiovascular Disease Physician)    ROS-except as noted above. . A complete cardiac and respiratory are reviewed and negative except as above ; Resp-denies wheezing  or productive cough,. Const- No unusual weight loss or fever; Neuro-no recent seizure or CVA ; GI- No BRBPR, abdom pain, bloating ; - no  hematuria   see supplement sheet, initialed and to be scanned by staff  Past Medical History:   Diagnosis Date    Advanced care planning/counseling discussion 10/8/2015    Basal cell carcinoma, face     now sees Dr. Kristi Mazariegos. Bladder cancer [facility]) 2012    Dr. Grisel Amaro. cyto 9/2014, 2/2016. s/p surgery, BCG irrigation [place]. saw Dr. Princess Hart    BPH with obstruction/lower urinary tract symptoms     Chronic lower back pain     injection in [place].   saw Dr. Allie Duckworth    Chronic neck pain     limited motion to side    Depression, major     taking sertaline since before 2014    Dupuytren's contracture of left hand     5th finger    Encephalopathy 8/2021, 11/21/21    admitted     Epistaxis 2013    saw ENT at [facility] history of skin cancer     Fecal incontinence     with loose stools    Hearing loss     has hearing aids    HTN (hypertension)     Insomnia     IVCD (intraventricular conduction defect) 11/30/2015    Dr Noemy Sorto    Loose stools 7/18/2014    MRI contraindicated due to metal implant     has scleral buckle    Parkinsonian features 3/10/2022    Paroxysmal atrial fibrillation (HCC)     EF 66%, 1-2+ LAE on flecainide;Dr. Noemy Sorto. saw Dr. Annie Cooper at [facility]    Peripheral neuropathy     severe foot neuropathy    RLS (restless legs syndrome) 7/3/2017    SCC (squamous cell carcinoma), face     prior hx, now sees Dr. Lisseth Alicia allergic reaction     Skin cancer     SSS (sick sinus syndrome) ([address] 2022    Urothelial carcinoma of right distal ureter ([address] 5/1/15    excision Dr. Martha Mendosa. low grade, papillary. repeat 3 month      Social Hx= reports that he has quit smoking. He has never used smokeless tobacco. He reports current alcohol use of about 3.0 standard drinks per week. He reports that he does not use drugs. Exam and Labs:  /62 (BP 1 Location: Right arm, BP Patient Position: Sitting, BP Cuff Size: Adult)   Pulse 82   Resp 18   Ht 5' 11\" (1.803 m)   Wt 225 lb (102.1 kg)   SpO2 96%   BMI 31.38 kg/m² Constitutional:  NAD, comfortable  Head: [place],AT. Eyes: No scleral icterus. Neck:  Neck supple. No JVD present. Throat: moist mucous membranes. Chest: Effort normal & normal respiratory excursion . Neurological: alert, conversant and oriented . Skin: Skin is not cold. No obvious systemic rash noted. Not diaphoretic. No erythema. Psychiatric:  Grossly normal mood and affect. Behavior appears normal. Extremities:  no clubbing or cyanosis. Abdomen: non distended    Lungs:breath sounds normal. No stridor.  distress, wheezes or Rales.  Heart: normal rate, regular rhythm, normal S1, S2, no murmurs, rubs, clicks or gallops , PMI non displaced. Edema: Edema is none. Lab Results   Component Value Date/Time    Cholesterol, total 172 03/08/2022 02:38 AM    HDL Cholesterol 53 03/08/2022 02:38 AM    LDL, calculated 105.6 (H) 03/08/2022 02:38 AM    Triglyceride 67 03/08/2022 02:38 AM    CHOL/HDL Ratio 3.2 03/08/2022 02:38 AM     Lab Results   Component Value Date/Time    Sodium 140 03/13/2022 04:10 AM    Potassium 3.8 03/13/2022 04:10 AM    Chloride 112 (H) 03/13/2022 04:10 AM    CO2 22 03/13/2022 04:10 AM    Anion gap 6 03/13/2022 04:10 AM    Glucose 107 (H) 03/13/2022 04:10 AM    BUN 38 (H) 03/13/2022 04:10 AM    Creatinine 1.65 (H) 03/13/2022 04:10 AM    BUN/Creatinine ratio 23 (H) 03/13/2022 04:10 AM    GFR est AA 48 (L) 03/13/2022 04:10 AM    GFR est non-AA 39 (L) 03/13/2022 04:10 AM    Calcium 8.8 03/13/2022 04:10 AM      Wt Readings from Last 3 Encounters:   12/01/22 225 lb (102.1 kg)   11/21/22 227 lb 1 oz (103 kg)   10/11/22 227 lb (103 kg)      BP Readings from Last 3 Encounters:   12/01/22 116/62   11/21/22 (!) 145/81   10/11/22 130/68      Current Outpatient Medications   Medication Sig    nystatin (MYCOSTATIN) topical cream Apply a finger tip around rectum twice daily for 14 days. For yeast infection. colestipoL (COLESTID) 1 gram tablet Take 1 Tablet by mouth two (2) times a day. Indications: for diarrhea    amLODIPine (NORVASC) 10 mg tablet Take 1 Tablet by mouth daily. cyanocobalamin 1,000 mcg tablet Take 1 Tablet by mouth daily. ergocalciferol (ERGOCALCIFEROL) 1,250 mcg (50,000 unit) capsule Take 1 Capsule by mouth every seven (7) days. irbesartan (AVAPRO) 150 mg tablet TAKE 1 TABLET BY MOUTH TWO (2) TIMES A DAY. INCREASED DOSE 1/9/22    sertraline (ZOLOFT) 50 mg tablet Take 1 Tablet by mouth daily. Decreased 3/31/22    carbidopa-levodopa (SINEMET)  mg per tablet Take 1 Tablet by mouth three (3) times daily. Indications: a type of movement disorder called parkinsonism    atorvastatin (LIPITOR) 40 mg tablet Take 1 Tablet by mouth nightly. Indications: high cholesterol    finasteride (PROSCAR) 5 mg tablet Take 1 Tablet by mouth daily. Indications: enlarged prostate with urination problem    aspirin 81 mg chewable tablet Take 1 Tablet by mouth daily. Indications: treatment to prevent a heart attack    acetaminophen (TYLENOL) 500 mg tablet Take 2 Tablets by mouth two (2) times a day. Indications: pain associated with arthritis, backache     No current facility-administered medications for this visit. Impression see above. This note was created using voice recognition software. Despite editing, there may be syntax errors.

## 2022-12-05 DIAGNOSIS — B37.9 YEAST INFECTION: ICD-10-CM

## 2022-12-05 RX ORDER — NYSTATIN 100000 U/G
CREAM TOPICAL
Qty: 15 G | Refills: 0 | Status: SHIPPED | OUTPATIENT
Start: 2022-12-05 | End: 2022-12-05 | Stop reason: SDUPTHER

## 2022-12-05 RX ORDER — NYSTATIN 100000 U/G
CREAM TOPICAL
Qty: 15 G | Refills: 0 | Status: CANCELLED | OUTPATIENT
Start: 2022-12-05

## 2022-12-08 DIAGNOSIS — B37.9 YEAST INFECTION: ICD-10-CM

## 2022-12-08 RX ORDER — NYSTATIN 100000 U/G
CREAM TOPICAL
Qty: 15 G | Refills: 2 | Status: SHIPPED | OUTPATIENT
Start: 2022-12-08

## 2023-03-30 ENCOUNTER — TELEPHONE (OUTPATIENT)
Dept: INTERNAL MEDICINE CLINIC | Age: 88
End: 2023-03-30

## 2023-03-30 NOTE — TELEPHONE ENCOUNTER
Spoke with patient and he reports he had a yeast infection around his anus and he reports that the medication Nystatin Cream is not working. He reports that stools are no longer loose and sometimes feeling constipated. He also has questions regarding his nocturnal urinary frequency. He is requesting an appt with Dr. Cesar Raphael and scheduled for 4/19/23 at 1:20 PM.  Grateful for the call.

## 2023-03-30 NOTE — TELEPHONE ENCOUNTER
----- Message from Camuy Stefayn sent at 3/30/2023  3:57 PM EDT -----  Subject: Message to Provider    QUESTIONS  Information for Provider? Pt wants to speak to a nurse. Pt wants to know   if he should see a specialist or see Dr. Estephania Gil. Pt also has questions about   medication. Pt did not want to talk to me just he nurse. Pt would like a   call back. ---------------------------------------------------------------------------  --------------  Karlynn L[facility]  [phone]; OK to leave message on voicemail  ---------------------------------------------------------------------------  --------------  SCRIPT ANSWERS  Relationship to Patient?  Self

## 2023-04-10 ENCOUNTER — APPOINTMENT (OUTPATIENT)
Dept: CT IMAGING | Age: 88
DRG: 390 | End: 2023-04-10
Attending: STUDENT IN AN ORGANIZED HEALTH CARE EDUCATION/TRAINING PROGRAM
Payer: MEDICARE

## 2023-04-10 ENCOUNTER — HOSPITAL ENCOUNTER (INPATIENT)
Age: 88
LOS: 2 days | Discharge: HOME | DRG: 390 | End: 2023-04-13
Attending: STUDENT IN AN ORGANIZED HEALTH CARE EDUCATION/TRAINING PROGRAM | Admitting: SURGERY
Payer: MEDICARE

## 2023-04-10 DIAGNOSIS — K56.609 SMALL BOWEL OBSTRUCTION (HCC): Primary | ICD-10-CM

## 2023-04-10 DIAGNOSIS — K43.9 VENTRAL HERNIA WITHOUT OBSTRUCTION OR GANGRENE: ICD-10-CM

## 2023-04-10 LAB
ALBUMIN SERPL-MCNC: 3.9 G/DL (ref 3.5–5)
ALBUMIN/GLOB SERPL: 1 (ref 1.1–2.2)
ALP SERPL-CCNC: 93 U/L (ref 45–117)
ALT SERPL-CCNC: 13 U/L (ref 12–78)
ANION GAP SERPL CALC-SCNC: 4 MMOL/L (ref 5–15)
AST SERPL-CCNC: 25 U/L (ref 15–37)
BASOPHILS # BLD: 0 K/UL (ref 0–0.1)
BASOPHILS NFR BLD: 0 % (ref 0–1)
BILIRUB SERPL-MCNC: 0.5 MG/DL (ref 0.2–1)
BUN SERPL-MCNC: 28 MG/DL (ref 6–20)
BUN/CREAT SERPL: 16 (ref 12–20)
CALCIUM SERPL-MCNC: 9.4 MG/DL (ref 8.5–10.1)
CHLORIDE SERPL-SCNC: 110 MMOL/L (ref 97–108)
CO2 SERPL-SCNC: 23 MMOL/L (ref 21–32)
CREAT SERPL-MCNC: 1.73 MG/DL (ref 0.7–1.3)
DIFFERENTIAL METHOD BLD: ABNORMAL
EOSINOPHIL # BLD: 0.2 K/UL (ref 0–0.4)
EOSINOPHIL NFR BLD: 1 % (ref 0–7)
ERYTHROCYTE [DISTWIDTH] IN BLOOD BY AUTOMATED COUNT: 13.1 % (ref 11.5–14.5)
GLOBULIN SER CALC-MCNC: 4 G/DL (ref 2–4)
GLUCOSE SERPL-MCNC: 147 MG/DL (ref 65–100)
HCT VFR BLD AUTO: 41.5 % (ref 36.6–50.3)
HGB BLD-MCNC: 13.7 G/DL (ref 12.1–17)
IMM GRANULOCYTES # BLD AUTO: 0 K/UL (ref 0–0.04)
IMM GRANULOCYTES NFR BLD AUTO: 0 % (ref 0–0.5)
LYMPHOCYTES # BLD: 1.8 K/UL (ref 0.8–3.5)
LYMPHOCYTES NFR BLD: 16 % (ref 12–49)
MCH RBC QN AUTO: 32.1 PG (ref 26–34)
MCHC RBC AUTO-ENTMCNC: 33 G/DL (ref 30–36.5)
MCV RBC AUTO: 97.2 FL (ref 80–99)
MONOCYTES # BLD: 0.6 K/UL (ref 0–1)
MONOCYTES NFR BLD: 5 % (ref 5–13)
NEUTS SEG # BLD: 9 K/UL (ref 1.8–8)
NEUTS SEG NFR BLD: 78 % (ref 32–75)
NRBC # BLD: 0 K/UL (ref 0–0.01)
NRBC BLD-RTO: 0 PER 100 WBC
PLATELET # BLD AUTO: 253 K/UL (ref 150–400)
PMV BLD AUTO: 10.8 FL (ref 8.9–12.9)
POTASSIUM SERPL-SCNC: 4 MMOL/L (ref 3.5–5.1)
PROT SERPL-MCNC: 7.9 G/DL (ref 6.4–8.2)
RBC # BLD AUTO: 4.27 M/UL (ref 4.1–5.7)
SODIUM SERPL-SCNC: 137 MMOL/L (ref 136–145)
WBC # BLD AUTO: 11.7 K/UL (ref 4.1–11.1)

## 2023-04-10 PROCEDURE — 74011250636 HC RX REV CODE- 250/636: Performed by: STUDENT IN AN ORGANIZED HEALTH CARE EDUCATION/TRAINING PROGRAM

## 2023-04-10 PROCEDURE — 80053 COMPREHEN METABOLIC PANEL: CPT

## 2023-04-10 PROCEDURE — 85025 COMPLETE CBC W/AUTO DIFF WBC: CPT

## 2023-04-10 PROCEDURE — 96374 THER/PROPH/DIAG INJ IV PUSH: CPT

## 2023-04-10 PROCEDURE — 74176 CT ABD & PELVIS W/O CONTRAST: CPT

## 2023-04-10 PROCEDURE — 36415 COLL VENOUS BLD VENIPUNCTURE: CPT

## 2023-04-10 PROCEDURE — 99285 EMERGENCY DEPT VISIT HI MDM: CPT

## 2023-04-10 PROCEDURE — 96375 TX/PRO/DX INJ NEW DRUG ADDON: CPT

## 2023-04-10 PROCEDURE — 83605 ASSAY OF LACTIC ACID: CPT

## 2023-04-10 RX ORDER — ONDANSETRON 2 MG/ML
4 INJECTION INTRAMUSCULAR; INTRAVENOUS
Status: COMPLETED | OUTPATIENT
Start: 2023-04-10 | End: 2023-04-10

## 2023-04-10 RX ORDER — SODIUM CHLORIDE 9 MG/ML
1000 INJECTION, SOLUTION INTRAVENOUS ONCE
Status: COMPLETED | OUTPATIENT
Start: 2023-04-10 | End: 2023-04-11

## 2023-04-10 RX ORDER — FENTANYL CITRATE 50 UG/ML
50 INJECTION, SOLUTION INTRAMUSCULAR; INTRAVENOUS ONCE
Status: COMPLETED | OUTPATIENT
Start: 2023-04-10 | End: 2023-04-10

## 2023-04-10 RX ADMIN — ONDANSETRON 4 MG: 2 INJECTION INTRAMUSCULAR; INTRAVENOUS at 23:42

## 2023-04-10 RX ADMIN — FENTANYL CITRATE 50 MCG: 50 INJECTION INTRAMUSCULAR; INTRAVENOUS at 23:42

## 2023-04-10 RX ADMIN — SODIUM CHLORIDE 1000 ML: 9 INJECTION, SOLUTION INTRAVENOUS at 23:43

## 2023-04-11 ENCOUNTER — APPOINTMENT (OUTPATIENT)
Dept: GENERAL RADIOLOGY | Age: 88
DRG: 390 | End: 2023-04-11
Attending: SURGERY
Payer: MEDICARE

## 2023-04-11 PROBLEM — K56.609 SBO (SMALL BOWEL OBSTRUCTION) (HCC): Status: ACTIVE | Noted: 2023-04-11

## 2023-04-11 LAB
ANION GAP SERPL CALC-SCNC: 9 MMOL/L (ref 5–15)
BASOPHILS # BLD: 0 K/UL (ref 0–0.1)
BASOPHILS NFR BLD: 0 % (ref 0–1)
BUN SERPL-MCNC: 28 MG/DL (ref 6–20)
BUN/CREAT SERPL: 16 (ref 12–20)
CALCIUM SERPL-MCNC: 9.2 MG/DL (ref 8.5–10.1)
CHLORIDE SERPL-SCNC: 110 MMOL/L (ref 97–108)
CO2 SERPL-SCNC: 19 MMOL/L (ref 21–32)
CREAT SERPL-MCNC: 1.74 MG/DL (ref 0.7–1.3)
DIFFERENTIAL METHOD BLD: ABNORMAL
EOSINOPHIL # BLD: 0.1 K/UL (ref 0–0.4)
EOSINOPHIL NFR BLD: 1 % (ref 0–7)
ERYTHROCYTE [DISTWIDTH] IN BLOOD BY AUTOMATED COUNT: 12.9 % (ref 11.5–14.5)
GLUCOSE SERPL-MCNC: 140 MG/DL (ref 65–100)
HCT VFR BLD AUTO: 41.6 % (ref 36.6–50.3)
HGB BLD-MCNC: 13.3 G/DL (ref 12.1–17)
IMM GRANULOCYTES # BLD AUTO: 0 K/UL (ref 0–0.04)
IMM GRANULOCYTES NFR BLD AUTO: 0 % (ref 0–0.5)
LACTATE SERPL-SCNC: 1.5 MMOL/L (ref 0.4–2)
LACTATE SERPL-SCNC: 1.9 MMOL/L (ref 0.4–2)
LYMPHOCYTES # BLD: 1.2 K/UL (ref 0.8–3.5)
LYMPHOCYTES NFR BLD: 11 % (ref 12–49)
MCH RBC QN AUTO: 31.6 PG (ref 26–34)
MCHC RBC AUTO-ENTMCNC: 32 G/DL (ref 30–36.5)
MCV RBC AUTO: 98.8 FL (ref 80–99)
MONOCYTES # BLD: 0.5 K/UL (ref 0–1)
MONOCYTES NFR BLD: 5 % (ref 5–13)
NEUTS SEG # BLD: 8.8 K/UL (ref 1.8–8)
NEUTS SEG NFR BLD: 83 % (ref 32–75)
NRBC # BLD: 0 K/UL (ref 0–0.01)
NRBC BLD-RTO: 0 PER 100 WBC
PLATELET # BLD AUTO: 216 K/UL (ref 150–400)
PMV BLD AUTO: 11.3 FL (ref 8.9–12.9)
POTASSIUM SERPL-SCNC: 3.8 MMOL/L (ref 3.5–5.1)
RBC # BLD AUTO: 4.21 M/UL (ref 4.1–5.7)
SODIUM SERPL-SCNC: 138 MMOL/L (ref 136–145)
WBC # BLD AUTO: 10.6 K/UL (ref 4.1–11.1)

## 2023-04-11 PROCEDURE — 80048 BASIC METABOLIC PNL TOTAL CA: CPT

## 2023-04-11 PROCEDURE — 85025 COMPLETE CBC W/AUTO DIFF WBC: CPT

## 2023-04-11 PROCEDURE — 74019 RADEX ABDOMEN 2 VIEWS: CPT

## 2023-04-11 PROCEDURE — 99222 1ST HOSP IP/OBS MODERATE 55: CPT | Performed by: SURGERY

## 2023-04-11 PROCEDURE — 74011250637 HC RX REV CODE- 250/637: Performed by: SURGERY

## 2023-04-11 PROCEDURE — 74011250636 HC RX REV CODE- 250/636: Performed by: STUDENT IN AN ORGANIZED HEALTH CARE EDUCATION/TRAINING PROGRAM

## 2023-04-11 PROCEDURE — 83605 ASSAY OF LACTIC ACID: CPT

## 2023-04-11 PROCEDURE — 74011000250 HC RX REV CODE- 250: Performed by: SURGERY

## 2023-04-11 PROCEDURE — 74011250636 HC RX REV CODE- 250/636: Performed by: SURGERY

## 2023-04-11 PROCEDURE — 65270000032 HC RM SEMIPRIVATE

## 2023-04-11 RX ORDER — HEPARIN SODIUM 5000 [USP'U]/ML
5000 INJECTION, SOLUTION INTRAVENOUS; SUBCUTANEOUS EVERY 8 HOURS
Status: DISCONTINUED | OUTPATIENT
Start: 2023-04-11 | End: 2023-04-13 | Stop reason: HOSPADM

## 2023-04-11 RX ORDER — SODIUM CHLORIDE 0.9 % (FLUSH) 0.9 %
5-40 SYRINGE (ML) INJECTION AS NEEDED
Status: DISCONTINUED | OUTPATIENT
Start: 2023-04-11 | End: 2023-04-13 | Stop reason: HOSPADM

## 2023-04-11 RX ORDER — FINASTERIDE 5 MG/1
5 TABLET, FILM COATED ORAL DAILY
Status: DISCONTINUED | OUTPATIENT
Start: 2023-04-11 | End: 2023-04-13 | Stop reason: HOSPADM

## 2023-04-11 RX ORDER — FENTANYL CITRATE 50 UG/ML
50 INJECTION, SOLUTION INTRAMUSCULAR; INTRAVENOUS
Status: DISCONTINUED | OUTPATIENT
Start: 2023-04-11 | End: 2023-04-11 | Stop reason: SDUPTHER

## 2023-04-11 RX ORDER — AMLODIPINE BESYLATE 5 MG/1
10 TABLET ORAL DAILY
Status: DISCONTINUED | OUTPATIENT
Start: 2023-04-11 | End: 2023-04-13 | Stop reason: HOSPADM

## 2023-04-11 RX ORDER — SODIUM CHLORIDE, SODIUM LACTATE, POTASSIUM CHLORIDE, CALCIUM CHLORIDE 600; 310; 30; 20 MG/100ML; MG/100ML; MG/100ML; MG/100ML
125 INJECTION, SOLUTION INTRAVENOUS CONTINUOUS
Status: DISCONTINUED | OUTPATIENT
Start: 2023-04-11 | End: 2023-04-12

## 2023-04-11 RX ORDER — ONDANSETRON 2 MG/ML
4 INJECTION INTRAMUSCULAR; INTRAVENOUS
Status: DISCONTINUED | OUTPATIENT
Start: 2023-04-11 | End: 2023-04-13 | Stop reason: HOSPADM

## 2023-04-11 RX ORDER — NALOXONE HYDROCHLORIDE 0.4 MG/ML
0.4 INJECTION, SOLUTION INTRAMUSCULAR; INTRAVENOUS; SUBCUTANEOUS AS NEEDED
Status: DISCONTINUED | OUTPATIENT
Start: 2023-04-11 | End: 2023-04-13 | Stop reason: HOSPADM

## 2023-04-11 RX ORDER — ENOXAPARIN SODIUM 100 MG/ML
40 INJECTION SUBCUTANEOUS EVERY 24 HOURS
Status: DISCONTINUED | OUTPATIENT
Start: 2023-04-11 | End: 2023-04-11

## 2023-04-11 RX ORDER — CARBIDOPA AND LEVODOPA 25; 100 MG/1; MG/1
1 TABLET ORAL 3 TIMES DAILY
Status: DISCONTINUED | OUTPATIENT
Start: 2023-04-11 | End: 2023-04-13 | Stop reason: HOSPADM

## 2023-04-11 RX ORDER — HYDROMORPHONE HYDROCHLORIDE 1 MG/ML
1 INJECTION, SOLUTION INTRAMUSCULAR; INTRAVENOUS; SUBCUTANEOUS
Status: DISCONTINUED | OUTPATIENT
Start: 2023-04-11 | End: 2023-04-13 | Stop reason: HOSPADM

## 2023-04-11 RX ORDER — DIPHENHYDRAMINE HYDROCHLORIDE 50 MG/ML
12.5 INJECTION, SOLUTION INTRAMUSCULAR; INTRAVENOUS
Status: ACTIVE | OUTPATIENT
Start: 2023-04-11 | End: 2023-04-12

## 2023-04-11 RX ORDER — SODIUM CHLORIDE 9 MG/ML
100 INJECTION, SOLUTION INTRAVENOUS CONTINUOUS
Status: DISCONTINUED | OUTPATIENT
Start: 2023-04-11 | End: 2023-04-12

## 2023-04-11 RX ORDER — IRBESARTAN 150 MG/1
150 TABLET ORAL 2 TIMES DAILY
Status: DISCONTINUED | OUTPATIENT
Start: 2023-04-11 | End: 2023-04-11 | Stop reason: CLARIF

## 2023-04-11 RX ORDER — FENTANYL CITRATE 50 UG/ML
50 INJECTION, SOLUTION INTRAMUSCULAR; INTRAVENOUS ONCE
Status: COMPLETED | OUTPATIENT
Start: 2023-04-11 | End: 2023-04-11

## 2023-04-11 RX ORDER — SODIUM CHLORIDE 0.9 % (FLUSH) 0.9 %
5-40 SYRINGE (ML) INJECTION EVERY 8 HOURS
Status: DISCONTINUED | OUTPATIENT
Start: 2023-04-11 | End: 2023-04-13 | Stop reason: HOSPADM

## 2023-04-11 RX ORDER — LOSARTAN POTASSIUM 50 MG/1
50 TABLET ORAL 2 TIMES DAILY
Status: DISCONTINUED | OUTPATIENT
Start: 2023-04-11 | End: 2023-04-13 | Stop reason: HOSPADM

## 2023-04-11 RX ORDER — ONDANSETRON 2 MG/ML
4 INJECTION INTRAMUSCULAR; INTRAVENOUS
Status: DISCONTINUED | OUTPATIENT
Start: 2023-04-11 | End: 2023-04-11 | Stop reason: SDUPTHER

## 2023-04-11 RX ORDER — LORAZEPAM 2 MG/ML
1 INJECTION, SOLUTION INTRAMUSCULAR; INTRAVENOUS
Status: DISCONTINUED | OUTPATIENT
Start: 2023-04-11 | End: 2023-04-13 | Stop reason: HOSPADM

## 2023-04-11 RX ADMIN — FENTANYL CITRATE 50 MCG: 50 INJECTION, SOLUTION INTRAMUSCULAR; INTRAVENOUS at 02:27

## 2023-04-11 RX ADMIN — SODIUM CHLORIDE, POTASSIUM CHLORIDE, SODIUM LACTATE AND CALCIUM CHLORIDE 125 ML/HR: 600; 310; 30; 20 INJECTION, SOLUTION INTRAVENOUS at 03:27

## 2023-04-11 RX ADMIN — AMLODIPINE BESYLATE 10 MG: 5 TABLET ORAL at 08:31

## 2023-04-11 RX ADMIN — ONDANSETRON 4 MG: 2 INJECTION INTRAMUSCULAR; INTRAVENOUS at 02:27

## 2023-04-11 RX ADMIN — SODIUM CHLORIDE, POTASSIUM CHLORIDE, SODIUM LACTATE AND CALCIUM CHLORIDE 125 ML/HR: 600; 310; 30; 20 INJECTION, SOLUTION INTRAVENOUS at 21:39

## 2023-04-11 RX ADMIN — HEPARIN SODIUM 5000 UNITS: 5000 INJECTION INTRAVENOUS; SUBCUTANEOUS at 18:47

## 2023-04-11 RX ADMIN — LOSARTAN POTASSIUM 50 MG: 50 TABLET, FILM COATED ORAL at 17:39

## 2023-04-11 RX ADMIN — Medication 10 ML: at 06:00

## 2023-04-11 RX ADMIN — HEPARIN SODIUM 5000 UNITS: 5000 INJECTION INTRAVENOUS; SUBCUTANEOUS at 11:49

## 2023-04-11 RX ADMIN — LOSARTAN POTASSIUM 50 MG: 50 TABLET, FILM COATED ORAL at 08:31

## 2023-04-11 RX ADMIN — FINASTERIDE 5 MG: 5 TABLET, FILM COATED ORAL at 08:31

## 2023-04-11 RX ADMIN — SODIUM CHLORIDE 100 ML/HR: 9 INJECTION, SOLUTION INTRAVENOUS at 02:27

## 2023-04-11 RX ADMIN — FENTANYL CITRATE 50 MCG: 50 INJECTION INTRAMUSCULAR; INTRAVENOUS at 03:08

## 2023-04-11 RX ADMIN — CARBIDOPA AND LEVODOPA 1 TABLET: 25; 100 TABLET ORAL at 09:02

## 2023-04-11 RX ADMIN — CARBIDOPA AND LEVODOPA 1 TABLET: 25; 100 TABLET ORAL at 21:38

## 2023-04-11 RX ADMIN — CARBIDOPA AND LEVODOPA 1 TABLET: 25; 100 TABLET ORAL at 17:39

## 2023-04-11 NOTE — PROGRESS NOTES
Reason for Admission:  admitted from home with complaints of abdominal pain. History of multiple hernias repaired. RUR Score:  11%-Low                   Plan for utilizing home health:   Resides at [facility]. Patient was in [place] [address] last year for SNF. PCP: First and Last name:  Bernabe Oneil MD   Are you a current patient: Yes/No: YES  Approximate date of last visit:   Can you participate in a virtual visit with your PCP: NO                 Current Advanced Directive/Advance Care Plan: Full Code  Healthcare Decision Maker:           Primary Decision Maker: Vidal Hatfield - [phone]                  Transition of Care Plan:   Admitted to surgery service. Nothing by mouth with bowel rest  Per surgery no need for surgical intervention now. Start clear liquid diet and advance as tolerated. Pending progress, if pain remains controlled and tolerates diet, will likely discharge home. Care Management Interventions  PCP Verified by CM:  Yes  Last Visit to PCP: 03/08/23  Palliative Care Criteria Met (RRAT>21 & CHF Dx)?: No  Support Systems: Child(nick)  Confirm Follow Up Transport: Family  The Patient and/or Patient Representative was Provided with a Choice of Provider and Agrees with the Discharge Plan?:  (Jacqui Ingram Provided?: No  Discharge Location  Patient Expects to be Discharged to[de-identified] Unable to determine at this time

## 2023-04-11 NOTE — ED NOTES
Bedside and Verbal shift change report given to [address] [place] [address],[address] (oncoming nurse) by Ac Awan RN (offgoing nurse). Report included the following information SBAR, ED Summary, MAR, Cardiac Rhythm  , and Dual Neuro Assessment.

## 2023-04-11 NOTE — ED PROVIDER NOTES
80-year-old male prior history of bladder cancer, multiple abdominal surgeries due to hernias and Parkinson's presenting today secondary to abdominal pain. Patient reports that over the past several days he has noticed his abdominal wall growing consistent with a hernia he believes. He states that today it became painful. He had a bowel movement that was small this morning but has not passed gas since. Has had nausea without vomiting. Reports the pain comes in waves and is severe when present. No fevers. Urinating okay. His last surgery was about 12 years ago in [place]. Past Medical History:   Diagnosis Date    Advanced care planning/counseling discussion 10/8/2015    Basal cell carcinoma, face     now sees Dr. Fani Gutierrez. Bladder cancer [facility]) 2012    Dr. Remington Hernandez. cyto 9/2014, 2/2016. s/p surgery, BCG irrigation [place]. saw Dr. Melissa Sepulveda    BPH with obstruction/lower urinary tract symptoms     Chronic lower back pain     injection in [place].   saw Dr. Levi Carlos    Chronic neck pain     limited motion to side    Depression, major     taking sertaline since before 2014    Dupuytren's contracture of left hand     5th finger    Encephalopathy 8/2021, 11/21/21    admitted     Epistaxis 2013    saw ENT at [facility] history of skin cancer     Fecal incontinence     with loose stools    Hearing loss     has hearing aids    HTN (hypertension)     Insomnia     IVCD (intraventricular conduction defect) 11/30/2015    Dr Nilesh Garcia    Loose stools 7/18/2014    MRI contraindicated due to metal implant     has scleral buckle    Parkinsonian features 3/10/2022    Paroxysmal atrial fibrillation (HCC)     EF 66%, 1-2+ LAE on flecainide;Dr. Nilesh Garcia. saw Dr. Iron Austin at [facility]    Peripheral neuropathy     severe foot neuropathy    RLS (restless legs syndrome) 7/3/2017    SCC (squamous cell carcinoma), face     prior hx, now sees Dr. Lauri Escalante allergic reaction     Skin cancer     SSS (sick [facility] syndrome) ([facility] 2022    Urothelial carcinoma of right distal [facility]ter [address] 5/1/15    excision Dr. Laina Sheehan. low grade, papillary. repeat 3 month       Past Surgical History:   Procedure Laterality Date    HX BLADDER REPAIR  2012    cancer    HX COLONOSCOPY  2012    polyps. repeat 2017? HX HERNIA REPAIR      periumibical    HX MALIGNANT SKIN LESION EXCISION  06/14/2018    basal cell left lip    HX OTHER SURGICAL  10/2021    Skin cancer removed from nose. HX RETINAL DETACHMENT REPAIR Right     [place] CYSTOURETHROSCOPY  9/2014    Dr. Pema Griffith CYSTOURETHROSCOPY  5/1/15    low grade right pap urothelial cancer    [place] CYSTOURETHROSCOPY  2/29/16         No family history on file. Social History     Socioeconomic History    Marital status:      Spouse name: Zeynep London    Number of children: Not on file    Years of education: Not on file    Highest education level: Not on file   Occupational History    Not on file   Tobacco Use    Smoking status: Former    Smokeless tobacco: Never    Tobacco comments:     socially   Vaping Use    Vaping Use: Never used   Substance and Sexual Activity    Alcohol use:  Yes     Alcohol/week: 3.0 standard drinks     Types: 3 Glasses of wine per week     Comment: occ    Drug use: Never    Sexual activity: Not Currently   Other Topics Concern     Service Not Asked    Blood Transfusions Not Asked    Caffeine Concern Not Asked    Occupational Exposure Not Asked    Hobby Hazards Not Asked    Sleep Concern Not Asked    Stress Concern Not Asked    Weight Concern Not Asked    Special Diet Not Asked    Back Care Not Asked    Exercise Not Asked    Bike Helmet Not Asked    Seat Belt Not Asked    Self-Exams Not Asked   Social History Narrative    Not on file     Social Determinants of Health     Financial Resource Strain: Not on file   Food Insecurity: Not on file   Transportation Needs: Not on file   Physical Activity: Not on file   Stress: Not on file   Social Connections: Not on file   Intimate Partner Violence: Not on file   Housing Stability: Not on file         ALLERGIES: Trazodone    Review of Systems   Constitutional:  Negative for chills and fever. HENT:  Negative for congestion and sore throat. Eyes:  Negative for pain and redness. Respiratory:  Negative for cough and shortness of breath. Cardiovascular:  Negative for chest pain and palpitations. Gastrointestinal:  Positive for abdominal pain and nausea. Negative for diarrhea and vomiting. Genitourinary:  Negative for frequency and hematuria. Musculoskeletal:  Negative for back pain and neck pain. Skin:  Negative for rash and wound. Neurological:  Negative for dizziness and headaches. Hematological:  Does not bruise/bleed easily. Vitals:    04/10/23 2225   BP: 90/61   Pulse: 85   Resp: 18   Temp: 97.7 °F (36.5 °C)   SpO2: 94%   Weight: 105 kg (231 lb 7.7 oz)            Physical Exam  Vitals and nursing note reviewed. Constitutional:       General: He is not in acute distress. Appearance: He is well-developed. HENT:      Head: Normocephalic and atraumatic. Eyes:      Conjunctiva/sclera: Conjunctivae normal.      Pupils: Pupils are equal, round, and reactive to light. Cardiovascular:      Rate and Rhythm: Normal rate and regular rhythm. Heart sounds: Normal heart sounds. No murmur heard. No friction rub. No gallop. Comments: Equal radial, dp, and pt pulses  Pulmonary:      Effort: Pulmonary effort is normal. No respiratory distress. Breath sounds: Normal breath sounds. No wheezing or rales. Abdominal:      General: Bowel sounds are normal. There is no distension. Palpations: Abdomen is soft. Tenderness: There is no guarding or rebound. Comments: Large ventral hernia noted which is soft and no overlying skin changes. It is tender to the left aspect of the hernia. It is reducible but returns immediately once I release pressure.    Musculoskeletal:         General: Normal range of motion. Cervical back: Normal range of motion and neck supple. Skin:     General: Skin is warm and dry. Capillary Refill: Capillary refill takes less than 2 seconds. Findings: No rash. Neurological:      Mental Status: He is alert and oriented to person, place, and time. Medical Decision Making  Patient is a 80-year-old male presenting today with abdominal discomfort and ventral hernia. He does have a reducible ventral hernia on exam but it is tender however no overlying skin changes. Vital signs are stable with a mild leukocytosis, normal/baseline renal function. Due to the persistent pain despite reduction of hernia I ordered a CT and it shows likely early/partial small bowel obstruction separate from his ventral hernia which contains large bowel without evidence of incarceration or obstruction. I discussed with general surgery who recommended IV fluids, n.p.o. status and pain control. They will admit overnight. Problems Addressed:  Small bowel obstruction [facility]): acute illness or injury  Ventral hernia without obstruction or gangrene: acute illness or injury    Amount and/or Complexity of Data Reviewed  Labs: ordered. Radiology: ordered. Discussion of management or test interpretation with external provider(s): Discussed with general surgery, Dr. Christelle Hodges who will admit the patient    Risk  Parenteral controlled substances. Decision regarding hospitalization.            Procedures

## 2023-04-11 NOTE — ED NOTES
Has a final transfer review been performed?  Yes    Reason for Admission: SBO  Patient comes from: home  Mental Status: Alert and oriented  ADL:partial assistance  Ambulation:mild difficulty  Pertinent Info/Safety Concerns: falls, infection, pain  COVID Status: Not Indicated  MEWS Score: 1  Sinus Rhythm  Vitals:    04/11/23 0500 04/11/23 0805 04/11/23 1116 04/11/23 1326   BP: 129/73 126/71 130/73 135/76   Pulse: 80 78 69 81   Resp: 16 18 16 18   Temp:  97.9 °F (36.6 °C)  97.9 °F (36.6 °C)   SpO2: 94% 94% 93% 95%   Weight:         Lines:   Peripheral IV 04/10/23 Right Forearm (Active)   Site Assessment Clean, dry, & intact 04/10/23 2338   Phlebitis Assessment 0 04/10/23 2338   Dressing Status Clean, dry, & intact 04/10/23 2338   Dressing Type Transparent 04/10/23 2338      Transport mode:ED stretcher    Codey Irvin.  being transferred to [address] [place] [address] ([facility]) for routine progression of care     \"Notification of etransfer note given to (Name) Sammie Vu (Title)

## 2023-04-11 NOTE — PROGRESS NOTES
Bedside and Verbal shift change report given to Montrell Mclean (oncoming nurse) by Cammy Verdin (offgoing nurse). Report included the following information SBAR, Kardex, OR Summary, Procedure Summary, MAR, and Recent Results.

## 2023-04-11 NOTE — ED TRIAGE NOTES
He arrives ambulatory from home for abdominal pain. He has a history of an abdominal hernia and feels like the hernia is causing him pain. The pain started a few hours ago. He states he is not able to reduce it like usual. A&OX4.

## 2023-04-12 PROCEDURE — 74011000250 HC RX REV CODE- 250: Performed by: SURGERY

## 2023-04-12 PROCEDURE — 74011250636 HC RX REV CODE- 250/636: Performed by: SURGERY

## 2023-04-12 PROCEDURE — 74011250637 HC RX REV CODE- 250/637: Performed by: SURGERY

## 2023-04-12 PROCEDURE — 99232 SBSQ HOSP IP/OBS MODERATE 35: CPT | Performed by: SURGERY

## 2023-04-12 PROCEDURE — 65270000032 HC RM SEMIPRIVATE

## 2023-04-12 RX ADMIN — CARBIDOPA AND LEVODOPA 1 TABLET: 25; 100 TABLET ORAL at 16:02

## 2023-04-12 RX ADMIN — CARBIDOPA AND LEVODOPA 1 TABLET: 25; 100 TABLET ORAL at 21:26

## 2023-04-12 RX ADMIN — SODIUM CHLORIDE, POTASSIUM CHLORIDE, SODIUM LACTATE AND CALCIUM CHLORIDE 125 ML/HR: 600; 310; 30; 20 INJECTION, SOLUTION INTRAVENOUS at 03:21

## 2023-04-12 RX ADMIN — LOSARTAN POTASSIUM 50 MG: 50 TABLET, FILM COATED ORAL at 10:07

## 2023-04-12 RX ADMIN — HEPARIN SODIUM 5000 UNITS: 5000 INJECTION INTRAVENOUS; SUBCUTANEOUS at 18:48

## 2023-04-12 RX ADMIN — HEPARIN SODIUM 5000 UNITS: 5000 INJECTION INTRAVENOUS; SUBCUTANEOUS at 03:20

## 2023-04-12 RX ADMIN — CARBIDOPA AND LEVODOPA 1 TABLET: 25; 100 TABLET ORAL at 10:07

## 2023-04-12 RX ADMIN — LOSARTAN POTASSIUM 50 MG: 50 TABLET, FILM COATED ORAL at 18:48

## 2023-04-12 RX ADMIN — Medication 10 ML: at 16:02

## 2023-04-12 RX ADMIN — AMLODIPINE BESYLATE 10 MG: 5 TABLET ORAL at 10:07

## 2023-04-12 RX ADMIN — HEPARIN SODIUM 5000 UNITS: 5000 INJECTION INTRAVENOUS; SUBCUTANEOUS at 10:08

## 2023-04-12 RX ADMIN — FINASTERIDE 5 MG: 5 TABLET, FILM COATED ORAL at 10:08

## 2023-04-12 NOTE — PROGRESS NOTES
[employer] [facility]    HD #2    Subjective     Tolerating clears, no N/V, no pain, having flatus    Objective     Patient Vitals for the past 24 hrs:   Temp Pulse Resp BP SpO2   04/12/23 0758 98.1 °F (36.7 °C) (!) 56 16 133/76 90 %   04/11/23 2013 97.6 °F (36.4 °C) 70 20 137/78 93 %       Date 04/11/23 0700 - 04/12/23 0659 04/12/23 0700 - 04/13/23 0659   Shift 1027-9996 9948-7969 24 Hour Total 2958-3666 5307-9743 24 Hour Total   INTAKE   Shift Total(mL/kg)         OUTPUT   Urine(mL/kg/hr)  650(0.5) 650(0.3)        Urine Voided  650 650      Shift Total(mL/kg)  650(6.2) 650(6.2)      NET  -650 -650      Weight (kg) 105 105 105 105 105 105       PE  Pulm - CTAB  CV - RRR  Abd - soft, ND, BS presnet, hernia NTTP,    Labs  No results found for this or any previous visit (from the past 12 hour(s)). '    Assessment     Kip Weir. is a 80 y. o.yr old male with resolving SBO    Plan     SBO resolving and increasing diet to regular  Bowel function returning well  Plan for DC to home tomorrow to assisted living    Emerald Dewitt MD

## 2023-04-12 NOTE — PROGRESS NOTES
RN asked pt if he wanted to go for a walk this morning. Pt stated \"maybe\" and that he would go for a walk later on. RN placed chair in pt's room for when pt gets up later this AM.     Bedside shift change report given to Angel [address] [place] [address] (oncoming nurse) by Ricki Cranker, RN (offgoing nurse). Report included the following information SBAR, Kardex, Intake/Output, MAR, and Recent Results.

## 2023-04-12 NOTE — PROGRESS NOTES
Transition Plan of Care  RUR 11%-Low  Disposition-resides at [facility]. Left detailed message for Piotr Fuller regarding criteria for discharging back to facility. Waiting her response. CM waiting for nurse/therapy to evaluated if he is at his baseline function with ADLs and ambulation. Update-CM requested for therapy to see patient. The order for PT/OT was not placed. Placed order now. Hopefull they can see today, however he will likely not discharge until tomorrow at this point.

## 2023-04-13 VITALS
SYSTOLIC BLOOD PRESSURE: 150 MMHG | TEMPERATURE: 98 F | WEIGHT: 231.48 LBS | RESPIRATION RATE: 16 BRPM | DIASTOLIC BLOOD PRESSURE: 80 MMHG | BODY MASS INDEX: 32.29 KG/M2 | OXYGEN SATURATION: 95 % | HEART RATE: 76 BPM

## 2023-04-13 PROCEDURE — 97535 SELF CARE MNGMENT TRAINING: CPT

## 2023-04-13 PROCEDURE — 97161 PT EVAL LOW COMPLEX 20 MIN: CPT

## 2023-04-13 PROCEDURE — 74011250637 HC RX REV CODE- 250/637: Performed by: SURGERY

## 2023-04-13 PROCEDURE — 74011250636 HC RX REV CODE- 250/636: Performed by: SURGERY

## 2023-04-13 PROCEDURE — 99232 SBSQ HOSP IP/OBS MODERATE 35: CPT | Performed by: SURGERY

## 2023-04-13 PROCEDURE — 97165 OT EVAL LOW COMPLEX 30 MIN: CPT

## 2023-04-13 PROCEDURE — 97116 GAIT TRAINING THERAPY: CPT

## 2023-04-13 RX ADMIN — AMLODIPINE BESYLATE 10 MG: 5 TABLET ORAL at 08:36

## 2023-04-13 RX ADMIN — LOSARTAN POTASSIUM 50 MG: 50 TABLET, FILM COATED ORAL at 08:36

## 2023-04-13 RX ADMIN — CARBIDOPA AND LEVODOPA 1 TABLET: 25; 100 TABLET ORAL at 09:00

## 2023-04-13 RX ADMIN — FINASTERIDE 5 MG: 5 TABLET, FILM COATED ORAL at 08:36

## 2023-04-13 RX ADMIN — HEPARIN SODIUM 5000 UNITS: 5000 INJECTION INTRAVENOUS; SUBCUTANEOUS at 11:00

## 2023-04-13 RX ADMIN — HEPARIN SODIUM 5000 UNITS: 5000 INJECTION INTRAVENOUS; SUBCUTANEOUS at 04:03

## 2023-04-13 NOTE — DISCHARGE INSTRUCTIONS
No surgery    Patient Discharge Instructions    Denise Herrera / [id number] : 7/15/1931    Admitted 4/10/2023 Discharged: 2023     Take Home Medications     It is important that you take the medication exactly as they are prescribed. Keep your medication in the bottles provided by the pharmacist and keep a list of the medication names, dosages, and times to be taken in your wallet. Do not take other medications without consulting your doctor. What to do at Home    Recommended diet: Regular Diet    Recommended activity: Activity as tolerated    If you experience any of the following symptoms increased pain, nausea vomiting, please follow up with Dr Hollie Taylor. Follow-up with Dr Hollie Taylor in in 2 week(s)        Information obtained by :  I understand that if any problems occur once I am at home I am to contact my physician. I understand and acknowledge receipt of the instructions indicated above.                                                                                                                                            Physician's or R.N.'s Signature                                                                  Date/Time                                                                                                                                              Patient or Representative Signature                                                          Date/Time

## 2023-04-13 NOTE — DISCHARGE SUMMARY
Physician Discharge Summary     Patient ID:  Arie Cleaning  [id number]  80 y.o.  7/15/1931    Admit Date: 4/10/2023    Discharge Date:     Admission Diagnoses: SBO (small bowel obstruction) ([facility] [O90.219]    Discharge Diagnoses: Active Problems:    SBO (small bowel obstruction) ([address] (4/11/2023)         Admission Condition: Good    Discharge Condition: Good    Procedure(s):    * No surgery found [facility] Course:   He improved quickly after admission. He had a BM HD 2 and started on clears. On HD 3 started regualr diet and then DC home on HD 4 doing well SO resolved    Consults: None    Significant Diagnostic Studies: KUB, CT    Disposition: home    Patient Instructions:   Current Discharge Medication List        CONTINUE these medications which have NOT CHANGED    Details   nystatin (MYCOSTATIN) topical cream APPLY A FINGER TIP AROUND RECTUM 2 TIMES A DAY FOR 14 DAYS FOR YEAST INFECTION  Qty: 15 g, Refills: 2    Associated Diagnoses: Yeast infection      colestipoL (COLESTID) 1 gram tablet Take 1 Tablet by mouth two (2) times a day. Indications: for diarrhea  Qty: 60 Tablet, Refills: 2    Associated Diagnoses: Chronic diarrhea      amLODIPine (NORVASC) 10 mg tablet Take 1 Tablet by mouth daily. Qty: 90 Tablet, Refills: 1    Associated Diagnoses: Essential hypertension      cyanocobalamin 1,000 mcg tablet Take 1 Tablet by mouth daily. Qty: 90 Tablet, Refills: 1    Associated Diagnoses: Idiopathic peripheral neuropathy      ergocalciferol (ERGOCALCIFEROL) 1,250 mcg (50,000 unit) capsule Take 1 Capsule by mouth every seven (7) days. Qty: 4 Capsule, Refills: 5    Associated Diagnoses: Vitamin D deficiency      irbesartan (AVAPRO) 150 mg tablet TAKE 1 TABLET BY MOUTH TWO (2) TIMES A DAY. INCREASED DOSE 1/9/22  Qty: 180 Tablet, Refills: 1    Associated Diagnoses: Essential hypertension      sertraline (ZOLOFT) 50 mg tablet Take 1 Tablet by mouth daily.  Decreased 3/31/22  Qty: 30 Tablet, Refills: 3 Associated Diagnoses: Major depressive disorder, single episode, moderate (HCC)      carbidopa-levodopa (SINEMET)  mg per tablet Take 1 Tablet by mouth three (3) times daily. Indications: a type of movement disorder called parkinsonism  Qty: 90 Tablet, Refills: 3    Associated Diagnoses: Parkinsonian features      atorvastatin (LIPITOR) 40 mg tablet Take 1 Tablet by mouth nightly. Indications: high cholesterol  Qty: 30 Tablet, Refills: 2    Associated Diagnoses: CVD (cardiovascular disease)      finasteride (PROSCAR) 5 mg tablet Take 1 Tablet by mouth daily. Indications: enlarged prostate with urination problem  Qty: 30 Tablet, Refills: 2    Comments: REQUESTING REFILLS  Associated Diagnoses: BPH with obstruction/lower urinary tract symptoms      aspirin 81 mg chewable tablet Take 1 Tablet by mouth daily. Indications: treatment to prevent a heart attack  Qty: 30 Tablet, Refills: 5    Associated Diagnoses: CVD (cardiovascular disease)      acetaminophen (TYLENOL) 500 mg tablet Take 2 Tablets by mouth two (2) times a day.  Indications: pain associated with arthritis, backache  Qty: 60 Tablet, Refills: 2    Associated Diagnoses: Chronic bilateral low back pain without sciatica             Activity: Activity as tolerated  Diet: Regular Diet  Wound Care: None needed    Follow-up with Juvenal Boeck, MD in 2 week(s)  Follow-up tests/labs none    Signed:  Juvenal Boeck, MD  4/13/2023  11:09 AM   .

## 2023-04-13 NOTE — PROGRESS NOTES
RUR: 11% Low    LAURENT: Return to [facility]. [facility] will provide transport   Follow-up with PCP/specialist.     Primary Contact: Fili Higgins, [phone]    9:45AM - CM called and left several messages for JOBY Hatfield DON to provide update that patient is medically stable for discharge. Awaiting return call. CM provided update via voicemail that patient will return today, 4/13 as he is at his baseline with no new needs. 12:00PM - CM called [facility] again and spoke with nurse that works with patient at [facility] to provide update patient will return. Nurse expressed understanding. CM called and spoke with patient's son to provide update. Per son, [facility] will provide transport upon discharge which is coordinated through their , [facility]. CM called [facility] to confirm transport. Per [facility], they will  patient @ 1:00PM today, Thursday, 4/13. CM faxed (f: [phone]) DC summary and recent clinicals. Nursing made aware.      Crista Kramer, CHARLENE   [phone]

## 2023-04-13 NOTE — PROGRESS NOTES
[employer] [facility] at [facility]      Subjective     Doing well, tolerating diet, no pain, having BM    Objective     Patient Vitals for the past 24 hrs:   Temp Pulse Resp BP SpO2   04/13/23 1007 -- 76 -- (!) 150/80 95 %   04/13/23 0748 98 °F (36.7 °C) 60 16 128/77 93 %   04/12/23 2008 97.7 °F (36.5 °C) 61 16 (!) 150/75 92 %   04/12/23 1847 -- 62 -- (!) 144/76 --       Date 04/12/23 0700 - 04/13/23 0659 04/13/23 0700 - 04/14/23 0659   Shift 8941-0781 7859-4518 24 Hour Total 8277-1649 5772-8951 24 Hour Total   INTAKE   Shift Total(mL/kg)         OUTPUT   Urine(mL/kg/hr)  1300(1) 1300(0.5)        Urine Voided  1300 1300      Shift Total(mL/kg)  1300(12.4) 1300(12.4)      NET  -1300 -1300      Weight (kg) 105 105 105 105 105 105       PE  Pulm - CTAB  CV - RRR  Abd - soft, ND, BS present, NTTP    Labs  No results found for this or any previous visit (from the past 12 hour(s)). Alexei Suarez. is a 80 y. o.yr old male with resolved SBO    Plan     DC home to assisted living today  FU as needed  SBO resolved    Glynn Pan MD

## 2023-04-13 NOTE — PROGRESS NOTES
Problem: Mobility Impaired (Adult and Pediatric)  Goal: *Acute Goals and Plan of Care (Insert Text)  Description: FUNCTIONAL STATUS PRIOR TO ADMISSION: Pt reports using intermittent DME for ambulation in his apartment and to dining 3x/day for meals. He reports a +fall hx and often carries his SPC \"just in case. \"    HOME SUPPORT PRIOR TO ADMISSION: Pt reports someone cleans his apartment weekly and he does not prepare meals in his apartment. He completes his bathing and dressing independently but receives assistance for medication management. Pt has children listed as support in chart and wife resides in SNF with poor health per his report. Physical Therapy Goals  Initiated 4/13/2023  1. Patient will move from supine to sit and sit to supine  in bed with independence within 7 day(s). 2.  Patient will transfer from bed to chair and chair to bed with supervision using the least restrictive device within 7 day(s). 3.  Patient will perform sit to stand with supervision/set-up within 7 day(s). 4.  Patient will ambulate with supervision/set-up for 300 feet with the least restrictive device within 7 day(s). Outcome: Progressing Towards Goal   PHYSICAL THERAPY EVALUATION  Patient: Nasir Lara (80 y.o. male)  Date: 4/13/2023  Primary Diagnosis: SBO (small bowel obstruction) ([facility] [K56.609]       Precautions:   Fall ("[employer]    ASSESSMENT  Pt admitted with N/V and abdominal pain, SBO diagnosed though no surgical intervention indicated as pt had +BM 4/11 and diet is being advanced. Discharge anticipated today following therapy and surgical clearance. Pt reports participating in therapies at [facility] and has a good understanding of services. He is agreeable to POC today: bed mobility, transfers, gait training, education, and OOB to chair following requested tasks for pulmonary and circulatory health.  Based on the objective data described below, the patient presents with generally fair functional mobility near his baseline status. Pt is expected for D/C today and this is appropriate for return home with his current therapy interventions. He has DME needed for home use as well. Recommend OOB to chair for all meals and walking around unit TID in event of delayed D/C with assist x1 and RW vs SPC. Current Level of Function Impacting Discharge (mobility/balance): upwards CGA    Other factors to consider for discharge: pt lives alone     Patient will benefit from skilled therapy intervention to address the above noted impairments. PLAN :  Recommendations and Planned Interventions: bed mobility training, transfer training, gait training, therapeutic exercises, patient and family training/education, and therapeutic activities      Frequency/Duration: Patient will be followed by physical therapy:  2 times a week to address goals. Recommendation for discharge: (in order for the patient to meet his/her long term goals)  To be determined: continues OP therapy per pre admission status    This discharge recommendation:  Has not yet been discussed the attending provider and/or case management    IF patient discharges home will need the following DME: patient owns DME required for discharge         SUBJECTIVE:   Patient stated My wife has been in skilled nursing for 18 months. .. she is not doing well. ..    OBJECTIVE DATA SUMMARY:   HISTORY:    Past Medical History:   Diagnosis Date    Advanced care planning/counseling discussion 10/8/2015    Basal cell carcinoma, face     now sees Dr. Amador Pedraza. Bladder cancer [facility]) 2012    Dr. Daniel Walter. cyto 9/2014, 2/2016. s/p surgery, BCG irrigation [place]. saw Dr. Carey Ogden    BPH with obstruction/lower urinary tract symptoms     Chronic lower back pain     injection in [place].   saw Dr. Lidia Almaguer    Chronic neck pain     limited motion to side    Depression, major     taking sertaline since before 2014    Dupuytren's contracture of left hand     5th finger    Encephalopathy 8/2021, 11/21/21 admitted     Epistaxis 2013    [facility]NT [facility] history of skin cancer     Fecal incontinence     with loose stools    Hearing loss     has hearing aids    HTN (hypertension)     Insomnia     IVCD (intraventricular conduction defect) 11/30/2015    Dr Sebastian Greco    Loose stools 7/18/2014    MRI contraindicated due to metal implant     has scleral buckle    Parkinsonian features 3/10/2022    Paroxysmal atrial fibrillation (HCC)     EF 66%, 1-2+ LAE on flecainide;Dr. Sebastian Greco. saw Dr. Terryl Nageotte at [facility]    Peripheral neuropathy     severe foot neuropathy    RLS (restless legs syndrome) 7/3/2017    SCC (squamous cell carcinoma), face     prior hx, now sees Dr. Gregory Rebolledo allergic reaction     Skin cancer     SSS (sick sinus syndrome) ([address] 2022    Urothelial carcinoma of right distal ureter ([address] 5/1/15    excision Dr. Yasmine Cano. low grade, papillary. repeat 3 month     Past Surgical History:   Procedure Laterality Date    HX BLADDER REPAIR  2012    cancer    HX COLONOSCOPY  2012    polyps. repeat 2017? HX HERNIA REPAIR      periumibical    HX MALIGNANT SKIN LESION EXCISION  06/14/2018    basal cell left lip    HX OTHER SURGICAL  10/2021    Skin cancer removed from nose.      HX RETINAL DETACHMENT REPAIR Right     AK CYSTOURETHROSCOPY  9/2014    Dr. Princess Joshi CYSTOURETHROSCOPY  5/1/15    low grade right pap urothelial cancer    AK CYSTOURETHROSCOPY  2/29/16       Personal factors and/or comorbidities impacting plan of care: pt motivated and agreeable to functional mobility training/participation     Home Situation  [address] Name: [place]  # Steps to Enter: 0  One/Two Story Residence: One story  Living Alone: Yes  Support Systems: Child(nick) (pt's wife resides in SNF)  Patient Expects to be Discharged to[de-identified] Assisted Living  Current DME Used/Available at Home: [place], mosheator, [place], straight, Shower chair, Grab bars  Tub or Shower Type: Shower    EXAMINATION/PRESENTATION/DECISION MAKING:   Critical Behavior:  Neurologic State: Alert, Appropriate for age  Orientation Level: Oriented X4  Cognition: Follows commands  Safety/Judgement: Fall prevention  Hearing:   +[place]    Range Of Motion:  AROM: Generally decreased, functional                       Strength:    Strength: Generally decreased, functional                    Tone & Sensation:   Tone: Normal              Sensation: Intact               Coordination:  Coordination: Generally decreased, functional    Functional Mobility:  Bed Mobility:  Rolling: Supervision  Supine to Sit: Supervision  Sit to Supine:  (NT; OOB to chair)  Scooting: Supervision  Transfers:  Sit to Stand: Stand-by assistance  Stand to Sit: Stand-by assistance  Balance:   Sitting: Intact  Standing: Impaired; Without support  Standing - Static: Good  Standing - Dynamic : Good;Fair  Ambulation/Gait Training:  Distance (ft): 200 Feet (ft) (+50)  Assistive Device: Gait belt;Walker, rolling (RW weaned and 1 LOB reported by OT in bathroom without BUE support for dynamic standing task completion)  Ambulation - Level of Assistance: Contact guard assistance  Gait Description (WDL): Exceptions to WDL  Gait Abnormalities: Decreased step clearance (pt able to wean RW towards end of walk)  Step Length: Right shortened;Left shortened    Functional Measure:  Orville Kin AM-PAC®      Basic Mobility Inpatient Short Form (6-Clicks) Version 2  How much HELP from another person do you currently need. .. (If the patient hasn't done an activity recently, how much help from another person do you think they would need if they tried?) Total A Lot A Little None   1. Turning from your back to your side while in a flat bed without using bedrails? []  1 []  2 []  3  [x]  4   2. Moving from lying on your back to sitting on the side of a flat bed without using bedrails? []  1 []  2 []  3  [x]  4   3.   Moving to and from a bed to a chair (including a wheelchair)? []  1 []  2 []  3  [x]  4   4. Standing up from a chair using your arms (e.g. wheelchair or bedside chair)? []  1 []  2 []  3  [x]  4   5. Walking in hospital room? []  1 []  2 [x]  3  []  4   6. Climbing 3-5 steps with a railing? []  1 [x]  2 []  3  []  4     Raw Score: 21/24                            Cutoff score ?171,2,3 had higher odds of discharging home with home health or need of SNF/IPR. [address], Shwetha Gonzales, Cathy Butt Frantz Forman. Validity of the AM-PAC 6-Clicks Inpatient Daily Activity and Basic Mobility Short Forms. Physical Therapy Mar 2014, [phone]; DOI: 10.2522/ptj.53945505  2. Sergio Leyva. Association of AM-PAC \"6-Clicks\" Basic Mobility and Daily Activity Scores With Discharge Destination. Phys Ther. 2021 Apr 4;101(4):qzic093. doi: 10.1093/ptj/hksb841. PMID: 53002093. [address], Karolina D, Shyam Fraction, Vida K, Hudson S. Activity Measure for Post-Acute Care \"6-Clicks\" Basic Mobility Scores Predict Discharge Destination After Acute Care Hospitalization in Select Patient Groups: A Retrospective, Observational Study. Arch Rehabil Res Clin Transl. 2022 Jul 16;4(3):050775. doi: 10.1016/j.arrct. 7901.465239. PMID: 05981397; PMCID: [id number]. 4. Michaela Baez W, Caroline P. AM-PAC Short Forms Manual 4.0. Revised 2/2020. Pain Rating:  Pt reported some pain in R achilles with standing tasks-> +LOB    Activity Tolerance:   Good, SpO2 stable on RA, and NAD    After treatment patient left in no apparent distress:   Sitting in chair and Call bell within reach    COMMUNICATION/EDUCATION:   The patients plan of care was discussed with: Occupational therapist and Registered nurse. Fall prevention education was provided and the patient/caregiver indicated understanding., Patient/family have participated as able in goal setting and plan of care. , and Patient/family agree to work toward stated goals and plan of care.     Thank you for this referral.  Shimon Lowry   Time Calculation: 25 mins

## 2023-04-13 NOTE — PROGRESS NOTES
OCCUPATIONAL THERAPY EVALUATION/DISCHARGE  Patient: Major Jovel. (80 y.o. male)  Date: 4/13/2023  Primary Diagnosis: SBO (small bowel obstruction) ([facility] [K56.609]       Precautions:   Fall ([place])    ASSESSMENT  Based on the objective data described below, the patient presents close to ADL and functional mobility baseline s/p admission for abdominal pain and SBO (no surgical intervention this admission). Pt with hx of PD. Pt was supervision to SBA with all mobility and able to progress from RW to no AD. Pt completed standing ADLs at sink in bathroom x 5 minutes. Pt with one episode of R calf \"catching\" due to R achilles tendon pain (present since admission). He is needing SBA for ADLs and do not anticipate further OT needs at this time. Recommend return to [facility] with continuation of his PT services. Current Level of Function (ADLs/self-care): SBA to supervision     Functional Outcome Measure: The patient scored 24/24 on the [facility] outcome measure which is indicative of independence in ADLs. Other factors to consider for discharge: has hx of PD, lives in residential     PLAN :  Recommend with staff: OOB to chair for meals, ADLs, A x 1    Recommendation for discharge: (in order for the patient to meet his/her long term goals)  No skilled occupational therapy/ follow up rehabilitation needs identified at this time. This discharge recommendation:  Has not yet been discussed the attending provider and/or case management    IF patient discharges home will need the following DME: none       SUBJECTIVE:   Patient stated I go to PT now for balance.     OBJECTIVE DATA SUMMARY:   HISTORY:   Past Medical History:   Diagnosis Date    Advanced care planning/counseling discussion 10/8/2015    Basal cell carcinoma, face     now sees Dr. Nica Choi. Bladder cancer [facility]) 2012    Dr. Ivelisse See. cyto 9/2014, 2/2016. s/p surgery, BCG irrigation Fish.   saw Dr. Reji Martinez    BPH with obstruction/lower urinary tract symptoms     Chronic lower back pain     injection in [place]. saw Dr. Jason Baum    Chronic neck pain     limited motion to side    Depression, major     taking sertaline since before 2014    Dupuytren's contracture of left hand     5th finger    Encephalopathy 8/2021, 11/21/21    admitted     Epistaxis 2013    saw ENT at [facility] history of skin cancer     Fecal incontinence     with loose stools    Hearing loss     has hearing aids    HTN (hypertension)     Insomnia     IVCD (intraventricular conduction defect) 11/30/2015    Dr Ton Iqbal    Loose stools 7/18/2014    MRI contraindicated due to metal implant     has scleral buckle    Parkinsonian features 3/10/2022    Paroxysmal atrial fibrillation (HCC)     EF 66%, 1-2+ LAE on flecainide;Dr. Ton Iqbal. saw Dr. Bernabe Wooten at [facility]    Peripheral neuropathy     severe foot neuropathy    RLS (restless legs syndrome) 7/3/2017    SCC (squamous cell carcinoma), face     prior hx, now sees Dr. Moore Plant allergic reaction     Skin cancer     SSS (sick sinus syndrome) ([address] 2022    Urothelial carcinoma of right distal ureter ([address] 5/1/15    excision Dr. Madhavi Garcia. low grade, papillary. repeat 3 month     Past Surgical History:   Procedure Laterality Date    HX BLADDER REPAIR  2012    cancer    HX COLONOSCOPY  2012    polyps. repeat 2017? HX HERNIA REPAIR      periumibical    HX MALIGNANT SKIN LESION EXCISION  06/14/2018    basal cell left lip    HX OTHER SURGICAL  10/2021    Skin cancer removed from nose. HX RETINAL DETACHMENT REPAIR Right     HI CYSTOURETHROSCOPY  9/2014    Dr. Alcala Courser CYSTOURETHROSCOPY  5/1/15    low grade right pap urothelial cancer    HI CYSTOURETHROSCOPY  2/29/16       Prior Level of Function/Environment/Context: pt lives in [place], occasionally uses a RW, hx of falls at home. Mod I with ADLs.    Expanded or extensive additional review of patient history:   Home Situation  Home Environment: [address] Name: [place]  # Steps to Enter: 0  One/Two Story Residence: One story  Living Alone: Yes  Support Systems: Child(nick) (pt's wife resides in SNF)  Patient Expects to be Discharged to[de-identified] Assisted Living  Current DME Used/Available at Home: Shabbir Agudelo, rollator, Cane, straight, Shower chair, Grab bars  Tub or Shower Type: Shower    Hand dominance: Right    EXAMINATION OF PERFORMANCE DEFICITS:  Cognitive/Behavioral Status:  Neurologic State: Alert; Appropriate for age  Orientation Level: Oriented X4  Cognition: Follows commands  Perception: Appears intact  Perseveration: No perseveration noted  Safety/Judgement: Fall prevention    Skin: intact    Edema: none noted    Hearing:       Vision/Perceptual:                                     Range of Motion:    AROM: Generally decreased, functional                         Strength:    Strength: Generally decreased, functional                Coordination:  Coordination: Generally decreased, functional            Tone & Sensation:    Tone: Normal  Sensation: Intact                      Balance:  Sitting: Intact  Standing: Impaired; Without support  Standing - Static: Good  Standing - Dynamic : Good;Fair    Functional Mobility and Transfers for ADLs:  Bed Mobility:  Rolling: Supervision  Supine to Sit: Supervision  Sit to Supine:  (NT; OOB to chair)  Scooting: Supervision    Transfers:  Sit to Stand: Stand-by assistance  Stand to Sit: Stand-by assistance    ADL Assessment:  Feeding: Independent    Oral Facial Hygiene/Grooming: Supervision    Bathing: Supervision         Upper Body Dressing: Independent    Lower Body Dressing: Supervision    Toileting: Supervision                ADL Intervention and task modifications:                                          Cognitive Retraining  Safety/Judgement: Fall prevention      Functional Measure:  [employer] AM-PAC®      Daily Activity Inpatient Short Form (6-Clicks) Version 2  How much HELP from another person do you currently need. .. (If the patient hasn't done an activity recently, how much help from another person do you think they would need if they tried?) Total A Lot A Little None   1. Putting on and taking off regular lower body clothing? []   1 []   2 []   3 [x]   4   2. Bathing (including washing, rinsing, drying)? []   1 []   2 []   3 [x]   4   3. Toileting, which includes using toilet, bedpan, or urinal? []   1 []   2 []   3 [x]   4   4. Putting on and taking off regular upper body clothing? []   1 []   2 []   3 [x]   4   5. Taking care of personal grooming such as brushing teeth? []   1 []   2 []   3 [x]   4   6. Eating meals? []   1 []   2 []   3 [x]   4     Raw Score: 24/24                            Cutoff score ?191,2,3 had higher odds of discharging home with home health or need of SNF/IPR    1. Vic Officer, Regina Turner. Validity of the AM-PAC 6-Clicks Inpatient Daily Activity and Basic Mobility Short Forms. Physical Therapy Mar 2014, 94 (3) 379-391; DOI: 10.2522/ptj.16780744  2. Trinidad Walters. Association of AM-PAC \"6-Clicks\" Basic Mobility and Daily Activity Scores With Discharge Destination. Phys Ther. 2021 Apr 4;101(4):qtqr994. doi: 10.1093/ptj/sctj488. PMID: 80796458. V Ivan Regan, Karolina D, Lilian Quintanilla, Vida K, Hudson S. Activity Measure for Post-Acute Care \"6-Clicks\" Basic Mobility Scores Predict Discharge Destination After Acute Care Hospitalization in Select Patient Groups: A Retrospective, Observational Study. Arch Rehabil Res Clin Transl. 2022 Jul 16;4(3):207004. doi: 10.1016/j.arrct. 6100.211005. PMID: 06328092; PMCID: EFG4494704. 4. Michaela Wolff W, Caroline STREETER. AM-PAC Short Forms Manual 4.0. Revised 2/2020.              Occupational Therapy Evaluation Charge Determination   History Examination Decision-Making   LOW Complexity : Brief history review  LOW Complexity : 1-3 performance deficits relating to physical, cognitive , or psychosocial skils that result in activity limitations and / or participation restrictions  LOW Complexity : No comorbidities that affect functional and no verbal or physical assistance needed to complete eval tasks       Based on the above components, the patient evaluation is determined to be of the following complexity level: LOW   Pain Rating:  C/o R achilles tendon pain during ADLs. Pt declined ice (no warmth, redness or tenderness with palpation)     Activity Tolerance:   Good    After treatment patient left in no apparent distress:    Sitting in chair and Call bell within reach    COMMUNICATION/EDUCATION:   The patients plan of care was discussed with: Physical therapist and Registered nurse.      Thank you for this referral.  Jenni Moseley OT  Time Calculation: 31 mins

## 2023-04-13 NOTE — PROGRESS NOTES
Bedside shift change report given to Pepe Tovar (oncoming nurse) by Michelle Kwan RN (offgoing nurse). Report included the following information SBAR, Kardex, Intake/Output, MAR, and Recent Results.

## 2023-05-20 RX ORDER — ERGOCALCIFEROL 1.25 MG/1
50000 CAPSULE ORAL
COMMUNITY
Start: 2022-10-11

## 2023-05-20 RX ORDER — MONTELUKAST SODIUM 4 MG/1
TABLET, CHEWABLE ORAL
COMMUNITY
Start: 2023-04-25

## 2023-05-20 RX ORDER — ASPIRIN 81 MG/1
81 TABLET, CHEWABLE ORAL DAILY
COMMUNITY
Start: 2022-03-31

## 2023-05-20 RX ORDER — AMLODIPINE BESYLATE 10 MG/1
10 TABLET ORAL DAILY
COMMUNITY
Start: 2022-10-11

## 2023-05-20 RX ORDER — NYSTATIN 100000 U/G
CREAM TOPICAL
COMMUNITY
Start: 2022-12-08 | End: 2023-06-29 | Stop reason: ALTCHOICE

## 2023-05-20 RX ORDER — IRBESARTAN 150 MG/1
150 TABLET ORAL 2 TIMES DAILY
COMMUNITY
Start: 2022-04-09

## 2023-05-20 RX ORDER — ACETAMINOPHEN 500 MG
1000 TABLET ORAL 2 TIMES DAILY
COMMUNITY
Start: 2022-03-31

## 2023-05-20 RX ORDER — ATORVASTATIN CALCIUM 40 MG/1
40 TABLET, FILM COATED ORAL
COMMUNITY
Start: 2022-03-31

## 2023-05-20 RX ORDER — FINASTERIDE 5 MG/1
5 TABLET, FILM COATED ORAL DAILY
COMMUNITY
Start: 2022-03-31

## 2023-06-12 ENCOUNTER — TELEPHONE (OUTPATIENT)
Age: 88
End: 2023-06-12

## 2023-06-12 NOTE — TELEPHONE ENCOUNTER
Pt son Harlan Bruno) called back to apologize, son stated that he was being rude to the  when he was called to r/s pt upcoming appt.      Pt Son # [phone]

## 2023-06-15 ENCOUNTER — TELEPHONE (OUTPATIENT)
Age: 88
End: 2023-06-15

## 2023-06-15 NOTE — TELEPHONE ENCOUNTER
----- Message from Solomon Sommer sent at 6/13/2023  3:56 PM EDT -----  Subject: Appointment Request    Reason for Call: Established Patient Appointment needed: Routine Existing   Condition Follow Up    QUESTIONS    Reason for appointment request? No appointments available during search     Additional Information for Provider?  Patient needing follow up for   infection in rectum before avail dates in July.   ---------------------------------------------------------------------------  --------------  Chary PADRON  [phone]; OK to leave message on voicemail  ---------------------------------------------------------------------------  --------------  SCRIPT ANSWERS

## 2023-06-28 ENCOUNTER — OFFICE VISIT (OUTPATIENT)
Age: 88
End: 2023-06-28
Payer: MEDICARE

## 2023-06-28 ENCOUNTER — TELEPHONE (OUTPATIENT)
Age: 88
End: 2023-06-28

## 2023-06-28 VITALS
SYSTOLIC BLOOD PRESSURE: 114 MMHG | RESPIRATION RATE: 17 BRPM | HEIGHT: 71 IN | DIASTOLIC BLOOD PRESSURE: 70 MMHG | OXYGEN SATURATION: 95 % | BODY MASS INDEX: 32.23 KG/M2 | HEART RATE: 77 BPM | TEMPERATURE: 97.9 F | WEIGHT: 230.2 LBS

## 2023-06-28 DIAGNOSIS — B37.89 RECTAL CANDIDIASIS: Primary | ICD-10-CM

## 2023-06-28 DIAGNOSIS — R53.83 OTHER FATIGUE: ICD-10-CM

## 2023-06-28 PROCEDURE — 99213 OFFICE O/P EST LOW 20 MIN: CPT | Performed by: INTERNAL MEDICINE

## 2023-06-28 PROCEDURE — 1123F ACP DISCUSS/DSCN MKR DOCD: CPT | Performed by: INTERNAL MEDICINE

## 2023-06-28 RX ORDER — SERTRALINE HYDROCHLORIDE 25 MG/1
25 TABLET, FILM COATED ORAL DAILY
Qty: 30 TABLET | Refills: 3 | Status: SHIPPED | OUTPATIENT
Start: 2023-06-28

## 2023-06-28 RX ORDER — SERTRALINE HYDROCHLORIDE 25 MG/1
25 TABLET, FILM COATED ORAL DAILY
Qty: 30 TABLET | Refills: 3 | Status: SHIPPED | OUTPATIENT
Start: 2023-06-28 | End: 2023-06-28 | Stop reason: SDUPTHER

## 2023-06-28 SDOH — ECONOMIC STABILITY: INCOME INSECURITY: HOW HARD IS IT FOR YOU TO PAY FOR THE VERY BASICS LIKE FOOD, HOUSING, MEDICAL CARE, AND HEATING?: NOT HARD AT ALL

## 2023-06-28 SDOH — ECONOMIC STABILITY: FOOD INSECURITY: WITHIN THE PAST 12 MONTHS, THE FOOD YOU BOUGHT JUST DIDN'T LAST AND YOU DIDN'T HAVE MONEY TO GET MORE.: NEVER TRUE

## 2023-06-28 SDOH — ECONOMIC STABILITY: HOUSING INSECURITY
IN THE LAST 12 MONTHS, WAS THERE A TIME WHEN YOU DID NOT HAVE A STEADY PLACE TO SLEEP OR SLEPT IN A SHELTER (INCLUDING NOW)?: NO

## 2023-06-28 SDOH — ECONOMIC STABILITY: FOOD INSECURITY: WITHIN THE PAST 12 MONTHS, YOU WORRIED THAT YOUR FOOD WOULD RUN OUT BEFORE YOU GOT MONEY TO BUY MORE.: NEVER TRUE

## 2023-06-28 ASSESSMENT — PATIENT HEALTH QUESTIONNAIRE - PHQ9
SUM OF ALL RESPONSES TO PHQ QUESTIONS 1-9: 0
10. IF YOU CHECKED OFF ANY PROBLEMS, HOW DIFFICULT HAVE THESE PROBLEMS MADE IT FOR YOU TO DO YOUR WORK, TAKE CARE OF THINGS AT HOME, OR GET ALONG WITH OTHER PEOPLE: 0
SUM OF ALL RESPONSES TO PHQ QUESTIONS 1-9: 0
SUM OF ALL RESPONSES TO PHQ QUESTIONS 1-9: 0
6. FEELING BAD ABOUT YOURSELF - OR THAT YOU ARE A FAILURE OR HAVE LET YOURSELF OR YOUR FAMILY DOWN: 0
SUM OF ALL RESPONSES TO PHQ9 QUESTIONS 1 & 2: 0
7. TROUBLE CONCENTRATING ON THINGS, SUCH AS READING THE NEWSPAPER OR WATCHING TELEVISION: 0
4. FEELING TIRED OR HAVING LITTLE ENERGY: 0
5. POOR APPETITE OR OVEREATING: 0
9. THOUGHTS THAT YOU WOULD BE BETTER OFF DEAD, OR OF HURTING YOURSELF: 0
1. LITTLE INTEREST OR PLEASURE IN DOING THINGS: 0
2. FEELING DOWN, DEPRESSED OR HOPELESS: 0
SUM OF ALL RESPONSES TO PHQ QUESTIONS 1-9: 0
3. TROUBLE FALLING OR STAYING ASLEEP: 0
8. MOVING OR SPEAKING SO SLOWLY THAT OTHER PEOPLE COULD HAVE NOTICED. OR THE OPPOSITE, BEING SO FIGETY OR RESTLESS THAT YOU HAVE BEEN MOVING AROUND A LOT MORE THAN USUAL: 0

## 2023-06-28 NOTE — TELEPHONE ENCOUNTER
Refill rx request:    sertraline (ZOLOFT) 25 MG tablet    Send to:  [facility]/pharmacy #[id number]- [address] [phone] - F [phone]

## 2023-08-14 NOTE — PROGRESS NOTES
Naila Martin, ACNP, [facility]          Assessment/Plan:       1. Paroxysmal atrial fibrillation (HCC)  -     EKG 12 Lead  2. Other fatigue  3. Essential (primary) hypertension  4. Stage 3b chronic kidney disease ([address])      Plan:    1. Paroxysmal atrial fibrillation (HCC)  Rate controlled, off OAC due to concern for bleeding risks with multiple falls, CHADS2VASC=3 but will continue ASA only at this time per Dr. Maira Campbell  No recent falls   Holter monitor 6/22 showed sinus with frequent PAC's, 501 occurrence of SVT  Does not need at this point pacemaker or ablation  03/06/22     ECHO ADULT COMPLETE Interpretation Summary    Left Ventricle: Left ventricle size is normal. Mildly increased wall thickness. Normal wall motion. Normal left ventricular systolic function with a visually estimated EF of 55 - 60%. Diastolic dysfunction present with normal LV EF. Right Ventricle: Not well visualized. Mitral Valve: Mildly thickened leaflet. Mild annular calcification of the mitral valve. Aorta: Mildly dilated sinus of Valsalva. Ao Root diameter is 3.9 cm.     2. IVCD (intraventricular conduction defect)  QRS now normal off flecainide. 3. Essential hypertension  At goal , meds and possible side effects reviewed and patient denies  Continue amlodipine and irbesartan    4. Dyslipidemia  At goal on atrovastatin, denies excess muscle aches     5. CKD 3b  Last creatinine 1.7, GFR 37 in 4/23    6.   Fatigue  Discussed checking TSH and Vitamin D level but he reports getting up every 2 hours at night to urinate due to BPH, advised him to discuss this with urologist who he plans to see in October, discussed reassessing fatigue after changes made by urologist, if fatigue persists may order labs and repeat holter monitor, he will follow up with me in 2-3 months     DARY Sewell - NP  8/15/2023       Primary Cardiologist:  Dr. Tony Zimmerman:  fatigue     HISTORY OF PRESENT ILLNESS:    Antonio Trevino

## 2023-08-15 ENCOUNTER — OFFICE VISIT (OUTPATIENT)
Age: 88
End: 2023-08-15
Payer: MEDICARE

## 2023-08-15 VITALS
SYSTOLIC BLOOD PRESSURE: 130 MMHG | HEART RATE: 80 BPM | DIASTOLIC BLOOD PRESSURE: 76 MMHG | BODY MASS INDEX: 32.48 KG/M2 | HEIGHT: 71 IN | OXYGEN SATURATION: 94 % | WEIGHT: 232 LBS

## 2023-08-15 DIAGNOSIS — N18.32 STAGE 3B CHRONIC KIDNEY DISEASE (HCC): ICD-10-CM

## 2023-08-15 DIAGNOSIS — R53.83 OTHER FATIGUE: ICD-10-CM

## 2023-08-15 DIAGNOSIS — I48.0 PAROXYSMAL ATRIAL FIBRILLATION (HCC): Primary | ICD-10-CM

## 2023-08-15 DIAGNOSIS — I10 ESSENTIAL (PRIMARY) HYPERTENSION: ICD-10-CM

## 2023-08-15 PROCEDURE — 99214 OFFICE O/P EST MOD 30 MIN: CPT | Performed by: NURSE PRACTITIONER

## 2023-08-15 PROCEDURE — 1123F ACP DISCUSS/DSCN MKR DOCD: CPT | Performed by: NURSE PRACTITIONER

## 2023-08-15 PROCEDURE — 93005 ELECTROCARDIOGRAM TRACING: CPT | Performed by: NURSE PRACTITIONER

## 2023-08-15 PROCEDURE — 93010 ELECTROCARDIOGRAM REPORT: CPT | Performed by: NURSE PRACTITIONER

## 2023-09-01 ENCOUNTER — TELEPHONE (OUTPATIENT)
Age: 88
End: 2023-09-01

## 2023-09-01 NOTE — TELEPHONE ENCOUNTER
----- Message from Anna Reyes sent at 8/31/2023  2:51 PM EDT -----  Subject: Referral Request    Reason for referral request? pt needs referral for physical and   occupational therapy, call pt back to discuss, send script to his address   he doesn't drive hes in assisted living  Provider patient wants to be referred to(if known):     Provider Phone Number(if known):     Additional Information for Provider?   ---------------------------------------------------------------------------  --------------  [address] [place]    [phone]; OK to leave message on voicemail  ---------------------------------------------------------------------------  --------------

## 2023-09-08 ENCOUNTER — TELEPHONE (OUTPATIENT)
Age: 88
End: 2023-09-08

## 2023-09-11 ENCOUNTER — TELEPHONE (OUTPATIENT)
Age: 88
End: 2023-09-11

## 2023-10-24 ENCOUNTER — TELEPHONE (OUTPATIENT)
Age: 88
End: 2023-10-24

## 2023-10-24 NOTE — TELEPHONE ENCOUNTER
Patient did not show for appointment today  Please call and see if they can reschedule to see Leticia Chase or Vishla Hale in November

## 2023-11-13 ENCOUNTER — TELEPHONE (OUTPATIENT)
Age: 88
End: 2023-11-13

## 2023-11-13 NOTE — TELEPHONE ENCOUNTER
Sounds like this is not acute?   [facility] for appt 11/22 as scheduled unless acute, needs to be seen sooner

## 2023-11-13 NOTE — TELEPHONE ENCOUNTER
Spoke with Vignesh Rg/Scott visiting from out of town. She reports she is extremely fatigued and SOB with exertion. Denies any other symptoms. She is requesting an appt and scheduled for 11/22/23 at 3:40 PM. Advised if the fatigue persists and SOB worsens to go to ER for evaluation and treatment. She states understanding and grateful for the call.

## 2023-11-22 ENCOUNTER — OFFICE VISIT (OUTPATIENT)
Age: 88
End: 2023-11-22
Payer: MEDICARE

## 2023-11-22 VITALS
HEIGHT: 71 IN | RESPIRATION RATE: 17 BRPM | SYSTOLIC BLOOD PRESSURE: 128 MMHG | TEMPERATURE: 97.8 F | HEART RATE: 87 BPM | WEIGHT: 225.6 LBS | OXYGEN SATURATION: 94 % | DIASTOLIC BLOOD PRESSURE: 70 MMHG | BODY MASS INDEX: 31.58 KG/M2

## 2023-11-22 DIAGNOSIS — R29.818 PARKINSONIAN FEATURES: ICD-10-CM

## 2023-11-22 DIAGNOSIS — R32 URINARY INCONTINENCE, UNSPECIFIED TYPE: ICD-10-CM

## 2023-11-22 DIAGNOSIS — R53.83 OTHER FATIGUE: Primary | ICD-10-CM

## 2023-11-22 DIAGNOSIS — R19.5 LOOSE STOOLS: ICD-10-CM

## 2023-11-22 DIAGNOSIS — N18.31 STAGE 3A CHRONIC KIDNEY DISEASE (HCC): ICD-10-CM

## 2023-11-22 DIAGNOSIS — I10 ESSENTIAL HYPERTENSION: ICD-10-CM

## 2023-11-22 DIAGNOSIS — R53.83 OTHER FATIGUE: ICD-10-CM

## 2023-11-22 DIAGNOSIS — C67.9 MALIGNANT NEOPLASM OF URINARY BLADDER, UNSPECIFIED SITE (HCC): ICD-10-CM

## 2023-11-22 DIAGNOSIS — C66.1 UROTHELIAL CARCINOMA OF RIGHT DISTAL URETER (HCC): ICD-10-CM

## 2023-11-22 PROBLEM — G93.40 ENCEPHALOPATHY: Status: RESOLVED | Noted: 2021-11-21 | Resolved: 2023-11-22

## 2023-11-22 PROBLEM — K56.609 SBO (SMALL BOWEL OBSTRUCTION) (HCC): Status: RESOLVED | Noted: 2023-04-11 | Resolved: 2023-11-22

## 2023-11-22 PROCEDURE — 99214 OFFICE O/P EST MOD 30 MIN: CPT | Performed by: INTERNAL MEDICINE

## 2023-11-22 PROCEDURE — 1123F ACP DISCUSS/DSCN MKR DOCD: CPT | Performed by: INTERNAL MEDICINE

## 2023-11-22 RX ORDER — TOLTERODINE 2 MG/1
2 CAPSULE, EXTENDED RELEASE ORAL DAILY
Qty: 90 CAPSULE | Refills: 1
Start: 2023-11-22

## 2023-11-22 RX ORDER — MONTELUKAST SODIUM 4 MG/1
1 TABLET, CHEWABLE ORAL DAILY
Qty: 30 TABLET | Refills: 3 | Status: SHIPPED | OUTPATIENT
Start: 2023-11-22

## 2023-11-22 NOTE — PATIENT INSTRUCTIONS
For overactive bladder, ask Dr. Raul Caro about [facility] or [facility]. They may help you sleep and will not cause dry mouth. Expensive, however! Current Outpatient Medications   Medication Sig    tolterodine (DETROL LA) 2 MG extended release capsule Take 1 capsule by mouth daily For overactive bladder    colestipol (COLESTID) 1 g tablet Take 1 tablet by mouth daily Decreased 11/22/23    sertraline (ZOLOFT) 25 MG tablet Take 1 tablet by mouth daily Decreased 6/28/23    acetaminophen (TYLENOL) 500 MG tablet Take 2 tablets by mouth 2 times daily    amLODIPine (NORVASC) 10 MG tablet Take 1 tablet by mouth daily    aspirin 81 MG chewable tablet Take 1 tablet by mouth daily    atorvastatin (LIPITOR) 40 MG tablet Take 1 tablet by mouth    carbidopa-levodopa (SINEMET)  MG per tablet Take 1 tablet by mouth 3 times daily    cyanocobalamin 1000 MCG tablet Take 1 tablet by mouth daily    ergocalciferol (ERGOCALCIFEROL) 1.25 MG (10366 UT) capsule Take 1 capsule by mouth every 7 days    finasteride (PROSCAR) 5 MG tablet Take 1 tablet by mouth daily    irbesartan (AVAPRO) 150 MG tablet Take 1 tablet by mouth 2 times daily     No current facility-administered medications for this visit.

## 2023-11-23 NOTE — PROGRESS NOTES
lab studies reviewed with patient  reviewed medications and side effects in detail    Return to clinic for further evaluation if new symptoms develop    No follow-ups on file. Current Outpatient Medications   Medication Sig    tolterodine (DETROL LA) 2 MG extended release capsule Take 1 capsule by mouth daily For overactive bladder    colestipol (COLESTID) 1 g tablet Take 1 tablet by mouth daily Decreased 11/22/23    sertraline (ZOLOFT) 25 MG tablet Take 1 tablet by mouth daily Decreased 6/28/23    acetaminophen (TYLENOL) 500 MG tablet Take 2 tablets by mouth 2 times daily    amLODIPine (NORVASC) 10 MG tablet Take 1 tablet by mouth daily    aspirin 81 MG chewable tablet Take 1 tablet by mouth daily    atorvastatin (LIPITOR) 40 MG tablet Take 1 tablet by mouth    carbidopa-levodopa (SINEMET)  MG per tablet Take 1 tablet by mouth 3 times daily    cyanocobalamin 1000 MCG tablet Take 1 tablet by mouth daily    ergocalciferol (ERGOCALCIFEROL) 1.25 MG (08907 UT) capsule Take 1 capsule by mouth every 7 days    finasteride (PROSCAR) 5 MG tablet Take 1 tablet by mouth daily    irbesartan (AVAPRO) 150 MG tablet Take 1 tablet by mouth 2 times daily     No current facility-administered medications for this visit.

## 2024-01-06 LAB
ALBUMIN SERPL-MCNC: 4.1 G/DL (ref 3.6–4.6)
ALBUMIN/GLOB SERPL: 1.5 {RATIO} (ref 1.2–2.2)
ALP SERPL-CCNC: 81 IU/L (ref 44–121)
ALT SERPL-CCNC: 8 IU/L (ref 0–44)
AST SERPL-CCNC: 17 IU/L (ref 0–40)
BILIRUB SERPL-MCNC: 0.5 MG/DL (ref 0–1.2)
BUN SERPL-MCNC: 19 MG/DL (ref 10–36)
BUN/CREAT SERPL: 13 (ref 10–24)
CALCIUM SERPL-MCNC: 9 MG/DL (ref 8.6–10.2)
CHLORIDE SERPL-SCNC: 105 MMOL/L (ref 96–106)
CHOLEST SERPL-MCNC: 122 MG/DL (ref 100–199)
CO2 SERPL-SCNC: 21 MMOL/L (ref 20–29)
CREAT SERPL-MCNC: 1.44 MG/DL (ref 0.76–1.27)
EGFRCR SERPLBLD CKD-EPI 2021: 46 ML/MIN/1.73
ERYTHROCYTE [DISTWIDTH] IN BLOOD BY AUTOMATED COUNT: 12.9 % (ref 11.6–15.4)
GLOBULIN SER CALC-MCNC: 2.7 G/DL (ref 1.5–4.5)
GLUCOSE SERPL-MCNC: 101 MG/DL (ref 70–99)
HCT VFR BLD AUTO: 38.6 % (ref 37.5–51)
HDLC SERPL-MCNC: 45 MG/DL
HGB BLD-MCNC: 12.8 G/DL (ref 13–17.7)
IMP & REVIEW OF LAB RESULTS: NORMAL
LDLC SERPL CALC-MCNC: 53 MG/DL (ref 0–99)
MCH RBC QN AUTO: 31.2 PG (ref 26.6–33)
MCHC RBC AUTO-ENTMCNC: 33.2 G/DL (ref 31.5–35.7)
MCV RBC AUTO: 94 FL (ref 79–97)
PLATELET # BLD AUTO: 262 X10E3/UL (ref 150–450)
POTASSIUM SERPL-SCNC: 4.5 MMOL/L (ref 3.5–5.2)
PROT SERPL-MCNC: 6.8 G/DL (ref 6–8.5)
RBC # BLD AUTO: 4.1 X10E6/UL (ref 4.14–5.8)
REPORT: NORMAL
REPORT: NORMAL
SODIUM SERPL-SCNC: 140 MMOL/L (ref 134–144)
TRIGL SERPL-MCNC: 139 MG/DL (ref 0–149)
TSH SERPL DL<=0.005 MIU/L-ACNC: 3.54 UIU/ML (ref 0.45–4.5)
VLDLC SERPL CALC-MCNC: 24 MG/DL (ref 5–40)
WBC # BLD AUTO: 6.6 X10E3/UL (ref 3.4–10.8)

## 2024-01-25 ENCOUNTER — OFFICE VISIT (OUTPATIENT)
Age: 89
End: 2024-01-25
Payer: MEDICARE

## 2024-01-25 VITALS
RESPIRATION RATE: 16 BRPM | HEART RATE: 85 BPM | OXYGEN SATURATION: 95 % | HEIGHT: 71 IN | SYSTOLIC BLOOD PRESSURE: 120 MMHG | BODY MASS INDEX: 31.5 KG/M2 | WEIGHT: 225 LBS | DIASTOLIC BLOOD PRESSURE: 70 MMHG

## 2024-01-25 DIAGNOSIS — R53.1 WEAKNESS: ICD-10-CM

## 2024-01-25 DIAGNOSIS — I49.5 SSS (SICK SINUS SYNDROME) (HCC): ICD-10-CM

## 2024-01-25 DIAGNOSIS — I45.4 IVCD (INTRAVENTRICULAR CONDUCTION DEFECT): ICD-10-CM

## 2024-01-25 DIAGNOSIS — I48.0 PAROXYSMAL ATRIAL FIBRILLATION (HCC): Primary | ICD-10-CM

## 2024-01-25 DIAGNOSIS — E78.00 HYPERCHOLESTEREMIA: ICD-10-CM

## 2024-01-25 DIAGNOSIS — N18.32 CHRONIC KIDNEY DISEASE, STAGE 3B (HCC): ICD-10-CM

## 2024-01-25 DIAGNOSIS — I10 ESSENTIAL HYPERTENSION: ICD-10-CM

## 2024-01-25 PROCEDURE — 99214 OFFICE O/P EST MOD 30 MIN: CPT | Performed by: SPECIALIST

## 2024-01-25 PROCEDURE — 1123F ACP DISCUSS/DSCN MKR DOCD: CPT | Performed by: SPECIALIST

## 2024-01-25 NOTE — PROGRESS NOTES
Denis Robin Jr.     7/15/1931       Etta Devlin MD, [facility]  Date of Visit-1/25/2024   PCP is Andrea Garcia MD   [facility] [place]  [facility]  HPI:  Denis Robin Jr. is a 89 y.o. male   PAF, IVCD, falls, HTN,BB intolerant, XOL  Seen NP 8/2023 mild fatigue , seen me 12/2022    Today  In good spirits, feels ok for his age, wife having many issues, she is in NH and he is with children, some  issues at night sees Dr Ford  Denies chest pain, edema, syncope or shortness of breath at rest   Has no tachycardia , palpitations or sense of arrythmia   Had SBO 4/2023       PAF   HTN.   Frequent falls, dementia,stopped flecainide  Nov 2021   bladder cancer. CKD 3   Pt hospitalized in August 2021 - LEANN and Rhabdo with dehydration. An echo 8/30/21 showed normal EF  Echo 3/8/22 EF 55-60%, WNL  Carotids 8/29/21 minimal DAMEON, no LICA stenosis  admitted 11/21/21 with confusion and BP of 170. CT head was negative. Had LEANN, which improved with fluids. Pt went to the ER on 1/13/22  with another fall and head injury, and then again 1/29/22 .   On 3/6/22 admitted with further Bradycardia , , and thought to have ischemia, and given TPA on 3/7/22. Due to bradycardia his Toprol was stopped.   EF remained normal.    had significant issues with Diarrhea.    on Sinemet for Parkinson's. Moved with wife to a new facility    Assessment/Plan:   fu 6 months      1. Paroxysmal atrial fibrillation (HCC)  Rate controlled, off flecainide, now back to SR  asymptomatic   Holter monitor is sinus with frequent PAC's  No anticoagulation due to falls  Preserved EF echo 2021, 2022    2. Weakness  tPA March 2022, Sinemet for Parkinson's, fall issues  Low bp May 2022 declined admit  More stable and steady in the last several visits    3. SSS (sick sinus syndrome) (HCC)  Bradycardia March 2022 stopped Toprol  Does not need at this point pacemaker or ablation    4. Essential

## 2024-02-06 PROBLEM — R00.1 BRADYCARDIA: Status: RESOLVED | Noted: 2022-03-06 | Resolved: 2024-02-06

## 2024-02-06 PROBLEM — R53.1 WEAKNESS: Status: ACTIVE | Noted: 2024-02-06

## 2024-02-06 PROBLEM — N18.32 CHRONIC KIDNEY DISEASE, STAGE 3B (HCC): Status: ACTIVE | Noted: 2022-06-11

## 2024-02-06 PROBLEM — E78.00 HYPERCHOLESTEREMIA: Status: ACTIVE | Noted: 2024-02-06

## 2024-04-26 NOTE — TELEPHONE ENCOUNTER
Surgery will be done on 9/08/21, removing a legion from the patient's left lateral nose, by Darwin Ceja at [address] [place] [address].   The doctor would like to receive clearance for the patient to stop taking coumadin, please advise      Dr. Darwin Gomez  [phone]  Fax [phone] The DP pulses are +2 bilaterally. The radial pulses are +2 bilaterally.

## 2024-05-21 ENCOUNTER — TELEPHONE (OUTPATIENT)
Age: 89
End: 2024-05-21

## 2024-05-24 ENCOUNTER — OFFICE VISIT (OUTPATIENT)
Age: 89
End: 2024-05-24
Payer: MEDICARE

## 2024-05-24 VITALS
RESPIRATION RATE: 16 BRPM | DIASTOLIC BLOOD PRESSURE: 72 MMHG | TEMPERATURE: 98.3 F | SYSTOLIC BLOOD PRESSURE: 116 MMHG | BODY MASS INDEX: 31.78 KG/M2 | HEIGHT: 71 IN | HEART RATE: 82 BPM | WEIGHT: 227 LBS | OXYGEN SATURATION: 96 %

## 2024-05-24 DIAGNOSIS — H61.23 BILATERAL IMPACTED CERUMEN: Primary | ICD-10-CM

## 2024-05-24 DIAGNOSIS — I10 ESSENTIAL HYPERTENSION: ICD-10-CM

## 2024-05-24 PROBLEM — F32.1 MAJOR DEPRESSIVE DISORDER, SINGLE EPISODE, MODERATE (HCC): Status: ACTIVE | Noted: 2024-05-24

## 2024-05-24 PROCEDURE — 1123F ACP DISCUSS/DSCN MKR DOCD: CPT | Performed by: NURSE PRACTITIONER

## 2024-05-24 PROCEDURE — 99213 OFFICE O/P EST LOW 20 MIN: CPT | Performed by: NURSE PRACTITIONER

## 2024-05-24 ASSESSMENT — ENCOUNTER SYMPTOMS
EYE REDNESS: 0
SINUS PRESSURE: 0
CHEST TIGHTNESS: 0
DIARRHEA: 0
RESPIRATORY NEGATIVE: 1
BLOOD IN STOOL: 0
SINUS PAIN: 0
SHORTNESS OF BREATH: 0
ABDOMINAL PAIN: 0
BACK PAIN: 0
RHINORRHEA: 0
VOMITING: 0
SORE THROAT: 0
CONSTIPATION: 0
EYE PAIN: 0
NAUSEA: 0
COUGH: 0
EYES NEGATIVE: 1
GASTROINTESTINAL NEGATIVE: 1

## 2024-05-24 ASSESSMENT — PATIENT HEALTH QUESTIONNAIRE - PHQ9
10. IF YOU CHECKED OFF ANY PROBLEMS, HOW DIFFICULT HAVE THESE PROBLEMS MADE IT FOR YOU TO DO YOUR WORK, TAKE CARE OF THINGS AT HOME, OR GET ALONG WITH OTHER PEOPLE: NOT DIFFICULT AT ALL
8. MOVING OR SPEAKING SO SLOWLY THAT OTHER PEOPLE COULD HAVE NOTICED. OR THE OPPOSITE, BEING SO FIGETY OR RESTLESS THAT YOU HAVE BEEN MOVING AROUND A LOT MORE THAN USUAL: NOT AT ALL
SUM OF ALL RESPONSES TO PHQ QUESTIONS 1-9: 0
SUM OF ALL RESPONSES TO PHQ QUESTIONS 1-9: 0
SUM OF ALL RESPONSES TO PHQ9 QUESTIONS 1 & 2: 0
6. FEELING BAD ABOUT YOURSELF - OR THAT YOU ARE A FAILURE OR HAVE LET YOURSELF OR YOUR FAMILY DOWN: NOT AT ALL
7. TROUBLE CONCENTRATING ON THINGS, SUCH AS READING THE NEWSPAPER OR WATCHING TELEVISION: NOT AT ALL
9. THOUGHTS THAT YOU WOULD BE BETTER OFF DEAD, OR OF HURTING YOURSELF: NOT AT ALL
1. LITTLE INTEREST OR PLEASURE IN DOING THINGS: NOT AT ALL
5. POOR APPETITE OR OVEREATING: NOT AT ALL
SUM OF ALL RESPONSES TO PHQ QUESTIONS 1-9: 0
4. FEELING TIRED OR HAVING LITTLE ENERGY: NOT AT ALL
2. FEELING DOWN, DEPRESSED OR HOPELESS: NOT AT ALL
SUM OF ALL RESPONSES TO PHQ QUESTIONS 1-9: 0
3. TROUBLE FALLING OR STAYING ASLEEP: NOT AT ALL

## 2024-05-24 NOTE — PROGRESS NOTES
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  repeat 2017?    CYSTOURETHROSCOPY  2/29/16    CYSTOURETHROSCOPY  5/1/15    low grade right pap urothelial cancer    CYSTOURETHROSCOPY  9/2014    Dr. Ford    HERNIA REPAIR      periumibical    MALIGNANT SKIN LESION EXCISION  06/14/2018    basal cell left lip    OTHER SURGICAL HISTORY  10/2021    Skin cancer removed from nose.     RETINAL DETACHMENT SURGERY Right       Social History     Tobacco Use    Smoking status: Former    Smokeless tobacco: Never   Substance Use Topics    Alcohol use: Yes     Alcohol/week: 3.0 standard drinks of alcohol      History reviewed. No pertinent family history.     Physical Exam   Vitals:       /72 (Site: Left Upper Arm, Position: Sitting, Cuff Size: Small Adult)   Pulse 82   Temp 98.3 °F (36.8 °C) (Oral)   Resp 16   Ht 1.803 m (5' 11\")   Wt 103 kg (227 lb)   SpO2 96%   BMI 31.66 kg/m²      Physical

## 2024-07-29 ENCOUNTER — OFFICE VISIT (OUTPATIENT)
Age: 89
End: 2024-07-29
Payer: MEDICARE

## 2024-07-29 VITALS
SYSTOLIC BLOOD PRESSURE: 122 MMHG | BODY MASS INDEX: 32.34 KG/M2 | OXYGEN SATURATION: 99 % | DIASTOLIC BLOOD PRESSURE: 70 MMHG | HEART RATE: 88 BPM | WEIGHT: 231 LBS | HEIGHT: 71 IN

## 2024-07-29 DIAGNOSIS — I49.5 SSS (SICK SINUS SYNDROME) (HCC): ICD-10-CM

## 2024-07-29 DIAGNOSIS — I10 ESSENTIAL HYPERTENSION: ICD-10-CM

## 2024-07-29 DIAGNOSIS — I48.0 PAROXYSMAL ATRIAL FIBRILLATION (HCC): Primary | ICD-10-CM

## 2024-07-29 DIAGNOSIS — N18.32 CHRONIC KIDNEY DISEASE, STAGE 3B (HCC): ICD-10-CM

## 2024-07-29 DIAGNOSIS — E78.00 HYPERCHOLESTEREMIA: ICD-10-CM

## 2024-07-29 DIAGNOSIS — I45.4 IVCD (INTRAVENTRICULAR CONDUCTION DEFECT): ICD-10-CM

## 2024-07-29 DIAGNOSIS — I67.9 CEREBRAL VASCULAR DISEASE: ICD-10-CM

## 2024-07-29 PROCEDURE — 1123F ACP DISCUSS/DSCN MKR DOCD: CPT | Performed by: SPECIALIST

## 2024-07-29 PROCEDURE — 99214 OFFICE O/P EST MOD 30 MIN: CPT | Performed by: SPECIALIST

## 2024-07-29 ASSESSMENT — PATIENT HEALTH QUESTIONNAIRE - PHQ9
SUM OF ALL RESPONSES TO PHQ QUESTIONS 1-9: 0
2. FEELING DOWN, DEPRESSED OR HOPELESS: NOT AT ALL
SUM OF ALL RESPONSES TO PHQ QUESTIONS 1-9: 0
SUM OF ALL RESPONSES TO PHQ9 QUESTIONS 1 & 2: 0
1. LITTLE INTEREST OR PLEASURE IN DOING THINGS: NOT AT ALL

## 2024-07-29 NOTE — PROGRESS NOTES
Denis Robin Jr.     7/15/1931       Etta Devlin MD, [facility]  Date of Visit-7/29/2024   PCP is Andrea Garcia MD   [facility] [place]  [facility]  HPI:  Denis Robin Jr. is a 89 y.o. male   6 month follow up visit   PAF, IVCD, falls, HTN,BB intolerant, XOL      Today  Ed returns today generally doing well with no complaints.  He has no chest pain shortness of breath.  He gets little leg foot pain at night.  He is on some Tylenol and is unsure if you take that regularly or its as needed.  I showed him it was as needed.  Has not had any cardiac issues of rapid heart rate or angina.    PAF   HTN.   Frequent falls, dementia,stopped flecainide  Nov 2021   bladder cancer. CKD 3   Pt hospitalized in August 2021 - LEANN and Rhabdo with dehydration. An echo 8/30/21 showed normal EF  Echo 3/8/22 EF 55-60%, WNL  Carotids 8/29/21 minimal DAMEON, no LICA stenosis  admitted 11/21/21 with confusion and BP of 170. CT head was negative. Had LEANN, which improved with fluids. Pt went to the ER on 1/13/22  with another fall and head injury, and then again 1/29/22 .   On 3/6/22 admitted with further Bradycardia , , and thought to have ischemia, and given TPA on 3/7/22. Due to bradycardia his Toprol was stopped.   EF remained normal.    had significant issues with Diarrhea.    on Sinemet for Parkinson's. Moved with wife to a new facility    Assessment/Plan:    Patient Instructions   We will see you back for an annual follow up.    Clinically stable doing well continue on current meds follow-up 1 year or sooner glad to see back sooner if there is any issues    1. Paroxysmal atrial fibrillation (HCC)  Rate controlled, off flecainide, now back to SR  asymptomatic   Holter monitor is sinus with frequent PAC's  No anticoagulation due to falls  Preserved EF echo 2021, 2022  Cardiac Medications       Calcium Channel Blockers       amLODIPine (NORVASC) 10 MG tablet Take 1 tablet by mouth

## 2024-12-19 ENCOUNTER — OFFICE VISIT (OUTPATIENT)
Facility: CLINIC | Age: 88
End: 2024-12-19

## 2024-12-19 VITALS
WEIGHT: 231 LBS | HEART RATE: 83 BPM | TEMPERATURE: 97.8 F | DIASTOLIC BLOOD PRESSURE: 74 MMHG | OXYGEN SATURATION: 94 % | SYSTOLIC BLOOD PRESSURE: 116 MMHG | BODY MASS INDEX: 32.34 KG/M2 | HEIGHT: 71 IN

## 2024-12-19 DIAGNOSIS — M25.551 LATERAL PAIN OF RIGHT HIP: ICD-10-CM

## 2024-12-19 DIAGNOSIS — E78.00 HYPERCHOLESTEREMIA: ICD-10-CM

## 2024-12-19 DIAGNOSIS — B37.2 CANDIDAL INTERTRIGO: ICD-10-CM

## 2024-12-19 DIAGNOSIS — Z00.00 MEDICARE ANNUAL WELLNESS VISIT, SUBSEQUENT: Primary | ICD-10-CM

## 2024-12-19 DIAGNOSIS — N40.1 BPH WITH OBSTRUCTION/LOWER URINARY TRACT SYMPTOMS: ICD-10-CM

## 2024-12-19 DIAGNOSIS — N18.31 STAGE 3A CHRONIC KIDNEY DISEASE (HCC): ICD-10-CM

## 2024-12-19 DIAGNOSIS — N13.8 BPH WITH OBSTRUCTION/LOWER URINARY TRACT SYMPTOMS: ICD-10-CM

## 2024-12-19 DIAGNOSIS — C66.1 UROTHELIAL CARCINOMA OF RIGHT DISTAL URETER (HCC): ICD-10-CM

## 2024-12-19 DIAGNOSIS — M54.16 CHRONIC RIGHT-SIDED LUMBAR RADICULOPATHY: ICD-10-CM

## 2024-12-19 DIAGNOSIS — F32.1 MAJOR DEPRESSIVE DISORDER, SINGLE EPISODE, MODERATE (HCC): ICD-10-CM

## 2024-12-19 DIAGNOSIS — I10 ESSENTIAL HYPERTENSION: ICD-10-CM

## 2024-12-19 DIAGNOSIS — Z74.09 IMPAIRED FUNCTIONAL MOBILITY, BALANCE, GAIT, AND ENDURANCE: ICD-10-CM

## 2024-12-19 DIAGNOSIS — M47.26 OSTEOARTHRITIS OF SPINE WITH RADICULOPATHY, LUMBAR REGION: ICD-10-CM

## 2024-12-19 PROBLEM — R53.1 WEAKNESS: Status: RESOLVED | Noted: 2024-02-06 | Resolved: 2024-12-19

## 2024-12-19 LAB
ALBUMIN SERPL-MCNC: 3.8 G/DL (ref 3.5–5)
ALBUMIN/GLOB SERPL: 1.1 (ref 1.1–2.2)
ALP SERPL-CCNC: 105 U/L (ref 45–117)
ALT SERPL-CCNC: 10 U/L (ref 12–78)
ANION GAP SERPL CALC-SCNC: 6 MMOL/L (ref 2–12)
AST SERPL-CCNC: 12 U/L (ref 15–37)
BILIRUB SERPL-MCNC: 0.6 MG/DL (ref 0.2–1)
BUN SERPL-MCNC: 23 MG/DL (ref 6–20)
BUN/CREAT SERPL: 15 (ref 12–20)
CALCIUM SERPL-MCNC: 9.1 MG/DL (ref 8.5–10.1)
CHLORIDE SERPL-SCNC: 110 MMOL/L (ref 97–108)
CHOLEST SERPL-MCNC: 131 MG/DL
CO2 SERPL-SCNC: 24 MMOL/L (ref 21–32)
CREAT SERPL-MCNC: 1.51 MG/DL (ref 0.7–1.3)
ERYTHROCYTE [DISTWIDTH] IN BLOOD BY AUTOMATED COUNT: 12.8 % (ref 11.5–14.5)
GLOBULIN SER CALC-MCNC: 3.4 G/DL (ref 2–4)
GLUCOSE SERPL-MCNC: 108 MG/DL (ref 65–100)
HCT VFR BLD AUTO: 40.5 % (ref 36.6–50.3)
HDLC SERPL-MCNC: 61 MG/DL
HDLC SERPL: 2.1 (ref 0–5)
HGB BLD-MCNC: 13.1 G/DL (ref 12.1–17)
LDLC SERPL CALC-MCNC: 37.6 MG/DL (ref 0–100)
MCH RBC QN AUTO: 32 PG (ref 26–34)
MCHC RBC AUTO-ENTMCNC: 32.3 G/DL (ref 30–36.5)
MCV RBC AUTO: 99 FL (ref 80–99)
NRBC # BLD: 0 K/UL (ref 0–0.01)
NRBC BLD-RTO: 0 PER 100 WBC
PLATELET # BLD AUTO: 238 K/UL (ref 150–400)
PMV BLD AUTO: 10.9 FL (ref 8.9–12.9)
POTASSIUM SERPL-SCNC: 4.2 MMOL/L (ref 3.5–5.1)
PROT SERPL-MCNC: 7.2 G/DL (ref 6.4–8.2)
RBC # BLD AUTO: 4.09 M/UL (ref 4.1–5.7)
SODIUM SERPL-SCNC: 140 MMOL/L (ref 136–145)
TRIGL SERPL-MCNC: 162 MG/DL
VLDLC SERPL CALC-MCNC: 32.4 MG/DL
WBC # BLD AUTO: 8.9 K/UL (ref 4.1–11.1)

## 2024-12-19 RX ORDER — NYSTATIN AND TRIAMCINOLONE ACETONIDE 100000; 1 [USP'U]/G; MG/G
CREAM TOPICAL
Qty: 30 G | Refills: 0 | Status: SHIPPED | OUTPATIENT
Start: 2024-12-19 | End: 2024-12-19 | Stop reason: SDUPTHER

## 2024-12-19 RX ORDER — NYSTATIN AND TRIAMCINOLONE ACETONIDE 100000; 1 [USP'U]/G; MG/G
CREAM TOPICAL
Qty: 30 G | Refills: 0 | Status: SHIPPED | OUTPATIENT
Start: 2024-12-19

## 2024-12-19 SDOH — ECONOMIC STABILITY: FOOD INSECURITY: WITHIN THE PAST 12 MONTHS, THE FOOD YOU BOUGHT JUST DIDN'T LAST AND YOU DIDN'T HAVE MONEY TO GET MORE.: NEVER TRUE

## 2024-12-19 SDOH — ECONOMIC STABILITY: INCOME INSECURITY: HOW HARD IS IT FOR YOU TO PAY FOR THE VERY BASICS LIKE FOOD, HOUSING, MEDICAL CARE, AND HEATING?: NOT HARD AT ALL

## 2024-12-19 SDOH — ECONOMIC STABILITY: FOOD INSECURITY: WITHIN THE PAST 12 MONTHS, YOU WORRIED THAT YOUR FOOD WOULD RUN OUT BEFORE YOU GOT MONEY TO BUY MORE.: NEVER TRUE

## 2024-12-19 ASSESSMENT — LIFESTYLE VARIABLES
HOW MANY STANDARD DRINKS CONTAINING ALCOHOL DO YOU HAVE ON A TYPICAL DAY: 1 OR 2
HOW OFTEN DO YOU HAVE A DRINK CONTAINING ALCOHOL: MONTHLY OR LESS
HOW MANY STANDARD DRINKS CONTAINING ALCOHOL DO YOU HAVE ON A TYPICAL DAY: PATIENT DOES NOT DRINK

## 2024-12-19 ASSESSMENT — PATIENT HEALTH QUESTIONNAIRE - PHQ9
2. FEELING DOWN, DEPRESSED OR HOPELESS: NOT AT ALL
SUM OF ALL RESPONSES TO PHQ QUESTIONS 1-9: 0
SUM OF ALL RESPONSES TO PHQ9 QUESTIONS 1 & 2: 0
SUM OF ALL RESPONSES TO PHQ QUESTIONS 1-9: 0
SUM OF ALL RESPONSES TO PHQ QUESTIONS 1-9: 0
1. LITTLE INTEREST OR PLEASURE IN DOING THINGS: NOT AT ALL
SUM OF ALL RESPONSES TO PHQ QUESTIONS 1-9: 0

## 2024-12-19 NOTE — PROGRESS NOTES
Medicare Annual Wellness Visit    Denis Robin Jr. 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   No acute findings today warrant change in management plan.    BPH with obstruction/lower urinary tract symptoms  Major depressive disorder, single episode, moderate (HCC)  Hypercholesteremia  This condition is controlled on current medication regimen as written in medication list.  Medications refilled.  -     Lipid Panel; Future  Essential hypertension  This condition is controlled on current medication regimen as written in medication list.  Medications refilled.  -     CBC; Future  Stage 3a chronic kidney disease (HCC)  Based on my history and available data, the overall control of this problem is unclear.   -     CBC; Future  -     Comprehensive Metabolic Panel; Future  Candidal intertrigo  Recurrent.   Refill mycolog prn  -     nystatin-triamcinolone (MYCOLOG II) 104566-0.1 UNIT/GM-% cream; Apply topically 2 times daily to buttocks for 14 days, Disp-30 g, R-0, Normal    Recommendations for Preventive Services Due: see orders and patient instructions/AVS.  Recommended screening schedule for the next 5-10 years is provided to the patient in written form: see Patient Instructions/AVS.     No follow-ups on file.     Subjective   The following acute and/or chronic problems were also addressed today:    Doing well at assisted living.      Increased R

## 2024-12-19 NOTE — PROGRESS NOTES
Identified pt with two pt identifiers(name and ). Reviewed record in preparation for visit and have obtained necessary documentation. All patient medications has been reviewed.  Chief Complaint   Patient presents with    Follow-up           Health Maintenance Due   Topic    DTaP/Tdap/Td vaccine (1 - Tdap)    Shingles vaccine (1 of 2)    Respiratory Syncytial Virus (RSV) Pregnant or age 60 yrs+ (1 - 1-dose 75+ series)    Annual Wellness Visit (Medicare Advantage)     Flu vaccine (1)    COVID-19 Vaccine ( season)    Lipids      Health Maintenance Review: Patient reminded of \"due or due soon\" health maintenance. I have asked the patient to contact his/her primary care provider (PCP) for follow-up on his/her health maintenance.        Wt Readings from Last 3 Encounters:   24 104.8 kg (231 lb)   24 103 kg (227 lb)   24 102.1 kg (225 lb)     Temp Readings from Last 3 Encounters:   24 98.3 °F (36.8 °C) (Oral)   23 97.8 °F (36.6 °C) (Oral)   23 97.9 °F (36.6 °C) (Oral)     BP Readings from Last 3 Encounters:   24 122/70   24 116/72   24 120/70     Pulse Readings from Last 3 Encounters:   24 88   24 82   24 85       1. \"Have you been to the ER, urgent care clinic since your last visit?  Hospitalized since your last visit?\" No    2. \"Have you seen or consulted any other health care providers outside of the [facility] since your last visit?\"  Yes, [facility]     3. For patients aged 45-75: Has the patient had a colonoscopy / FIT/ Cologuard? NA - based on age      Patient is accompanied by self I have received verbal consent from Denis Robin Jr. to discuss any/all medical information while they are present in the room.

## 2024-12-27 ENCOUNTER — HOSPITAL ENCOUNTER (INPATIENT)
Facility: HOSPITAL | Age: 88
LOS: 2 days | Discharge: HOME HEALTH | DRG: 694 | End: 2024-12-30
Attending: EMERGENCY MEDICINE | Admitting: FAMILY MEDICINE
Payer: MEDICARE

## 2024-12-27 DIAGNOSIS — N20.1 URETERIC STONE: Primary | ICD-10-CM

## 2024-12-27 LAB
ALBUMIN SERPL-MCNC: 3.7 G/DL (ref 3.5–5)
ALBUMIN/GLOB SERPL: 1 (ref 1.1–2.2)
ALP SERPL-CCNC: 87 U/L (ref 45–117)
ALT SERPL-CCNC: 17 U/L (ref 12–78)
ANION GAP SERPL CALC-SCNC: 8 MMOL/L (ref 2–12)
AST SERPL-CCNC: 25 U/L (ref 15–37)
BASOPHILS # BLD: 0 K/UL (ref 0–0.1)
BASOPHILS NFR BLD: 0 % (ref 0–1)
BILIRUB SERPL-MCNC: 0.5 MG/DL (ref 0.2–1)
BUN SERPL-MCNC: 28 MG/DL (ref 6–20)
BUN/CREAT SERPL: 16 (ref 12–20)
CALCIUM SERPL-MCNC: 9.5 MG/DL (ref 8.5–10.1)
CHLORIDE SERPL-SCNC: 110 MMOL/L (ref 97–108)
CO2 SERPL-SCNC: 22 MMOL/L (ref 21–32)
COMMENT:: NORMAL
CREAT SERPL-MCNC: 1.73 MG/DL (ref 0.7–1.3)
DIFFERENTIAL METHOD BLD: ABNORMAL
EOSINOPHIL # BLD: 0.1 K/UL (ref 0–0.4)
EOSINOPHIL NFR BLD: 1 % (ref 0–7)
ERYTHROCYTE [DISTWIDTH] IN BLOOD BY AUTOMATED COUNT: 12.9 % (ref 11.5–14.5)
GLOBULIN SER CALC-MCNC: 3.8 G/DL (ref 2–4)
GLUCOSE SERPL-MCNC: 139 MG/DL (ref 65–100)
HCT VFR BLD AUTO: 39.2 % (ref 36.6–50.3)
HGB BLD-MCNC: 13.2 G/DL (ref 12.1–17)
IMM GRANULOCYTES # BLD AUTO: 0 K/UL (ref 0–0.04)
IMM GRANULOCYTES NFR BLD AUTO: 1 % (ref 0–0.5)
LACTATE SERPL-SCNC: 2 MMOL/L (ref 0.4–2)
LIPASE SERPL-CCNC: 41 U/L (ref 13–75)
LYMPHOCYTES # BLD: 1.2 K/UL (ref 0.8–3.5)
LYMPHOCYTES NFR BLD: 19 % (ref 12–49)
MAGNESIUM SERPL-MCNC: 2.2 MG/DL (ref 1.6–2.4)
MCH RBC QN AUTO: 32.1 PG (ref 26–34)
MCHC RBC AUTO-ENTMCNC: 33.7 G/DL (ref 30–36.5)
MCV RBC AUTO: 95.4 FL (ref 80–99)
MONOCYTES # BLD: 0.5 K/UL (ref 0–1)
MONOCYTES NFR BLD: 8 % (ref 5–13)
NEUTS SEG # BLD: 4.7 K/UL (ref 1.8–8)
NEUTS SEG NFR BLD: 71 % (ref 32–75)
NRBC # BLD: 0 K/UL (ref 0–0.01)
NRBC BLD-RTO: 0 PER 100 WBC
PLATELET # BLD AUTO: 231 K/UL (ref 150–400)
PMV BLD AUTO: 10.7 FL (ref 8.9–12.9)
POTASSIUM SERPL-SCNC: 3.8 MMOL/L (ref 3.5–5.1)
PROT SERPL-MCNC: 7.5 G/DL (ref 6.4–8.2)
RBC # BLD AUTO: 4.11 M/UL (ref 4.1–5.7)
SODIUM SERPL-SCNC: 140 MMOL/L (ref 136–145)
SPECIMEN HOLD: NORMAL
WBC # BLD AUTO: 6.6 K/UL (ref 4.1–11.1)

## 2024-12-27 PROCEDURE — 99285 EMERGENCY DEPT VISIT HI MDM: CPT

## 2024-12-27 PROCEDURE — 83735 ASSAY OF MAGNESIUM: CPT

## 2024-12-27 PROCEDURE — 96374 THER/PROPH/DIAG INJ IV PUSH: CPT

## 2024-12-27 PROCEDURE — 85025 COMPLETE CBC W/AUTO DIFF WBC: CPT

## 2024-12-27 PROCEDURE — 36415 COLL VENOUS BLD VENIPUNCTURE: CPT

## 2024-12-27 PROCEDURE — 80053 COMPREHEN METABOLIC PANEL: CPT

## 2024-12-27 PROCEDURE — 96375 TX/PRO/DX INJ NEW DRUG ADDON: CPT

## 2024-12-27 PROCEDURE — 83690 ASSAY OF LIPASE: CPT

## 2024-12-27 PROCEDURE — 83605 ASSAY OF LACTIC ACID: CPT

## 2024-12-27 PROCEDURE — 6360000002 HC RX W HCPCS: Performed by: EMERGENCY MEDICINE

## 2024-12-27 RX ORDER — MORPHINE SULFATE 4 MG/ML
4 INJECTION, SOLUTION INTRAMUSCULAR; INTRAVENOUS
Status: COMPLETED | OUTPATIENT
Start: 2024-12-27 | End: 2024-12-27

## 2024-12-27 RX ORDER — ONDANSETRON 2 MG/ML
4 INJECTION INTRAMUSCULAR; INTRAVENOUS ONCE
Status: COMPLETED | OUTPATIENT
Start: 2024-12-27 | End: 2024-12-27

## 2024-12-27 RX ADMIN — ONDANSETRON 4 MG: 2 INJECTION INTRAMUSCULAR; INTRAVENOUS at 23:40

## 2024-12-27 RX ADMIN — MORPHINE SULFATE 4 MG: 4 INJECTION, SOLUTION INTRAMUSCULAR; INTRAVENOUS at 23:40

## 2024-12-27 ASSESSMENT — PAIN DESCRIPTION - PAIN TYPE: TYPE: ACUTE PAIN

## 2024-12-27 ASSESSMENT — PAIN DESCRIPTION - ONSET: ONSET: ON-GOING

## 2024-12-27 ASSESSMENT — PAIN DESCRIPTION - ORIENTATION
ORIENTATION: RIGHT;LOWER
ORIENTATION: RIGHT;MID

## 2024-12-27 ASSESSMENT — PAIN DESCRIPTION - LOCATION
LOCATION: ABDOMEN
LOCATION: ABDOMEN

## 2024-12-27 ASSESSMENT — PAIN - FUNCTIONAL ASSESSMENT
PAIN_FUNCTIONAL_ASSESSMENT: PREVENTS OR INTERFERES SOME ACTIVE ACTIVITIES AND ADLS
PAIN_FUNCTIONAL_ASSESSMENT: ACTIVITIES ARE NOT PREVENTED
PAIN_FUNCTIONAL_ASSESSMENT: 0-10

## 2024-12-27 ASSESSMENT — PAIN SCALES - GENERAL
PAINLEVEL_OUTOF10: 9
PAINLEVEL_OUTOF10: 9

## 2024-12-27 ASSESSMENT — PAIN DESCRIPTION - DESCRIPTORS
DESCRIPTORS: SHARP
DESCRIPTORS: DISCOMFORT

## 2024-12-27 ASSESSMENT — PAIN DESCRIPTION - FREQUENCY: FREQUENCY: CONTINUOUS

## 2024-12-28 ENCOUNTER — APPOINTMENT (OUTPATIENT)
Facility: HOSPITAL | Age: 88
DRG: 694 | End: 2024-12-28
Payer: MEDICARE

## 2024-12-28 PROBLEM — N20.1 URETERAL CALCULUS: Status: ACTIVE | Noted: 2024-12-28

## 2024-12-28 LAB
APPEARANCE UR: ABNORMAL
BACTERIA URNS QL MICRO: NEGATIVE /HPF
BILIRUB UR QL: NEGATIVE
COLOR UR: ABNORMAL
EPITH CASTS URNS QL MICRO: ABNORMAL /LPF
GLUCOSE UR STRIP.AUTO-MCNC: 100 MG/DL
HGB UR QL STRIP: ABNORMAL
HYALINE CASTS URNS QL MICRO: ABNORMAL /LPF (ref 0–5)
KETONES UR QL STRIP.AUTO: NEGATIVE MG/DL
LEUKOCYTE ESTERASE UR QL STRIP.AUTO: NEGATIVE
NITRITE UR QL STRIP.AUTO: NEGATIVE
PH UR STRIP: 5 (ref 5–8)
PROT UR STRIP-MCNC: 100 MG/DL
RBC #/AREA URNS HPF: >100 /HPF (ref 0–5)
SP GR UR REFRACTOMETRY: 1.02 (ref 1–1.03)
URINE CULTURE IF INDICATED: ABNORMAL
UROBILINOGEN UR QL STRIP.AUTO: 0.2 EU/DL (ref 0.2–1)
WBC URNS QL MICRO: ABNORMAL /HPF (ref 0–4)

## 2024-12-28 PROCEDURE — 6370000000 HC RX 637 (ALT 250 FOR IP): Performed by: FAMILY MEDICINE

## 2024-12-28 PROCEDURE — 96375 TX/PRO/DX INJ NEW DRUG ADDON: CPT

## 2024-12-28 PROCEDURE — 81001 URINALYSIS AUTO W/SCOPE: CPT

## 2024-12-28 PROCEDURE — 74176 CT ABD & PELVIS W/O CONTRAST: CPT

## 2024-12-28 PROCEDURE — 6360000002 HC RX W HCPCS: Performed by: EMERGENCY MEDICINE

## 2024-12-28 PROCEDURE — 2580000003 HC RX 258: Performed by: FAMILY MEDICINE

## 2024-12-28 PROCEDURE — 2500000003 HC RX 250 WO HCPCS: Performed by: FAMILY MEDICINE

## 2024-12-28 PROCEDURE — 1200000000 HC SEMI PRIVATE

## 2024-12-28 RX ORDER — ATORVASTATIN CALCIUM 40 MG/1
40 TABLET, FILM COATED ORAL NIGHTLY
Status: DISCONTINUED | OUTPATIENT
Start: 2024-12-28 | End: 2024-12-30 | Stop reason: HOSPADM

## 2024-12-28 RX ORDER — SODIUM CHLORIDE 0.9 % (FLUSH) 0.9 %
5-40 SYRINGE (ML) INJECTION PRN
Status: DISCONTINUED | OUTPATIENT
Start: 2024-12-28 | End: 2024-12-30 | Stop reason: HOSPADM

## 2024-12-28 RX ORDER — ACETAMINOPHEN 650 MG/1
650 SUPPOSITORY RECTAL EVERY 6 HOURS PRN
Status: DISCONTINUED | OUTPATIENT
Start: 2024-12-28 | End: 2024-12-30 | Stop reason: HOSPADM

## 2024-12-28 RX ORDER — FINASTERIDE 5 MG/1
5 TABLET, FILM COATED ORAL DAILY
Status: DISCONTINUED | OUTPATIENT
Start: 2024-12-28 | End: 2024-12-30 | Stop reason: HOSPADM

## 2024-12-28 RX ORDER — ONDANSETRON 4 MG/1
4 TABLET, ORALLY DISINTEGRATING ORAL EVERY 8 HOURS PRN
Status: DISCONTINUED | OUTPATIENT
Start: 2024-12-28 | End: 2024-12-30 | Stop reason: HOSPADM

## 2024-12-28 RX ORDER — TAMSULOSIN HYDROCHLORIDE 0.4 MG/1
0.4 CAPSULE ORAL DAILY
Status: DISCONTINUED | OUTPATIENT
Start: 2024-12-28 | End: 2024-12-30 | Stop reason: HOSPADM

## 2024-12-28 RX ORDER — POLYETHYLENE GLYCOL 3350 17 G/17G
17 POWDER, FOR SOLUTION ORAL DAILY PRN
Status: DISCONTINUED | OUTPATIENT
Start: 2024-12-28 | End: 2024-12-30 | Stop reason: HOSPADM

## 2024-12-28 RX ORDER — AMLODIPINE BESYLATE 5 MG/1
10 TABLET ORAL DAILY
Status: DISCONTINUED | OUTPATIENT
Start: 2024-12-28 | End: 2024-12-30 | Stop reason: HOSPADM

## 2024-12-28 RX ORDER — CHOLESTYRAMINE LIGHT 4 G/5.7G
4 POWDER, FOR SUSPENSION ORAL DAILY
Status: DISCONTINUED | OUTPATIENT
Start: 2024-12-28 | End: 2024-12-30 | Stop reason: HOSPADM

## 2024-12-28 RX ORDER — ACETAMINOPHEN 325 MG/1
650 TABLET ORAL EVERY 6 HOURS PRN
Status: DISCONTINUED | OUTPATIENT
Start: 2024-12-28 | End: 2024-12-30 | Stop reason: HOSPADM

## 2024-12-28 RX ORDER — ONDANSETRON 2 MG/ML
4 INJECTION INTRAMUSCULAR; INTRAVENOUS EVERY 6 HOURS PRN
Status: DISCONTINUED | OUTPATIENT
Start: 2024-12-28 | End: 2024-12-30 | Stop reason: HOSPADM

## 2024-12-28 RX ORDER — SODIUM CHLORIDE 0.9 % (FLUSH) 0.9 %
5-40 SYRINGE (ML) INJECTION EVERY 12 HOURS SCHEDULED
Status: DISCONTINUED | OUTPATIENT
Start: 2024-12-28 | End: 2024-12-30 | Stop reason: HOSPADM

## 2024-12-28 RX ORDER — HYDROMORPHONE HYDROCHLORIDE 1 MG/ML
1 INJECTION, SOLUTION INTRAMUSCULAR; INTRAVENOUS; SUBCUTANEOUS EVERY 4 HOURS PRN
Status: DISCONTINUED | OUTPATIENT
Start: 2024-12-28 | End: 2024-12-30 | Stop reason: HOSPADM

## 2024-12-28 RX ORDER — HYDRALAZINE HYDROCHLORIDE 20 MG/ML
10 INJECTION INTRAMUSCULAR; INTRAVENOUS EVERY 6 HOURS PRN
Status: DISCONTINUED | OUTPATIENT
Start: 2024-12-28 | End: 2024-12-30 | Stop reason: HOSPADM

## 2024-12-28 RX ORDER — NALOXONE HYDROCHLORIDE 0.4 MG/ML
0.4 INJECTION, SOLUTION INTRAMUSCULAR; INTRAVENOUS; SUBCUTANEOUS PRN
Status: DISCONTINUED | OUTPATIENT
Start: 2024-12-28 | End: 2024-12-30 | Stop reason: HOSPADM

## 2024-12-28 RX ORDER — SODIUM CHLORIDE 9 MG/ML
INJECTION, SOLUTION INTRAVENOUS CONTINUOUS
Status: DISCONTINUED | OUTPATIENT
Start: 2024-12-28 | End: 2024-12-29

## 2024-12-28 RX ORDER — SODIUM CHLORIDE 9 MG/ML
INJECTION, SOLUTION INTRAVENOUS PRN
Status: DISCONTINUED | OUTPATIENT
Start: 2024-12-28 | End: 2024-12-30 | Stop reason: HOSPADM

## 2024-12-28 RX ORDER — CARBIDOPA AND LEVODOPA 25; 100 MG/1; MG/1
1 TABLET ORAL 3 TIMES DAILY
Status: DISCONTINUED | OUTPATIENT
Start: 2024-12-28 | End: 2024-12-30 | Stop reason: HOSPADM

## 2024-12-28 RX ORDER — HYDROMORPHONE HYDROCHLORIDE 1 MG/ML
1 INJECTION, SOLUTION INTRAMUSCULAR; INTRAVENOUS; SUBCUTANEOUS ONCE
Status: COMPLETED | OUTPATIENT
Start: 2024-12-28 | End: 2024-12-28

## 2024-12-28 RX ADMIN — CHOLESTYRAMINE 4 G: 4 POWDER, FOR SUSPENSION ORAL at 13:05

## 2024-12-28 RX ADMIN — SODIUM CHLORIDE, PRESERVATIVE FREE 10 ML: 5 INJECTION INTRAVENOUS at 20:30

## 2024-12-28 RX ADMIN — TAMSULOSIN HYDROCHLORIDE 0.4 MG: 0.4 CAPSULE ORAL at 13:04

## 2024-12-28 RX ADMIN — CARBIDOPA AND LEVODOPA 1 TABLET: 25; 100 TABLET ORAL at 20:30

## 2024-12-28 RX ADMIN — CARBIDOPA AND LEVODOPA 1 TABLET: 25; 100 TABLET ORAL at 14:59

## 2024-12-28 RX ADMIN — ATORVASTATIN CALCIUM 40 MG: 40 TABLET, FILM COATED ORAL at 20:30

## 2024-12-28 RX ADMIN — SODIUM CHLORIDE: 9 INJECTION, SOLUTION INTRAVENOUS at 12:39

## 2024-12-28 RX ADMIN — SODIUM CHLORIDE, PRESERVATIVE FREE 10 ML: 5 INJECTION INTRAVENOUS at 12:43

## 2024-12-28 RX ADMIN — FINASTERIDE 5 MG: 5 TABLET, FILM COATED ORAL at 13:05

## 2024-12-28 RX ADMIN — HYDROMORPHONE HYDROCHLORIDE 1 MG: 1 INJECTION, SOLUTION INTRAMUSCULAR; INTRAVENOUS; SUBCUTANEOUS at 01:10

## 2024-12-28 RX ADMIN — AMLODIPINE BESYLATE 10 MG: 5 TABLET ORAL at 12:42

## 2024-12-28 ASSESSMENT — PAIN - FUNCTIONAL ASSESSMENT
PAIN_FUNCTIONAL_ASSESSMENT: 0-10
PAIN_FUNCTIONAL_ASSESSMENT: 0-10

## 2024-12-28 ASSESSMENT — PAIN SCALES - GENERAL
PAINLEVEL_OUTOF10: 8
PAINLEVEL_OUTOF10: 0
PAINLEVEL_OUTOF10: 8

## 2024-12-28 ASSESSMENT — PAIN DESCRIPTION - LOCATION
LOCATION: ABDOMEN
LOCATION: ABDOMEN

## 2024-12-28 ASSESSMENT — PAIN DESCRIPTION - ORIENTATION
ORIENTATION: RIGHT
ORIENTATION: RIGHT

## 2024-12-28 NOTE — ED NOTES
ED TO INPATIENT SBAR HANDOFF    Patient Name: Denis Robin Jr.   :  7/15/1931  89 y.o.   MRN:  [id number]  ED Room #:  ER05/05     Situation  Code Status: Full Code   Allergies: Patient has no known allergies.  Weight: Patient Vitals for the past 96 hrs (Last 3 readings):   Weight   24 0902 103.9 kg (229 lb)   24 0029 103.9 kg (229 lb 0.9 oz)       Arrived from: home    Chief Complaint:   Chief Complaint   Patient presents with    Hernia       Hospital Problem/Diagnosis:  Principal Problem:    Ureteral calculus  Resolved Problems:    * No resolved hospital problems. *      Mobility: no current mobility problem   ED Fall Risk: Presents to emergency department  because of falls (Syncope, seizure, or loss of consciousness): No, Age > 70: Yes, Altered Mental Status, Intoxication with alcohol or substance confusion (Disorientation, impaired judgment, poor safety awaremess, or inability to follow instructions): No, Impaired Mobility: Ambulates or transfers with assistive devices or assistance; Unable to ambulate or transer.: Yes, Nursing Judgement: Yes   Fell in ED or prior to admission: no   Restraints: no     Sitter: no   Family/Caregiver Present: no    Neet to know social/safety information: Pt wears hearing aids but the battery is currently dead- so is hard of hearing.    Background  History:   Past Medical History:   Diagnosis Date    Advanced care planning/counseling discussion 10/8/2015    Basal cell carcinoma, face     now sees Dr. Kim.    Bladder cancer (HCC)     Dr. Dotson. cyto 2014, 2016. s/p surgery, BCG irrigation [place].  saw Dr. Alarcon    BPH with obstruction/lower urinary tract symptoms     Chronic lower back pain     injection in [place].  saw Dr. Richardson    Chronic neck pain     limited motion to side    Depression, major     taking sertaline since before     Dupuytren's contracture of left hand     5th finger    Encephalopathy 2021, 21    admitted     Epistaxis     saw

## 2024-12-28 NOTE — ED TRIAGE NOTES
Pt presents to the ed co R sided abdominal pain due to a hernia. Pt states that about 10 days ago he had to push his hernia back in. Pt states that the pain started about 1600 today. Endorses nausea at this time. Pt has had multiple hernia issues in the past.     Last BM today.     Pt visibly uncomfortable in triage

## 2024-12-28 NOTE — ED NOTES
Pt states that his hearing aid batteries are dead, and therefore he is having trouble hearing. Pt given his cell phone. Plan for the day is to admit the patient and have him see urologist and treat the obstructing stone. Patient made aware, given pepsi zero to drink.

## 2024-12-28 NOTE — ED PROVIDER NOTES
created with voice recognition software which can lead to typographical errors.    Amount and/or Complexity of Data Reviewed  Labs: ordered. Decision-making details documented in ED Course.  Radiology: ordered.    Risk  Prescription drug management.  Decision regarding hospitalization.          Procedures       DISPOSITION: Admitted 12/28/2024 09:51:11 AM    CLINICAL IMPRESSION:   1. Ureteric stone         Further personalized recommendations for outpatient care as below.        Prescriptions provided to the patient:     Current Discharge Medication List           Thomas Del Valle DO   Emergency Medicine Attending Physician            Thomas Del Valle DO  12/28/24 [id number]

## 2024-12-28 NOTE — ED NOTES
Bedside shift change report given to Levon RN (oncoming nurse) by Adama RN (offgoing nurse). Report included the following information Nurse Handoff Report, Index, ED Encounter Summary, ED SBAR, Adult Overview, Intake/Output, and MAR.

## 2024-12-28 NOTE — ED NOTES
Bedside and Verbal shift change report given to JUVENTINO Galan (oncoming nurse) by JUVENTINO Craig (offgoing nurse). Report included the following information Nurse Handoff Report and Index.

## 2024-12-28 NOTE — H&P
History and Physical    Date of Service:  12/28/2024  Primary Care Provider: Andrea Garcia MD  Source of information: patient, electronic medical record    Chief Complaint: Hernia      History of Presenting Illness:   Denis Robin Jr. is a 89 y.o. male with a pmhx bladder cancer s/p resection, and BCG irrigation, BPH, depression, HTN, SCC, BCC, RLS, neuropathy, and pAF who presented with right flank pain and is being admitted for right ureterolithiasis.    In the ED, VSS.  Labs showed  BuN 28, creatinine 1.73, UA with large blood, and >100 RBC.  CT abdomen/pelvis showed obstructing 4mm mid right ureteral calculus with upstream right hydroureter and right hydronephrosis.    In the ED, he received IV morphine, IV dilaudid,  and zofran         REVIEW OF SYSTEMS:  A comprehensive review of systems was negative except for that written in the History of Present Illness.     Past Medical History:   Diagnosis Date    Advanced care planning/counseling discussion 10/8/2015    Basal cell carcinoma, face     now sees Dr. Kim.    Bladder cancer (HCC) 2012    Dr. Dotson. cyto 9/2014, 2/2016. s/p surgery, BCG irrigation [place].  saw Dr. Alarcon    BPH with obstruction/lower urinary tract symptoms     Chronic lower back pain     injection in [place].  saw Dr. Richardson    Chronic neck pain     limited motion to side    Depression, major     taking sertaline since before 2014    Dupuytren's contracture of left hand     5th finger    Encephalopathy 8/2021, 11/21/21    admitted     Epistaxis 2013    saw ENT at [facility]    Family history of skin cancer     Fecal incontinence     with loose stools    Hearing loss     has hearing aids    HTN (hypertension)     Insomnia     IVCD (intraventricular conduction defect) 11/30/2015    Dr Devlin    Loose stools 7/18/2014    MRI contraindicated due to metal implant     has scleral buckle    Parkinsonian features 3/10/2022    Paroxysmal atrial fibrillation (HCC)     EF 66%, 1-2+ LAE on

## 2024-12-29 LAB
ALBUMIN SERPL-MCNC: 3.3 G/DL (ref 3.5–5)
ALBUMIN/GLOB SERPL: 0.9 (ref 1.1–2.2)
ALP SERPL-CCNC: 89 U/L (ref 45–117)
ALT SERPL-CCNC: 10 U/L (ref 12–78)
ANION GAP SERPL CALC-SCNC: 7 MMOL/L (ref 2–12)
AST SERPL-CCNC: 12 U/L (ref 15–37)
BASOPHILS # BLD: 0 K/UL (ref 0–0.1)
BASOPHILS NFR BLD: 0 % (ref 0–1)
BILIRUB SERPL-MCNC: 0.6 MG/DL (ref 0.2–1)
BUN SERPL-MCNC: 25 MG/DL (ref 6–20)
BUN/CREAT SERPL: 11 (ref 12–20)
CALCIUM SERPL-MCNC: 8.7 MG/DL (ref 8.5–10.1)
CHLORIDE SERPL-SCNC: 112 MMOL/L (ref 97–108)
CO2 SERPL-SCNC: 20 MMOL/L (ref 21–32)
COMMENT:: NORMAL
CREAT SERPL-MCNC: 2.23 MG/DL (ref 0.7–1.3)
DIFFERENTIAL METHOD BLD: ABNORMAL
EOSINOPHIL # BLD: 0.2 K/UL (ref 0–0.4)
EOSINOPHIL NFR BLD: 3 % (ref 0–7)
ERYTHROCYTE [DISTWIDTH] IN BLOOD BY AUTOMATED COUNT: 12.5 % (ref 11.5–14.5)
GLOBULIN SER CALC-MCNC: 3.7 G/DL (ref 2–4)
GLUCOSE SERPL-MCNC: 101 MG/DL (ref 65–100)
HCT VFR BLD AUTO: 38.8 % (ref 36.6–50.3)
HGB BLD-MCNC: 12.8 G/DL (ref 12.1–17)
IMM GRANULOCYTES # BLD AUTO: 0 K/UL (ref 0–0.04)
IMM GRANULOCYTES NFR BLD AUTO: 0 % (ref 0–0.5)
LYMPHOCYTES # BLD: 1.5 K/UL (ref 0.8–3.5)
LYMPHOCYTES NFR BLD: 25 % (ref 12–49)
MCH RBC QN AUTO: 32.3 PG (ref 26–34)
MCHC RBC AUTO-ENTMCNC: 33 G/DL (ref 30–36.5)
MCV RBC AUTO: 98 FL (ref 80–99)
MONOCYTES # BLD: 0.6 K/UL (ref 0–1)
MONOCYTES NFR BLD: 9 % (ref 5–13)
NEUTS SEG # BLD: 3.8 K/UL (ref 1.8–8)
NEUTS SEG NFR BLD: 63 % (ref 32–75)
NRBC # BLD: 0 K/UL (ref 0–0.01)
NRBC BLD-RTO: 0 PER 100 WBC
PLATELET # BLD AUTO: 205 K/UL (ref 150–400)
PMV BLD AUTO: 10.2 FL (ref 8.9–12.9)
POTASSIUM SERPL-SCNC: 3.9 MMOL/L (ref 3.5–5.1)
PROT SERPL-MCNC: 7 G/DL (ref 6.4–8.2)
RBC # BLD AUTO: 3.96 M/UL (ref 4.1–5.7)
SODIUM SERPL-SCNC: 139 MMOL/L (ref 136–145)
SPECIMEN HOLD: NORMAL
WBC # BLD AUTO: 6.1 K/UL (ref 4.1–11.1)

## 2024-12-29 PROCEDURE — 2500000003 HC RX 250 WO HCPCS: Performed by: FAMILY MEDICINE

## 2024-12-29 PROCEDURE — 97116 GAIT TRAINING THERAPY: CPT

## 2024-12-29 PROCEDURE — 85025 COMPLETE CBC W/AUTO DIFF WBC: CPT

## 2024-12-29 PROCEDURE — 6370000000 HC RX 637 (ALT 250 FOR IP): Performed by: FAMILY MEDICINE

## 2024-12-29 PROCEDURE — 6370000000 HC RX 637 (ALT 250 FOR IP): Performed by: NURSE PRACTITIONER

## 2024-12-29 PROCEDURE — 1200000000 HC SEMI PRIVATE

## 2024-12-29 PROCEDURE — 2580000003 HC RX 258: Performed by: NURSE PRACTITIONER

## 2024-12-29 PROCEDURE — 97161 PT EVAL LOW COMPLEX 20 MIN: CPT

## 2024-12-29 PROCEDURE — 2580000003 HC RX 258: Performed by: FAMILY MEDICINE

## 2024-12-29 PROCEDURE — 80053 COMPREHEN METABOLIC PANEL: CPT

## 2024-12-29 RX ORDER — SODIUM CHLORIDE, SODIUM LACTATE, POTASSIUM CHLORIDE, CALCIUM CHLORIDE 600; 310; 30; 20 MG/100ML; MG/100ML; MG/100ML; MG/100ML
INJECTION, SOLUTION INTRAVENOUS CONTINUOUS
Status: DISCONTINUED | OUTPATIENT
Start: 2024-12-29 | End: 2024-12-30 | Stop reason: HOSPADM

## 2024-12-29 RX ADMIN — SERTRALINE HYDROCHLORIDE 25 MG: 50 TABLET ORAL at 09:10

## 2024-12-29 RX ADMIN — SODIUM CHLORIDE: 9 INJECTION, SOLUTION INTRAVENOUS at 09:06

## 2024-12-29 RX ADMIN — SODIUM CHLORIDE, PRESERVATIVE FREE 10 ML: 5 INJECTION INTRAVENOUS at 21:08

## 2024-12-29 RX ADMIN — FINASTERIDE 5 MG: 5 TABLET, FILM COATED ORAL at 09:09

## 2024-12-29 RX ADMIN — TAMSULOSIN HYDROCHLORIDE 0.4 MG: 0.4 CAPSULE ORAL at 09:10

## 2024-12-29 RX ADMIN — SODIUM CHLORIDE, PRESERVATIVE FREE 10 ML: 5 INJECTION INTRAVENOUS at 09:10

## 2024-12-29 RX ADMIN — ATORVASTATIN CALCIUM 40 MG: 40 TABLET, FILM COATED ORAL at 21:06

## 2024-12-29 RX ADMIN — CARBIDOPA AND LEVODOPA 1 TABLET: 25; 100 TABLET ORAL at 09:09

## 2024-12-29 RX ADMIN — CARBIDOPA AND LEVODOPA 1 TABLET: 25; 100 TABLET ORAL at 21:06

## 2024-12-29 RX ADMIN — CARBIDOPA AND LEVODOPA 1 TABLET: 25; 100 TABLET ORAL at 13:24

## 2024-12-29 RX ADMIN — AMLODIPINE BESYLATE 10 MG: 5 TABLET ORAL at 09:09

## 2024-12-29 RX ADMIN — SODIUM CHLORIDE, SODIUM LACTATE, POTASSIUM CHLORIDE, AND CALCIUM CHLORIDE: .6; .31; .03; .02 INJECTION, SOLUTION INTRAVENOUS at 13:02

## 2024-12-29 NOTE — PROGRESS NOTES
Hospitalist Progress Note  DARY Dowell NP  Answering service: [phone] OR [phone] from in house phone        Date of Service:  2024  NAME:  Denis Robin Jr.  :  7/15/1931  MRN:  [id number]      Admission Summary:   Per H&P    Denis Robin Jr. is a 89 y.o. male with a pmhx bladder cancer s/p resection, and BCG irrigation, BPH, depression, HTN, SCC, BCC, RLS, neuropathy, and pAF who presented with right flank pain and is being admitted for right ureterolithiasis.     In the ED, VSS.  Labs showed  BuN 28, creatinine 1.73, UA with large blood, and >100 RBC.  CT abdomen/pelvis showed obstructing 4mm mid right ureteral calculus with upstream right hydroureter and right hydronephrosis.     In the ED, he received IV morphine, IV dilaudid,  and zofran    Interval history / Subjective:   I saw the patient this morning on rounds.  Still with off-and-on pain     Assessment & Plan:         right ureteral calculus  Hydroureter and hydronephrosis noted on CT due to 4 mm calculus  Continue IV hydration  Consult with urology  Continue multimodal pain control  Holding off on antibiotics, urine does not look infected    Acute kidney injury  Creatinine at presentation 1.73  Baseline 1.4-1.5  Likely due to the above  IV hydration  Labs today with creatinine increased, 2.23 today  Also with none anion gap metabolic acidosis, switching IV fluids to LR  May consider nephrology consult if no improvements       abdominal hernia  Has had surgery in the past  No abdominal symptoms, will continue to monitor     hx bladder cancer  BPH  S/p resection  Continuing tamsulosin and finasteride     Hypertension  Continuing amlodipine  Blood pressure currently controlled  Creatinine mildly elevated, holding irbesartan for now  Will restart as appropriate       Hyperlipidemia  Atorvastatin and cholestyramine     Mood

## 2024-12-29 NOTE — PLAN OF CARE
Problem: Discharge Planning  Goal: Discharge to home or other facility with appropriate resources  Outcome: Progressing  Flowsheets (Taken 12/28/2024 2030)  Discharge to home or other facility with appropriate resources:   Identify barriers to discharge with patient and caregiver   Arrange for needed discharge resources and transportation as appropriate   Identify discharge learning needs (meds, wound care, etc)     Problem: Safety - Adult  Goal: Free from fall injury  Outcome: Progressing  Flowsheets (Taken 12/28/2024 2030)  Free From Fall Injury:   Instruct family/caregiver on patient safety   Based on caregiver fall risk screen, instruct family/caregiver to ask for assistance with transferring infant if caregiver noted to have fall risk factors     Problem: Skin/Tissue Integrity  Goal: Absence of new skin breakdown  Description: 1.  Monitor for areas of redness and/or skin breakdown  2.  Assess vascular access sites hourly  3.  Every 4-6 hours minimum:  Change oxygen saturation probe site  4.  Every 4-6 hours:  If on nasal continuous positive airway pressure, respiratory therapy assess nares and determine need for appliance change or resting period.  Outcome: Progressing     
.    Patient will benefit from skilled intervention to address the above impairments.    Functional Outcome Measure:  The patient scored 70 on the Barthel outcome measure which is indicative of minimal independence.           PLAN :  Recommendations and Planned Interventions:   bed mobility training, transfer training, gait training, therapeutic exercises, neuromuscular re-education, edema management/control, patient and family training/education, and therapeutic activities    Frequency/Duration: Patient will be followed by physical therapy to address goals, PT Plan of Care: 5 times/week to address goals.    Recommend for next PT session: further progression of gait with existing device    Recommendation for discharge: (in order for the patient to meet his/her long term goals):   Intermittent physical therapy up to 2-3x/week in previous living setting    Other factors to consider for discharge: no additional factors    IF patient discharges home will need the following DME: continuing to assess with progress                SUBJECTIVE:   Patient stated “I was hoping to go home today.”    OBJECTIVE DATA SUMMARY:       Past Medical History:   Diagnosis Date    Advanced care planning/counseling discussion 10/8/2015    Basal cell carcinoma, face     now sees Dr. Kim.    Bladder cancer (HCC) 2012    Dr. Dotson. cyto 9/2014, 2/2016. s/p surgery, BCG irrigation [place].  saw Dr. Alarcon    BPH with obstruction/lower urinary tract symptoms     Chronic lower back pain     injection in [place].  saw Dr. Richardson    Chronic neck pain     limited motion to side    Depression, major     taking sertaline since before 2014    Dupuytren's contracture of left hand     5th finger    Encephalopathy 8/2021, 11/21/21    admitted     Epistaxis 2013    saw ENT at [facility]    Family history of skin cancer     Fecal incontinence     with loose stools    Hearing loss     has hearing aids    HTN (hypertension)     Insomnia     IVCD (intraventricular

## 2024-12-29 NOTE — CONSULTS
Reconciliation, Ar   cyanocobalamin 1000 MCG tablet Take 1 tablet by mouth daily 10/11/22   Automatic Reconciliation, Ar   ergocalciferol (ERGOCALCIFEROL) 1.25 MG (40652 UT) capsule Take 1 capsule by mouth every 7 days 10/11/22   Automatic Reconciliation, Ar   finasteride (PROSCAR) 5 MG tablet Take 1 tablet by mouth daily 3/31/22   Automatic Reconciliation, Ar   irbesartan (AVAPRO) 150 MG tablet Take 1 tablet by mouth 2 times daily 4/9/22   Automatic Reconciliation, Ar      PMHx:  has a past medical history of Advanced care planning/counseling discussion, Basal cell carcinoma, face, Bladder cancer (HCC), BPH with obstruction/lower urinary tract symptoms, Chronic lower back pain, Chronic neck pain, Depression, major, Dupuytren's contracture of left hand, Encephalopathy, Epistaxis, Family history of skin cancer, Fecal incontinence, Hearing loss, HTN (hypertension), Insomnia, IVCD (intraventricular conduction defect), Loose stools, MRI contraindicated due to metal implant, Parkinsonian features, Paroxysmal atrial fibrillation (HCC), Peripheral neuropathy, RLS (restless legs syndrome), SCC (squamous cell carcinoma), face, Seasonal allergic reaction, Skin cancer, SSS (sick sinus syndrome) (HCC), and 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   PSurgHx:  has a past surgical history that includes Retinal detachment surgery (Right); other surgical history (10/2021); malignant skin lesion excision (06/14/2018); cystourethroscopy (2/29/16); cystourethroscopy (5/1/15); cystourethroscopy (9/2014); Colonoscopy (2012); hernia repair; and bladder repair (2012).  PSocHx:  reports that he has quit smoking. He has never used smokeless tobacco. He reports current alcohol use of about 3.0 standard drinks of alcohol per week. He reports that he does not use drugs.   ROS:  (Complete - 10 systems)     POSITIVES: None    NEGATIVES: Weight loss (Constitutional), Dry mouth (ENMT), Chest pain (CV), SOB (Respiratory), Constipation

## 2024-12-30 VITALS
HEIGHT: 71 IN | OXYGEN SATURATION: 92 % | WEIGHT: 229 LBS | SYSTOLIC BLOOD PRESSURE: 140 MMHG | RESPIRATION RATE: 14 BRPM | HEART RATE: 60 BPM | BODY MASS INDEX: 32.06 KG/M2 | DIASTOLIC BLOOD PRESSURE: 73 MMHG | TEMPERATURE: 98.1 F

## 2024-12-30 LAB
ALBUMIN SERPL-MCNC: 2.9 G/DL (ref 3.5–5)
ALBUMIN/GLOB SERPL: 0.8 (ref 1.1–2.2)
ALP SERPL-CCNC: 79 U/L (ref 45–117)
ALT SERPL-CCNC: 10 U/L (ref 12–78)
ANION GAP SERPL CALC-SCNC: 9 MMOL/L (ref 2–12)
AST SERPL-CCNC: 5 U/L (ref 15–37)
BASOPHILS # BLD: 0 K/UL (ref 0–0.1)
BASOPHILS NFR BLD: 0 % (ref 0–1)
BILIRUB SERPL-MCNC: 0.4 MG/DL (ref 0.2–1)
BUN SERPL-MCNC: 21 MG/DL (ref 6–20)
BUN/CREAT SERPL: 15 (ref 12–20)
CALCIUM SERPL-MCNC: 8.5 MG/DL (ref 8.5–10.1)
CHLORIDE SERPL-SCNC: 110 MMOL/L (ref 97–108)
CO2 SERPL-SCNC: 22 MMOL/L (ref 21–32)
CREAT SERPL-MCNC: 1.41 MG/DL (ref 0.7–1.3)
DIFFERENTIAL METHOD BLD: ABNORMAL
EOSINOPHIL # BLD: 0.2 K/UL (ref 0–0.4)
EOSINOPHIL NFR BLD: 3 % (ref 0–7)
ERYTHROCYTE [DISTWIDTH] IN BLOOD BY AUTOMATED COUNT: 12.5 % (ref 11.5–14.5)
GLOBULIN SER CALC-MCNC: 3.5 G/DL (ref 2–4)
GLUCOSE SERPL-MCNC: 103 MG/DL (ref 65–100)
HCT VFR BLD AUTO: 34.2 % (ref 36.6–50.3)
HGB BLD-MCNC: 11.1 G/DL (ref 12.1–17)
IMM GRANULOCYTES # BLD AUTO: 0 K/UL (ref 0–0.04)
IMM GRANULOCYTES NFR BLD AUTO: 0 % (ref 0–0.5)
LYMPHOCYTES # BLD: 1.3 K/UL (ref 0.8–3.5)
LYMPHOCYTES NFR BLD: 26 % (ref 12–49)
MCH RBC QN AUTO: 31.7 PG (ref 26–34)
MCHC RBC AUTO-ENTMCNC: 32.5 G/DL (ref 30–36.5)
MCV RBC AUTO: 97.7 FL (ref 80–99)
MONOCYTES # BLD: 0.6 K/UL (ref 0–1)
MONOCYTES NFR BLD: 12 % (ref 5–13)
NEUTS SEG # BLD: 2.9 K/UL (ref 1.8–8)
NEUTS SEG NFR BLD: 59 % (ref 32–75)
NRBC # BLD: 0 K/UL (ref 0–0.01)
NRBC BLD-RTO: 0 PER 100 WBC
PLATELET # BLD AUTO: 185 K/UL (ref 150–400)
PMV BLD AUTO: 10.4 FL (ref 8.9–12.9)
POTASSIUM SERPL-SCNC: 3.2 MMOL/L (ref 3.5–5.1)
PROT SERPL-MCNC: 6.4 G/DL (ref 6.4–8.2)
RBC # BLD AUTO: 3.5 M/UL (ref 4.1–5.7)
SODIUM SERPL-SCNC: 141 MMOL/L (ref 136–145)
WBC # BLD AUTO: 5 K/UL (ref 4.1–11.1)

## 2024-12-30 PROCEDURE — 85025 COMPLETE CBC W/AUTO DIFF WBC: CPT

## 2024-12-30 PROCEDURE — 6370000000 HC RX 637 (ALT 250 FOR IP): Performed by: FAMILY MEDICINE

## 2024-12-30 PROCEDURE — 80053 COMPREHEN METABOLIC PANEL: CPT

## 2024-12-30 PROCEDURE — 6370000000 HC RX 637 (ALT 250 FOR IP): Performed by: NURSE PRACTITIONER

## 2024-12-30 RX ORDER — TAMSULOSIN HYDROCHLORIDE 0.4 MG/1
0.4 CAPSULE ORAL DAILY
Qty: 30 CAPSULE | Refills: 0 | Status: SHIPPED | OUTPATIENT
Start: 2024-12-31

## 2024-12-30 RX ADMIN — CARBIDOPA AND LEVODOPA 1 TABLET: 25; 100 TABLET ORAL at 09:46

## 2024-12-30 RX ADMIN — AMLODIPINE BESYLATE 10 MG: 5 TABLET ORAL at 09:46

## 2024-12-30 RX ADMIN — SERTRALINE HYDROCHLORIDE 25 MG: 50 TABLET ORAL at 09:46

## 2024-12-30 RX ADMIN — TAMSULOSIN HYDROCHLORIDE 0.4 MG: 0.4 CAPSULE ORAL at 09:46

## 2024-12-30 RX ADMIN — FINASTERIDE 5 MG: 5 TABLET, FILM COATED ORAL at 09:46

## 2024-12-30 NOTE — DISCHARGE SUMMARY
currently controlled  Creatinine mildly elevated, holding irbesartan for now  Will restart as appropriate        Hyperlipidemia  Atorvastatin and cholestyramine     Mood disorder  Continuing sertraline     Parkinson's  Continuing carbidopa/levodopa          PENDING TEST RESULTS:   At the time of discharge the following test results are still pending: x    FOLLOW UP APPOINTMENTS:    Follow-up Information       Follow up With Specialties Details Why Contact Philip    [facility]  Follow up 01- at 2:10 PM with Dr. Ford   ** call sooner for fevers, chills, nausea, vomiting, or severe pain.** [facility]  [address] [place] [address]  [phone]              ADDITIONAL CARE RECOMMENDATIONS:   F/u with PCP and Urology   Strain urine   Flomax  Return if you develop fevers/chills, abd pain, chest pain, shortness of breath    DIET: regular diet, increase PO fluids    ACTIVITY: activity as tolerated, has HH set up through PCP    WOUND CARE: x    EQUIPMENT needed: x      DISCHARGE MEDICATIONS:     Medication List        START taking these medications      tamsulosin 0.4 MG capsule  Commonly known as: FLOMAX  Take 1 capsule by mouth daily  Start taking on: December 31, 2024            CONTINUE taking these medications      acetaminophen 500 MG tablet  Commonly known as: TYLENOL     amLODIPine 10 MG tablet  Commonly known as: NORVASC     aspirin 81 MG chewable tablet     atorvastatin 40 MG tablet  Commonly known as: LIPITOR     carbidopa-levodopa  MG per tablet  Commonly known as: SINEMET     colestipol 1 g tablet  Commonly known as: COLESTID  Take 1 tablet by mouth daily Decreased 11/22/23     cyanocobalamin 1000 MCG tablet     ergocalciferol 1.25 MG (46177 UT) capsule  Commonly known as: ERGOCALCIFEROL     finasteride 5 MG tablet  Commonly known as: PROSCAR     irbesartan 150 MG tablet  Commonly known as: AVAPRO     nystatin-triamcinolone 049443-0.1 UNIT/GM-%

## 2024-12-30 NOTE — PROGRESS NOTES
[facility]      Patient: Denis Robin Jr. MRN: [id number]  SSN: [id number]    YOB: 1931  Age: 89 y.o.  Sex: male        ADMITTED: 12/27/2024 to Rosio Kowalski MD by Deanne Baugh MD for Ureteral calculus [N20.1]  Ureteric stone [N20.1]    Interval History:  Denis Robin Jr. Is resting quietly in bed in NAD. He remains afebrile with stable VS. His creatinine is at his baseline with stable WBC.  He denies pain, vomiting or difficulty voiding.    I have personally reviewed the most recent imagining.    Current Medications: all current  Medications have been eviewed in EPIC  Review of Systems: Pertinent items are noted in HPI.    Objective:  Vitals:    Vitals:    12/28/24 1222 12/28/24 2019 12/29/24 0804 12/29/24 2022   BP: (!) 152/84 137/78 (!) 149/81 125/81   Pulse: 60 73 67 93   Resp: 16 18 18 16   Temp: 97.9 °F (36.6 °C) 98.4 °F (36.9 °C) 97.5 °F (36.4 °C) 98.2 °F (36.8 °C)   TempSrc: Oral Oral Oral Oral   SpO2: 97% 94% 94% 95%   Weight:       Height:         Intake and Output:  No intake/output data recorded.  12/28 1901 - 12/30 0700  In: -   Out: 2000 [Urine:2000]    LABS:  Recent Labs     12/30/24  0520 12/29/24  0818 12/27/24  2301    139 140   K 3.2* 3.9 3.8   * 112* 110*   CO2 22 20* 22   BUN 21* 25* 28*   CREATININE 1.41* 2.23* 1.73*   CALCIUM 8.5 8.7 9.5   MG  --   --  2.2     Recent Labs     12/30/24  0520 12/29/24  0818 12/27/24  2301   WBC 5.0 6.1 6.6   HGB 11.1* 12.8 13.2   HCT 34.2* 38.8 39.2    205 231     No results for input(s): \"KU\", \"CLU\" in the last 720 hours.    Invalid input(s): \"MARIO\", \"CREAU\", \"PROU\"  No results found for: \"SDES\"  No components found for: \"CULT\"  Recent Results (from the past 24 hour(s))   Comprehensive Metabolic Panel    Collection Time: 12/29/24  8:18 AM   Result Value Ref Range    Sodium 139 136 - 145 mmol/L    Potassium 3.9 3.5 - 5.1 mmol/L    Chloride 112 (H) 97 - 108 mmol/L    CO2 20 (L) 21 - 32 mmol/L

## 2024-12-30 NOTE — PROGRESS NOTES
Spiritual Health History and Assessment/Progress Note  [facility]    Rituals, Rites and Sacraments,  ,  ,      Name: Denis Robin Jr. MRN: [id number]    Age: 89 y.o.     Sex: male   Language: English   Holiness: Restorationism   Ureteral calculus     Date: 12/30/2024            Total Time Calculated: 5 min              Spiritual Assessment began in [facility] 5E2 SURGICAL UNIT        Referral/Consult From: Clergy/   Encounter Overview/Reason: Rituals, Rites and Sacraments  Service Provided For: Patient    Nan, Belief, Meaning:   Patient is connected with a nan tradition or spiritual practice  Family/Friends No family/friends present      Importance and Influence:  Patient unable to assess at this time  Family/Friends No family/friends present    Community:  Patient is connected with a spiritual community  Family/Friends No family/friends present    Assessment and Plan of Care:     Patient Interventions include: Provided sacramental/Advent ritual  Family/Friends Interventions include: No family/friends present    Patient Plan of Care: Spiritual Care available upon further referral  Family/Friends Plan of Care: Spiritual Care available upon further referral    Electronically signed by Chaplain NUZHAT on 12/30/2024 at 12:42 PM    [facility] Mr. Robin was dressed and waiting to be discharged. He declined communion today and received prayer.     Sr. RICKI Marcelo, RN, ACSW, LCSW   Page:  [phone])

## 2024-12-30 NOTE — DISCHARGE INSTRUCTIONS
Discharge Instructions       PATIENT ID: Denis Robin Jr.  MRN: [id number]   YOB: 1931    DATE OF ADMISSION: 12/27/2024   DATE OF DISCHARGE: 12/30/2024    PRIMARY CARE PROVIDER: Andrea Garcia     ATTENDING PHYSICIAN: Rosio Kowalski MD   DISCHARGING PROVIDER: Aishwarya Ford PA-C    To contact this individual call [phone] and ask the  to page.   If unavailable ask to be transferred the Adult Hospitalist Department.    DISCHARGE DIAGNOSES     Right ureteral calculus  Hydroureter and hydronephrosis noted on CT due to 4 mm calculus  Continue IV hydration  Consult with urology - f/u outpatient   Continue multimodal pain control  Holding off on antibiotics, urine does not look infected     Acute kidney injury  Resolved   IV hydration     Abdominal hernia  Has had surgery in the past  No abdominal symptoms, will continue to monitor     hx bladder cancer  BPH  S/p resection  Continuing tamsulosin and finasteride     Hypertension  Continuing amlodipine  Blood pressure currently controlled  Creatinine mildly elevated, holding irbesartan for now  Will restart as appropriate        Hyperlipidemia  Atorvastatin and cholestyramine     Mood disorder  Continuing sertraline     Parkinson's  Continuing carbidopa/levodopa    CONSULTATIONS: [unfilled]    PROCEDURES/SURGERIES: * No surgery found *    PENDING TEST RESULTS:   At the time of discharge the following test results are still pending: x    FOLLOW UP APPOINTMENTS:   [unfilled]     ADDITIONAL CARE RECOMMENDATIONS:   F/u with PCP and Urology   Strain urine   Flomax  Return if you develop fevers/chills, abd pain, chest pain, shortness of breath    DIET: regular diet, increase PO fluids    ACTIVITY: activity as tolerated, has HH set up through PCP    WOUND CARE: x    EQUIPMENT needed: x    DISCHARGE MEDICATIONS:   See Medication Reconciliation Form    It is important that you take the medication exactly as they are prescribed.   Keep your

## 2024-12-30 NOTE — CARE COORDINATION
Care Management Initial Assessment       RUR: 12% Low   Readmission? No  1st IM letter given? Yes - 12/28/24  1st  letter given: NA    CM met with patient at bedside to introduce self and explain role. Patient resides at [facility] (p: [phone]). Patient is modified independent with ADL's and ambulates with the use of a cane; reports that he also uses a RW at night when using the bathroom. History of HH PT with [facility] (on site therapy within [facility]). Patient reports that he already has HH PT in place with [facility]; stating he does not need CM to assist with therapy needs. Attending and nursing made aware. Son will provide transport home today, Mon 12/30. No further CM needs.      12/30/24 1046   Service Assessment   Patient Orientation Alert and Oriented;Person;Place;Situation;Self   Cognition Alert   History Provided By Patient   Primary Caregiver Self   Accompanied By/Relationship Son   Support Systems Parent;Family Members   Patient's Healthcare Decision Maker is: Legal Next of Kin   PCP Verified by CM Yes  (Dr. Andrea Garcia)   Last Visit to PCP Within last 3 months   Prior Functional Level Cooking;Assistance with the following:   Current Functional Level Assistance with the following:   Can patient return to prior living arrangement Yes   Ability to make needs known: Good   Family able to assist with home care needs: Yes   Would you like for me to discuss the discharge plan with any other family members/significant others, and if so, who? Yes   Financial Resources Medicare   Social/Functional History   Lives With Alone   Type of Home Assisted living  ([facility])   Home Layout One level   Home Equipment Cane;Walker - Rolling   Receives Help From Home health  (HH PT w/ on site [facility] agency, [facility])   Prior Level of Assist for ADLs Needs assistance   Prior Level of Assist for Homemaking Needs assistance   Ambulation Assistance Independent   Prior Level of Assist for Transfers

## 2024-12-31 ENCOUNTER — TELEPHONE (OUTPATIENT)
Facility: CLINIC | Age: 88
End: 2024-12-31

## 2024-12-31 NOTE — TELEPHONE ENCOUNTER
Care Transitions Initial Follow Up Call    Outreach made within 2 business days of discharge: Yes    Patient: Denis Robin Jr. Patient : 7/15/1931   MRN: [id number]  Reason for Admission: ureter stone   Discharge Date: 24       Spoke with: Patient's son    Discharge department/facility: Home to [facility]    TCM Interactive Patient Contact:  Was patient able to fill all prescriptions: Yes  Was patient instructed to bring all medications to the follow-up visit: Yes  Is patient taking all medications as directed in the discharge summary? Yes  Does patient understand their discharge instructions: Yes  Does patient have questions or concerns that need addressed prior to 7-14 day follow up office visit: no    Additional needs identified to be addressed with provider  Patient's son had additional questions & concerns which were addressed by nurse. Per patient's son's request, nurse left a voicemail for the [facility] office as patient's son has requested a call from [facility] (# [phone]) bc he is disappointed with the way his father was treated at the hospital leif the discharge. Nurse forwarded discharge summary to patient's son. Nurse offered a hospital follow-up with PCP in-office or virtual & patient's son declined as patient will be following up with his urologist.  Patient's son states that patient does have an appt scheduled with urologist in the near future. Patient's son verbalized appreciation of call.              Scheduled appointment with PCP within 7-14 days    Follow Up  Future Appointments   Date Time Provider Department Center   2025 11:40 AM Etta Devlin MD [facility] T Jeffress, RN

## 2024-12-31 NOTE — PROGRESS NOTES
Physician Progress Note      PATIENT:               HOLLIS KRAUSE  CSN #:                  [id number]  :                       7/15/1931  ADMIT DATE:       2024 11:29 PM  DISCH DATE:        2024 12:07 PM  RESPONDING  PROVIDER #:        Aishwarya Ford PA-C          QUERY TEXT:    Patient was admitted with right ureteral calculus with upstream right   hydroureter and right hydronephrosis. Pt noted to have BPH in H&P note on    and was treated with Proscar.?If possible, please document in progress   notes and discharge summary if you are evaluating and/or treating any of the   following:    The medical record reflects the following:  Risk Factors: Age 89 M  Clinical Indicators:  H&P stated BPH and was treated with Proscar.  Treatment: Proscar.    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    Query created by: Bria Cardozo on 2024 11:04 PM      Electronically signed by:  Aishwarya Ford PA-C 2024 9:15 AM

## 2025-03-17 ENCOUNTER — TELEPHONE (OUTPATIENT)
Facility: CLINIC | Age: 89
End: 2025-03-17

## 2025-03-17 NOTE — TELEPHONE ENCOUNTER
Patient calling to speak with nurse regarding hip and knee pain - I did attempt to schedule patient this week with provider but did not want to schedule at this time with me as he states he probably just needs to be referred to ortho.   Patient states he is worried he may fall down and is having a hard time getting around.

## 2025-03-17 NOTE — TELEPHONE ENCOUNTER
Spoke with patient and he reports his right hip is having a lot of pain and pain to the lower right back also. Pain in the right knee also. He thinks he may need an injection. He is having difficulty walking and sleeping. Advised he should schedule an appt with Dr. Garcia to discuss and Dr. Garcia can order any imaging required to follow up and recommend an appropriate Orthopedic. He states understanding and scheduled for 3/21/25 at 1100 AM.  Grateful for the call.

## 2025-03-20 ENCOUNTER — OFFICE VISIT (OUTPATIENT)
Facility: CLINIC | Age: 89
End: 2025-03-20
Payer: MEDICARE

## 2025-03-20 VITALS
HEART RATE: 86 BPM | TEMPERATURE: 97.9 F | HEIGHT: 71 IN | DIASTOLIC BLOOD PRESSURE: 72 MMHG | SYSTOLIC BLOOD PRESSURE: 120 MMHG | OXYGEN SATURATION: 92 % | WEIGHT: 229 LBS | BODY MASS INDEX: 32.06 KG/M2

## 2025-03-20 DIAGNOSIS — M54.16 CHRONIC RIGHT-SIDED LUMBAR RADICULOPATHY: Primary | ICD-10-CM

## 2025-03-20 DIAGNOSIS — N20.0 NEPHROLITHIASIS: ICD-10-CM

## 2025-03-20 DIAGNOSIS — I48.0 PAROXYSMAL ATRIAL FIBRILLATION (HCC): ICD-10-CM

## 2025-03-20 DIAGNOSIS — M47.896 OTHER OSTEOARTHRITIS OF SPINE, LUMBAR REGION: ICD-10-CM

## 2025-03-20 DIAGNOSIS — I10 ESSENTIAL HYPERTENSION: ICD-10-CM

## 2025-03-20 DIAGNOSIS — N18.32 CHRONIC KIDNEY DISEASE, STAGE 3B (HCC): ICD-10-CM

## 2025-03-20 DIAGNOSIS — M48.10 DISH (DISSEMINATED IDIOPATHIC SKELETAL HYPEROSTOSIS): ICD-10-CM

## 2025-03-20 DIAGNOSIS — C66.1 UROTHELIAL CARCINOMA OF RIGHT DISTAL URETER: ICD-10-CM

## 2025-03-20 PROBLEM — F32.1 MAJOR DEPRESSIVE DISORDER, SINGLE EPISODE, MODERATE (HCC): Status: RESOLVED | Noted: 2024-05-24 | Resolved: 2025-03-20

## 2025-03-20 PROCEDURE — 1125F AMNT PAIN NOTED PAIN PRSNT: CPT | Performed by: INTERNAL MEDICINE

## 2025-03-20 PROCEDURE — 1123F ACP DISCUSS/DSCN MKR DOCD: CPT | Performed by: INTERNAL MEDICINE

## 2025-03-20 PROCEDURE — 1159F MED LIST DOCD IN RCRD: CPT | Performed by: INTERNAL MEDICINE

## 2025-03-20 PROCEDURE — 99214 OFFICE O/P EST MOD 30 MIN: CPT | Performed by: INTERNAL MEDICINE

## 2025-03-20 PROCEDURE — 1160F RVW MEDS BY RX/DR IN RCRD: CPT | Performed by: INTERNAL MEDICINE

## 2025-03-20 RX ORDER — PREDNISONE 20 MG/1
TABLET ORAL
Qty: 18 TABLET | Refills: 0 | Status: SHIPPED | OUTPATIENT
Start: 2025-03-20

## 2025-03-20 RX ORDER — PREDNISONE 20 MG/1
TABLET ORAL
Qty: 18 TABLET | Refills: 0 | Status: SHIPPED | OUTPATIENT
Start: 2025-03-20 | End: 2025-03-20 | Stop reason: SDUPTHER

## 2025-03-20 SDOH — ECONOMIC STABILITY: FOOD INSECURITY: WITHIN THE PAST 12 MONTHS, YOU WORRIED THAT YOUR FOOD WOULD RUN OUT BEFORE YOU GOT MONEY TO BUY MORE.: NEVER TRUE

## 2025-03-20 SDOH — ECONOMIC STABILITY: FOOD INSECURITY: WITHIN THE PAST 12 MONTHS, THE FOOD YOU BOUGHT JUST DIDN'T LAST AND YOU DIDN'T HAVE MONEY TO GET MORE.: NEVER TRUE

## 2025-03-20 ASSESSMENT — PATIENT HEALTH QUESTIONNAIRE - PHQ9
SUM OF ALL RESPONSES TO PHQ QUESTIONS 1-9: 0
SUM OF ALL RESPONSES TO PHQ QUESTIONS 1-9: 0
4. FEELING TIRED OR HAVING LITTLE ENERGY: NOT AT ALL
SUM OF ALL RESPONSES TO PHQ QUESTIONS 1-9: 0
6. FEELING BAD ABOUT YOURSELF - OR THAT YOU ARE A FAILURE OR HAVE LET YOURSELF OR YOUR FAMILY DOWN: NOT AT ALL
8. MOVING OR SPEAKING SO SLOWLY THAT OTHER PEOPLE COULD HAVE NOTICED. OR THE OPPOSITE, BEING SO FIGETY OR RESTLESS THAT YOU HAVE BEEN MOVING AROUND A LOT MORE THAN USUAL: NOT AT ALL
10. IF YOU CHECKED OFF ANY PROBLEMS, HOW DIFFICULT HAVE THESE PROBLEMS MADE IT FOR YOU TO DO YOUR WORK, TAKE CARE OF THINGS AT HOME, OR GET ALONG WITH OTHER PEOPLE: NOT DIFFICULT AT ALL
1. LITTLE INTEREST OR PLEASURE IN DOING THINGS: NOT AT ALL
SUM OF ALL RESPONSES TO PHQ QUESTIONS 1-9: 0
9. THOUGHTS THAT YOU WOULD BE BETTER OFF DEAD, OR OF HURTING YOURSELF: NOT AT ALL
5. POOR APPETITE OR OVEREATING: NOT AT ALL
3. TROUBLE FALLING OR STAYING ASLEEP: NOT AT ALL
7. TROUBLE CONCENTRATING ON THINGS, SUCH AS READING THE NEWSPAPER OR WATCHING TELEVISION: NOT AT ALL
2. FEELING DOWN, DEPRESSED OR HOPELESS: NOT AT ALL

## 2025-03-20 NOTE — PROGRESS NOTES
Identified pt with two pt identifiers(name and ). Reviewed record in preparation for visit and have obtained necessary documentation. All patient medications has been reviewed.  Chief Complaint   Patient presents with    Back Pain    Hip Pain    Knee Pain       Health Maintenance Due   Topic    Annual Wellness Visit (Medicare Advantage)      Health Maintenance Review: Patient reminded of \"due or due soon\" health maintenance. I have asked the patient to contact his/her primary care provider (PCP) for follow-up on his/her health maintenance.    Wt Readings from Last 3 Encounters:   25 103.9 kg (229 lb)   24 103.9 kg (229 lb)   24 104.8 kg (231 lb)     Temp Readings from Last 3 Encounters:   25 97.9 °F (36.6 °C)   24 98.1 °F (36.7 °C) (Oral)   24 97.8 °F (36.6 °C)     BP Readings from Last 3 Encounters:   25 120/72   24 (!) 140/73   24 116/74     Pulse Readings from Last 3 Encounters:   25 86   24 60   24 83       1. \"Have you been to the ER, urgent care clinic since your last visit?  Hospitalized since your last visit?\" Yes [facility]    2. \"Have you seen or consulted any other health care providers outside of the [facility] since your last visit?\" No     3. For patients aged 45-75: Has the patient had a colonoscopy / FIT/ Cologuard? NA - based on age    Patient is accompanied by self I have received verbal consent from Denis Robin Jr. to discuss any/all medical information while they are present in the room.

## 2025-03-21 NOTE — PROGRESS NOTES
HISTORY OF PRESENT ILLNESS    Chief Complaint   Patient presents with    Back Pain    Hip Pain    Knee Pain     History of Present Illness  The patient is a 89-year-old male who presents for a follow-up visit.    He reports experiencing significant pain in his right hip and knee, which he believes is primarily originating from the hip. The pain radiates down his upper leg and terminates at the knee. He has previously undergone physical therapy for his back, but it did not provide substantial relief. He has not attempted any pharmacological or topical treatments for his back pain. The intensity of his pain has escalated recently, to the extent that he avoids walking around the grocery store unless absolutely necessary. He also reports occasional hand locking, particularly when holding a newspaper or attempting to read. He has been making efforts to maintain hydration by drinking water frequently and continues to enjoy a glass of wine with dinner. He recalls receiving an injection in his right knee a few years ago, which had been effective until recently.    He experiences intermittent bilateral foot pain, predominantly at night after standing for approximately half an hour. He finds it challenging to reach over and stretch his toes back, a maneuver that occasionally provides relief. However, his back pain impedes this action.    He is uncertain about the status of his kidney stone dissolution and continues to consult with his urologist.    SOCIAL HISTORY  He still likes a glass of wine at dinner time.    Results  Laboratory Studies  Potassium was slightly low on 12/30/2024.    Imaging  CAT scan of abdomen on 12/28/2024 showed a 4 mm kidney stone and slight right hydroureter. CAT scan of spine in January 2022 showed DISH and multiple levels of nerve root impingement, foraminal stenosis in the spinal canal.    Review of Systems   All other systems reviewed and are negative, except as noted in HPI    Current Outpatient

## 2025-04-14 ENCOUNTER — TRANSCRIBE ORDERS (OUTPATIENT)
Facility: HOSPITAL | Age: 89
End: 2025-04-14

## 2025-04-14 DIAGNOSIS — M51.362 DEGENERATION OF INTERVERTEBRAL DISC OF LUMBAR REGION WITH DISCOGENIC BACK PAIN AND LOWER EXTREMITY PAIN: ICD-10-CM

## 2025-04-14 DIAGNOSIS — M47.816 LUMBAR SPONDYLOSIS: ICD-10-CM

## 2025-04-14 DIAGNOSIS — M54.16 RADICULOPATHY OF LUMBAR REGION: Primary | ICD-10-CM

## 2025-04-17 ENCOUNTER — TRANSCRIBE ORDERS (OUTPATIENT)
Facility: HOSPITAL | Age: 89
End: 2025-04-17

## 2025-04-17 DIAGNOSIS — M47.816 LUMBAR SPONDYLOSIS: ICD-10-CM

## 2025-04-17 DIAGNOSIS — M54.16 LUMBAR RADICULOPATHY: Primary | ICD-10-CM

## 2025-04-17 DIAGNOSIS — M51.362 DEGENERATION OF INTERVERTEBRAL DISC OF LUMBAR REGION WITH DISCOGENIC BACK PAIN AND LOWER EXTREMITY PAIN: ICD-10-CM

## 2025-04-18 ENCOUNTER — HOSPITAL ENCOUNTER (OUTPATIENT)
Facility: HOSPITAL | Age: 89
Discharge: HOME | End: 2025-04-21
Attending: PHYSICAL MEDICINE & REHABILITATION
Payer: MEDICARE

## 2025-04-18 DIAGNOSIS — M51.362 DEGENERATION OF INTERVERTEBRAL DISC OF LUMBAR REGION WITH DISCOGENIC BACK PAIN AND LOWER EXTREMITY PAIN: ICD-10-CM

## 2025-04-18 DIAGNOSIS — M54.16 LUMBAR RADICULOPATHY: ICD-10-CM

## 2025-04-18 DIAGNOSIS — M47.816 LUMBAR SPONDYLOSIS: ICD-10-CM

## 2025-04-18 PROCEDURE — 72131 CT LUMBAR SPINE W/O DYE: CPT

## 2025-05-05 ENCOUNTER — TELEPHONE (OUTPATIENT)
Facility: CLINIC | Age: 89
End: 2025-05-05

## 2025-05-05 DIAGNOSIS — M54.16 CHRONIC RIGHT-SIDED LUMBAR RADICULOPATHY: Primary | ICD-10-CM

## 2025-05-05 DIAGNOSIS — M48.061 SPINAL STENOSIS AT L4-L5 LEVEL: ICD-10-CM

## 2025-05-05 NOTE — TELEPHONE ENCOUNTER
Patient son Denis Robin (HIPAA verified). Patient had a CAT SCAN at [facility]. L4 showed bulge. Patient had an injection in his back. They would like a referral to Dr. Gardner [phone]. Please call son at [phone]. Concerned with falls.     Can not have MRI due to metal in eye.

## 2025-05-05 NOTE — TELEPHONE ENCOUNTER
Spoke with Edward/Son (HIPAA VERIFIED) and advised that Dr. Garcia has made a referral to Dr. Gardner as requested and we will sen over the written report also with referral. Advised he will need to obtain a copy of the Ct to on disc for the Neuro surgeon. He states understanding. He reports that patient has an appt set up for 5/15/25 already. Grateful for the call.

## 2025-05-05 NOTE — TELEPHONE ENCOUNTER
CT of lumbar spine from 4/18/25 reviewed which does show moderate narrowing of spinal canal at L3-4 because of facet arthropathy and disc bulge.    I have no problem referring him to Dr. Gardner for consultation, but I doubt this will be a surgical treatment.  Referral placed.  Printed up CT scan to fax with referral.  He is seeing Dr. Garber at [facility] for injections and pain.

## 2025-06-17 ENCOUNTER — TRANSCRIBE ORDERS (OUTPATIENT)
Facility: HOSPITAL | Age: 89
End: 2025-06-17

## 2025-06-17 DIAGNOSIS — M48.062 LUMBAR STENOSIS WITH NEUROGENIC CLAUDICATION: Primary | ICD-10-CM

## 2025-06-20 ENCOUNTER — TELEPHONE (OUTPATIENT)
Facility: CLINIC | Age: 89
End: 2025-06-20

## 2025-06-20 DIAGNOSIS — B37.2 CANDIDAL INTERTRIGO: ICD-10-CM

## 2025-06-20 NOTE — TELEPHONE ENCOUNTER
Requesting     Hemorrhoid cream    Send to [facility]  Located in: [facility]  Address: [address] [place] [address]  Phone: [phone]    His son wants to know what to do about the handicap pass, he states he accidentally throw away the paperwork.     Please advise   Denis  [phone]

## 2025-06-23 RX ORDER — NYSTATIN AND TRIAMCINOLONE ACETONIDE 100000; 1 [USP'U]/G; MG/G
CREAM TOPICAL
Qty: 30 G | Refills: 0 | Status: SHIPPED | OUTPATIENT
Start: 2025-06-23

## 2025-06-23 NOTE — TELEPHONE ENCOUNTER
Spoke with Edward/Son and advised that patient cream has been sent to the pharmacy and that Dr. Garcia will complete a DMV form. He states understanding and grateful for the call.

## 2025-06-24 ENCOUNTER — TELEPHONE (OUTPATIENT)
Facility: CLINIC | Age: 89
End: 2025-06-24

## 2025-06-24 NOTE — TELEPHONE ENCOUNTER
Spoke with Edflaquito/Son (HIPAA VERIFIED) and updated that his fathers DMV Form is ready for . He requests that it be mailed to him. Grateful for the call.

## 2025-07-17 ENCOUNTER — OFFICE VISIT (OUTPATIENT)
Age: 89
End: 2025-07-17
Payer: MEDICARE

## 2025-07-17 VITALS
BODY MASS INDEX: 31.22 KG/M2 | DIASTOLIC BLOOD PRESSURE: 64 MMHG | HEIGHT: 71 IN | SYSTOLIC BLOOD PRESSURE: 122 MMHG | WEIGHT: 223 LBS

## 2025-07-17 DIAGNOSIS — R29.818 PARKINSONIAN FEATURES: ICD-10-CM

## 2025-07-17 DIAGNOSIS — I48.0 PAROXYSMAL ATRIAL FIBRILLATION (HCC): Primary | ICD-10-CM

## 2025-07-17 DIAGNOSIS — C67.9 MALIGNANT NEOPLASM OF URINARY BLADDER, UNSPECIFIED SITE (HCC): ICD-10-CM

## 2025-07-17 DIAGNOSIS — I49.5 SSS (SICK SINUS SYNDROME) (HCC): ICD-10-CM

## 2025-07-17 DIAGNOSIS — I10 ESSENTIAL HYPERTENSION: ICD-10-CM

## 2025-07-17 DIAGNOSIS — E78.00 HYPERCHOLESTEREMIA: ICD-10-CM

## 2025-07-17 PROCEDURE — 1123F ACP DISCUSS/DSCN MKR DOCD: CPT | Performed by: NURSE PRACTITIONER

## 2025-07-17 PROCEDURE — 99214 OFFICE O/P EST MOD 30 MIN: CPT | Performed by: NURSE PRACTITIONER

## 2025-07-17 PROCEDURE — 93000 ELECTROCARDIOGRAM COMPLETE: CPT | Performed by: NURSE PRACTITIONER

## 2025-07-17 PROCEDURE — 1126F AMNT PAIN NOTED NONE PRSNT: CPT | Performed by: NURSE PRACTITIONER

## 2025-07-17 RX ORDER — TOLTERODINE TARTRATE 2 MG/1
TABLET, EXTENDED RELEASE ORAL
COMMUNITY
Start: 2025-03-13

## 2025-07-17 ASSESSMENT — PATIENT HEALTH QUESTIONNAIRE - PHQ9
1. LITTLE INTEREST OR PLEASURE IN DOING THINGS: NOT AT ALL
SUM OF ALL RESPONSES TO PHQ QUESTIONS 1-9: 0
SUM OF ALL RESPONSES TO PHQ QUESTIONS 1-9: 0
2. FEELING DOWN, DEPRESSED OR HOPELESS: NOT AT ALL
SUM OF ALL RESPONSES TO PHQ QUESTIONS 1-9: 0
SUM OF ALL RESPONSES TO PHQ QUESTIONS 1-9: 0

## 2025-07-17 NOTE — PATIENT INSTRUCTIONS
No changes to your medications today.     For second opinion regarding back surgery:   [facility] (at [place]) - [phone]    [facility] ([facility]) - [phone]    Return for follow up in 6 months.     Contact the [facility] for a scooter

## 2025-07-23 NOTE — PROGRESS NOTES
Denis Robin Jr.     7/15/1931         Etta Devlin MD, [facility]    Date of Visit-7/17/2025   PCP is Andrea Garcia MD     [facility] [place]  [facility]    HPI:  Denis Robin Jr. is a 89 y.o. male   Annual follow up   PAF, IVCD, falls, HTN,BB intolerant, XOL    Today  Ed returns today generally doing well with no complaints.  His wife is debilitated following back surgeries.  He has had some recent mechanical falls, not related to dizziness, syncope.  He has no chest pain, shortness of breath.  Has not had any cardiac issues of rapid heart rate or angina.  He has significant back pain, awaiting [facility] opinion [facility] - but leaning toward non-surgical management given his wife's issues.      PAF   HTN.   Frequent falls, dementia,stopped flecainide  Nov 2021   bladder cancer. CKD 3   Pt hospitalized in August 2021 - LEANN and Rhabdo with dehydration. An echo 8/30/21 showed normal EF  Echo 3/8/22 EF 55-60%, WNL  Carotids 8/29/21 minimal DAMEON, no LICA stenosis  admitted 11/21/21 with confusion and BP of 170. CT head was negative. Had LEANN, which improved with fluids. Pt went to the ER on 1/13/22  with another fall and head injury, and then again 1/29/22 .   On 3/6/22 admitted with further Bradycardia , , and thought to have ischemia, and given TPA on 3/7/22. Due to bradycardia his Toprol was stopped.   EF remained normal.    had significant issues with Diarrhea.    on Sinemet for Parkinson's. Moved with wife to a new facility    Assessment/Plan:    Patient Instructions   No changes to your medications today.     For second opinion regarding back surgery:   [facility] (at [place]) - [phone]    [facility] ([facility]) - [phone]    Return for follow up in 6 months.     [facility]     1. Paroxysmal atrial fibrillation (HCC)  Rate controlled, off flecainide, now back to   Asymptomatic  Holter monitor is